# Patient Record
Sex: MALE | Race: WHITE | NOT HISPANIC OR LATINO | Employment: OTHER | URBAN - NONMETROPOLITAN AREA
[De-identification: names, ages, dates, MRNs, and addresses within clinical notes are randomized per-mention and may not be internally consistent; named-entity substitution may affect disease eponyms.]

---

## 2017-04-04 ENCOUNTER — HOSPITAL ENCOUNTER (EMERGENCY)
Facility: HOSPITAL | Age: 77
Discharge: HOME | End: 2017-04-04
Attending: EMERGENCY MEDICINE | Admitting: EMERGENCY MEDICINE

## 2017-04-04 ENCOUNTER — APPOINTMENT (OUTPATIENT)
Dept: MRI IMAGING | Facility: HOSPITAL | Age: 77
End: 2017-04-04

## 2017-04-04 VITALS
WEIGHT: 180 LBS | SYSTOLIC BLOOD PRESSURE: 108 MMHG | RESPIRATION RATE: 18 BRPM | OXYGEN SATURATION: 95 % | TEMPERATURE: 97.9 F | BODY MASS INDEX: 27.28 KG/M2 | DIASTOLIC BLOOD PRESSURE: 63 MMHG | HEART RATE: 63 BPM | HEIGHT: 68 IN

## 2017-04-04 DIAGNOSIS — M54.2 NECK PAIN: Primary | ICD-10-CM

## 2017-04-04 LAB
ALBUMIN SERPL-MCNC: 3.4 G/DL (ref 3.5–5)
ALBUMIN/GLOB SERPL: 1.1 G/DL (ref 1.1–2.5)
ALP SERPL-CCNC: 158 U/L (ref 24–120)
ALT SERPL W P-5'-P-CCNC: 18 U/L (ref 0–54)
ANION GAP SERPL CALCULATED.3IONS-SCNC: 15 MMOL/L (ref 4–13)
APTT PPP: 32.9 SECONDS (ref 24.1–34.8)
AST SERPL-CCNC: 21 U/L (ref 7–45)
BASOPHILS # BLD AUTO: 0.05 10*3/MM3 (ref 0–0.2)
BASOPHILS NFR BLD AUTO: 0.5 % (ref 0–2)
BILIRUB SERPL-MCNC: 0.2 MG/DL (ref 0.1–1)
BUN BLD-MCNC: 20 MG/DL (ref 5–21)
BUN/CREAT SERPL: 18.5 (ref 7–25)
CALCIUM SPEC-SCNC: 8.6 MG/DL (ref 8.4–10.4)
CHLORIDE SERPL-SCNC: 102 MMOL/L (ref 98–110)
CO2 SERPL-SCNC: 24 MMOL/L (ref 24–31)
CREAT BLD-MCNC: 1.08 MG/DL (ref 0.5–1.4)
DEPRECATED RDW RBC AUTO: 43.9 FL (ref 40–54)
EOSINOPHIL # BLD AUTO: 0.31 10*3/MM3 (ref 0–0.7)
EOSINOPHIL NFR BLD AUTO: 2.8 % (ref 0–4)
ERYTHROCYTE [DISTWIDTH] IN BLOOD BY AUTOMATED COUNT: 12.4 % (ref 12–15)
GFR SERPL CREATININE-BSD FRML MDRD: 67 ML/MIN/1.73
GLOBULIN UR ELPH-MCNC: 3.2 GM/DL
GLUCOSE BLD-MCNC: 125 MG/DL (ref 70–100)
HCT VFR BLD AUTO: 34.2 % (ref 40–52)
HGB BLD-MCNC: 11.6 G/DL (ref 14–18)
IMM GRANULOCYTES # BLD: 0.03 10*3/MM3 (ref 0–0.03)
IMM GRANULOCYTES NFR BLD: 0.3 % (ref 0–5)
INR PPP: 0.94 (ref 0.91–1.09)
LYMPHOCYTES # BLD AUTO: 1.89 10*3/MM3 (ref 0.72–4.86)
LYMPHOCYTES NFR BLD AUTO: 17.1 % (ref 15–45)
MCH RBC QN AUTO: 32.9 PG (ref 28–32)
MCHC RBC AUTO-ENTMCNC: 33.9 G/DL (ref 33–36)
MCV RBC AUTO: 96.9 FL (ref 82–95)
MONOCYTES # BLD AUTO: 1.28 10*3/MM3 (ref 0.19–1.3)
MONOCYTES NFR BLD AUTO: 11.6 % (ref 4–12)
NEUTROPHILS # BLD AUTO: 7.47 10*3/MM3 (ref 1.87–8.4)
NEUTROPHILS NFR BLD AUTO: 67.7 % (ref 39–78)
PLATELET # BLD AUTO: 367 10*3/MM3 (ref 130–400)
PMV BLD AUTO: 10.4 FL (ref 6–12)
POTASSIUM BLD-SCNC: 3.7 MMOL/L (ref 3.5–5.3)
PROT SERPL-MCNC: 6.6 G/DL (ref 6.3–8.7)
PROTHROMBIN TIME: 12.8 SECONDS (ref 11.9–14.6)
RBC # BLD AUTO: 3.53 10*6/MM3 (ref 4.8–5.9)
SODIUM BLD-SCNC: 141 MMOL/L (ref 135–145)
WBC NRBC COR # BLD: 11.03 10*3/MM3 (ref 4.8–10.8)

## 2017-04-04 PROCEDURE — 25010000002 DIAZEPAM PER 5 MG: Performed by: EMERGENCY MEDICINE

## 2017-04-04 PROCEDURE — 96376 TX/PRO/DX INJ SAME DRUG ADON: CPT

## 2017-04-04 PROCEDURE — 96374 THER/PROPH/DIAG INJ IV PUSH: CPT

## 2017-04-04 PROCEDURE — 85610 PROTHROMBIN TIME: CPT | Performed by: EMERGENCY MEDICINE

## 2017-04-04 PROCEDURE — 99283 EMERGENCY DEPT VISIT LOW MDM: CPT

## 2017-04-04 PROCEDURE — 85025 COMPLETE CBC W/AUTO DIFF WBC: CPT | Performed by: EMERGENCY MEDICINE

## 2017-04-04 PROCEDURE — 72141 MRI NECK SPINE W/O DYE: CPT

## 2017-04-04 PROCEDURE — 25010000002 KETOROLAC TROMETHAMINE PER 15 MG: Performed by: EMERGENCY MEDICINE

## 2017-04-04 PROCEDURE — 85730 THROMBOPLASTIN TIME PARTIAL: CPT | Performed by: EMERGENCY MEDICINE

## 2017-04-04 PROCEDURE — 25010000002 HYDROMORPHONE PER 4 MG: Performed by: EMERGENCY MEDICINE

## 2017-04-04 PROCEDURE — 80053 COMPREHEN METABOLIC PANEL: CPT | Performed by: EMERGENCY MEDICINE

## 2017-04-04 PROCEDURE — 25010000002 ONDANSETRON PER 1 MG: Performed by: EMERGENCY MEDICINE

## 2017-04-04 PROCEDURE — 96375 TX/PRO/DX INJ NEW DRUG ADDON: CPT

## 2017-04-04 RX ORDER — ONDANSETRON 2 MG/ML
4 INJECTION INTRAMUSCULAR; INTRAVENOUS ONCE
Status: COMPLETED | OUTPATIENT
Start: 2017-04-04 | End: 2017-04-04

## 2017-04-04 RX ORDER — DIAZEPAM 5 MG/ML
2.5 INJECTION, SOLUTION INTRAMUSCULAR; INTRAVENOUS ONCE
Status: COMPLETED | OUTPATIENT
Start: 2017-04-04 | End: 2017-04-04

## 2017-04-04 RX ORDER — OXYCODONE AND ACETAMINOPHEN 10; 325 MG/1; MG/1
1 TABLET ORAL EVERY 6 HOURS PRN
COMMUNITY
End: 2017-05-09

## 2017-04-04 RX ORDER — KETOROLAC TROMETHAMINE 30 MG/ML
30 INJECTION, SOLUTION INTRAMUSCULAR; INTRAVENOUS ONCE
Status: COMPLETED | OUTPATIENT
Start: 2017-04-04 | End: 2017-04-04

## 2017-04-04 RX ADMIN — HYDROMORPHONE HYDROCHLORIDE 1 MG: 1 INJECTION, SOLUTION INTRAMUSCULAR; INTRAVENOUS; SUBCUTANEOUS at 16:45

## 2017-04-04 RX ADMIN — DIAZEPAM 2.5 MG: 5 INJECTION, SOLUTION INTRAMUSCULAR; INTRAVENOUS at 19:04

## 2017-04-04 RX ADMIN — DIAZEPAM 2.5 MG: 5 INJECTION, SOLUTION INTRAMUSCULAR; INTRAVENOUS at 16:45

## 2017-04-04 RX ADMIN — KETOROLAC TROMETHAMINE 30 MG: 30 INJECTION, SOLUTION INTRAMUSCULAR at 19:06

## 2017-04-04 RX ADMIN — ONDANSETRON HYDROCHLORIDE 4 MG: 2 SOLUTION INTRAMUSCULAR; INTRAVENOUS at 16:45

## 2017-04-04 NOTE — ED PROVIDER NOTES
Subjective  neck pain  History of Present Illness  Her Moore is a 76-year-old white male who comes in today with chief complaint of neck pain.  The patient has been seen at [facility] as well as his own primary care physician.  Although he is being given medication he tells me the medication really has not helped the discomfort that he has been experiencing.  Apparently had a CT scan done.  The CT scan revealed some arthritic changes.  Mr. Moore tells me that he knows he has cervical spine stenosis and he has been able to endure that the past but just recently this pain and discomfort has caused him a lot of angst.  When his son noted that the pain seemed to be worsening he decided to bring Mr. Moore to our emergency room to be evaluated.  Upon my arrival at the bedside the patient is very hard of hearing however he appears to be very uncomfortable especially with any movement. He is not Complaining of any paresthesias in either arm or legs.  Review of Systems   Constitutional: Negative.    HENT: Negative.    Eyes: Negative.    Respiratory: Negative.    Cardiovascular: Negative.    Gastrointestinal: Negative.    Endocrine: Negative.    Genitourinary: Negative.    Musculoskeletal: Positive for myalgias and neck pain.   Skin: Negative.    Allergic/Immunologic: Negative.    Neurological: Negative.    Hematological: Negative.    Psychiatric/Behavioral: Negative.    All other systems reviewed and are negative.      Past Medical History:   Diagnosis Date   • Anemia    • Diverticulitis    • Enlarged prostate    • GERD (gastroesophageal reflux disease)    • Hypertension    • Kidney infection    • Lumbago    • Vitamin B12 deficiency    • Weak urinary stream        No Known Allergies    Past Surgical History:   Procedure Laterality Date   • ABDOMINAL SURGERY     • HERNIA REPAIR     • ROTATOR CUFF REPAIR     • TONSILLECTOMY         Family History   Problem Relation Age of Onset   • Adopted: Yes       Social History      Social History   • Marital status: Legally      Spouse name: N/A   • Number of children: N/A   • Years of education: N/A     Social History Main Topics   • Smoking status: Never Smoker   • Smokeless tobacco: None   • Alcohol use No   • Drug use: None   • Sexual activity: Not Asked     Other Topics Concern   • None     Social History Narrative           Objective   Physical Exam   Constitutional: He is oriented to person, place, and time. He appears well-developed and well-nourished.   HENT:   Head: Normocephalic and atraumatic.   Right Ear: External ear normal.   Left Ear: External ear normal.   Nose: Nose normal.   Mouth/Throat: Oropharynx is clear and moist.   Eyes: Conjunctivae and EOM are normal. Pupils are equal, round, and reactive to light. Right eye exhibits no discharge. Left eye exhibits no discharge.   Neck: No JVD present. No thyromegaly present.   Significant tenderness to palpation entire neck area.  Noted pain on range of motion.      Cardiovascular: Normal rate, regular rhythm, normal heart sounds and intact distal pulses.  Exam reveals no friction rub.    No murmur heard.  Pulmonary/Chest: Effort normal and breath sounds normal. No stridor. No respiratory distress.   Abdominal: Soft. Bowel sounds are normal. He exhibits no distension. There is no tenderness.   Musculoskeletal: Normal range of motion. He exhibits no edema or deformity.   Lymphadenopathy:     He has no cervical adenopathy.   Neurological: He is alert and oriented to person, place, and time. He has normal reflexes. No cranial nerve deficit.   Skin: Skin is warm and dry. No rash noted.   Psychiatric: Judgment normal.   Nursing note and vitals reviewed.      Procedures         ED Course  ED Course   Comment By Time   I have spoken with Dr. Galindo concerning Mr. Moore's eyes scan.  He will see him tomorrow in the office.  Upon recheck there is some overall improvement but not a maximal amount.  We will try some Toradol as  well as some additional Valium. Ericka Btoello MD 04/04 7267   I took over from Dr. Botello at shift change.  Told the patient and family that his blood tests look okay.  They understand that they are to call Dr. Galindo in AM to be worked in. Gerber Hough Jr., MD 04/04 1954                  MDM  Number of Diagnoses or Management Options  Neck pain: new and requires workup     Amount and/or Complexity of Data Reviewed  Clinical lab tests: ordered and reviewed  Tests in the radiology section of CPT®: ordered and reviewed  Discuss the patient with other providers: yes    Risk of Complications, Morbidity, and/or Mortality  Presenting problems: moderate  Diagnostic procedures: moderate  Management options: moderate    Patient Progress  Patient progress: stable      Final diagnoses:   Neck pain            Ericka Botello MD  04/05/17 1100

## 2017-04-05 ENCOUNTER — OFFICE VISIT (OUTPATIENT)
Dept: NEUROSURGERY | Facility: CLINIC | Age: 77
End: 2017-04-05

## 2017-04-05 ENCOUNTER — LAB (OUTPATIENT)
Dept: LAB | Facility: HOSPITAL | Age: 77
End: 2017-04-05
Attending: NEUROLOGICAL SURGERY

## 2017-04-05 ENCOUNTER — HOSPITAL ENCOUNTER (OUTPATIENT)
Dept: GENERAL RADIOLOGY | Facility: HOSPITAL | Age: 77
Discharge: HOME | End: 2017-04-05
Attending: NEUROLOGICAL SURGERY | Admitting: NEUROLOGICAL SURGERY

## 2017-04-05 VITALS
BODY MASS INDEX: 27.28 KG/M2 | SYSTOLIC BLOOD PRESSURE: 120 MMHG | WEIGHT: 180 LBS | DIASTOLIC BLOOD PRESSURE: 62 MMHG | HEIGHT: 68 IN

## 2017-04-05 DIAGNOSIS — Z86.19 HISTORY OF INFECTION: ICD-10-CM

## 2017-04-05 DIAGNOSIS — Z78.9 NONSMOKER: ICD-10-CM

## 2017-04-05 DIAGNOSIS — M47.812 CERVICAL SPONDYLOSIS WITHOUT MYELOPATHY: ICD-10-CM

## 2017-04-05 DIAGNOSIS — E66.3 OVERWEIGHT FOR HEIGHT: ICD-10-CM

## 2017-04-05 DIAGNOSIS — M47.812 CERVICAL SPONDYLOSIS WITHOUT MYELOPATHY: Primary | ICD-10-CM

## 2017-04-05 LAB
BASOPHILS # BLD AUTO: 0.06 10*3/MM3 (ref 0–0.2)
BASOPHILS NFR BLD AUTO: 0.6 % (ref 0–2)
CRP SERPL-MCNC: 6.11 MG/DL (ref 0–0.99)
DEPRECATED RDW RBC AUTO: 44.5 FL (ref 40–54)
EOSINOPHIL # BLD AUTO: 0.48 10*3/MM3 (ref 0–0.7)
EOSINOPHIL NFR BLD AUTO: 4.6 % (ref 0–4)
ERYTHROCYTE [DISTWIDTH] IN BLOOD BY AUTOMATED COUNT: 12.4 % (ref 12–15)
ERYTHROCYTE [SEDIMENTATION RATE] IN BLOOD: 18 MM/HR (ref 0–15)
HCT VFR BLD AUTO: 36 % (ref 40–52)
HGB BLD-MCNC: 12.2 G/DL (ref 14–18)
IMM GRANULOCYTES # BLD: 0.02 10*3/MM3 (ref 0–0.03)
IMM GRANULOCYTES NFR BLD: 0.2 % (ref 0–5)
LYMPHOCYTES # BLD AUTO: 1.98 10*3/MM3 (ref 0.72–4.86)
LYMPHOCYTES NFR BLD AUTO: 19.1 % (ref 15–45)
MCH RBC QN AUTO: 33.3 PG (ref 28–32)
MCHC RBC AUTO-ENTMCNC: 33.9 G/DL (ref 33–36)
MCV RBC AUTO: 98.4 FL (ref 82–95)
MONOCYTES # BLD AUTO: 1.09 10*3/MM3 (ref 0.19–1.3)
MONOCYTES NFR BLD AUTO: 10.5 % (ref 4–12)
NEUTROPHILS # BLD AUTO: 6.74 10*3/MM3 (ref 1.87–8.4)
NEUTROPHILS NFR BLD AUTO: 65 % (ref 39–78)
PLATELET # BLD AUTO: 406 10*3/MM3 (ref 130–400)
PMV BLD AUTO: 10.8 FL (ref 6–12)
RBC # BLD AUTO: 3.66 10*6/MM3 (ref 4.8–5.9)
WBC NRBC COR # BLD: 10.37 10*3/MM3 (ref 4.8–10.8)

## 2017-04-05 PROCEDURE — 36415 COLL VENOUS BLD VENIPUNCTURE: CPT

## 2017-04-05 PROCEDURE — 85651 RBC SED RATE NONAUTOMATED: CPT | Performed by: NEUROLOGICAL SURGERY

## 2017-04-05 PROCEDURE — 72040 X-RAY EXAM NECK SPINE 2-3 VW: CPT

## 2017-04-05 PROCEDURE — 86140 C-REACTIVE PROTEIN: CPT | Performed by: NEUROLOGICAL SURGERY

## 2017-04-05 PROCEDURE — 99203 OFFICE O/P NEW LOW 30 MIN: CPT | Performed by: NEUROLOGICAL SURGERY

## 2017-04-05 PROCEDURE — 85025 COMPLETE CBC W/AUTO DIFF WBC: CPT | Performed by: NEUROLOGICAL SURGERY

## 2017-04-05 RX ORDER — MELOXICAM 15 MG/1
TABLET ORAL
Qty: 60 TABLET | Refills: 0 | Status: SHIPPED | OUTPATIENT
Start: 2017-04-05 | End: 2017-04-06

## 2017-04-05 NOTE — ED PROVIDER NOTES
Subjective   History of Present Illness    Review of Systems    Past Medical History:   Diagnosis Date   • Anemia    • Diverticulitis    • Enlarged prostate    • GERD (gastroesophageal reflux disease)    • Hypertension    • Kidney infection    • Lumbago    • Vitamin B12 deficiency    • Weak urinary stream        No Known Allergies    Past Surgical History:   Procedure Laterality Date   • ABDOMINAL SURGERY     • HERNIA REPAIR     • ROTATOR CUFF REPAIR     • TONSILLECTOMY         Family History   Problem Relation Age of Onset   • Adopted: Yes       Social History     Social History   • Marital status: Legally      Spouse name: N/A   • Number of children: N/A   • Years of education: N/A     Social History Main Topics   • Smoking status: Never Smoker   • Smokeless tobacco: None   • Alcohol use No   • Drug use: None   • Sexual activity: Not Asked     Other Topics Concern   • None     Social History Narrative           Objective   Physical Exam    Procedures         ED Course  ED Course   Comment By Time   I have spoken with Dr. Galindo concerning Mr. Moore's eyes scan.  He will see him tomorrow in the office.  Upon recheck there is some overall improvement but not a maximal amount.  We will try some Toradol as well as some additional Valium. Ericka Botello MD 04/04 1816   I took over from Dr. Botello at shift change.  Told the patient and family that his blood tests look okay.  They understand that they are to call Dr. Galindo in AM to be worked in. Gerber Hough Jr., MD 04/04 1954                  [facility]    Final diagnoses:   Neck pain            Gerber Hough Jr., MD  04/04/17 1954

## 2017-04-05 NOTE — PROGRESS NOTES
Patient: Spencre Moore  : 1940    Primary Care Provider: Miguel Ángel Hinson DO    Referring Provider: No ref. provider found [facility] emergency room doctor Ayan        History    Chief Complaint: [] Neck pain    History of Present Illness: [] He is obtained from the patient and corrected frequently by his son-in-law present.  The patient has left-sided neck pain posteriorly for 1 week.  No fever no rash.  He states the left side of his neck was tender and swollen last night.  As general weakness in his arms difficulties with his gait with previous knee surgery and stiffness of the right knee memory problems for 2 years history of trigeminal neuralgia 3 of bilateral rotator cuff surgery total knee operation on the left hypertension and significant arthritis.  The chronic urinary infection.   MRI was done yesterday evening showing some very very nonspecific swelling in the soft tissues posterior to the facet at C2 on the left.  No radicular symptoms    Review of Systems   Constitutional: Negative.    HENT: Positive for hearing loss.         Edentulous   Eyes: Negative.         Prior cataract surgery bilateral   Respiratory: Negative.    Cardiovascular: Negative.    Gastrointestinal: Negative.         Gastric reflux   Endocrine: Negative.    Genitourinary: Negative.         Chronic urinary infection   Musculoskeletal: Positive for arthralgias, back pain and neck pain.   Skin: Negative.    Allergic/Immunologic: Negative.    Neurological: Negative.         Trigeminal neuralgia treated with stereotactic radiation therapy; Wale memory in the last 2 years   Hematological: Negative.    Psychiatric/Behavioral: Negative.         Depression   All other systems reviewed and are negative.      Past Medical History:   Diagnosis Date   • Anemia    • Arthritis    • Diverticulitis    • Enlarged prostate    • GERD (gastroesophageal reflux disease)    • Hypertension    • Kidney infection    • Lumbago    • Vitamin  B12 deficiency    • Weak urinary stream        Family History   Problem Relation Age of Onset   • Adopted: Yes       Social History   Substance Use Topics   • Smoking status: Never Smoker   • Smokeless tobacco: None   • Alcohol use No       Past Surgical History:   Procedure Laterality Date   • ABDOMINAL SURGERY     • CATARACT EXTRACTION Left    • HERNIA REPAIR     • REPLACEMENT TOTAL KNEE Left    • ROTATOR CUFF REPAIR     • TONSILLECTOMY         Biaxin [clarithromycin] and Parafon forte dsc [chlorzoxazone]      Physical Exam:   [] Awake alert and oriented fairly well.  He does need a lot of help and relating his medical history to me.  Right optic disc is flat but very pale the left could not be seen.  Oral pharynx is clear but edentulous.  Impaired range of motion of the neck with slight tenderness on the left side.  There are no skin lesions noted.  No mass.  No bruit.  Breath sounds are clear.  Harsh murmur throughout the chest.  Abdomen soft.  He has impaired motion of both shoulders cannot lift his arms above horizontal.  He has stiffness of the right knee cannot bend or flex it properly.  His cranial nerves intact except for bilateral hearing loss.  Fair equal strength of extremities.  Reflexes 1+ upper extremities unobtainable lower.  Sensory intact.  Positive Tinel's left hand with mild atrophy in the small muscles of left hand.  He is a left handed person.  Cerebeller intact.  No clonus and Ermias's negative    Medical Decision Making    Data Review:   [] MRI of cervical spine carried out shows degenerative spondylolisthesis C4 5 relative spinal stenosis C4 through 7.  Cervical spondylosis.  No destructive lesion.  He had description of slight soft tissue edema posterior to the facet C2 3 on the left.  This is mild.  There is no mass associated.  No lytic nor blastic lesion associated.  Etiology may be degenerative.    Diagnosis:   []  Acute cervical pain with underlying cervical spondylosis and  degenerative spondylolisthesis spinal stenosis  Treatment Options:   []    1. Cervical spondylosis without myelopathy

## 2017-04-06 ENCOUNTER — DOCUMENTATION (OUTPATIENT)
Dept: NEUROSURGERY | Facility: CLINIC | Age: 77
End: 2017-04-06

## 2017-04-06 DIAGNOSIS — M47.812 CERVICAL SPONDYLOSIS WITHOUT MYELOPATHY: Primary | ICD-10-CM

## 2017-04-06 RX ORDER — MELOXICAM 7.5 MG/1
7.5 TABLET ORAL 2 TIMES DAILY PRN
COMMUNITY
End: 2017-04-06 | Stop reason: SDUPTHER

## 2017-04-06 RX ORDER — MELOXICAM 7.5 MG/1
7.5 TABLET ORAL 2 TIMES DAILY PRN
Qty: 60 TABLET | Refills: 2 | Status: SHIPPED | OUTPATIENT
Start: 2017-04-06 | End: 2018-02-05

## 2017-04-06 NOTE — TELEPHONE ENCOUNTER
Regarding Mobic Rx 15 mg was written for bid, changed to Mobic 7.5 mg bid. Patient's insurance would not pay for 15 mg bid, 15 mg was maximum amount pt can take per day.

## 2017-04-06 NOTE — PROGRESS NOTES
Rec a message from [facility] stating 30 mg per day is all we can order for Mobic.  Patient's insurance would not authorize it for any more. Put Rx in for Dr. Galindo to sign off on for Mobic 7.5 mg 1 bid to be sent to pharmacy.

## 2017-04-17 ENCOUNTER — HOSPITAL ENCOUNTER (OUTPATIENT)
Dept: MRI IMAGING | Facility: HOSPITAL | Age: 77
Discharge: HOME | End: 2017-04-17
Attending: NEUROLOGICAL SURGERY | Admitting: NEUROLOGICAL SURGERY

## 2017-04-17 DIAGNOSIS — M47.812 CERVICAL SPONDYLOSIS WITHOUT MYELOPATHY: ICD-10-CM

## 2017-04-17 LAB — CREAT BLDA-MCNC: 1.1 MG/DL (ref 0.6–1.3)

## 2017-04-17 PROCEDURE — 82565 ASSAY OF CREATININE: CPT

## 2017-04-17 PROCEDURE — 72156 MRI NECK SPINE W/O & W/DYE: CPT

## 2017-04-17 PROCEDURE — 0 GADOBENATE DIMEGLUMINE 529 MG/ML SOLUTION: Performed by: NEUROLOGICAL SURGERY

## 2017-04-17 PROCEDURE — A9577 INJ MULTIHANCE: HCPCS | Performed by: NEUROLOGICAL SURGERY

## 2017-04-17 RX ADMIN — GADOBENATE DIMEGLUMINE 16 ML: 529 INJECTION, SOLUTION INTRAVENOUS at 15:30

## 2017-04-22 DIAGNOSIS — N40.0 ENLARGED PROSTATE: ICD-10-CM

## 2017-04-22 DIAGNOSIS — R39.12 WEAK URINARY STREAM: ICD-10-CM

## 2017-04-24 RX ORDER — TAMSULOSIN HYDROCHLORIDE 0.4 MG/1
CAPSULE ORAL
Qty: 30 CAPSULE | Refills: 0 | Status: SHIPPED | OUTPATIENT
Start: 2017-04-24 | End: 2017-05-11 | Stop reason: SDUPTHER

## 2017-04-27 DIAGNOSIS — R39.12 WEAK URINARY STREAM: ICD-10-CM

## 2017-04-27 DIAGNOSIS — N40.0 ENLARGED PROSTATE: ICD-10-CM

## 2017-04-27 RX ORDER — FINASTERIDE 5 MG/1
TABLET, FILM COATED ORAL
Qty: 30 TABLET | Refills: 0 | Status: SHIPPED | OUTPATIENT
Start: 2017-04-27 | End: 2017-05-11 | Stop reason: SDUPTHER

## 2017-05-02 ENCOUNTER — TELEPHONE (OUTPATIENT)
Dept: UROLOGY | Facility: CLINIC | Age: 77
End: 2017-05-02

## 2017-05-09 ENCOUNTER — OFFICE VISIT (OUTPATIENT)
Dept: NEUROLOGY | Facility: CLINIC | Age: 77
End: 2017-05-09

## 2017-05-09 VITALS
BODY MASS INDEX: 27.28 KG/M2 | SYSTOLIC BLOOD PRESSURE: 128 MMHG | HEIGHT: 68 IN | HEART RATE: 64 BPM | WEIGHT: 180 LBS | DIASTOLIC BLOOD PRESSURE: 58 MMHG

## 2017-05-09 DIAGNOSIS — G47.419 PRIMARY NARCOLEPSY WITHOUT CATAPLEXY: ICD-10-CM

## 2017-05-09 DIAGNOSIS — G50.0 TRIGEMINAL NEURALGIA OF RIGHT SIDE OF FACE: Primary | ICD-10-CM

## 2017-05-09 PROCEDURE — 99204 OFFICE O/P NEW MOD 45 MIN: CPT | Performed by: PSYCHIATRY & NEUROLOGY

## 2017-05-09 RX ORDER — METHYLPHENIDATE HYDROCHLORIDE 20 MG/1
20 TABLET ORAL 4 TIMES DAILY
Qty: 120 TABLET | Refills: 0 | Status: SHIPPED | OUTPATIENT
Start: 2017-05-09 | End: 2017-06-05 | Stop reason: SDUPTHER

## 2017-05-09 RX ORDER — TRIAMCINOLONE ACETONIDE 1 MG/G
CREAM TOPICAL
COMMUNITY
Start: 2017-05-08 | End: 2017-10-02

## 2017-05-09 RX ORDER — OXYCODONE AND ACETAMINOPHEN 7.5; 325 MG/1; MG/1
1 TABLET ORAL EVERY 6 HOURS PRN
COMMUNITY

## 2017-05-10 ENCOUNTER — OFFICE VISIT (OUTPATIENT)
Dept: NEUROSURGERY | Facility: CLINIC | Age: 77
End: 2017-05-10

## 2017-05-10 VITALS
WEIGHT: 180 LBS | BODY MASS INDEX: 27.28 KG/M2 | DIASTOLIC BLOOD PRESSURE: 70 MMHG | SYSTOLIC BLOOD PRESSURE: 140 MMHG | HEIGHT: 68 IN

## 2017-05-10 DIAGNOSIS — M47.812 CERVICAL SPONDYLOSIS WITHOUT MYELOPATHY: Primary | ICD-10-CM

## 2017-05-10 DIAGNOSIS — Z78.9 NONSMOKER: ICD-10-CM

## 2017-05-10 DIAGNOSIS — E66.3 OVERWEIGHT FOR HEIGHT: ICD-10-CM

## 2017-05-10 PROCEDURE — 99213 OFFICE O/P EST LOW 20 MIN: CPT | Performed by: NEUROLOGICAL SURGERY

## 2017-05-11 DIAGNOSIS — R39.12 WEAK URINARY STREAM: ICD-10-CM

## 2017-05-11 DIAGNOSIS — N40.0 ENLARGED PROSTATE: ICD-10-CM

## 2017-05-11 RX ORDER — TAMSULOSIN HYDROCHLORIDE 0.4 MG/1
CAPSULE ORAL
Qty: 30 CAPSULE | Refills: 0 | Status: SHIPPED | OUTPATIENT
Start: 2017-05-11 | End: 2017-07-26 | Stop reason: SDUPTHER

## 2017-05-11 RX ORDER — FINASTERIDE 5 MG/1
TABLET, FILM COATED ORAL
Qty: 30 TABLET | Refills: 0 | Status: SHIPPED | OUTPATIENT
Start: 2017-05-11 | End: 2017-07-26 | Stop reason: SDUPTHER

## 2017-06-05 NOTE — TELEPHONE ENCOUNTER
Mr. Moore's POA called today for a refill on Mr. Moore's Ritalin. I did tell him I would have it ready to  tomorrow after 1pm. He did voice understanding.     NEFTALI printed and reviewed.

## 2017-06-06 ENCOUNTER — OFFICE VISIT (OUTPATIENT)
Dept: UROLOGY | Facility: CLINIC | Age: 77
End: 2017-06-06

## 2017-06-06 VITALS
DIASTOLIC BLOOD PRESSURE: 64 MMHG | HEIGHT: 68 IN | BODY MASS INDEX: 27.37 KG/M2 | SYSTOLIC BLOOD PRESSURE: 142 MMHG | WEIGHT: 180.6 LBS | TEMPERATURE: 98.2 F

## 2017-06-06 DIAGNOSIS — N39.0 URINARY TRACT INFECTION, SITE UNSPECIFIED: Primary | ICD-10-CM

## 2017-06-06 DIAGNOSIS — N40.1 BENIGN NON-NODULAR PROSTATIC HYPERPLASIA WITH LOWER URINARY TRACT SYMPTOMS: ICD-10-CM

## 2017-06-06 LAB
BILIRUB BLD-MCNC: NEGATIVE MG/DL
CLARITY, POC: CLEAR
COLOR UR: YELLOW
GLUCOSE UR STRIP-MCNC: NEGATIVE MG/DL
KETONES UR QL: ABNORMAL
LEUKOCYTE EST, POC: ABNORMAL
NITRITE UR-MCNC: POSITIVE MG/ML
PH UR: 5.5 [PH] (ref 5–8)
PROT UR STRIP-MCNC: ABNORMAL MG/DL
RBC # UR STRIP: ABNORMAL /UL
SP GR UR: 1.03 (ref 1–1.03)
UROBILINOGEN UR QL: NORMAL

## 2017-06-06 PROCEDURE — 81003 URINALYSIS AUTO W/O SCOPE: CPT | Performed by: UROLOGY

## 2017-06-06 PROCEDURE — 99213 OFFICE O/P EST LOW 20 MIN: CPT | Performed by: UROLOGY

## 2017-06-06 NOTE — PROGRESS NOTES
Mr. Moore is 76 y.o. male    Chief Complaint   Patient presents with   • Urinary Tract Infection   • Benign Prostatic Hypertrophy       Urinary Tract Infection    This is a recurrent problem. The current episode started more than 1 month ago. The problem occurs intermittently. The problem has been resolved. The patient is experiencing no pain. There has been no fever. There is no history of pyelonephritis. Associated symptoms include frequency. Pertinent negatives include no chills, flank pain, hematuria or urgency. He has tried antibiotics for the symptoms. The treatment provided significant relief. His past medical history is significant for recurrent UTIs.       The following portions of the patient's history were reviewed and updated as appropriate: allergies, current medications, past family history, past medical history, past social history, past surgical history and problem list.    Review of Systems   Constitutional: Negative for chills and fever.   Gastrointestinal: Negative for abdominal pain, anal bleeding and blood in stool.   Genitourinary: Positive for frequency. Negative for difficulty urinating, flank pain, hematuria and urgency.         Current Outpatient Prescriptions:   •  aspirin 81 MG EC tablet, Take 81 mg by mouth Daily., Disp: , Rfl:   •  atorvastatin (LIPITOR) 10 MG tablet, , Disp: , Rfl:   •  carBAMazepine (TEGretol) 200 MG tablet, TAKE ONE TABLET BY MOUTH TWICE DAILY, Disp: , Rfl: 3  •  diclofenac (VOLTAREN) 1 % gel gel, , Disp: , Rfl:   •  finasteride (PROSCAR) 5 MG tablet, TAKE ONE TABLET BY MOUTH ONCE DAILY, Disp: 30 tablet, Rfl: 0  •  FLUoxetine (PROzac) 40 MG capsule, , Disp: , Rfl:   •  meloxicam (MOBIC) 7.5 MG tablet, Take 1 tablet by mouth 2 (Two) Times a Day As Needed for Moderate Pain (4-6). Changed from 15 mg to 7.5 mg, Disp: 60 tablet, Rfl: 2  •  methylphenidate (RITALIN) 20 MG tablet, Take 1 tablet by mouth 4 (Four) Times a Day., Disp: 120 tablet, Rfl: 0  •  omeprazole  "(priLOSEC) 40 MG capsule, , Disp: , Rfl:   •  oxyCODONE-acetaminophen (PERCOCET) 5-325 MG per tablet, Take 1 tablet by mouth Every 6 (Six) Hours As Needed. 1 tablet 4 times a day, Disp: , Rfl:   •  tamsulosin (FLOMAX) 0.4 MG capsule 24 hr capsule, TAKE ONE CAPSULE BY MOUTH ONCE DAILY, Disp: 30 capsule, Rfl: 0  •  terazosin (HYTRIN) 2 MG capsule, , Disp: , Rfl:   •  triamcinolone (KENALOG) 0.1 % cream, , Disp: , Rfl:   •  verapamil SR (CALAN-SR) 180 MG CR tablet, , Disp: , Rfl:     Past Medical History:   Diagnosis Date   • Anemia    • Arthritis    • Diverticulitis    • Enlarged prostate    • GERD (gastroesophageal reflux disease)    • Hypertension    • Kidney infection    • Lumbago    • Narcolepsy    • Vitamin B12 deficiency    • Weak urinary stream        Past Surgical History:   Procedure Laterality Date   • ABDOMINAL SURGERY     • CATARACT EXTRACTION Left    • HAND SURGERY Left    • HERNIA REPAIR     • REPLACEMENT TOTAL KNEE Left    • ROTATOR CUFF REPAIR     • TONSILLECTOMY         Social History     Social History   • Marital status: Legally      Spouse name: N/A   • Number of children: N/A   • Years of education: N/A     Social History Main Topics   • Smoking status: Never Smoker   • Smokeless tobacco: Never Used   • Alcohol use No   • Drug use: No   • Sexual activity: Defer     Other Topics Concern   • None     Social History Narrative       Family History   Problem Relation Age of Onset   • Adopted: Yes   • Family history unknown: Yes       Objective    /64  Temp 98.2 °F (36.8 °C)  Ht 68\" (172.7 cm)  Wt 180 lb 9.6 oz (81.9 kg)  BMI 27.46 kg/m2    Physical Exam    Hospital Outpatient Visit on 04/17/2017   Component Date Value Ref Range Status   • Creatinine 04/17/2017 1.10  0.60 - 1.30 mg/dL Final    Serial Number: [id number]    : [id number]       Results for orders placed or performed in visit on 06/06/17   POC Urinalysis Dipstick, Automated   Result Value Ref Range    Color Yellow Yellow, " Straw, Dark Yellow, Tarci    Clarity, UA Clear Clear    Glucose, UA Negative Negative, 1000 mg/dL (3+) mg/dL    Bilirubin Negative Negative    Ketones, UA Trace (A) Negative    Specific Gravity  1.030 1.005 - 1.030    Blood, UA Moderate (A) Negative    pH, Urine 5.5 5.0 - 8.0    Protein, POC 2+ (A) Negative mg/dL    Urobilinogen, UA Normal Normal    Leukocytes Small (1+) (A) Negative    Nitrite, UA Positive (A) Negative     Assessment and Plan    Spencer was seen today for urinary tract infection and benign prostatic hypertrophy.    Diagnoses and all orders for this visit:    Urinary tract infection, site unspecified  -     POC Urinalysis Dipstick, Automated    Benign non-nodular prostatic hyperplasia with lower urinary tract symptoms    Continue with current Flomax and finasteride.  He does have some mild lower urinary tract symptoms.  I do not think he is a good candidate for any  surgical procedure that is nonemergent.  He would be at high risk for sepsis given his multi-drug-resistant urinary tract infections.  He has not had any infections since last seen him.  Again his urine does look abnormal today but he is asymptomatic.  In agreement with infectious disease about not treating asymptomatic bacteriuria at this point.  He is discussing at this point a possible hip replacement.  He will likely need antibiotics to clear any active.  His urine prior to this procedure.  I encouraged him to see infectious disease prior and will have this discussion with his orthopedic surgeon if necessary.  I will see him back in a year unless he needs me otherwise.    EMR Dragon/Transcription disclaimer:  Much of this encounter note is an electronic transcription/translation of spoken language to printed text. The electronic translation of spoken language may permit erroneous, or at times, nonsensical words or phrases to be inadvertently transcribed; although I have reviewed the note for such errors, some may still exist.

## 2017-06-08 RX ORDER — METHYLPHENIDATE HYDROCHLORIDE 20 MG/1
20 TABLET ORAL 4 TIMES DAILY
Qty: 120 TABLET | Refills: 0 | Status: SHIPPED | OUTPATIENT
Start: 2017-06-08 | End: 2017-08-04 | Stop reason: SDUPTHER

## 2017-06-08 RX ORDER — METHYLPHENIDATE HYDROCHLORIDE 20 MG/1
20 TABLET ORAL 4 TIMES DAILY
Qty: 120 TABLET | Refills: 0 | Status: SHIPPED | OUTPATIENT
Start: 2017-06-08 | End: 2017-08-04

## 2017-06-14 ENCOUNTER — TELEPHONE (OUTPATIENT)
Dept: NEUROLOGY | Facility: CLINIC | Age: 77
End: 2017-06-14

## 2017-06-14 NOTE — TELEPHONE ENCOUNTER
Mr Larsen called to let me know that he has not received the Ritalin script that he had requested I mail to him. I did tell him that we would not be able to replace the script at this time. I told him that I will discuss the matter with Dr Shaffer to see what he says and I will call MR. Larsen back this afternoon around 3:00. He did voice understanding.

## 2017-06-14 NOTE — TELEPHONE ENCOUNTER
Mr. Larsen came by and was asking about the Ritalin script. I told him what was in your note today and would call after 3:00.

## 2017-06-15 ENCOUNTER — TELEPHONE (OUTPATIENT)
Dept: NEUROLOGY | Facility: CLINIC | Age: 77
End: 2017-06-15

## 2017-06-15 NOTE — TELEPHONE ENCOUNTER
Mr. Larsen called regarding Ritalin script that he has not received in the mail. I spoke with Romeo and she states that Dr. Shaffer could not write another script. Mr. Larsen was very upset with the office. I told him I would talk with my manager, Karen and call him back. I talked with Karen and she says it is true that Dr. Shaffer could not re-write the script. She suggested that he contact the PCP or urgent care to see if they can give an emergency supply until scripts are received. She will talk with the office and no controlled scripts will be mailed from now on. I called Mr. Larsen back and explained the options and apologized for the trouble. He was calm and voiced understanding.

## 2017-06-24 DIAGNOSIS — M47.812 CERVICAL SPONDYLOSIS WITHOUT MYELOPATHY: ICD-10-CM

## 2017-06-26 RX ORDER — MELOXICAM 7.5 MG/1
TABLET ORAL
Qty: 60 TABLET | Refills: 0 | OUTPATIENT
Start: 2017-06-26

## 2017-07-25 ENCOUNTER — TELEPHONE (OUTPATIENT)
Dept: UROLOGY | Facility: CLINIC | Age: 77
End: 2017-07-25

## 2017-07-25 DIAGNOSIS — R39.12 WEAK URINARY STREAM: ICD-10-CM

## 2017-07-25 DIAGNOSIS — N40.0 ENLARGED PROSTATE: ICD-10-CM

## 2017-07-25 NOTE — TELEPHONE ENCOUNTER
Pt POA called for refill on Finasteride 5mg and Tamsulosin 0.4mg to be called to [facility] in [place]. He would like refills for a year.

## 2017-07-26 RX ORDER — FINASTERIDE 5 MG/1
5 TABLET, FILM COATED ORAL DAILY
Qty: 90 TABLET | Refills: 3 | Status: SHIPPED | OUTPATIENT
Start: 2017-07-26 | End: 2018-06-06 | Stop reason: SDUPTHER

## 2017-07-26 RX ORDER — FINASTERIDE 5 MG/1
TABLET, FILM COATED ORAL
Qty: 90 TABLET | Refills: 3 | Status: CANCELLED | OUTPATIENT
Start: 2017-07-26

## 2017-07-26 RX ORDER — TAMSULOSIN HYDROCHLORIDE 0.4 MG/1
CAPSULE ORAL
Qty: 90 CAPSULE | Refills: 3 | Status: SHIPPED | OUTPATIENT
Start: 2017-07-26 | End: 2018-06-06 | Stop reason: SDUPTHER

## 2017-08-04 ENCOUNTER — OFFICE VISIT (OUTPATIENT)
Dept: NEUROLOGY | Facility: CLINIC | Age: 77
End: 2017-08-04

## 2017-08-04 VITALS
DIASTOLIC BLOOD PRESSURE: 80 MMHG | HEIGHT: 68 IN | HEART RATE: 70 BPM | BODY MASS INDEX: 28.34 KG/M2 | WEIGHT: 187 LBS | SYSTOLIC BLOOD PRESSURE: 140 MMHG

## 2017-08-04 DIAGNOSIS — G50.0 TRIGEMINAL NEURALGIA OF RIGHT SIDE OF FACE: Primary | ICD-10-CM

## 2017-08-04 DIAGNOSIS — G47.419 PRIMARY NARCOLEPSY WITHOUT CATAPLEXY: ICD-10-CM

## 2017-08-04 PROCEDURE — 99213 OFFICE O/P EST LOW 20 MIN: CPT | Performed by: PHYSICIAN ASSISTANT

## 2017-08-04 RX ORDER — METHYLPHENIDATE HYDROCHLORIDE 20 MG/1
20 TABLET ORAL 4 TIMES DAILY
Qty: 120 TABLET | Refills: 0 | Status: SHIPPED | OUTPATIENT
Start: 2017-08-04 | End: 2017-09-11 | Stop reason: SDUPTHER

## 2017-08-04 RX ORDER — METHYLPHENIDATE HYDROCHLORIDE 20 MG/1
20 TABLET ORAL 4 TIMES DAILY
Qty: 120 TABLET | Refills: 0 | Status: CANCELLED | OUTPATIENT
Start: 2017-08-04

## 2017-08-04 RX ORDER — CARBAMAZEPINE 200 MG/1
200 TABLET ORAL 2 TIMES DAILY
COMMUNITY
End: 2017-11-03 | Stop reason: SDUPTHER

## 2017-08-09 NOTE — PROGRESS NOTES
Subjective   Spencer Moore is a 77 y.o. male is here today for follow-up.    Facial Pain   This is a chronic problem. The current episode started more than 1 year ago. The problem occurs intermittently. The problem has been waxing and waning. Associated symptoms include headaches. The symptoms are aggravated by exertion, stress and eating. He has tried rest (Gamma knife, medications) for the symptoms. The treatment provided significant relief.   Neurologic Problem   The patient's primary symptoms include an altered mental status. Primary symptoms comment: Narcolepsy and cataplexy. This is a chronic problem. The current episode started more than 1 year ago. The neurological problem developed suddenly. The problem is unchanged. There was no focality noted. Associated symptoms include headaches. Past treatments include medication. The treatment provided significant relief.       The following portions of the patient's history were reviewed and updated as appropriate: allergies, current medications, past family history, past medical history, past social history, past surgical history and problem list.    Review of Systems   Constitutional: Negative.    HENT: Positive for hearing loss.    Eyes: Negative.    Respiratory: Negative.    Cardiovascular: Negative.    Gastrointestinal: Negative.    Endocrine: Negative.    Genitourinary: Positive for difficulty urinating and frequency.   Musculoskeletal: Positive for gait problem.   Allergic/Immunologic: Negative.    Neurological: Positive for headaches. Negative for facial asymmetry and speech difficulty.   Hematological: Negative.    Psychiatric/Behavioral: Positive for sleep disturbance.       Objective   Physical Exam   Constitutional: He is oriented to person, place, and time. Vital signs are normal. He appears well-developed and well-nourished. No distress.   HENT:   Head: Normocephalic and atraumatic.   Right Ear: External ear normal. Decreased hearing is noted.   Left  Ear: External ear normal. Decreased hearing is noted.   Nose: Nose normal.   Mouth/Throat: Uvula is midline, oropharynx is clear and moist and mucous membranes are normal.   Eyes: Conjunctivae, EOM and lids are normal. Pupils are equal, round, and reactive to light. No scleral icterus.   Neck: Normal range of motion and phonation normal. No spinous process tenderness and no muscular tenderness present. Carotid bruit is not present. Normal range of motion present. No thyroid mass and no thyromegaly present.   Cardiovascular: Normal rate, regular rhythm, S1 normal, S2 normal and normal heart sounds.    No murmur heard.  Pulmonary/Chest: Effort normal and breath sounds normal. No stridor. No respiratory distress. He has no wheezes. He has no rales.   Musculoskeletal: He exhibits no edema.        Lumbar back: He exhibits decreased range of motion and pain.   Lymphadenopathy:     He has no cervical adenopathy.   Neurological: He is alert and oriented to person, place, and time. He has normal strength. He displays no atrophy and no tremor. No cranial nerve deficit or sensory deficit. He exhibits normal muscle tone. Gait abnormal. Coordination normal. GCS eye subscore is 4. GCS verbal subscore is 5. GCS motor subscore is 6.   Reflex Scores:       Tricep reflexes are 2+ on the right side and 2+ on the left side.       Bicep reflexes are 2+ on the right side and 2+ on the left side.       Brachioradialis reflexes are 2+ on the right side and 2+ on the left side.       Patellar reflexes are 2+ on the right side and 2+ on the left side.       Achilles reflexes are 2+ on the right side and 2+ on the left side.  Mild psychomotor impairment of his gait   Skin: Skin is warm and dry. No ecchymosis, no lesion and no rash noted. No cyanosis. Nails show no clubbing.   Psychiatric: He has a normal mood and affect. His behavior is normal. Thought content normal. His speech is delayed. He is not actively hallucinating. Cognition and  memory are normal. He expresses impulsivity. He is attentive.   Nursing note and vitals reviewed.        Assessment/Plan   Spencer was seen today for sleeping problem and neurologic problem.    Diagnoses and all orders for this visit:    Trigeminal neuralgia of right side of face    Primary narcolepsy without cataplexy    Neurologically stable.  No changes were made in his medication regimen today.    10 minutes of 15 minute outpatient visit was spent in counseling and coordination of care today.  Return in 3 months.        EMR Dragon transcription disclaimer:  Much of this encounter note is an electronic transcription/translation of spoken language to printed text.  The electronic translation of spoken language may permit erroneous, or at times, nonsensical words or phrases to be inadvertently transcribed.  The author has reviewed the note for such errors, however some may still exist.

## 2017-09-11 NOTE — TELEPHONE ENCOUNTER
Mr. Larsen called to request a refill for Mr Moore's Ritalin. I did tell him it will be ready for him Wednesday after 2pm. He did voice understanding.

## 2017-09-14 RX ORDER — METHYLPHENIDATE HYDROCHLORIDE 20 MG/1
20 TABLET ORAL 4 TIMES DAILY
Qty: 120 TABLET | Refills: 0 | Status: SHIPPED | OUTPATIENT
Start: 2017-09-14 | End: 2017-10-05 | Stop reason: SDUPTHER

## 2017-10-02 ENCOUNTER — OFFICE VISIT (OUTPATIENT)
Dept: CARDIOLOGY | Facility: CLINIC | Age: 77
End: 2017-10-02

## 2017-10-02 VITALS
HEIGHT: 68 IN | DIASTOLIC BLOOD PRESSURE: 68 MMHG | HEART RATE: 71 BPM | WEIGHT: 188 LBS | SYSTOLIC BLOOD PRESSURE: 122 MMHG | BODY MASS INDEX: 28.49 KG/M2

## 2017-10-02 DIAGNOSIS — E78.2 MIXED HYPERLIPIDEMIA: ICD-10-CM

## 2017-10-02 DIAGNOSIS — I10 ESSENTIAL HYPERTENSION: Primary | ICD-10-CM

## 2017-10-02 PROCEDURE — 99203 OFFICE O/P NEW LOW 30 MIN: CPT | Performed by: INTERNAL MEDICINE

## 2017-10-02 PROCEDURE — 93000 ELECTROCARDIOGRAM COMPLETE: CPT | Performed by: INTERNAL MEDICINE

## 2017-10-02 RX ORDER — CLONIDINE HYDROCHLORIDE 0.1 MG/1
0.1 TABLET ORAL AS NEEDED
COMMUNITY

## 2017-10-02 RX ORDER — IRBESARTAN AND HYDROCHLOROTHIAZIDE 300; 12.5 MG/1; MG/1
1 TABLET, FILM COATED ORAL DAILY
COMMUNITY
End: 2020-01-23

## 2017-10-02 NOTE — PROGRESS NOTES
Subjective:     Encounter Date:10/02/2017      Patient ID: Spencer Moore is a 77 y.o. male with hypertension referred here for further evaluation and to establish care.    Referring Provider: Miguel Ángel Hinson DO    Reason for Referral: Hypertension    Chief Complaint: Hypertension    Hypertension   This is a chronic problem. The problem has been gradually improving since onset. The problem is resistant. Associated symptoms include palpitations and shortness of breath. Pertinent negatives include no anxiety, blurred vision, chest pain, headaches, malaise/fatigue, neck pain, orthopnea or PND. There are no associated agents to hypertension. Risk factors for coronary artery disease include male gender and dyslipidemia. Past treatments include calcium channel blockers, angiotensin blockers and diuretics. The current treatment provides moderate improvement. There are no compliance problems.  There is no history of angina or CAD/MI.   Hyperlipidemia   This is a chronic problem. The problem is controlled (per patient report). He has no history of diabetes. Associated symptoms include shortness of breath. Pertinent negatives include no chest pain, focal sensory loss, focal weakness, leg pain or myalgias. Current antihyperlipidemic treatment includes statins. The current treatment provides significant improvement of lipids. There are no compliance problems.  Risk factors for coronary artery disease include male sex, hypertension and dyslipidemia.      The patient presents today for further evaluation of abnormal blood pressure readings.  The patient says that he has had hypertension for a number of years.  He himself does not give an enormous amount of history; however his brother-in-law who is present with him gives the majority of the history.  The patient himself says that he is very concerned about nocturnal blood pressure readings that are high.  It seems that over the past few months Dr. Hinson has been  adjusting medications and that the trend is towards overall better blood pressure control however, the patient is still having a large number of readings in the 150s and 160s range.  The patient takes clonidine as needed but says that he takes it often times at night when his blood pressure is 160 mmHg for fear that it might go up overnight therefore he goes ahead and takes clonidine.  He says that he does this every so often although was difficult for me to determine how often this is actually occurring.  The patient denies any significant chest pain.  He says that he does have some mild shortness of breath and dyspnea on exertion but the symptoms are not limiting his activities.  He also says that he has occasional palpitations but he says that at this time, he has checked his pulse and his pulse is regular and in the 70s.  He denies any lightheadedness, dizziness, syncope.  He denies any headache or visual changes with elevated blood pressure readings and denies any focal weakness or speech difficulties.  He denies any personal history of cardiovascular disease although does have hyperlipidemia which he says well-controlled with his lipids being followed closely by Dr. Hinson.    The following portions of the patient's history were reviewed and updated as appropriate: allergies, current medications, past family history, past medical history, past social history, past surgical history and problem list.     Past Medical History:   Diagnosis Date   • Anemia    • Arthritis    • Diverticulitis    • Enlarged prostate    • GERD (gastroesophageal reflux disease)    • Hypertension    • Kidney infection    • Lumbago    • Narcolepsy    • Vitamin B12 deficiency    • Weak urinary stream      Past Surgical History:   Procedure Laterality Date   • ABDOMINAL SURGERY     • CATARACT EXTRACTION Left    • HAND SURGERY Left    • HERNIA REPAIR     • REPLACEMENT TOTAL KNEE Left    • ROTATOR CUFF REPAIR     • TONSILLECTOMY          Current Outpatient Prescriptions:   •  aspirin 81 MG EC tablet, Take 81 mg by mouth Daily., Disp: , Rfl:   •  atorvastatin (LIPITOR) 10 MG tablet, Take 10 mg by mouth Daily., Disp: , Rfl:   •  carBAMazepine (TEGretol) 200 MG tablet, Take 200 mg by mouth 2 (Two) Times a Day. Most of the time he is only taking 1 per day, Disp: , Rfl:   •  CloNIDine (CATAPRES) 0.1 MG tablet, Take 0.1 mg by mouth As Needed for High Blood Pressure (1 tablet if systolic is over 170)., Disp: , Rfl:   •  diclofenac (VOLTAREN) 1 % gel gel, Apply 4 g topically 2 (Two) Times a Day., Disp: , Rfl:   •  finasteride (PROSCAR) 5 MG tablet, Take 1 tablet by mouth Daily., Disp: 90 tablet, Rfl: 3  •  FLUoxetine (PROzac) 40 MG capsule, Take 40 mg by mouth Daily., Disp: , Rfl:   •  irbesartan-hydrochlorothiazide (AVALIDE) 300-12.5 MG tablet, Take 1 tablet by mouth Daily., Disp: , Rfl:   •  meloxicam (MOBIC) 7.5 MG tablet, Take 1 tablet by mouth 2 (Two) Times a Day As Needed for Moderate Pain (4-6). Changed from 15 mg to 7.5 mg, Disp: 60 tablet, Rfl: 2  •  methylphenidate (RITALIN) 20 MG tablet, Take 1 tablet by mouth 4 (Four) Times a Day., Disp: 120 tablet, Rfl: 0  •  omeprazole (priLOSEC) 40 MG capsule, Take 40 mg by mouth Daily., Disp: , Rfl:   •  oxyCODONE-acetaminophen (PERCOCET) 5-325 MG per tablet, Take 1 tablet by mouth Every 6 (Six) Hours As Needed. 1 tablet 4 times a day, Disp: , Rfl:   •  tamsulosin (FLOMAX) 0.4 MG capsule 24 hr capsule, Take one capsule by mouth once daily, Disp: 90 capsule, Rfl: 3  •  terazosin (HYTRIN) 2 MG capsule, Take 2 mg by mouth Every Night., Disp: , Rfl:   •  metoprolol tartrate (LOPRESSOR) 25 MG tablet, Take 1 tablet by mouth 2 (Two) Times a Day., Disp: 60 tablet, Rfl: 11     Allergies   Allergen Reactions   • Biaxin [Clarithromycin]    • Parafon Forte Dsc [Chlorzoxazone]      Social History   Substance Use Topics   • Smoking status: Never Smoker   • Smokeless tobacco: Never Used   • Alcohol use No     Family  History   Problem Relation Age of Onset   • Adopted: Yes   • Family history unknown: Yes     Review of Systems   Constitution: Negative for chills, fever, malaise/fatigue, night sweats and weight loss.   HENT: Negative for congestion and hearing loss.    Eyes: Negative for blurred vision and pain.   Cardiovascular: Positive for dyspnea on exertion and palpitations. Negative for chest pain, claudication, irregular heartbeat, leg swelling, orthopnea, paroxysmal nocturnal dyspnea and syncope.   Respiratory: Positive for shortness of breath. Negative for cough, hemoptysis and wheezing.    Endocrine: Negative for cold intolerance, heat intolerance, polydipsia and polyuria.   Hematologic/Lymphatic: Negative for adenopathy and bleeding problem. Does not bruise/bleed easily.   Skin: Negative for color change, poor wound healing and rash.   Musculoskeletal: Negative for arthritis, back pain, joint pain, joint swelling, myalgias and neck pain.   Gastrointestinal: Negative for abdominal pain, change in bowel habit, constipation, diarrhea, heartburn, hematochezia, melena, nausea and vomiting.   Genitourinary: Negative for bladder incontinence, dysuria, frequency, hematuria and nocturia.   Neurological: Negative for dizziness, focal weakness, headaches, light-headedness, loss of balance, numbness and seizures.   Psychiatric/Behavioral: Negative for altered mental status, memory loss and substance abuse.   Allergic/Immunologic: Negative for hives and persistent infections.       ECG 12 Lead  Date/Time: 10/2/2017 1:34 PM  Performed by: RORO ARANDA  Authorized by: RORO ARANDA   Comparison: compared with previous ECG from 4/9/2015  Rhythm: sinus rhythm  Rate: normal  Conduction: conduction normal  ST Segments: ST segments normal  T Waves: T waves normal  QRS axis: normal  Other findings: LVH  Clinical impression: abnormal ECG             Objective:     Physical Exam   Constitutional: He is oriented to person,  place, and time. Vital signs are normal. He appears well-developed and well-nourished. He is cooperative.  Non-toxic appearance. No distress.   HENT:   Head: Normocephalic and atraumatic.   Right Ear: External ear normal.   Left Ear: External ear normal.   Nose: Nose normal.   Mouth/Throat: Uvula is midline, oropharynx is clear and moist and mucous membranes are normal. Mucous membranes are not pale, not dry and not cyanotic. No oropharyngeal exudate.   Eyes: EOM and lids are normal. Pupils are equal, round, and reactive to light.   Neck: Normal range of motion. Neck supple. No hepatojugular reflux and no JVD present. Carotid bruit is not present. No tracheal deviation and no edema present. No thyroid mass and no thyromegaly present.   Cardiovascular: Normal rate, regular rhythm, S1 normal, S2 normal and intact distal pulses.   No extrasystoles are present. PMI is not displaced.  Exam reveals no gallop and no friction rub.    No murmur heard.  Pulses:       Radial pulses are 2+ on the right side, and 2+ on the left side.        Femoral pulses are 2+ on the right side, and 2+ on the left side.       Dorsalis pedis pulses are 1+ on the right side, and 1+ on the left side.        Posterior tibial pulses are 1+ on the right side, and 1+ on the left side.   Pulmonary/Chest: Effort normal and breath sounds normal. No accessory muscle usage. No respiratory distress. He has no wheezes. He has no rales. He exhibits no tenderness.   Abdominal: Soft. Normal appearance and bowel sounds are normal. He exhibits no distension, no abdominal bruit and no pulsatile midline mass. There is no hepatosplenomegaly. There is no tenderness.   Musculoskeletal: Normal range of motion. He exhibits edema (trace ankles, bilaterally). He exhibits no tenderness or deformity.   Lymphadenopathy:     He has no cervical adenopathy.   Neurological: He is oriented to person, place, and time. He has normal strength. No cranial nerve deficit.   Skin: Skin  "is warm, dry and intact. No rash noted. He is not diaphoretic. No cyanosis or erythema. Nails show no clubbing.   Psychiatric: He has a normal mood and affect. His speech is normal and behavior is normal. Thought content normal.   Vitals reviewed.    /68 (BP Location: Left arm, Patient Position: Sitting)  Pulse 71  Ht 68\" (172.7 cm)  Wt 188 lb (85.3 kg)  BMI 28.59 kg/m2        Assessment:          Diagnosis Plan   1. Essential hypertension  ECG 12 Lead    metoprolol tartrate (LOPRESSOR) 25 MG tablet   2. Mixed hyperlipidemia            Plan:       1.  Essential hypertension: The patient's blood pressure seems to be overall improved after recent changes were made by Dr. Hinson.  At this time, to try and help him even further, I am going to discontinue his verapamil and place him on 25 twice daily of metoprolol.  The patient will continue to monitor his blood pressure at home and call us should blood pressure continued to increase.  Otherwise, we will see him back in a month with her nurse practitioner clinic to reevaluate his blood pressure.  It may be that he needs additional agents.  I have cautioned him about the use of clonidine, which he seems to take almost inferior of his pressure going up.  He describes taking this at night when his blood pressure is 160 mmHg for fear that it might be 180 mmHg later.  Therefore, I have cautioned him about taking this only at times when his blood pressures actually measured a greater than 180 mmHg.  The patient should call or return sooner than planned if he should have difficulty controlling blood pressure after changes have been made.    2.  Mixed hyperlipidemia: The patient's lipids are followed by his primary care physician.  He remains on Lipitor which she is tolerating well.  Reportedly, his lipids are well controlled.    Follow-up: One month and her nurse practitioner clinic and unlikely 6 months with me thereafterwards.    EMR Dragon/Transcription disclaimer: " Much of this encounter note is an electronic transcription/translation of spoken language to printed text. The electronic translation of spoken language may permit erroneous, or at times, nonsensical words or phrases to be inadvertently transcribed; although I have reviewed the note for such errors, some may still exist.

## 2017-10-05 RX ORDER — METHYLPHENIDATE HYDROCHLORIDE 20 MG/1
20 TABLET ORAL 4 TIMES DAILY
Qty: 120 TABLET | Refills: 0 | Status: SHIPPED | OUTPATIENT
Start: 2017-10-05 | End: 2017-11-10 | Stop reason: SDUPTHER

## 2017-10-05 NOTE — TELEPHONE ENCOUNTER
Mr Larsen called to see when he could  Mr Moore's Ritalin script. I did let him know that I will have it ready for him on Monday. He did voice understanding.

## 2017-10-20 ENCOUNTER — TELEPHONE (OUTPATIENT)
Dept: NEUROLOGY | Facility: CLINIC | Age: 77
End: 2017-10-20

## 2017-10-20 NOTE — TELEPHONE ENCOUNTER
I did call and speak with Mr Larsen to let him know that I have received the authorization for Mr Moore's Ritalin. He did voice understanding and I have faxed it to [facility].

## 2017-11-03 ENCOUNTER — OFFICE VISIT (OUTPATIENT)
Dept: NEUROLOGY | Facility: CLINIC | Age: 77
End: 2017-11-03

## 2017-11-03 VITALS
HEART RATE: 56 BPM | DIASTOLIC BLOOD PRESSURE: 60 MMHG | WEIGHT: 189 LBS | HEIGHT: 68 IN | BODY MASS INDEX: 28.64 KG/M2 | OXYGEN SATURATION: 98 % | SYSTOLIC BLOOD PRESSURE: 120 MMHG

## 2017-11-03 DIAGNOSIS — G47.419 PRIMARY NARCOLEPSY WITHOUT CATAPLEXY: ICD-10-CM

## 2017-11-03 DIAGNOSIS — G50.0 TRIGEMINAL NEURALGIA OF RIGHT SIDE OF FACE: Primary | ICD-10-CM

## 2017-11-03 PROCEDURE — 99213 OFFICE O/P EST LOW 20 MIN: CPT | Performed by: PHYSICIAN ASSISTANT

## 2017-11-03 RX ORDER — CARBAMAZEPINE 200 MG/1
200 TABLET ORAL 2 TIMES DAILY
Qty: 60 TABLET | Refills: 6 | Status: SHIPPED | OUTPATIENT
Start: 2017-11-03 | End: 2018-10-08 | Stop reason: SDUPTHER

## 2017-11-03 NOTE — PROGRESS NOTES
Subjective   Spencer Moore is a 77 y.o. male is here today for follow-up.    Facial Pain   This is a chronic problem. The current episode started more than 1 year ago. The problem occurs intermittently. The problem has been waxing and waning. Associated symptoms include headaches. The symptoms are aggravated by exertion, stress and eating. He has tried rest (Gamma knife, medications) for the symptoms. The treatment provided significant relief.   Neurologic Problem   The patient's primary symptoms include an altered mental status. Primary symptoms comment: Narcolepsy and cataplexy. This is a chronic problem. The current episode started more than 1 year ago. The neurological problem developed suddenly. The problem is unchanged. There was no focality noted. Associated symptoms include headaches. Past treatments include medication. The treatment provided significant relief.       The following portions of the patient's history were reviewed and updated as appropriate: allergies, current medications, past family history, past medical history, past social history, past surgical history and problem list.    Review of Systems   Constitutional: Negative.    HENT: Positive for hearing loss.    Eyes: Negative.    Respiratory: Negative.    Cardiovascular: Negative.    Gastrointestinal: Negative.    Endocrine: Negative.    Genitourinary: Positive for difficulty urinating and frequency.   Musculoskeletal: Positive for gait problem.   Allergic/Immunologic: Negative.    Neurological: Positive for headaches. Negative for facial asymmetry and speech difficulty.   Hematological: Negative.    Psychiatric/Behavioral: Positive for sleep disturbance.       Objective   Physical Exam   Constitutional: He is oriented to person, place, and time. Vital signs are normal. He appears well-developed and well-nourished. No distress.   HENT:   Head: Normocephalic and atraumatic.   Right Ear: External ear normal. Decreased hearing is noted.   Left  Ear: External ear normal. Decreased hearing is noted.   Nose: Nose normal.   Mouth/Throat: Uvula is midline, oropharynx is clear and moist and mucous membranes are normal.   Eyes: Conjunctivae, EOM and lids are normal. Pupils are equal, round, and reactive to light. No scleral icterus.   Neck: Normal range of motion and phonation normal. No spinous process tenderness and no muscular tenderness present. Carotid bruit is not present. Normal range of motion present. No thyroid mass and no thyromegaly present.   Cardiovascular: Normal rate, regular rhythm, S1 normal, S2 normal and normal heart sounds.    No murmur heard.  Pulmonary/Chest: Effort normal and breath sounds normal. No stridor. No respiratory distress. He has no wheezes. He has no rales.   Musculoskeletal: He exhibits no edema.        Lumbar back: He exhibits decreased range of motion and pain.   Lymphadenopathy:     He has no cervical adenopathy.   Neurological: He is alert and oriented to person, place, and time. He has normal strength. He displays no atrophy and no tremor. No cranial nerve deficit or sensory deficit. He exhibits normal muscle tone. Gait abnormal. Coordination normal. GCS eye subscore is 4. GCS verbal subscore is 5. GCS motor subscore is 6.   Reflex Scores:       Tricep reflexes are 2+ on the right side and 2+ on the left side.       Bicep reflexes are 2+ on the right side and 2+ on the left side.       Brachioradialis reflexes are 2+ on the right side and 2+ on the left side.       Patellar reflexes are 2+ on the right side and 2+ on the left side.       Achilles reflexes are 2+ on the right side and 2+ on the left side.  Mild psychomotor impairment of his gait   Skin: Skin is warm and dry. No ecchymosis, no lesion and no rash noted. No cyanosis. Nails show no clubbing.   Psychiatric: He has a normal mood and affect. His behavior is normal. Thought content normal. His speech is delayed. He is not actively hallucinating. Cognition and  memory are normal. He expresses impulsivity. He is attentive.   Nursing note and vitals reviewed.        Assessment/Plan   Spencer was seen today for facial pain.    Diagnoses and all orders for this visit:    Trigeminal neuralgia of right side of face  -     carBAMazepine (TEGretol) 200 MG tablet; Take 1 tablet by mouth 2 (Two) Times a Day. Most of the time he is only taking 1 per day    Primary narcolepsy without cataplexy    Patient is neurologically stable.  His facial neuralgia is well-controlled.  He has been on Ritalin for greater than four decades continues to respond well to this for his narcolepsy symptoms.    10 minutes of a 15 minute outpatient visit was spent in counseling and coordination of care today.        EMR Dragon transcription disclaimer:  Much of this encounter note is an electronic transcription/translation of spoken language to printed text.  The electronic translation of spoken language may permit erroneous, or at times, nonsensical words or phrases to be inadvertently transcribed.  The author has reviewed the note for such errors, however some may still exist.

## 2017-11-06 ENCOUNTER — OFFICE VISIT (OUTPATIENT)
Dept: CARDIOLOGY | Facility: CLINIC | Age: 77
End: 2017-11-06

## 2017-11-06 VITALS
WEIGHT: 190 LBS | OXYGEN SATURATION: 99 % | DIASTOLIC BLOOD PRESSURE: 80 MMHG | BODY MASS INDEX: 28.79 KG/M2 | SYSTOLIC BLOOD PRESSURE: 126 MMHG | HEART RATE: 55 BPM | HEIGHT: 68 IN

## 2017-11-06 DIAGNOSIS — I10 ESSENTIAL HYPERTENSION: Primary | ICD-10-CM

## 2017-11-06 PROCEDURE — 99213 OFFICE O/P EST LOW 20 MIN: CPT | Performed by: INTERNAL MEDICINE

## 2017-11-06 NOTE — PROGRESS NOTES
Subjective:     Encounter Date:11/06/2017      Patient ID: Spencer Moore is a 77 y.o. male a history of hypertension and hyperlipidemia who returns today for follow-up of blood pressure.    Chief Complaint:Follow-up of hypertension    Hypertension   This is a chronic problem. The problem has been gradually improving since onset. The problem is controlled. Pertinent negatives include no anxiety, chest pain, headaches, malaise/fatigue, neck pain, orthopnea, palpitations, peripheral edema, PND or shortness of breath. There are no associated agents to hypertension. Risk factors for coronary artery disease include male gender and dyslipidemia. Past treatments include angiotensin blockers, diuretics and beta blockers. The current treatment provides significant improvement. There are no compliance problems.  There is no history of angina or heart failure.     The patient presents today for follow-up of hypertension.  He says that his blood pressure has been better controlled since his last office visit.  The majority of the readings that the patient has been getting have been less than 150 mmHg with quite a few readings in the 120s and 130s range.  The patient says that he has felt good about the readings that he has been getting.  He has tolerated all of his medications well.  He has noted that his pulse is been low in the 50s at times and sometimes even in the upper 40s.  The patient denies lightheadedness, dizziness, syncope.  The patient's breathing is at baseline.  He denies any chest pain.  Overall, he says that he has been doing well since his last office visit here.      Current Outpatient Prescriptions:   •  aspirin 81 MG EC tablet, Take 81 mg by mouth Daily., Disp: , Rfl:   •  atorvastatin (LIPITOR) 10 MG tablet, Take 10 mg by mouth Daily., Disp: , Rfl:   •  carBAMazepine (TEGretol) 200 MG tablet, Take 1 tablet by mouth 2 (Two) Times a Day. Most of the time he is only taking 1 per day, Disp: 60 tablet,  Rfl: 6  •  CloNIDine (CATAPRES) 0.1 MG tablet, Take 0.1 mg by mouth As Needed for High Blood Pressure (1 tablet if systolic is over 170)., Disp: , Rfl:   •  diclofenac (VOLTAREN) 1 % gel gel, Apply 4 g topically 2 (Two) Times a Day., Disp: , Rfl:   •  finasteride (PROSCAR) 5 MG tablet, Take 1 tablet by mouth Daily., Disp: 90 tablet, Rfl: 3  •  FLUoxetine (PROzac) 40 MG capsule, Take 40 mg by mouth Daily., Disp: , Rfl:   •  irbesartan-hydrochlorothiazide (AVALIDE) 300-12.5 MG tablet, Take 1 tablet by mouth Daily., Disp: , Rfl:   •  meloxicam (MOBIC) 7.5 MG tablet, Take 1 tablet by mouth 2 (Two) Times a Day As Needed for Moderate Pain (4-6). Changed from 15 mg to 7.5 mg, Disp: 60 tablet, Rfl: 2  •  methylphenidate (RITALIN) 20 MG tablet, Take 1 tablet by mouth 4 (Four) Times a Day., Disp: 120 tablet, Rfl: 0  •  metoprolol tartrate (LOPRESSOR) 25 MG tablet, Take 1 tablet by mouth 2 (Two) Times a Day., Disp: 60 tablet, Rfl: 11  •  omeprazole (priLOSEC) 40 MG capsule, Take 40 mg by mouth Daily., Disp: , Rfl:   •  oxyCODONE-acetaminophen (PERCOCET) 5-325 MG per tablet, Take 1 tablet by mouth Every 6 (Six) Hours As Needed. 1 tablet 4 times a day, Disp: , Rfl:   •  tamsulosin (FLOMAX) 0.4 MG capsule 24 hr capsule, Take one capsule by mouth once daily, Disp: 90 capsule, Rfl: 3  •  terazosin (HYTRIN) 2 MG capsule, Take 2 mg by mouth Every Night., Disp: , Rfl:     Allergies   Allergen Reactions   • Biaxin [Clarithromycin]    • Parafon Forte Dsc [Chlorzoxazone]      Social History   Substance Use Topics   • Smoking status: Never Smoker   • Smokeless tobacco: Never Used   • Alcohol use No     Review of Systems   Constitution: Negative for chills, fever, weakness, malaise/fatigue, night sweats and weight loss.   HENT: Negative for congestion and sore throat.    Cardiovascular: Negative for chest pain, claudication, dyspnea on exertion, irregular heartbeat, leg swelling, orthopnea, palpitations, paroxysmal nocturnal dyspnea and  "syncope.   Respiratory: Negative for cough, hemoptysis, shortness of breath and wheezing.    Endocrine: Negative for cold intolerance, heat intolerance, polydipsia and polyuria.   Hematologic/Lymphatic: Negative for bleeding problem. Does not bruise/bleed easily.   Musculoskeletal: Negative for neck pain.   Gastrointestinal: Negative for abdominal pain, hematemesis, hematochezia, melena, nausea and vomiting.   Genitourinary: Negative for bladder incontinence, dysuria and hematuria.   Neurological: Negative for dizziness, headaches, loss of balance, numbness, paresthesias and seizures.       Procedures: None       Objective:     Physical Exam   Constitutional: He is oriented to person, place, and time. He appears well-developed and well-nourished. No distress.   HENT:   Head: Normocephalic and atraumatic.   Mouth/Throat: Oropharynx is clear and moist.   Eyes: EOM are normal. Pupils are equal, round, and reactive to light.   Neck: Normal range of motion. Neck supple. No JVD present. No thyromegaly present.   Cardiovascular: Normal rate, regular rhythm, S1 normal, S2 normal, normal heart sounds and intact distal pulses.  Exam reveals no gallop and no friction rub.    No murmur heard.  Pulmonary/Chest: Effort normal and breath sounds normal.   Abdominal: Soft. Bowel sounds are normal. He exhibits no distension. There is no tenderness.   Musculoskeletal: Normal range of motion. He exhibits no edema.   Neurological: He is alert and oriented to person, place, and time. No cranial nerve deficit.   Skin: Skin is warm and dry. No rash noted. No cyanosis or erythema. Nails show no clubbing.   Psychiatric: He has a normal mood and affect.   Vitals reviewed.    /80 (BP Location: Left arm, Patient Position: Sitting)  Pulse 55  Ht 68\" (172.7 cm)  Wt 190 lb (86.2 kg)  SpO2 99%  BMI 28.89 kg/m2         Assessment:          Diagnosis Plan   1. Essential hypertension            Plan:       1.  Essential hypertension: The " patient's blood pressure is under better control at this time.  He still has some readings exceed 150 mmHg however, there has been marked improvement since his last visit.  He will continue to follow closely with Dr. Hinson.  We will see him back in 3 months.  However, no changes are recommended at this time as it appears that the trend is towards better blood pressure control in this patient.  Have asked him to call or return if blood pressure should start to rise once again.    Follow-up: 3 months unless needed sooner.

## 2017-11-09 NOTE — PROGRESS NOTES
I have reviewed the notes, assessments, and/or procedures performed by RAUL Lerner, I concur with her/his documentation of Spencer Moore.  Spencer Shaffer MD

## 2017-11-10 RX ORDER — METHYLPHENIDATE HYDROCHLORIDE 20 MG/1
20 TABLET ORAL 4 TIMES DAILY
Qty: 120 TABLET | Refills: 0 | Status: SHIPPED | OUTPATIENT
Start: 2017-11-10 | End: 2017-12-11 | Stop reason: SDUPTHER

## 2017-12-11 RX ORDER — METHYLPHENIDATE HYDROCHLORIDE 20 MG/1
20 TABLET ORAL 4 TIMES DAILY
Qty: 120 TABLET | Refills: 0 | Status: SHIPPED | OUTPATIENT
Start: 2017-12-11 | End: 2018-01-09 | Stop reason: SDUPTHER

## 2018-01-03 ENCOUNTER — TELEPHONE (OUTPATIENT)
Dept: NEUROLOGY | Facility: CLINIC | Age: 78
End: 2018-01-03

## 2018-01-10 RX ORDER — METHYLPHENIDATE HYDROCHLORIDE 20 MG/1
20 TABLET ORAL 4 TIMES DAILY
Qty: 120 TABLET | Refills: 0 | Status: SHIPPED | OUTPATIENT
Start: 2018-01-10 | End: 2018-02-05 | Stop reason: SDUPTHER

## 2018-02-05 ENCOUNTER — OFFICE VISIT (OUTPATIENT)
Dept: NEUROLOGY | Facility: CLINIC | Age: 78
End: 2018-02-05

## 2018-02-05 VITALS
HEART RATE: 64 BPM | BODY MASS INDEX: 28.64 KG/M2 | SYSTOLIC BLOOD PRESSURE: 128 MMHG | WEIGHT: 189 LBS | DIASTOLIC BLOOD PRESSURE: 78 MMHG | HEIGHT: 68 IN

## 2018-02-05 DIAGNOSIS — I67.9 CEREBROVASCULAR SMALL VESSEL DISEASE: ICD-10-CM

## 2018-02-05 DIAGNOSIS — G47.419 PRIMARY NARCOLEPSY WITHOUT CATAPLEXY: ICD-10-CM

## 2018-02-05 DIAGNOSIS — G50.0 TRIGEMINAL NEURALGIA OF RIGHT SIDE OF FACE: Primary | ICD-10-CM

## 2018-02-05 PROCEDURE — 99214 OFFICE O/P EST MOD 30 MIN: CPT | Performed by: PHYSICIAN ASSISTANT

## 2018-02-05 NOTE — PROGRESS NOTES
Subjective   Spencer Moore is a 77 y.o. male is here today for follow-up.    Facial Pain   This is a chronic problem. The current episode started more than 1 year ago. The problem occurs intermittently. The problem has been waxing and waning. Associated symptoms include headaches. The symptoms are aggravated by exertion, stress and eating. He has tried rest (Gamma knife, medications) for the symptoms. The treatment provided significant relief.   Neurologic Problem   The patient's primary symptoms include an altered mental status. Primary symptoms comment: Narcolepsy and cataplexy. This is a chronic problem. The current episode started more than 1 year ago. The neurological problem developed suddenly. The problem is unchanged. There was no focality noted. Associated symptoms include headaches. Past treatments include medication. The treatment provided significant relief.       The following portions of the patient's history were reviewed and updated as appropriate: allergies, current medications, past family history, past medical history, past social history, past surgical history and problem list.    Review of Systems   Constitutional: Negative.    HENT: Positive for hearing loss.    Eyes: Negative.    Respiratory: Negative.    Cardiovascular: Negative.    Gastrointestinal: Negative.    Endocrine: Negative.    Genitourinary: Positive for difficulty urinating and frequency.   Musculoskeletal: Positive for gait problem.   Allergic/Immunologic: Negative.    Neurological: Positive for headaches. Negative for facial asymmetry and speech difficulty.   Hematological: Negative.    Psychiatric/Behavioral: Positive for sleep disturbance.       Objective   Physical Exam   Constitutional: He is oriented to person, place, and time. Vital signs are normal. He appears well-developed and well-nourished. No distress.   HENT:   Head: Normocephalic and atraumatic.   Right Ear: External ear normal. Decreased hearing is noted.   Left  Ear: External ear normal. Decreased hearing is noted.   Nose: Nose normal.   Mouth/Throat: Uvula is midline, oropharynx is clear and moist and mucous membranes are normal.   Eyes: Conjunctivae, EOM and lids are normal. Pupils are equal, round, and reactive to light. No scleral icterus.   Neck: Normal range of motion and phonation normal. No spinous process tenderness and no muscular tenderness present. Carotid bruit is not present. Normal range of motion present. No thyroid mass and no thyromegaly present.   Cardiovascular: Normal rate, regular rhythm, S1 normal, S2 normal and normal heart sounds.    No murmur heard.  Pulmonary/Chest: Effort normal and breath sounds normal. No stridor. No respiratory distress. He has no wheezes. He has no rales.   Musculoskeletal: He exhibits no edema.        Lumbar back: He exhibits decreased range of motion and pain.   Lymphadenopathy:     He has no cervical adenopathy.   Neurological: He is alert and oriented to person, place, and time. He has normal strength. He displays no atrophy and no tremor. No cranial nerve deficit or sensory deficit. He exhibits normal muscle tone. Gait abnormal. Coordination normal. GCS eye subscore is 4. GCS verbal subscore is 5. GCS motor subscore is 6.   Reflex Scores:       Tricep reflexes are 2+ on the right side and 2+ on the left side.       Bicep reflexes are 2+ on the right side and 2+ on the left side.       Brachioradialis reflexes are 2+ on the right side and 2+ on the left side.       Patellar reflexes are 2+ on the right side and 2+ on the left side.       Achilles reflexes are 2+ on the right side and 2+ on the left side.  Mild psychomotor impairment of his gait   Skin: Skin is warm and dry. No ecchymosis, no lesion and no rash noted. No cyanosis. Nails show no clubbing.   Psychiatric: He has a normal mood and affect. His behavior is normal. Thought content normal. His speech is delayed. He is not actively hallucinating. Cognition and  memory are normal. He expresses impulsivity. He is attentive.   Nursing note and vitals reviewed.        Assessment/Plan   Spencer was seen today for trigeminal neuralgia of right side of face and sleeping problem.    Diagnoses and all orders for this visit:    Trigeminal neuralgia of right side of face    Primary narcolepsy without cataplexy    Cerebrovascular small vessel disease      Discussed modulating his Tegretol dose between 200 and 400 mg daily depending on symptoms.  Generally he takes 200 mg per day, but when consistently taking 400 mg per day has more ataxia.  This is reviewed with the patient and his family.    No other changes were recommended in his medication regimen today.    20 minutes of a 25 minute outpatient visit was spent in counseling and coordination of care.    He will continue on scheduled methylphenidate for his history of narcolepsy.      Patient's BMI is above normal parameters. Follow-up plan includes:  no follow-up required.      Fall Risk Assessment  Fallen in past 6 months: 5--> Yes  Mental Status: 0--> no mental status change  Mobility: 2--> Requires assistance- transfer, walker, etc.  Medications: 1--> Narcotics  Total Fall Risk Score: 10      EMR Dragon transcription disclaimer:  Much of this encounter note is an electronic transcription/translation of spoken language to printed text.  The electronic translation of spoken language may permit erroneous, or at times, nonsensical words or phrases to be inadvertently transcribed.  The author has reviewed the note for such errors, however some may still exist.

## 2018-02-06 ENCOUNTER — OFFICE VISIT (OUTPATIENT)
Dept: CARDIOLOGY | Facility: CLINIC | Age: 78
End: 2018-02-06

## 2018-02-06 VITALS
SYSTOLIC BLOOD PRESSURE: 114 MMHG | DIASTOLIC BLOOD PRESSURE: 60 MMHG | WEIGHT: 189 LBS | HEIGHT: 68 IN | OXYGEN SATURATION: 98 % | HEART RATE: 56 BPM | BODY MASS INDEX: 28.64 KG/M2

## 2018-02-06 DIAGNOSIS — E78.2 MIXED HYPERLIPIDEMIA: ICD-10-CM

## 2018-02-06 DIAGNOSIS — R13.10 PAINFUL SWALLOWING: ICD-10-CM

## 2018-02-06 DIAGNOSIS — I10 ESSENTIAL HYPERTENSION: Primary | ICD-10-CM

## 2018-02-06 PROCEDURE — 99213 OFFICE O/P EST LOW 20 MIN: CPT | Performed by: INTERNAL MEDICINE

## 2018-02-06 NOTE — PROGRESS NOTES
Subjective:     Encounter Date:02/06/2018      Patient ID: Spencer Moore is a 77 y.o. male with hypertension and hyperlipidemia who presents today for follow-up.    Chief Complaint: Routine follow-up.    Hypertension   This is a chronic problem. The problem is unchanged. The problem is controlled. Pertinent negatives include no chest pain, headaches, orthopnea, palpitations, peripheral edema, PND or shortness of breath. There are no associated agents to hypertension. Risk factors for coronary artery disease include male gender and dyslipidemia. Past treatments include angiotensin blockers, beta blockers, diuretics and central alpha agonists. The current treatment provides significant improvement. There are no compliance problems.  There is no history of CAD/MI or heart failure.   Hyperlipidemia   This is a chronic problem. The problem is controlled (per patient report - followed by Dr. Hinson). He has no history of diabetes. Factors aggravating his hyperlipidemia include beta blockers. Pertinent negatives include no chest pain, focal sensory loss, focal weakness, leg pain, myalgias or shortness of breath. Current antihyperlipidemic treatment includes statins. The current treatment provides significant improvement of lipids. There are no compliance problems.      The patient presents today for follow-up of his blood pressure.  The patient says that his blood pressure has been under very good control lately.  He is no longer having elevated blood pressure readings.  He says that otherwise he has been clinically stable with the exception of some trouble swallowing lately.  The patient tells me that a number of years ago he had similar symptoms and had to have his esophagus stretched.  The patient describes both trouble swallowing and painful swallowing and also feeling like sometimes that food and pills might get stuck in his esophagus.  He denies any nausea, vomiting, abdominal pain.  The patient denies any  heart burn or acid reflux.    The patient has been tolerating all of his medications well with no significant side effects.  The patient does remain on Lipitor for his cholesterol and this is followed by his primary care physician.  The patient denies any significant shortness of breath, dyspnea on exertion, orthopnea, PND, edema, lightheadedness, dizziness, syncope.      Current Outpatient Prescriptions:   •  aspirin 81 MG EC tablet, Take 81 mg by mouth Daily., Disp: , Rfl:   •  atorvastatin (LIPITOR) 10 MG tablet, Take 10 mg by mouth Daily., Disp: , Rfl:   •  carBAMazepine (TEGretol) 200 MG tablet, Take 1 tablet by mouth 2 (Two) Times a Day. Most of the time he is only taking 1 per day, Disp: 60 tablet, Rfl: 6  •  CloNIDine (CATAPRES) 0.1 MG tablet, Take 0.1 mg by mouth As Needed for High Blood Pressure (1 tablet if systolic is over 170)., Disp: , Rfl:   •  diclofenac (VOLTAREN) 1 % gel gel, Apply 4 g topically 2 (Two) Times a Day., Disp: , Rfl:   •  finasteride (PROSCAR) 5 MG tablet, Take 1 tablet by mouth Daily., Disp: 90 tablet, Rfl: 3  •  FLUoxetine (PROzac) 40 MG capsule, Take 40 mg by mouth Daily., Disp: , Rfl:   •  irbesartan-hydrochlorothiazide (AVALIDE) 300-12.5 MG tablet, Take 1 tablet by mouth Daily., Disp: , Rfl:   •  methylphenidate (RITALIN) 20 MG tablet, Take 1 tablet by mouth 4 (Four) Times a Day., Disp: 120 tablet, Rfl: 0  •  metoprolol tartrate (LOPRESSOR) 25 MG tablet, Take 1 tablet by mouth 2 (Two) Times a Day., Disp: 60 tablet, Rfl: 11  •  omeprazole (priLOSEC) 40 MG capsule, Take 40 mg by mouth Daily., Disp: , Rfl:   •  oxyCODONE-acetaminophen (PERCOCET) 5-325 MG per tablet, Take 1 tablet by mouth Every 6 (Six) Hours As Needed. Takes 7.5mg four times daily, Disp: , Rfl:   •  tamsulosin (FLOMAX) 0.4 MG capsule 24 hr capsule, Take one capsule by mouth once daily, Disp: 90 capsule, Rfl: 3  •  terazosin (HYTRIN) 2 MG capsule, Take 2 mg by mouth Every Night., Disp: , Rfl:     Allergies   Allergen  Reactions   • Biaxin [Clarithromycin]    • Parafon Forte Dsc [Chlorzoxazone]      Social History   Substance Use Topics   • Smoking status: Never Smoker   • Smokeless tobacco: Never Used   • Alcohol use No     Review of Systems   Constitution: Negative for chills, fever, weakness, night sweats and weight loss.   HENT: Positive for odynophagia and sore throat.    Cardiovascular: Negative for chest pain, claudication, dyspnea on exertion, irregular heartbeat, leg swelling, orthopnea, palpitations, paroxysmal nocturnal dyspnea and syncope.   Respiratory: Negative for cough, hemoptysis, shortness of breath and wheezing.    Hematologic/Lymphatic: Negative for bleeding problem. Does not bruise/bleed easily.   Musculoskeletal: Negative for myalgias.   Gastrointestinal: Negative for abdominal pain, hematemesis, hematochezia, melena, nausea and vomiting.   Genitourinary: Negative for bladder incontinence, dysuria and hematuria.   Neurological: Negative for dizziness, focal weakness, headaches, loss of balance, numbness, paresthesias and seizures.       Procedures: None today       Objective:     Physical Exam   Constitutional: He is oriented to person, place, and time. He appears well-developed and well-nourished. No distress.   HENT:   Head: Normocephalic and atraumatic.   Mouth/Throat: Oropharynx is clear and moist.   Eyes: EOM are normal. Pupils are equal, round, and reactive to light.   Neck: Normal range of motion. Neck supple. No JVD present. No thyromegaly present.   Cardiovascular: Normal rate, regular rhythm, S1 normal, S2 normal, normal heart sounds and intact distal pulses.  Exam reveals no gallop and no friction rub.    No murmur heard.  Pulmonary/Chest: Effort normal and breath sounds normal.   Abdominal: Soft. Bowel sounds are normal. He exhibits no distension. There is no tenderness.   Musculoskeletal: Normal range of motion. He exhibits no edema.   Neurological: He is alert and oriented to person, place, and  "time. No cranial nerve deficit.   Skin: Skin is warm and dry. No rash noted. No cyanosis or erythema. Nails show no clubbing.   Psychiatric: He has a normal mood and affect.   Vitals reviewed.    /60 (BP Location: Left arm, Patient Position: Sitting)  Pulse 56  Ht 172.7 cm (68\")  Wt 85.7 kg (189 lb)  SpO2 98%  BMI 28.74 kg/m2        Assessment:          Diagnosis Plan   1. Essential hypertension     2. Mixed hyperlipidemia     3. Painful swallowing            Plan:       1.  Essential hypertension: This patient's blood pressure is now very.  Control has been very good on home readings and readings and other physician's offices.  Therefore, I think this patient is clinically stable on his current medications.  In terms of follow-up, he will follow-up with his primary care physician and can return here as needed at this time.    2.  Mixed hyperlipidemia: The patient remains on Lipitor and is tolerating this well.  He continues to follow with Dr. Hinson regarding his lipids.    3.  Painful swallowing: The patient says that a number of years ago he had to have his esophagus stretched.  He says that he has had some difficulty swallowing and some painful swallowing as well as some pills getting stuck in his esophagus.  He was scheduled to see gastroenterology tomorrow however is not going to do this due to the potential for ice on the roads.  He will reschedule his appointment with him in the near future.    Follow-up: As needed at this time.    EMR Dragon/Transcription disclaimer: Much of this encounter note is an electronic transcription/translation of spoken language to printed text. The electronic translation of spoken language may permit erroneous, or at times, nonsensical words or phrases to be inadvertently transcribed; although I have reviewed the note for such errors, some may still exist.            "

## 2018-02-07 RX ORDER — METHYLPHENIDATE HYDROCHLORIDE 20 MG/1
20 TABLET ORAL 4 TIMES DAILY
Qty: 120 TABLET | Refills: 0 | Status: SHIPPED | OUTPATIENT
Start: 2018-02-07 | End: 2018-03-14 | Stop reason: SDUPTHER

## 2018-03-13 ENCOUNTER — DOCUMENTATION (OUTPATIENT)
Dept: NEUROLOGY | Facility: CLINIC | Age: 78
End: 2018-03-13

## 2018-03-13 NOTE — PROGRESS NOTES
Received a call from patient's care giver in regards to his Ritalin prescription.  He is calling to say he will need a refill soon.  I did let him know I would let Romeo know and she will get that prescription signed.

## 2018-03-14 NOTE — TELEPHONE ENCOUNTER
----- Message from Samantha Murillo LPN sent at 3/13/2018  9:36 AM CDT -----  Regarding: Spencer Moore  Patient's care giver calls letting us know he will need a prescription for Ritalin for the patient. He states he always calls ahead so he doesn't run out.  Please call him when he can pick the prescription up. Phone number is [phone].

## 2018-03-15 RX ORDER — METHYLPHENIDATE HYDROCHLORIDE 20 MG/1
20 TABLET ORAL 4 TIMES DAILY
Qty: 120 TABLET | Refills: 0 | Status: SHIPPED | OUTPATIENT
Start: 2018-03-15 | End: 2018-04-12 | Stop reason: SDUPTHER

## 2018-04-13 RX ORDER — METHYLPHENIDATE HYDROCHLORIDE 20 MG/1
20 TABLET ORAL 4 TIMES DAILY
Qty: 120 TABLET | Refills: 0 | Status: SHIPPED | OUTPATIENT
Start: 2018-04-13 | End: 2018-08-02 | Stop reason: SDUPTHER

## 2018-04-30 ENCOUNTER — OFFICE VISIT (OUTPATIENT)
Dept: OTOLARYNGOLOGY | Facility: CLINIC | Age: 78
End: 2018-04-30

## 2018-04-30 ENCOUNTER — PROCEDURE VISIT (OUTPATIENT)
Dept: OTOLARYNGOLOGY | Facility: CLINIC | Age: 78
End: 2018-04-30

## 2018-04-30 VITALS
BODY MASS INDEX: 29.7 KG/M2 | SYSTOLIC BLOOD PRESSURE: 130 MMHG | WEIGHT: 196 LBS | DIASTOLIC BLOOD PRESSURE: 80 MMHG | HEIGHT: 68 IN | TEMPERATURE: 97 F

## 2018-04-30 DIAGNOSIS — H90.3 SENSORINEURAL HEARING LOSS (SNHL) OF BOTH EARS: ICD-10-CM

## 2018-04-30 DIAGNOSIS — H93.13 TINNITUS OF BOTH EARS: ICD-10-CM

## 2018-04-30 DIAGNOSIS — G50.0 TRIGEMINAL NEURALGIA OF RIGHT SIDE OF FACE: ICD-10-CM

## 2018-04-30 DIAGNOSIS — H90.3 SENSORINEURAL HEARING LOSS (SNHL), BILATERAL: Primary | ICD-10-CM

## 2018-04-30 DIAGNOSIS — H69.83 DYSFUNCTION OF BOTH EUSTACHIAN TUBES: Primary | ICD-10-CM

## 2018-04-30 PROCEDURE — 92550 TYMPANOMETRY & REFLEX THRESH: CPT | Performed by: AUDIOLOGIST

## 2018-04-30 PROCEDURE — 99214 OFFICE O/P EST MOD 30 MIN: CPT | Performed by: PHYSICIAN ASSISTANT

## 2018-04-30 RX ORDER — FLUTICASONE PROPIONATE 50 MCG
2 SPRAY, SUSPENSION (ML) NASAL DAILY
Qty: 16 G | Refills: 11 | Status: SHIPPED | OUTPATIENT
Start: 2018-04-30 | End: 2020-07-23 | Stop reason: ALTCHOICE

## 2018-04-30 RX ORDER — AZELASTINE 1 MG/ML
2 SPRAY, METERED NASAL 2 TIMES DAILY
Qty: 30 ML | Refills: 11 | Status: SHIPPED | OUTPATIENT
Start: 2018-04-30 | End: 2020-07-23 | Stop reason: ALTCHOICE

## 2018-04-30 NOTE — PROGRESS NOTES
CASE HISTORY DETAILS   Mr. Moore presented to the clinic this date with complaints of decreased hearing bilaterally. He has had long standing hearing loss and wears binaural hearing aids which do not give him much benefit. He further reported bilateral tinnitus. He denied aural fullness and history of ear infection.        SUMMARY   RIGHT  · Otoscopy revealed clear EAC/Unremarkable TM.  · Severe sloping sensorineural hearing loss.  · Immitance measures are consistent with negative middle ear pressure.    LEFT  · Otoscopy revealed clear EAC/Unremarkable TM.  · Severe sloping sensorineural hearing loss.  · Immitance measures suggest possible middle ear fluid (wide tympanometric width).    Options for amplification were discussed. Based on hearing thresholds he may be a candidate for a cochlear implant evaluation. However, this is pending medical clearance to determine if he has health risks that would prevent surgery.    RECOMMENDATIONS   Results of today's evaluation were discussed with Mr Moore and the following recommendations were made:  1. ENT evaluation today as scheduled.  2. Continue wearing binaural amplification.    AUDIOGRAM AND IMMITANCE       Ajit Carlton, CCC-A  Audiologist

## 2018-04-30 NOTE — PROGRESS NOTES
YOB: 1940  Location: [facility]  Location Address: [address] [place] [address],  [address], [address] [place] [address]  Location Phone: [phone]    Chief Complaint   Patient presents with   • Ear Problem       History of Present Illness  Spencer Moore is a 77 y.o. male.  Spencer Moore is here for evaluation of ENT complaints. The patient has had problems with hearing loss, change in hearing and tinnitus  The symptoms are localized to both ears. The patient has had moderate to severe symptoms. The symptoms have been present for the last several years The symptoms are aggravated by  no identifiable factors. The symptoms are improved by no identifiable factors.     Procedure visit     2018  [facility]   GARRETT Monique   Audiology   Sensorineural hearing loss (SNHL), bilateral   Dx   Hearing Loss   Reason for Visit    Progress Notes           CASE HISTORY DETAILS   Mr. Moore presented to the clinic this date with complaints of decreased hearing bilaterally. He has had long standing hearing loss and wears binaural hearing aids which do not give him much benefit. He further reported bilateral tinnitus. He denied aural fullness and history of ear infection.         SUMMARY   RIGHT  ? Otoscopy revealed clear EAC/Unremarkable TM.  ? Severe sloping sensorineural hearing loss.  ? Immitance measures are consistent with negative middle ear pressure.     LEFT  ? Otoscopy revealed clear EAC/Unremarkable TM.  ? Severe sloping sensorineural hearing loss.  ? Immitance measures suggest possible middle ear fluid (wide tympanometric width).     Options for amplification were discussed. Based on hearing thresholds he may be a candidate for a cochlear implant evaluation. However, this is pending medical clearance to determine if he has health risks that would prevent surgery.     RECOMMENDATIONS   Results of today's evaluation were discussed with Mr Moore and the following  recommendations were made:  1. ENT evaluation today as scheduled.  2. Continue wearing binaural amplification.     AUDIOGRAM AND IMMITANCE        Ajit Carlton, [facility]  Audiologist      Instructions         Return for ENT evaluation today as scheduled..     Past Medical History:   Diagnosis Date   • Anemia    • Arthritis    • Diverticulitis    • Enlarged prostate    • GERD (gastroesophageal reflux disease)    • Hypertension    • Kidney infection    • Lumbago    • Narcolepsy    • Vitamin B12 deficiency    • Weak urinary stream        Past Surgical History:   Procedure Laterality Date   • ABDOMINAL SURGERY     • CATARACT EXTRACTION Left    • HAND SURGERY Left    • HERNIA REPAIR     • REPLACEMENT TOTAL KNEE Left    • ROTATOR CUFF REPAIR     • TONSILLECTOMY         Outpatient Prescriptions Marked as Taking for the 4/30/18 encounter (Office Visit) with RAUL Rosas   Medication Sig Dispense Refill   • aspirin 81 MG EC tablet Take 81 mg by mouth Daily.     • atorvastatin (LIPITOR) 10 MG tablet Take 10 mg by mouth Daily.     • carBAMazepine (TEGretol) 200 MG tablet Take 1 tablet by mouth 2 (Two) Times a Day. Most of the time he is only taking 1 per day 60 tablet 6   • CloNIDine (CATAPRES) 0.1 MG tablet Take 0.1 mg by mouth As Needed for High Blood Pressure (1 tablet if systolic is over 170).     • diclofenac (VOLTAREN) 1 % gel gel Apply 4 g topically 2 (Two) Times a Day.     • finasteride (PROSCAR) 5 MG tablet Take 1 tablet by mouth Daily. 90 tablet 3   • FLUoxetine (PROzac) 40 MG capsule Take 40 mg by mouth Daily.     • irbesartan-hydrochlorothiazide (AVALIDE) 300-12.5 MG tablet Take 1 tablet by mouth Daily.     • methylphenidate (RITALIN) 20 MG tablet Take 1 tablet by mouth 4 (Four) Times a Day. 120 tablet 0   • metoprolol tartrate (LOPRESSOR) 25 MG tablet Take 1 tablet by mouth 2 (Two) Times a Day. 60 tablet 11   • omeprazole (priLOSEC) 40 MG capsule Take 40 mg by mouth Daily.     •  oxyCODONE-acetaminophen (PERCOCET) 5-325 MG per tablet Take 1 tablet by mouth Every 6 (Six) Hours As Needed. Takes 7.5mg four times daily     • tamsulosin (FLOMAX) 0.4 MG capsule 24 hr capsule Take one capsule by mouth once daily 90 capsule 3   • terazosin (HYTRIN) 2 MG capsule Take 2 mg by mouth Every Night.         Biaxin [clarithromycin] and Parafon forte dsc [chlorzoxazone]    Family History   Problem Relation Age of Onset   • Adopted: Yes   • Family history unknown: Yes       Social History     Social History   • Marital status: Legally      Spouse name: N/A   • Number of children: N/A   • Years of education: N/A     Occupational History   • Not on file.     Social History Main Topics   • Smoking status: Never Smoker   • Smokeless tobacco: Never Used   • Alcohol use No   • Drug use: No   • Sexual activity: Defer     Other Topics Concern   • Not on file     Social History Narrative   • No narrative on file       Review of Systems   Constitutional: Negative for activity change, appetite change, chills, diaphoresis, fatigue, fever and unexpected weight change.   HENT: Positive for hearing loss and tinnitus. Negative for congestion, ear discharge, ear pain, facial swelling, mouth sores, nosebleeds, postnasal drip, rhinorrhea, sinus pressure, sneezing, sore throat, trouble swallowing and voice change.    Eyes: Negative for pain, discharge, redness, itching and visual disturbance.   Respiratory: Negative for apnea, cough, choking, chest tightness, shortness of breath, wheezing and stridor.    Gastrointestinal: Negative for nausea and vomiting.   Endocrine: Negative for cold intolerance and heat intolerance.   Musculoskeletal: Negative for arthralgias, back pain, gait problem, neck pain and neck stiffness.   Skin: Negative for rash.   Allergic/Immunologic: Negative for environmental allergies and food allergies.   Neurological: Negative for dizziness, tremors, seizures, syncope, facial asymmetry, speech  difficulty, weakness, light-headedness, numbness and headaches.   Hematological: Negative for adenopathy. Does not bruise/bleed easily.   Psychiatric/Behavioral: Negative for behavioral problems, sleep disturbance and suicidal ideas. The patient is not nervous/anxious and is not hyperactive.        Vitals:    04/30/18 1504   BP: 130/80   Temp: 97 °F (36.1 °C)       Body mass index is 29.8 kg/m².    Objective     Physical Exam  CONSTITUTIONAL: well nourished, alert, oriented, in no acute distress     COMMUNICATION AND VOICE: able to communicate normally, normal voice quality    HEAD: normocephalic, no lesions, atraumatic, no tenderness, no masses     FACE: appearance normal, no lesions, no tenderness, no deformities, facial motion symmetric    SALIVARY GLANDS: parotid glands with no tenderness, no swelling, no masses, submandibular glands with normal size, nontender    EYES: ocular motility normal, eyelids normal, orbits normal, no proptosis, conjunctiva normal , pupils equal, round     EARS:  Hearing: response to conversational voice normal bilaterally   External Ears: auricles without lesions  Otoscopic: tympanic membrane with pressure on the right and mild effusion on the left    NOSE:  External Nose: structure normal, no tenderness on palpation, no nasal discharge, no lesions, no evidence of trauma, nostrils patent   Intranasal Exam: nasal mucosa normal, vestibule within normal limits, inferior turbinate normal, nasal septum midline   Nasopharynx:     ORAL:  Lips: upper and lower lips without lesion   Teeth: dentition within normal limits for age   Gums: gingivae healthy   Oral Mucosa: oral mucosa normal, no mucosal lesions   Floor of Mouth: Warthin’s duct patent, mucosa normal  Tongue: lingual mucosa normal without lesions, normal tongue mobility   Palate: soft and hard palates with normal mucosa and structure  Oropharynx: oropharyngeal mucosa normal    NECK: neck appearance normal, no mass,  noted without  erythema or tenderness    THYROID: no overt thyromegaly, no tenderness, nodules or mass present on palpation, position midline     LYMPH NODES: no lymphadenopathy    CHEST/RESPIRATORY: respiratory effort normal, normal breath sounds     CARDIOVASCULAR: rate and rhythm normal, extremities without cyanosis or edema      NEUROLOGIC/PSYCHIATRIC: oriented to time, place and person, mood normal, affect appropriate, CN II-XII intact grossly    Assessment/Plan   Problems Addressed this Visit        Nervous and Auditory    Trigeminal neuralgia of right side of face      Other Visit Diagnoses     Dysfunction of both eustachian tubes    -  Primary    Tinnitus of both ears        Sensorineural hearing loss (SNHL) of both ears        Relevant Orders    Ambulatory Referral to ENT (Otolaryngology) (Completed)        * Surgery not found *  Orders Placed This Encounter   Procedures   • Ambulatory Referral to ENT (Otolaryngology)     Referral Priority:   Routine     Referral Type:   Consultation     Referral Reason:   Specialty Services Required     Requested Specialty:   Otolaryngology     Number of Visits Requested:   1     Return in about 6 weeks (around 6/11/2018) for Recheck ears.       Patient Instructions   Will start nasal sprays and recheck ears in 6 weeks. If symptoms have not improved will consider PE tubes.    ###### BMI  #####   MyPlate from [employer]  The general, healthful diet is based on the 2010 Dietary Guidelines for Americans. The amount of food you need to eat from each food group depends on your age, sex, and level of physical activity and can be individualized by a dietitian. Go to ChooseMyPlate.gov for more information.  What do I need to know about the MyPlate plan?  · Enjoy your food, but eat less.  · Avoid oversized portions.  ¨ ½ of your plate should include fruits and vegetables.  ¨ ¼ of your plate should be grains.  ¨ ¼ of your plate should be protein.  Grains   · Make at least half of your grains whole  grains.  · For a 2,000 calorie daily food plan, eat 6 oz every day.  · 1 oz is about 1 slice bread, 1 cup cereal, or ½ cup cooked rice, cereal, or pasta.  Vegetables   · Make half your plate fruits and vegetables.  · For a 2,000 calorie daily food plan, eat 2½ cups every day.  · 1 cup is about 1 cup raw or cooked vegetables or vegetable juice or 2 cups raw leafy greens.  Fruits   · Make half your plate fruits and vegetables.  · For a 2,000 calorie daily food plan, eat 2 cups every day.  · 1 cup is about 1 cup fruit or 100% fruit juice or ½ cup dried fruit.  Protein   · For a 2,000 calorie daily food plan, eat 5½ oz every day.  · 1 oz is about 1 oz meat, poultry, or fish, ¼ cup cooked beans, 1 egg, 1 Tbsp peanut butter, or ½ oz nuts or seeds.  Dairy   · Switch to fat-free or low-fat (1%) milk.  · For a 2,000 calorie daily food plan, eat 3 cups every day.  · 1 cup is about 1 cup milk or yogurt or soy milk (soy beverage), 1½ oz natural cheese, or 2 oz processed cheese.  Fats, Oils, and Empty Calories   · Only small amounts of oils are recommended.  · Empty calories are calories from solid fats or added sugars.  · Compare sodium in foods like soup, bread, and frozen meals. Choose the foods with lower numbers.  · Drink water instead of sugary drinks.  What foods can I eat?  Grains   Whole grains such as whole wheat, quinoa, millet, and bulgur. Bread, rolls, and pasta made from whole grains. Brown or wild rice. Hot or cold cereals made from whole grains and without added sugar.  Vegetables   All fresh vegetables, especially fresh red, dark green, or orange vegetables. Peas and beans. Low-sodium frozen or canned vegetables prepared without added salt. Low-sodium vegetable juices.  Fruits   All fresh, frozen, and dried fruits. Canned fruit packed in water or fruit juice without added sugar. Fruit juices without added sugar.  Meats and Other Protein Sources   Boiled, baked, or grilled lean meat trimmed of fat. Skinless  poultry. Fresh seafood and shellfish. Canned seafood packed in water. Unsalted nuts and unsalted nut butters. Tofu. Dried beans and pea. Eggs.  Dairy   Low-fat or fat-free milk, yogurt, and cheeses.  Sweets and Desserts   Frozen desserts made from low-fat milk.  Fats and Oils   Olive, peanut, and canola oils and margarine. Salad dressing and mayonnaise made from these oils.  Other   Soups and casseroles made from allowed ingredients and without added fat or salt.  The items listed above may not be a complete list of recommended foods or beverages. Contact your dietitian for more options.   What foods are not recommended?  Grains   Sweetened, low-fiber cereals. Packaged baked goods. Snack crackers and chips. Cheese crackers, butter crackers, and biscuits. Frozen waffles, sweet breads, doughnuts, pastries, packaged baking mixes, pancakes, cakes, and cookies.  Vegetables   Regular canned or frozen vegetables or vegetables prepared with salt. Canned tomatoes. Canned tomato sauce. Fried vegetables. Vegetables in cream sauce or cheese sauce.  Fruits   Fruits packed in syrup or made with added sugar.  Meats and Other Protein Sources   Marbled or fatty meats such as ribs. Poultry with skin. Fried meats, poultry, eggs, or fish. Sausages, hot dogs, and deli meats such as pastrami, bologna, or salami.  Dairy   Whole milk, cream, cheeses made from whole milk, sour cream. Ice cream or yogurt made from whole milk or with added sugar.  Beverages   For adults, no more than one alcoholic drink per day. Regular soft drinks or other sugary beverages. Juice drinks.  Sweets and Desserts   Sugary or fatty desserts, candy, and other sweets.  Fats and Oils   Solid shortening or partially hydrogenated oils. Solid margarine. Margarine that contains trans fats. Butter.  The items listed above may not be a complete list of foods and beverages to avoid. Contact your dietitian for more information.   This information is not intended to replace  advice given to you by your health care provider. Make sure you discuss any questions you have with your health care provider.  Document Released: 01/06/2009 Document Revised: 05/25/2017 Document Reviewed: 11/26/2014  [employer] Interactive Patient Education © 2017 [employer]     Calorie Counting for Weight Loss  Calories are units of energy. Your body needs a certain amount of calories from food to keep you going throughout the day. When you eat more calories than your body needs, your body stores the extra calories as fat. When you eat fewer calories than your body needs, your body burns fat to get the energy it needs.  Calorie counting means keeping track of how many calories you eat and drink each day. Calorie counting can be helpful if you need to lose weight. If you make sure to eat fewer calories than your body needs, you should lose weight. Ask your health care provider what a healthy weight is for you.  For calorie counting to work, you will need to eat the right number of calories in a day in order to lose a healthy amount of weight per week. A dietitian can help you determine how many calories you need in a day and will give you suggestions on how to reach your calorie goal.  · A healthy amount of weight to lose per week is usually 1-2 lb (0.5-0.9 kg). This usually means that your daily calorie intake should be reduced by 500-750 calories.  · Eating 1,200 - 1,500 calories per day can help most women lose weight.  · Eating 1,500 - 1,800 calories per day can help most men lose weight.  What is my plan?  My goal is to have __________ calories per day.  If I have this many calories per day, I should lose around __________ pounds per week.  What do I need to know about calorie counting?  In order to meet your daily calorie goal, you will need to:  · Find out how many calories are in each food you would like to eat. Try to do this before you eat.  · Decide how much of the food you plan to eat.  · Write down  what you ate and how many calories it had. Doing this is called keeping a food log.  To successfully lose weight, it is important to balance calorie counting with a healthy lifestyle that includes regular activity. Aim for 150 minutes of moderate exercise (such as walking) or 75 minutes of vigorous exercise (such as running) each week.  Where do I find calorie information?     The number of calories in a food can be found on a Nutrition Facts label. If a food does not have a Nutrition Facts label, try to look up the calories online or ask your dietitian for help.  Remember that calories are listed per serving. If you choose to have more than one serving of a food, you will have to multiply the calories per serving by the amount of servings you plan to eat. For example, the label on a package of bread might say that a serving size is 1 slice and that there are 90 calories in a serving. If you eat 1 slice, you will have eaten 90 calories. If you eat 2 slices, you will have eaten 180 calories.  How do I keep a food log?  Immediately after each meal, record the following information in your food log:  · What you ate. Don't forget to include toppings, sauces, and other extras on the food.  · How much you ate. This can be measured in cups, ounces, or number of items.  · How many calories each food and drink had.  · The total number of calories in the meal.  Keep your food log near you, such as in a small notebook in your pocket, or use a mobile anatoliy or website. Some programs will calculate calories for you and show you how many calories you have left for the day to meet your goal.  What are some calorie counting tips?  · Use your calories on foods and drinks that will fill you up and not leave you hungry:  ¨ Some examples of foods that fill you up are nuts and nut butters, vegetables, lean proteins, and high-fiber foods like whole grains. High-fiber foods are foods with more than 5 g fiber per serving.  ¨ Drinks such as  "sodas, specialty coffee drinks, alcohol, and juices have a lot of calories, yet do not fill you up.  · Eat nutritious foods and avoid empty calories. Empty calories are calories you get from foods or beverages that do not have many vitamins or protein, such as candy, sweets, and soda. It is better to have a nutritious high-calorie food (such as an avocado) than a food with few nutrients (such as a bag of chips).  · Know how many calories are in the foods you eat most often. This will help you calculate calorie counts faster.  · Pay attention to calories in drinks. Low-calorie drinks include water and unsweetened drinks.  · Pay attention to nutrition labels for \"low fat\" or \"fat free\" foods. These foods sometimes have the same amount of calories or more calories than the full fat versions. They also often have added sugar, starch, or salt, to make up for flavor that was removed with the fat.  · Find a way of tracking calories that works for you. Get creative. Try different apps or programs if writing down calories does not work for you.  What are some portion control tips?  · Know how many calories are in a serving. This will help you know how many servings of a certain food you can have.  · Use a measuring cup to measure serving sizes. You could also try weighing out portions on a kitchen scale. With time, you will be able to estimate serving sizes for some foods.  · Take some time to put servings of different foods on your favorite plates, bowls, and cups so you know what a serving looks like.  · Try not to eat straight from a bag or box. Doing this can lead to overeating. Put the amount you would like to eat in a cup or on a plate to make sure you are eating the right portion.  · Use smaller plates, glasses, and bowls to prevent overeating.  · Try not to multitask (for example, watch TV or use your computer) while eating. If it is time to eat, sit down at a table and enjoy your food. This will help you to know " when you are full. It will also help you to be aware of what you are eating and how much you are eating.  What are tips for following this plan?  Reading food labels   · Check the calorie count compared to the serving size. The serving size may be smaller than what you are used to eating.  · Check the source of the calories. Make sure the food you are eating is high in vitamins and protein and low in saturated and trans fats.  Shopping   · Read nutrition labels while you shop. This will help you make healthy decisions before you decide to purchase your food.  · Make a grocery list and stick to it.  Cooking   · Try to cook your favorite foods in a healthier way. For example, try baking instead of frying.  · Use low-fat dairy products.  Meal planning   · Use more fruits and vegetables. Half of your plate should be fruits and vegetables.  · Include lean proteins like poultry and fish.  How do I count calories when eating out?  · Ask for smaller portion sizes.  · Consider sharing an entree and sides instead of getting your own entree.  · If you get your own entree, eat only half. Ask for a box at the beginning of your meal and put the rest of your entree in it so you are not tempted to eat it.  · If calories are listed on the menu, choose the lower calorie options.  · Choose dishes that include vegetables, fruits, whole grains, low-fat dairy products, and lean protein.  · Choose items that are boiled, broiled, grilled, or steamed. Stay away from items that are buttered, battered, fried, or served with cream sauce. Items labeled “crispy” are usually fried, unless stated otherwise.  · Choose water, low-fat milk, unsweetened iced tea, or other drinks without added sugar. If you want an alcoholic beverage, choose a lower calorie option such as a glass of wine or light beer.  · Ask for dressings, sauces, and syrups on the side. These are usually high in calories, so you should limit the amount you eat.  · If you want a  salad, choose a garden salad and ask for grilled meats. Avoid extra toppings like rodney, cheese, or fried items. Ask for the dressing on the side, or ask for olive oil and vinegar or lemon to use as dressing.  · Estimate how many servings of a food you are given. For example, a serving of cooked rice is ½ cup or about the size of half a baseball. Knowing serving sizes will help you be aware of how much food you are eating at restaurants. The list below tells you how big or small some common portion sizes are based on everyday objects:  ¨ 1 oz--4 stacked dice.  ¨ 3 oz--1 deck of cards.  ¨ 1 tsp--1 die.  ¨ 1 Tbsp--½ a ping-pong ball.  ¨ 2 Tbsp--1 ping-pong ball.  ¨ ½ cup--½ baseball.  ¨ 1 cup--1 baseball.  Summary  · Calorie counting means keeping track of how many calories you eat and drink each day. If you eat fewer calories than your body needs, you should lose weight.  · A healthy amount of weight to lose per week is usually 1-2 lb (0.5-0.9 kg). This usually means reducing your daily calorie intake by 500-750 calories.  · The number of calories in a food can be found on a Nutrition Facts label. If a food does not have a Nutrition Facts label, try to look up the calories online or ask your dietitian for help.  · Use your calories on foods and drinks that will fill you up, and not on foods and drinks that will leave you hungry.  · Use smaller plates, glasses, and bowls to prevent overeating.  This information is not intended to replace advice given to you by your health care provider. Make sure you discuss any questions you have with your health care provider.  Document Released: 12/18/2006 Document Revised: 11/17/2017 Document Reviewed: 11/17/2017  [employer] Interactive Patient Education © 2017 [employer]     Exercising to Lose Weight  Exercising can help you to lose weight. In order to lose weight through exercise, you need to do vigorous-intensity exercise. You can tell that you are exercising with vigorous  intensity if you are breathing very hard and fast and cannot hold a conversation while exercising.  Moderate-intensity exercise helps to maintain your current weight. You can tell that you are exercising at a moderate level if you have a higher heart rate and faster breathing, but you are still able to hold a conversation.  How often should I exercise?  Choose an activity that you enjoy and set realistic goals. Your health care provider can help you to make an activity plan that works for you. Exercise regularly as directed by your health care provider. This may include:  · Doing resistance training twice each week, such as:  ¨ Push-ups.  ¨ Sit-ups.  ¨ Lifting weights.  ¨ Using resistance bands.  · Doing a given intensity of exercise for a given amount of time. Choose from these options:  ¨ 150 minutes of moderate-intensity exercise every week.  ¨ 75 minutes of vigorous-intensity exercise every week.  ¨ A mix of moderate-intensity and vigorous-intensity exercise every week.  Children, pregnant women, people who are out of shape, people who are overweight, and older adults may need to consult a health care provider for individual recommendations. If you have any sort of medical condition, be sure to consult your health care provider before starting a new exercise program.  What are some activities that can help me to lose weight?  · Walking at a rate of at least 4.5 miles an hour.  · Jogging or running at a rate of 5 miles per hour.  · Biking at a rate of at least 10 miles per hour.  · Lap swimming.  · Roller-skating or in-line skating.  · Cross-country skiing.  · Vigorous competitive sports, such as football, basketball, and soccer.  · Jumping rope.  · Aerobic dancing.  How can I be more active in my day-to-day activities?  · Use the stairs instead of the elevator.  · Take a walk during your lunch break.  · If you drive, park your car farther away from work or school.  · If you take public transportation, get off  one stop early and walk the rest of the way.  · Make all of your phone calls while standing up and walking around.  · Get up, stretch, and walk around every 30 minutes throughout the day.  What guidelines should I follow while exercising?  · Do not exercise so much that you hurt yourself, feel dizzy, or get very short of breath.  · Consult your health care provider prior to starting a new exercise program.  · Wear comfortable clothes and shoes with good support.  · Drink plenty of water while you exercise to prevent dehydration or heat stroke. Body water is lost during exercise and must be replaced.  · Work out until you breathe faster and your heart beats faster.  This information is not intended to replace advice given to you by your health care provider. Make sure you discuss any questions you have with your health care provider.  Document Released: 01/20/2012 Document Revised: 05/25/2017 Document Reviewed: 05/21/2015  [employer] Patient Education © 2017 [employer]

## 2018-04-30 NOTE — PATIENT INSTRUCTIONS
Will start nasal sprays and recheck ears in 6 weeks. If symptoms have not improved will consider PE tubes.    ###### BMI  #####   MyPlate from [employer]  The general, healthful diet is based on the 2010 Dietary Guidelines for Americans. The amount of food you need to eat from each food group depends on your age, sex, and level of physical activity and can be individualized by a dietitian. Go to ChooseMyPlate.gov for more information.  What do I need to know about the MyPlate plan?  · Enjoy your food, but eat less.  · Avoid oversized portions.  ¨ ½ of your plate should include fruits and vegetables.  ¨ ¼ of your plate should be grains.  ¨ ¼ of your plate should be protein.  Grains   · Make at least half of your grains whole grains.  · For a 2,000 calorie daily food plan, eat 6 oz every day.  · 1 oz is about 1 slice bread, 1 cup cereal, or ½ cup cooked rice, cereal, or pasta.  Vegetables   · Make half your plate fruits and vegetables.  · For a 2,000 calorie daily food plan, eat 2½ cups every day.  · 1 cup is about 1 cup raw or cooked vegetables or vegetable juice or 2 cups raw leafy greens.  Fruits   · Make half your plate fruits and vegetables.  · For a 2,000 calorie daily food plan, eat 2 cups every day.  · 1 cup is about 1 cup fruit or 100% fruit juice or ½ cup dried fruit.  Protein   · For a 2,000 calorie daily food plan, eat 5½ oz every day.  · 1 oz is about 1 oz meat, poultry, or fish, ¼ cup cooked beans, 1 egg, 1 Tbsp peanut butter, or ½ oz nuts or seeds.  Dairy   · Switch to fat-free or low-fat (1%) milk.  · For a 2,000 calorie daily food plan, eat 3 cups every day.  · 1 cup is about 1 cup milk or yogurt or soy milk (soy beverage), 1½ oz natural cheese, or 2 oz processed cheese.  Fats, Oils, and Empty Calories   · Only small amounts of oils are recommended.  · Empty calories are calories from solid fats or added sugars.  · Compare sodium in foods like soup, bread, and frozen meals. Choose the foods with lower  numbers.  · Drink water instead of sugary drinks.  What foods can I eat?  Grains   Whole grains such as whole wheat, quinoa, millet, and bulgur. Bread, rolls, and pasta made from whole grains. Brown or wild rice. Hot or cold cereals made from whole grains and without added sugar.  Vegetables   All fresh vegetables, especially fresh red, dark green, or orange vegetables. Peas and beans. Low-sodium frozen or canned vegetables prepared without added salt. Low-sodium vegetable juices.  Fruits   All fresh, frozen, and dried fruits. Canned fruit packed in water or fruit juice without added sugar. Fruit juices without added sugar.  Meats and Other Protein Sources   Boiled, baked, or grilled lean meat trimmed of fat. Skinless poultry. Fresh seafood and shellfish. Canned seafood packed in water. Unsalted nuts and unsalted nut butters. Tofu. Dried beans and pea. Eggs.  Dairy   Low-fat or fat-free milk, yogurt, and cheeses.  Sweets and Desserts   Frozen desserts made from low-fat milk.  Fats and Oils   Olive, peanut, and canola oils and margarine. Salad dressing and mayonnaise made from these oils.  Other   Soups and casseroles made from allowed ingredients and without added fat or salt.  The items listed above may not be a complete list of recommended foods or beverages. Contact your dietitian for more options.   What foods are not recommended?  Grains   Sweetened, low-fiber cereals. Packaged baked goods. Snack crackers and chips. Cheese crackers, butter crackers, and biscuits. Frozen waffles, sweet breads, doughnuts, pastries, packaged baking mixes, pancakes, cakes, and cookies.  Vegetables   Regular canned or frozen vegetables or vegetables prepared with salt. Canned tomatoes. Canned tomato sauce. Fried vegetables. Vegetables in cream sauce or cheese sauce.  Fruits   Fruits packed in syrup or made with added sugar.  Meats and Other Protein Sources   Marbled or fatty meats such as ribs. Poultry with skin. Fried meats,  poultry, eggs, or fish. Sausages, hot dogs, and deli meats such as pastrami, bologna, or salami.  Dairy   Whole milk, cream, cheeses made from whole milk, sour cream. Ice cream or yogurt made from whole milk or with added sugar.  Beverages   For adults, no more than one alcoholic drink per day. Regular soft drinks or other sugary beverages. Juice drinks.  Sweets and Desserts   Sugary or fatty desserts, candy, and other sweets.  Fats and Oils   Solid shortening or partially hydrogenated oils. Solid margarine. Margarine that contains trans fats. Butter.  The items listed above may not be a complete list of foods and beverages to avoid. Contact your dietitian for more information.   This information is not intended to replace advice given to you by your health care provider. Make sure you discuss any questions you have with your health care provider.  Document Released: 01/06/2009 Document Revised: 05/25/2017 Document Reviewed: 11/26/2014  [employer] Interactive Patient Education © 2017 [employer]     Calorie Counting for Weight Loss  Calories are units of energy. Your body needs a certain amount of calories from food to keep you going throughout the day. When you eat more calories than your body needs, your body stores the extra calories as fat. When you eat fewer calories than your body needs, your body burns fat to get the energy it needs.  Calorie counting means keeping track of how many calories you eat and drink each day. Calorie counting can be helpful if you need to lose weight. If you make sure to eat fewer calories than your body needs, you should lose weight. Ask your health care provider what a healthy weight is for you.  For calorie counting to work, you will need to eat the right number of calories in a day in order to lose a healthy amount of weight per week. A dietitian can help you determine how many calories you need in a day and will give you suggestions on how to reach your calorie goal.  · A  healthy amount of weight to lose per week is usually 1-2 lb (0.5-0.9 kg). This usually means that your daily calorie intake should be reduced by 500-750 calories.  · Eating 1,200 - 1,500 calories per day can help most women lose weight.  · Eating 1,500 - 1,800 calories per day can help most men lose weight.  What is my plan?  My goal is to have __________ calories per day.  If I have this many calories per day, I should lose around __________ pounds per week.  What do I need to know about calorie counting?  In order to meet your daily calorie goal, you will need to:  · Find out how many calories are in each food you would like to eat. Try to do this before you eat.  · Decide how much of the food you plan to eat.  · Write down what you ate and how many calories it had. Doing this is called keeping a food log.  To successfully lose weight, it is important to balance calorie counting with a healthy lifestyle that includes regular activity. Aim for 150 minutes of moderate exercise (such as walking) or 75 minutes of vigorous exercise (such as running) each week.  Where do I find calorie information?     The number of calories in a food can be found on a Nutrition Facts label. If a food does not have a Nutrition Facts label, try to look up the calories online or ask your dietitian for help.  Remember that calories are listed per serving. If you choose to have more than one serving of a food, you will have to multiply the calories per serving by the amount of servings you plan to eat. For example, the label on a package of bread might say that a serving size is 1 slice and that there are 90 calories in a serving. If you eat 1 slice, you will have eaten 90 calories. If you eat 2 slices, you will have eaten 180 calories.  How do I keep a food log?  Immediately after each meal, record the following information in your food log:  · What you ate. Don't forget to include toppings, sauces, and other extras on the food.  · How  "much you ate. This can be measured in cups, ounces, or number of items.  · How many calories each food and drink had.  · The total number of calories in the meal.  Keep your food log near you, such as in a small notebook in your pocket, or use a mobile anatoliy or website. Some programs will calculate calories for you and show you how many calories you have left for the day to meet your goal.  What are some calorie counting tips?  · Use your calories on foods and drinks that will fill you up and not leave you hungry:  ¨ Some examples of foods that fill you up are nuts and nut butters, vegetables, lean proteins, and high-fiber foods like whole grains. High-fiber foods are foods with more than 5 g fiber per serving.  ¨ Drinks such as sodas, specialty coffee drinks, alcohol, and juices have a lot of calories, yet do not fill you up.  · Eat nutritious foods and avoid empty calories. Empty calories are calories you get from foods or beverages that do not have many vitamins or protein, such as candy, sweets, and soda. It is better to have a nutritious high-calorie food (such as an avocado) than a food with few nutrients (such as a bag of chips).  · Know how many calories are in the foods you eat most often. This will help you calculate calorie counts faster.  · Pay attention to calories in drinks. Low-calorie drinks include water and unsweetened drinks.  · Pay attention to nutrition labels for \"low fat\" or \"fat free\" foods. These foods sometimes have the same amount of calories or more calories than the full fat versions. They also often have added sugar, starch, or salt, to make up for flavor that was removed with the fat.  · Find a way of tracking calories that works for you. Get creative. Try different apps or programs if writing down calories does not work for you.  What are some portion control tips?  · Know how many calories are in a serving. This will help you know how many servings of a certain food you can " have.  · Use a measuring cup to measure serving sizes. You could also try weighing out portions on a kitchen scale. With time, you will be able to estimate serving sizes for some foods.  · Take some time to put servings of different foods on your favorite plates, bowls, and cups so you know what a serving looks like.  · Try not to eat straight from a bag or box. Doing this can lead to overeating. Put the amount you would like to eat in a cup or on a plate to make sure you are eating the right portion.  · Use smaller plates, glasses, and bowls to prevent overeating.  · Try not to multitask (for example, watch TV or use your computer) while eating. If it is time to eat, sit down at a table and enjoy your food. This will help you to know when you are full. It will also help you to be aware of what you are eating and how much you are eating.  What are tips for following this plan?  Reading food labels   · Check the calorie count compared to the serving size. The serving size may be smaller than what you are used to eating.  · Check the source of the calories. Make sure the food you are eating is high in vitamins and protein and low in saturated and trans fats.  Shopping   · Read nutrition labels while you shop. This will help you make healthy decisions before you decide to purchase your food.  · Make a grocery list and stick to it.  Cooking   · Try to cook your favorite foods in a healthier way. For example, try baking instead of frying.  · Use low-fat dairy products.  Meal planning   · Use more fruits and vegetables. Half of your plate should be fruits and vegetables.  · Include lean proteins like poultry and fish.  How do I count calories when eating out?  · Ask for smaller portion sizes.  · Consider sharing an entree and sides instead of getting your own entree.  · If you get your own entree, eat only half. Ask for a box at the beginning of your meal and put the rest of your entree in it so you are not tempted to eat  it.  · If calories are listed on the menu, choose the lower calorie options.  · Choose dishes that include vegetables, fruits, whole grains, low-fat dairy products, and lean protein.  · Choose items that are boiled, broiled, grilled, or steamed. Stay away from items that are buttered, battered, fried, or served with cream sauce. Items labeled “crispy” are usually fried, unless stated otherwise.  · Choose water, low-fat milk, unsweetened iced tea, or other drinks without added sugar. If you want an alcoholic beverage, choose a lower calorie option such as a glass of wine or light beer.  · Ask for dressings, sauces, and syrups on the side. These are usually high in calories, so you should limit the amount you eat.  · If you want a salad, choose a garden salad and ask for grilled meats. Avoid extra toppings like rodney, cheese, or fried items. Ask for the dressing on the side, or ask for olive oil and vinegar or lemon to use as dressing.  · Estimate how many servings of a food you are given. For example, a serving of cooked rice is ½ cup or about the size of half a baseball. Knowing serving sizes will help you be aware of how much food you are eating at restaurants. The list below tells you how big or small some common portion sizes are based on everyday objects:  ¨ 1 oz--4 stacked dice.  ¨ 3 oz--1 deck of cards.  ¨ 1 tsp--1 die.  ¨ 1 Tbsp--½ a ping-pong ball.  ¨ 2 Tbsp--1 ping-pong ball.  ¨ ½ cup--½ baseball.  ¨ 1 cup--1 baseball.  Summary  · Calorie counting means keeping track of how many calories you eat and drink each day. If you eat fewer calories than your body needs, you should lose weight.  · A healthy amount of weight to lose per week is usually 1-2 lb (0.5-0.9 kg). This usually means reducing your daily calorie intake by 500-750 calories.  · The number of calories in a food can be found on a Nutrition Facts label. If a food does not have a Nutrition Facts label, try to look up the calories online or ask your  dietitian for help.  · Use your calories on foods and drinks that will fill you up, and not on foods and drinks that will leave you hungry.  · Use smaller plates, glasses, and bowls to prevent overeating.  This information is not intended to replace advice given to you by your health care provider. Make sure you discuss any questions you have with your health care provider.  Document Released: 12/18/2006 Document Revised: 11/17/2017 Document Reviewed: 11/17/2017  [employer] Patient Education © 2017 [employer]     Exercising to Lose Weight  Exercising can help you to lose weight. In order to lose weight through exercise, you need to do vigorous-intensity exercise. You can tell that you are exercising with vigorous intensity if you are breathing very hard and fast and cannot hold a conversation while exercising.  Moderate-intensity exercise helps to maintain your current weight. You can tell that you are exercising at a moderate level if you have a higher heart rate and faster breathing, but you are still able to hold a conversation.  How often should I exercise?  Choose an activity that you enjoy and set realistic goals. Your health care provider can help you to make an activity plan that works for you. Exercise regularly as directed by your health care provider. This may include:  · Doing resistance training twice each week, such as:  ¨ Push-ups.  ¨ Sit-ups.  ¨ Lifting weights.  ¨ Using resistance bands.  · Doing a given intensity of exercise for a given amount of time. Choose from these options:  ¨ 150 minutes of moderate-intensity exercise every week.  ¨ 75 minutes of vigorous-intensity exercise every week.  ¨ A mix of moderate-intensity and vigorous-intensity exercise every week.  Children, pregnant women, people who are out of shape, people who are overweight, and older adults may need to consult a health care provider for individual recommendations. If you have any sort of medical condition, be  sure to consult your health care provider before starting a new exercise program.  What are some activities that can help me to lose weight?  · Walking at a rate of at least 4.5 miles an hour.  · Jogging or running at a rate of 5 miles per hour.  · Biking at a rate of at least 10 miles per hour.  · Lap swimming.  · Roller-skating or in-line skating.  · Cross-country skiing.  · Vigorous competitive sports, such as football, basketball, and soccer.  · Jumping rope.  · Aerobic dancing.  How can I be more active in my day-to-day activities?  · Use the stairs instead of the elevator.  · Take a walk during your lunch break.  · If you drive, park your car farther away from work or school.  · If you take public transportation, get off one stop early and walk the rest of the way.  · Make all of your phone calls while standing up and walking around.  · Get up, stretch, and walk around every 30 minutes throughout the day.  What guidelines should I follow while exercising?  · Do not exercise so much that you hurt yourself, feel dizzy, or get very short of breath.  · Consult your health care provider prior to starting a new exercise program.  · Wear comfortable clothes and shoes with good support.  · Drink plenty of water while you exercise to prevent dehydration or heat stroke. Body water is lost during exercise and must be replaced.  · Work out until you breathe faster and your heart beats faster.  This information is not intended to replace advice given to you by your health care provider. Make sure you discuss any questions you have with your health care provider.  Document Released: 01/20/2012 Document Revised: 05/25/2017 Document Reviewed: 05/21/2015  [employer] Patient Education © 2017 [employer]

## 2018-05-03 ENCOUNTER — TELEPHONE (OUTPATIENT)
Dept: OTOLARYNGOLOGY | Facility: CLINIC | Age: 78
End: 2018-05-03

## 2018-05-03 NOTE — TELEPHONE ENCOUNTER
Suzie called re: patient hearing aid repair. They wish to send one of his aids for repair. They will drop off aid on Tuesday.

## 2018-05-04 ENCOUNTER — TELEPHONE (OUTPATIENT)
Dept: OTOLARYNGOLOGY | Facility: CLINIC | Age: 78
End: 2018-05-04

## 2018-05-04 NOTE — TELEPHONE ENCOUNTER
Bharti returned my call and left a vm regarding cochlear implant referral appt.  She said she has mailed the pt an intake packet that must be filled out and returned before an appt can be made.  All recs recv'd and will wait for pack to be returned.  They will also have the audio reviewed by the audiologist.  Once all this information is completed, they will contact pt with an appt.  She stated it could take 2 weeks or more depending on how long it takes for packet to come back to them.  Thanks

## 2018-05-08 ENCOUNTER — OFFICE VISIT (OUTPATIENT)
Dept: NEUROLOGY | Facility: CLINIC | Age: 78
End: 2018-05-08

## 2018-05-08 VITALS
HEART RATE: 68 BPM | SYSTOLIC BLOOD PRESSURE: 122 MMHG | HEIGHT: 68 IN | DIASTOLIC BLOOD PRESSURE: 70 MMHG | BODY MASS INDEX: 29.25 KG/M2 | WEIGHT: 193 LBS

## 2018-05-08 DIAGNOSIS — G50.0 TRIGEMINAL NEURALGIA OF RIGHT SIDE OF FACE: Primary | ICD-10-CM

## 2018-05-08 DIAGNOSIS — I67.9 CEREBROVASCULAR SMALL VESSEL DISEASE: ICD-10-CM

## 2018-05-08 DIAGNOSIS — M47.812 CERVICAL SPONDYLOSIS WITHOUT MYELOPATHY: ICD-10-CM

## 2018-05-08 DIAGNOSIS — G47.419 PRIMARY NARCOLEPSY WITHOUT CATAPLEXY: ICD-10-CM

## 2018-05-08 PROCEDURE — 99214 OFFICE O/P EST MOD 30 MIN: CPT | Performed by: PHYSICIAN ASSISTANT

## 2018-05-10 ENCOUNTER — DOCUMENTATION (OUTPATIENT)
Dept: OTOLARYNGOLOGY | Facility: CLINIC | Age: 78
End: 2018-05-10

## 2018-05-10 NOTE — PROGRESS NOTES
Mr. Moore's hearing right hearing aid (SN: [id number]) was dropped off by his brother in law, Orestes Larsen, to be sent for repair. Will call pt when aid is returned from .    [phone]

## 2018-05-15 NOTE — PROGRESS NOTES
Subjective   Spencer Moore is a 77 y.o. male is here today for follow-up.    Facial Pain   This is a chronic problem. The current episode started more than 1 year ago. The problem occurs intermittently. The problem has been waxing and waning. Associated symptoms include fatigue, headaches, neck pain, numbness and weakness. The symptoms are aggravated by exertion, stress and eating. He has tried rest (Gamma knife, medications) for the symptoms. The treatment provided significant relief.   Neurologic Problem   The patient's primary symptoms include an altered mental status and weakness. Primary symptoms comment: Narcolepsy and cataplexy. This is a chronic problem. The current episode started more than 1 year ago. The neurological problem developed suddenly. The problem is unchanged. There was no focality noted. Associated symptoms include fatigue, headaches and neck pain. Past treatments include medication. The treatment provided significant relief.       The following portions of the patient's history were reviewed and updated as appropriate: allergies, current medications, past family history, past medical history, past social history, past surgical history and problem list.    Review of Systems   Constitutional: Positive for fatigue.   HENT: Positive for hearing loss. Negative for drooling and trouble swallowing.    Eyes: Negative.    Respiratory: Negative.    Cardiovascular: Negative.    Gastrointestinal: Negative.    Endocrine: Negative.    Genitourinary: Positive for difficulty urinating and frequency.   Musculoskeletal: Positive for gait problem and neck pain.   Skin: Negative.    Allergic/Immunologic: Negative.    Neurological: Positive for weakness, numbness and headaches. Negative for facial asymmetry and speech difficulty.   Hematological: Negative.    Psychiatric/Behavioral: Positive for sleep disturbance.       Objective   Physical Exam   Constitutional: He is oriented to person, place, and time. Vital  signs are normal. He appears well-developed and well-nourished. No distress.   HENT:   Head: Normocephalic and atraumatic.   Right Ear: External ear normal. Decreased hearing is noted.   Left Ear: External ear normal. Decreased hearing is noted.   Nose: Nose normal.   Mouth/Throat: Uvula is midline, oropharynx is clear and moist and mucous membranes are normal.   Eyes: Conjunctivae, EOM and lids are normal. Pupils are equal, round, and reactive to light. No scleral icterus.   Neck: Normal range of motion and phonation normal. No spinous process tenderness and no muscular tenderness present. Carotid bruit is not present. Normal range of motion present. No thyroid mass and no thyromegaly present.   Cardiovascular: Normal rate, regular rhythm, S1 normal, S2 normal and normal heart sounds.    No murmur heard.  Pulmonary/Chest: Effort normal and breath sounds normal. No stridor. No respiratory distress. He has no wheezes. He has no rales.   Musculoskeletal: He exhibits no edema.        Lumbar back: He exhibits decreased range of motion and pain.   Lymphadenopathy:     He has no cervical adenopathy.   Neurological: He is alert and oriented to person, place, and time. He has normal strength. He displays no atrophy and no tremor. No cranial nerve deficit or sensory deficit. He exhibits normal muscle tone. Gait abnormal. Coordination normal. GCS eye subscore is 4. GCS verbal subscore is 5. GCS motor subscore is 6.   Reflex Scores:       Tricep reflexes are 2+ on the right side and 2+ on the left side.       Bicep reflexes are 2+ on the right side and 2+ on the left side.       Brachioradialis reflexes are 2+ on the right side and 2+ on the left side.       Patellar reflexes are 2+ on the right side and 2+ on the left side.       Achilles reflexes are 2+ on the right side and 2+ on the left side.  Mild psychomotor impairment of his gait   Skin: Skin is warm and dry. No ecchymosis, no lesion and no rash noted. No cyanosis.  Nails show no clubbing.   Psychiatric: He has a normal mood and affect. His behavior is normal. Thought content normal. His speech is delayed. He is not actively hallucinating. Cognition and memory are normal. He expresses impulsivity. He is attentive.   Nursing note and vitals reviewed.        Assessment/Plan   Spencer was seen today for trigeminal neuralgia.    Diagnoses and all orders for this visit:    Trigeminal neuralgia of right side of face    Cerebrovascular small vessel disease    Cervical spondylosis without myelopathy    Primary narcolepsy without cataplexy    The patient's trigeminal neuralgia has improved.  No changes are made with regard to treatment of this today.    The patient has been spontaneously decreasing his Ritalin dose to 3 times per day.  We will continue Reglan at 3 times per day, he did not require refills today.    20 minutes of a 25 minute outpatient visit was spent in counseling and coordination of care with the patient and his family today.    Dictated utilizing Dragon dictation.

## 2018-05-23 ENCOUNTER — PROCEDURE VISIT (OUTPATIENT)
Dept: OTOLARYNGOLOGY | Facility: CLINIC | Age: 78
End: 2018-05-23

## 2018-05-23 DIAGNOSIS — H90.3 SENSORINEURAL HEARING LOSS (SNHL), BILATERAL: Primary | ICD-10-CM

## 2018-05-23 PROCEDURE — HEARINGNOCHG: Performed by: AUDIOLOGIST

## 2018-05-23 NOTE — PROGRESS NOTES
Mr. Moore presented to the clinic this date to  hearing aid from repair.   Repair charge $300.  Left aid will be sent for repair.

## 2018-06-06 ENCOUNTER — OFFICE VISIT (OUTPATIENT)
Dept: UROLOGY | Facility: CLINIC | Age: 78
End: 2018-06-06

## 2018-06-06 VITALS
HEART RATE: 52 BPM | BODY MASS INDEX: 32.19 KG/M2 | SYSTOLIC BLOOD PRESSURE: 120 MMHG | WEIGHT: 193.2 LBS | DIASTOLIC BLOOD PRESSURE: 82 MMHG | HEIGHT: 65 IN | TEMPERATURE: 98.2 F

## 2018-06-06 DIAGNOSIS — R39.12 WEAK URINARY STREAM: ICD-10-CM

## 2018-06-06 DIAGNOSIS — Z87.440 HISTORY OF UTI: ICD-10-CM

## 2018-06-06 DIAGNOSIS — N40.0 ENLARGED PROSTATE: ICD-10-CM

## 2018-06-06 DIAGNOSIS — N40.1 BENIGN PROSTATIC HYPERPLASIA WITH WEAK URINARY STREAM: Primary | ICD-10-CM

## 2018-06-06 DIAGNOSIS — R82.71 ASYMPTOMATIC BACTERIURIA: ICD-10-CM

## 2018-06-06 DIAGNOSIS — R39.12 BENIGN PROSTATIC HYPERPLASIA WITH WEAK URINARY STREAM: Primary | ICD-10-CM

## 2018-06-06 LAB
BILIRUB BLD-MCNC: NEGATIVE MG/DL
CLARITY, POC: CLEAR
COLOR UR: YELLOW
GLUCOSE UR STRIP-MCNC: NEGATIVE MG/DL
KETONES UR QL: NEGATIVE
LEUKOCYTE EST, POC: ABNORMAL
NITRITE UR-MCNC: POSITIVE MG/ML
PH UR: 5.5 [PH] (ref 5–8)
PROT UR STRIP-MCNC: NEGATIVE MG/DL
RBC # UR STRIP: ABNORMAL /UL
SP GR UR: 1.01 (ref 1–1.03)
UROBILINOGEN UR QL: NORMAL

## 2018-06-06 PROCEDURE — 81001 URINALYSIS AUTO W/SCOPE: CPT | Performed by: UROLOGY

## 2018-06-06 PROCEDURE — 51798 US URINE CAPACITY MEASURE: CPT | Performed by: UROLOGY

## 2018-06-06 PROCEDURE — 99214 OFFICE O/P EST MOD 30 MIN: CPT | Performed by: UROLOGY

## 2018-06-06 RX ORDER — FINASTERIDE 5 MG/1
5 TABLET, FILM COATED ORAL DAILY
Qty: 90 TABLET | Refills: 3 | Status: SHIPPED | OUTPATIENT
Start: 2018-06-06 | End: 2022-11-17 | Stop reason: SDUPTHER

## 2018-06-06 RX ORDER — TAMSULOSIN HYDROCHLORIDE 0.4 MG/1
CAPSULE ORAL
Qty: 90 CAPSULE | Refills: 3 | Status: SHIPPED | OUTPATIENT
Start: 2018-06-06 | End: 2020-10-13 | Stop reason: SDUPTHER

## 2018-06-06 NOTE — PATIENT INSTRUCTIONS

## 2018-06-06 NOTE — PROGRESS NOTES
Mr. Moore is 77 y.o. male    Chief Complaint   Patient presents with   • Benign Prostatic Hypertrophy       Benign Prostatic Hypertrophy   This is a chronic problem. The current episode started more than 1 year ago. The problem is unchanged. Irritative symptoms include urgency. Irritative symptoms do not include frequency. Nothing aggravates the symptoms. Past treatments include finasteride and tamsulosin. The treatment provided moderate relief. He has been using treatment for 6 to 12 months.       The following portions of the patient's history were reviewed and updated as appropriate: allergies, current medications, past family history, past medical history, past social history, past surgical history and problem list.    Review of Systems   Respiratory: Negative.    Cardiovascular: Negative.    Genitourinary: Positive for urgency. Negative for difficulty urinating, flank pain and frequency.         Current Outpatient Prescriptions:   •  aspirin 81 MG EC tablet, Take 81 mg by mouth Daily., Disp: , Rfl:   •  atorvastatin (LIPITOR) 10 MG tablet, Take 10 mg by mouth Daily., Disp: , Rfl:   •  azelastine (ASTELIN) 0.1 % nasal spray, 2 sprays into each nostril 2 (Two) Times a Day. Use in each nostril as directed, Disp: 30 mL, Rfl: 11  •  carBAMazepine (TEGretol) 200 MG tablet, Take 1 tablet by mouth 2 (Two) Times a Day. Most of the time he is only taking 1 per day, Disp: 60 tablet, Rfl: 6  •  CloNIDine (CATAPRES) 0.1 MG tablet, Take 0.1 mg by mouth As Needed for High Blood Pressure (1 tablet if systolic is over 170)., Disp: , Rfl:   •  Cyanocobalamin (B-12 COMPLIANCE INJECTION) 1000 MCG/ML kit, Inject  as directed Every 30 (Thirty) Days., Disp: , Rfl:   •  finasteride (PROSCAR) 5 MG tablet, Take 1 tablet by mouth Daily., Disp: 90 tablet, Rfl: 3  •  FLUoxetine (PROzac) 40 MG capsule, Take 40 mg by mouth Daily., Disp: , Rfl:   •  fluticasone (FLONASE) 50 MCG/ACT nasal spray, 2 sprays into each nostril Daily., Disp: 16 g,  "Rfl: 11  •  irbesartan-hydrochlorothiazide (AVALIDE) 300-12.5 MG tablet, Take 1 tablet by mouth Daily., Disp: , Rfl:   •  methylphenidate (RITALIN) 20 MG tablet, Take 1 tablet by mouth 4 (Four) Times a Day. (Patient taking differently: Take 20 mg by mouth 3 (Three) Times a Day.), Disp: 120 tablet, Rfl: 0  •  metoprolol tartrate (LOPRESSOR) 25 MG tablet, Take 1 tablet by mouth 2 (Two) Times a Day. (Patient taking differently: Take 25 mg by mouth Daily.), Disp: 60 tablet, Rfl: 11  •  omeprazole (priLOSEC) 40 MG capsule, Take 40 mg by mouth Daily., Disp: , Rfl:   •  oxyCODONE-acetaminophen (PERCOCET) 7.5-325 MG per tablet, Take 1 tablet by mouth Every 6 (Six) Hours As Needed. Takes 7.5mg four times daily, Disp: , Rfl:   •  tamsulosin (FLOMAX) 0.4 MG capsule 24 hr capsule, Take one capsule by mouth once daily, Disp: 90 capsule, Rfl: 3  •  terazosin (HYTRIN) 2 MG capsule, Take 2 mg by mouth Every Night., Disp: , Rfl:     Past Medical History:   Diagnosis Date   • Anemia    • Arthritis    • Diverticulitis    • Enlarged prostate    • GERD (gastroesophageal reflux disease)    • Hypertension    • Kidney infection    • Lumbago    • Narcolepsy    • Vitamin B12 deficiency    • Weak urinary stream        Past Surgical History:   Procedure Laterality Date   • ABDOMINAL SURGERY     • CATARACT EXTRACTION Left    • HAND SURGERY Left    • HERNIA REPAIR     • REPLACEMENT TOTAL KNEE Left    • ROTATOR CUFF REPAIR     • TONSILLECTOMY         Social History     Social History   • Marital status: Legally      Social History Main Topics   • Smoking status: Never Smoker   • Smokeless tobacco: Never Used   • Alcohol use No   • Drug use: No   • Sexual activity: Defer     Other Topics Concern   • Not on file       Family History   Problem Relation Age of Onset   • Adopted: Yes   • Family history unknown: Yes       Objective    /82   Pulse 52   Temp 98.2 °F (36.8 °C)   Ht 165.1 cm (65\")   Wt 87.6 kg (193 lb 3.2 oz)   BMI " 32.15 kg/m²     Physical Exam    Office Visit on 06/06/2017   Component Date Value Ref Range Status   • Color 06/06/2017 Yellow  Yellow, Straw, Dark Yellow, Traci Final   • Clarity, UA 06/06/2017 Clear  Clear Final   • Glucose, UA 06/06/2017 Negative  Negative, 1000 mg/dL (3+) mg/dL Final   • Bilirubin 06/06/2017 Negative  Negative Final   • Ketones, UA 06/06/2017 Trace* Negative Final   • Specific Gravity  06/06/2017 1.030  1.005 - 1.030 Final   • Blood, UA 06/06/2017 Moderate* Negative Final   • pH, Urine 06/06/2017 5.5  5.0 - 8.0 Final   • Protein, POC 06/06/2017 2+* Negative mg/dL Final   • Urobilinogen, UA 06/06/2017 Normal  Normal Final   • Leukocytes 06/06/2017 Small (1+)* Negative Final   • Nitrite, UA 06/06/2017 Positive* Negative Final       Results for orders placed or performed in visit on 06/06/18   POC Urinalysis Dipstick, Multipro   Result Value Ref Range    Color Yellow Yellow, Straw, Dark Yellow, Traci    Clarity, UA Clear Clear    Glucose, UA Negative Negative, 1000 mg/dL (3+) mg/dL    Bilirubin Negative Negative    Ketones, UA Negative Negative    Specific Gravity  1.015 1.005 - 1.030    Blood, UA Small (A) Negative    pH, Urine 5.5 5.0 - 8.0    Protein, POC Negative Negative mg/dL    Urobilinogen, UA Normal Normal    Leukocytes Moderate (2+) (A) Negative    Nitrite, UA Positive (A) Negative     Bladder Scan interpretation  Estimation of residual urine via abdominal ultrasound  Residual Urine: 74  ml  Indication: BPH  Position: Supine  Examination: Incremental scanning of the suprapubic area using 3 MHz transducer using copious amounts of acoustic gel.   Findings: An anechoic area was demonstrated which represented the bladder, with measurement of residual urine as noted. I inspected this myself. In that the residual urine was stable or insignificant, no treatment will be necessary at this time.       Assessment and Plan    Spencer was seen today for benign prostatic hypertrophy.    Diagnoses and  all orders for this visit:    Benign prostatic hyperplasia with weak urinary stream  -     POC Urinalysis Dipstick, Multipro    Enlarged prostate  -     finasteride (PROSCAR) 5 MG tablet; Take 1 tablet by mouth Daily.  -     tamsulosin (FLOMAX) 0.4 MG capsule 24 hr capsule; Take one capsule by mouth once daily    Weak urinary stream  -     finasteride (PROSCAR) 5 MG tablet; Take 1 tablet by mouth Daily.  -     tamsulosin (FLOMAX) 0.4 MG capsule 24 hr capsule; Take one capsule by mouth once daily    History of UTI    Asymptomatic bacteriuria    No infections since his last visit.  Urinalysis abnormal today, but I believe this is likely due to his continued bacteria.  I discussed with him that there is no need to treat asymptomatic bacteria, and currently he denies any significant frequency urgency abdominal pain or dysuria.  Again, we discussed the has any orthopedic procedures that he will likely need treatment of this prior to their operation.  Likely best served with infectious disease, as they have treated him in the past.  He is overall happy with how he is urinating and I refilled his Proscar and Flomax today.

## 2018-06-18 ENCOUNTER — OFFICE VISIT (OUTPATIENT)
Dept: OTOLARYNGOLOGY | Facility: CLINIC | Age: 78
End: 2018-06-18

## 2018-06-18 ENCOUNTER — PROCEDURE VISIT (OUTPATIENT)
Dept: OTOLARYNGOLOGY | Facility: CLINIC | Age: 78
End: 2018-06-18

## 2018-06-18 VITALS
DIASTOLIC BLOOD PRESSURE: 78 MMHG | WEIGHT: 193 LBS | TEMPERATURE: 97.8 F | HEIGHT: 68 IN | SYSTOLIC BLOOD PRESSURE: 122 MMHG | BODY MASS INDEX: 29.25 KG/M2

## 2018-06-18 DIAGNOSIS — H90.3 SENSORINEURAL HEARING LOSS (SNHL) OF BOTH EARS: ICD-10-CM

## 2018-06-18 DIAGNOSIS — G50.0 TRIGEMINAL NEURALGIA OF RIGHT SIDE OF FACE: Primary | ICD-10-CM

## 2018-06-18 DIAGNOSIS — H90.3 SENSORINEURAL HEARING LOSS (SNHL) OF BOTH EARS: Primary | ICD-10-CM

## 2018-06-18 PROCEDURE — 99213 OFFICE O/P EST LOW 20 MIN: CPT | Performed by: PHYSICIAN ASSISTANT

## 2018-06-18 PROCEDURE — HEARINGNOCHG: Performed by: AUDIOLOGIST

## 2018-06-18 NOTE — PATIENT INSTRUCTIONS
Stop nasal sprays at this time, follow-up in one year for ear check and audiogram.    ###### BMI  #####   MyPlate from [employer]  The general, healthful diet is based on the 2010 Dietary Guidelines for Americans. The amount of food you need to eat from each food group depends on your age, sex, and level of physical activity and can be individualized by a dietitian. Go to ChooseMyPlate.gov for more information.  What do I need to know about the MyPlate plan?  · Enjoy your food, but eat less.  · Avoid oversized portions.  ? ½ of your plate should include fruits and vegetables.  ? ¼ of your plate should be grains.  ? ¼ of your plate should be protein.  Grains  · Make at least half of your grains whole grains.  · For a 2,000 calorie daily food plan, eat 6 oz every day.  · 1 oz is about 1 slice bread, 1 cup cereal, or ½ cup cooked rice, cereal, or pasta.  Vegetables  · Make half your plate fruits and vegetables.  · For a 2,000 calorie daily food plan, eat 2½ cups every day.  · 1 cup is about 1 cup raw or cooked vegetables or vegetable juice or 2 cups raw leafy greens.  Fruits  · Make half your plate fruits and vegetables.  · For a 2,000 calorie daily food plan, eat 2 cups every day.  · 1 cup is about 1 cup fruit or 100% fruit juice or ½ cup dried fruit.  Protein  · For a 2,000 calorie daily food plan, eat 5½ oz every day.  · 1 oz is about 1 oz meat, poultry, or fish, ¼ cup cooked beans, 1 egg, 1 Tbsp peanut butter, or ½ oz nuts or seeds.  Dairy  · Switch to fat-free or low-fat (1%) milk.  · For a 2,000 calorie daily food plan, eat 3 cups every day.  · 1 cup is about 1 cup milk or yogurt or soy milk (soy beverage), 1½ oz natural cheese, or 2 oz processed cheese.  Fats, Oils, and Empty Calories  · Only small amounts of oils are recommended.  · Empty calories are calories from solid fats or added sugars.  · Compare sodium in foods like soup, bread, and frozen meals. Choose the foods with lower numbers.  · Drink water instead of  sugary drinks.  What foods can I eat?  Grains  Whole grains such as whole wheat, quinoa, millet, and bulgur. Bread, rolls, and pasta made from whole grains. Brown or wild rice. Hot or cold cereals made from whole grains and without added sugar.  Vegetables  All fresh vegetables, especially fresh red, dark green, or orange vegetables. Peas and beans. Low-sodium frozen or canned vegetables prepared without added salt. Low-sodium vegetable juices.  Fruits  All fresh, frozen, and dried fruits. Canned fruit packed in water or fruit juice without added sugar. Fruit juices without added sugar.  Meats and Other Protein Sources  Boiled, baked, or grilled lean meat trimmed of fat. Skinless poultry. Fresh seafood and shellfish. Canned seafood packed in water. Unsalted nuts and unsalted nut butters. Tofu. Dried beans and pea. Eggs.  Dairy  Low-fat or fat-free milk, yogurt, and cheeses.  Sweets and Desserts  Frozen desserts made from low-fat milk.  Fats and Oils  Olive, peanut, and canola oils and margarine. Salad dressing and mayonnaise made from these oils.  Other  Soups and casseroles made from allowed ingredients and without added fat or salt.  The items listed above may not be a complete list of recommended foods or beverages. Contact your dietitian for more options.  What foods are not recommended?  Grains  Sweetened, low-fiber cereals. Packaged baked goods. Snack crackers and chips. Cheese crackers, butter crackers, and biscuits. Frozen waffles, sweet breads, doughnuts, pastries, packaged baking mixes, pancakes, cakes, and cookies.  Vegetables  Regular canned or frozen vegetables or vegetables prepared with salt. Canned tomatoes. Canned tomato sauce. Fried vegetables. Vegetables in cream sauce or cheese sauce.  Fruits  Fruits packed in syrup or made with added sugar.  Meats and Other Protein Sources  Marbled or fatty meats such as ribs. Poultry with skin. Fried meats, poultry, eggs, or fish. Sausages, hot dogs, and deli  meats such as pastrami, bologna, or salami.  Dairy  Whole milk, cream, cheeses made from whole milk, sour cream. Ice cream or yogurt made from whole milk or with added sugar.  Beverages  For adults, no more than one alcoholic drink per day. Regular soft drinks or other sugary beverages. Juice drinks.  Sweets and Desserts  Sugary or fatty desserts, candy, and other sweets.  Fats and Oils  Solid shortening or partially hydrogenated oils. Solid margarine. Margarine that contains trans fats. Butter.  The items listed above may not be a complete list of foods and beverages to avoid. Contact your dietitian for more information.  This information is not intended to replace advice given to you by your health care provider. Make sure you discuss any questions you have with your health care provider.  Document Released: 01/06/2009 Document Revised: 05/25/2017 Document Reviewed: 11/26/2014  [employer] Interactive Patient Education © 2018 [employer]     Calorie Counting for Weight Loss  Calories are units of energy. Your body needs a certain amount of calories from food to keep you going throughout the day. When you eat more calories than your body needs, your body stores the extra calories as fat. When you eat fewer calories than your body needs, your body burns fat to get the energy it needs.  Calorie counting means keeping track of how many calories you eat and drink each day. Calorie counting can be helpful if you need to lose weight. If you make sure to eat fewer calories than your body needs, you should lose weight. Ask your health care provider what a healthy weight is for you.  For calorie counting to work, you will need to eat the right number of calories in a day in order to lose a healthy amount of weight per week. A dietitian can help you determine how many calories you need in a day and will give you suggestions on how to reach your calorie goal.  · A healthy amount of weight to lose per week is usually 1-2 lb  (0.5-0.9 kg). This usually means that your daily calorie intake should be reduced by 500-750 calories.  · Eating 1,200 - 1,500 calories per day can help most women lose weight.  · Eating 1,500 - 1,800 calories per day can help most men lose weight.    What is my plan?  My goal is to have __________ calories per day.  If I have this many calories per day, I should lose around __________ pounds per week.  What do I need to know about calorie counting?  In order to meet your daily calorie goal, you will need to:  · Find out how many calories are in each food you would like to eat. Try to do this before you eat.  · Decide how much of the food you plan to eat.  · Write down what you ate and how many calories it had. Doing this is called keeping a food log.    To successfully lose weight, it is important to balance calorie counting with a healthy lifestyle that includes regular activity. Aim for 150 minutes of moderate exercise (such as walking) or 75 minutes of vigorous exercise (such as running) each week.  Where do I find calorie information?    The number of calories in a food can be found on a Nutrition Facts label. If a food does not have a Nutrition Facts label, try to look up the calories online or ask your dietitian for help.  Remember that calories are listed per serving. If you choose to have more than one serving of a food, you will have to multiply the calories per serving by the amount of servings you plan to eat. For example, the label on a package of bread might say that a serving size is 1 slice and that there are 90 calories in a serving. If you eat 1 slice, you will have eaten 90 calories. If you eat 2 slices, you will have eaten 180 calories.  How do I keep a food log?  Immediately after each meal, record the following information in your food log:  · What you ate. Don't forget to include toppings, sauces, and other extras on the food.  · How much you ate. This can be measured in cups, ounces, or  "number of items.  · How many calories each food and drink had.  · The total number of calories in the meal.    Keep your food log near you, such as in a small notebook in your pocket, or use a mobile anatoliy or website. Some programs will calculate calories for you and show you how many calories you have left for the day to meet your goal.  What are some calorie counting tips?  · Use your calories on foods and drinks that will fill you up and not leave you hungry:  ? Some examples of foods that fill you up are nuts and nut butters, vegetables, lean proteins, and high-fiber foods like whole grains. High-fiber foods are foods with more than 5 g fiber per serving.  ? Drinks such as sodas, specialty coffee drinks, alcohol, and juices have a lot of calories, yet do not fill you up.  · Eat nutritious foods and avoid empty calories. Empty calories are calories you get from foods or beverages that do not have many vitamins or protein, such as candy, sweets, and soda. It is better to have a nutritious high-calorie food (such as an avocado) than a food with few nutrients (such as a bag of chips).  · Know how many calories are in the foods you eat most often. This will help you calculate calorie counts faster.  · Pay attention to calories in drinks. Low-calorie drinks include water and unsweetened drinks.  · Pay attention to nutrition labels for \"low fat\" or \"fat free\" foods. These foods sometimes have the same amount of calories or more calories than the full fat versions. They also often have added sugar, starch, or salt, to make up for flavor that was removed with the fat.  · Find a way of tracking calories that works for you. Get creative. Try different apps or programs if writing down calories does not work for you.  What are some portion control tips?  · Know how many calories are in a serving. This will help you know how many servings of a certain food you can have.  · Use a measuring cup to measure serving sizes. You could " also try weighing out portions on a kitchen scale. With time, you will be able to estimate serving sizes for some foods.  · Take some time to put servings of different foods on your favorite plates, bowls, and cups so you know what a serving looks like.  · Try not to eat straight from a bag or box. Doing this can lead to overeating. Put the amount you would like to eat in a cup or on a plate to make sure you are eating the right portion.  · Use smaller plates, glasses, and bowls to prevent overeating.  · Try not to multitask (for example, watch TV or use your computer) while eating. If it is time to eat, sit down at a table and enjoy your food. This will help you to know when you are full. It will also help you to be aware of what you are eating and how much you are eating.  What are tips for following this plan?  Reading food labels  · Check the calorie count compared to the serving size. The serving size may be smaller than what you are used to eating.  · Check the source of the calories. Make sure the food you are eating is high in vitamins and protein and low in saturated and trans fats.  Shopping  · Read nutrition labels while you shop. This will help you make healthy decisions before you decide to purchase your food.  · Make a grocery list and stick to it.  Cooking  · Try to cook your favorite foods in a healthier way. For example, try baking instead of frying.  · Use low-fat dairy products.  Meal planning  · Use more fruits and vegetables. Half of your plate should be fruits and vegetables.  · Include lean proteins like poultry and fish.  How do I count calories when eating out?  · Ask for smaller portion sizes.  · Consider sharing an entree and sides instead of getting your own entree.  · If you get your own entree, eat only half. Ask for a box at the beginning of your meal and put the rest of your entree in it so you are not tempted to eat it.  · If calories are listed on the menu, choose the lower calorie  options.  · Choose dishes that include vegetables, fruits, whole grains, low-fat dairy products, and lean protein.  · Choose items that are boiled, broiled, grilled, or steamed. Stay away from items that are buttered, battered, fried, or served with cream sauce. Items labeled “crispy” are usually fried, unless stated otherwise.  · Choose water, low-fat milk, unsweetened iced tea, or other drinks without added sugar. If you want an alcoholic beverage, choose a lower calorie option such as a glass of wine or light beer.  · Ask for dressings, sauces, and syrups on the side. These are usually high in calories, so you should limit the amount you eat.  · If you want a salad, choose a garden salad and ask for grilled meats. Avoid extra toppings like rodney, cheese, or fried items. Ask for the dressing on the side, or ask for olive oil and vinegar or lemon to use as dressing.  · Estimate how many servings of a food you are given. For example, a serving of cooked rice is ½ cup or about the size of half a baseball. Knowing serving sizes will help you be aware of how much food you are eating at restaurants. The list below tells you how big or small some common portion sizes are based on everyday objects:  ? 1 oz--4 stacked dice.  ? 3 oz--1 deck of cards.  ? 1 tsp--1 die.  ? 1 Tbsp--½ a ping-pong ball.  ? 2 Tbsp--1 ping-pong ball.  ? ½ cup--½ baseball.  ? 1 cup--1 baseball.  Summary  · Calorie counting means keeping track of how many calories you eat and drink each day. If you eat fewer calories than your body needs, you should lose weight.  · A healthy amount of weight to lose per week is usually 1-2 lb (0.5-0.9 kg). This usually means reducing your daily calorie intake by 500-750 calories.  · The number of calories in a food can be found on a Nutrition Facts label. If a food does not have a Nutrition Facts label, try to look up the calories online or ask your dietitian for help.  · Use your calories on foods and drinks that  will fill you up, and not on foods and drinks that will leave you hungry.  · Use smaller plates, glasses, and bowls to prevent overeating.  This information is not intended to replace advice given to you by your health care provider. Make sure you discuss any questions you have with your health care provider.  Document Released: 12/18/2006 Document Revised: 11/17/2017 Document Reviewed: 11/17/2017  [employer] Interactive Patient Education © 2018 [employer]     Exercising to Lose Weight  Exercising can help you to lose weight. In order to lose weight through exercise, you need to do vigorous-intensity exercise. You can tell that you are exercising with vigorous intensity if you are breathing very hard and fast and cannot hold a conversation while exercising.  Moderate-intensity exercise helps to maintain your current weight. You can tell that you are exercising at a moderate level if you have a higher heart rate and faster breathing, but you are still able to hold a conversation.  How often should I exercise?  Choose an activity that you enjoy and set realistic goals. Your health care provider can help you to make an activity plan that works for you. Exercise regularly as directed by your health care provider. This may include:  · Doing resistance training twice each week, such as:  ? Push-ups.  ? Sit-ups.  ? Lifting weights.  ? Using resistance bands.  · Doing a given intensity of exercise for a given amount of time. Choose from these options:  ? 150 minutes of moderate-intensity exercise every week.  ? 75 minutes of vigorous-intensity exercise every week.  ? A mix of moderate-intensity and vigorous-intensity exercise every week.    Children, pregnant women, people who are out of shape, people who are overweight, and older adults may need to consult a health care provider for individual recommendations. If you have any sort of medical condition, be sure to consult your health care provider before starting a new  exercise program.  What are some activities that can help me to lose weight?  · Walking at a rate of at least 4.5 miles an hour.  · Jogging or running at a rate of 5 miles per hour.  · Biking at a rate of at least 10 miles per hour.  · Lap swimming.  · Roller-skating or in-line skating.  · Cross-country skiing.  · Vigorous competitive sports, such as football, basketball, and soccer.  · Jumping rope.  · Aerobic dancing.  How can I be more active in my day-to-day activities?  · Use the stairs instead of the elevator.  · Take a walk during your lunch break.  · If you drive, park your car farther away from work or school.  · If you take public transportation, get off one stop early and walk the rest of the way.  · Make all of your phone calls while standing up and walking around.  · Get up, stretch, and walk around every 30 minutes throughout the day.  What guidelines should I follow while exercising?  · Do not exercise so much that you hurt yourself, feel dizzy, or get very short of breath.  · Consult your health care provider prior to starting a new exercise program.  · Wear comfortable clothes and shoes with good support.  · Drink plenty of water while you exercise to prevent dehydration or heat stroke. Body water is lost during exercise and must be replaced.  · Work out until you breathe faster and your heart beats faster.  This information is not intended to replace advice given to you by your health care provider. Make sure you discuss any questions you have with your health care provider.  Document Released: 01/20/2012 Document Revised: 05/25/2017 Document Reviewed: 05/21/2015  [employer] Interactive Patient Education © 2018 [employer]

## 2018-06-18 NOTE — PROGRESS NOTES
YOB: 1940  Location: [facility]  Location Address: [address] [place] [address],  [address], [address] [place] [address]  Location Phone: [phone]    Chief Complaint   Patient presents with   • Follow-up     ears       History of Present Illness  Spencer Moore is a 77 y.o. male.  Spencer Moore is here for follow-up evaluation of ENT complaints. The patient has had problems with hearing loss, change in hearing and tinnitus  The symptoms are localized to both ears. The patient has had improved symptoms. The symptoms have been present for the last several years The symptoms are aggravated by  no identifiable factors. The symptoms are improved by hearing aid repair and nasal sprays.     Procedure visit     2018  [facility]   GARRETT Monique   Audiology   Sensorineural hearing loss (SNHL), bilateral   Dx   Hearing Loss   Reason for Visit    Progress Notes           CASE HISTORY DETAILS   Mr. Moore presented to the clinic this date with complaints of decreased hearing bilaterally. He has had long standing hearing loss and wears binaural hearing aids which do not give him much benefit. He further reported bilateral tinnitus. He denied aural fullness and history of ear infection.         SUMMARY   RIGHT  ? Otoscopy revealed clear EAC/Unremarkable TM.  ? Severe sloping sensorineural hearing loss.  ? Immitance measures are consistent with negative middle ear pressure.     LEFT  ? Otoscopy revealed clear EAC/Unremarkable TM.  ? Severe sloping sensorineural hearing loss.  ? Immitance measures suggest possible middle ear fluid (wide tympanometric width).     Options for amplification were discussed. Based on hearing thresholds he may be a candidate for a cochlear implant evaluation. However, this is pending medical clearance to determine if he has health risks that would prevent surgery.     RECOMMENDATIONS   Results of today's evaluation were discussed with Mr Moore and the  following recommendations were made:  1. ENT evaluation today as scheduled.  2. Continue wearing binaural amplification.     AUDIOGRAM AND IMMITANCE        Ajit Carlton, [facility]  Audiologist      Instructions         Return for ENT evaluation today as scheduled..     Past Medical History:   Diagnosis Date   • Anemia    • Arthritis    • Diverticulitis    • Enlarged prostate    • ETD (eustachian tube dysfunction)    • GERD (gastroesophageal reflux disease)    • Hypertension    • Kidney infection    • Lumbago    • Narcolepsy    • SNHL (sensorineural hearing loss)    • Tinnitus    • Vitamin B12 deficiency    • Weak urinary stream        Past Surgical History:   Procedure Laterality Date   • ABDOMINAL SURGERY     • CATARACT EXTRACTION Left    • HAND SURGERY Left    • HERNIA REPAIR     • REPLACEMENT TOTAL KNEE Left    • ROTATOR CUFF REPAIR     • TONSILLECTOMY         Outpatient Prescriptions Marked as Taking for the 6/18/18 encounter (Office Visit) with RAUL Rosas   Medication Sig Dispense Refill   • aspirin 81 MG EC tablet Take 81 mg by mouth Daily.     • atorvastatin (LIPITOR) 10 MG tablet Take 10 mg by mouth Daily.     • azelastine (ASTELIN) 0.1 % nasal spray 2 sprays into each nostril 2 (Two) Times a Day. Use in each nostril as directed 30 mL 11   • carBAMazepine (TEGretol) 200 MG tablet Take 1 tablet by mouth 2 (Two) Times a Day. Most of the time he is only taking 1 per day 60 tablet 6   • CloNIDine (CATAPRES) 0.1 MG tablet Take 0.1 mg by mouth As Needed for High Blood Pressure (1 tablet if systolic is over 170).     • Cyanocobalamin (B-12 COMPLIANCE INJECTION) 1000 MCG/ML kit Inject  as directed Every 30 (Thirty) Days.     • finasteride (PROSCAR) 5 MG tablet Take 1 tablet by mouth Daily. 90 tablet 3   • FLUoxetine (PROzac) 40 MG capsule Take 40 mg by mouth Daily.     • fluticasone (FLONASE) 50 MCG/ACT nasal spray 2 sprays into each nostril Daily. 16 g 11   • irbesartan-hydrochlorothiazide  (AVALIDE) 300-12.5 MG tablet Take 1 tablet by mouth Daily.     • methylphenidate (RITALIN) 20 MG tablet Take 1 tablet by mouth 4 (Four) Times a Day. (Patient taking differently: Take 20 mg by mouth 3 (Three) Times a Day.) 120 tablet 0   • metoprolol tartrate (LOPRESSOR) 25 MG tablet Take 1 tablet by mouth 2 (Two) Times a Day. (Patient taking differently: Take 25 mg by mouth Daily.) 60 tablet 11   • omeprazole (priLOSEC) 40 MG capsule Take 40 mg by mouth Daily.     • oxyCODONE-acetaminophen (PERCOCET) 7.5-325 MG per tablet Take 1 tablet by mouth Every 6 (Six) Hours As Needed. Takes 7.5mg four times daily     • tamsulosin (FLOMAX) 0.4 MG capsule 24 hr capsule Take one capsule by mouth once daily 90 capsule 3   • terazosin (HYTRIN) 2 MG capsule Take 2 mg by mouth Every Night.         Biaxin [clarithromycin] and Parafon forte dsc [chlorzoxazone]    Family History   Problem Relation Age of Onset   • Adopted: Yes   • Family history unknown: Yes       Social History     Social History   • Marital status: Legally      Spouse name: N/A   • Number of children: N/A   • Years of education: N/A     Occupational History   • Not on file.     Social History Main Topics   • Smoking status: Never Smoker   • Smokeless tobacco: Never Used   • Alcohol use No   • Drug use: No   • Sexual activity: Defer     Other Topics Concern   • Not on file     Social History Narrative   • No narrative on file       Review of Systems   Constitutional: Negative for activity change, appetite change, chills, diaphoresis, fatigue, fever and unexpected weight change.   HENT: Positive for hearing loss and tinnitus. Negative for congestion, ear discharge, ear pain, facial swelling, mouth sores, nosebleeds, postnasal drip, rhinorrhea, sinus pressure, sneezing, sore throat, trouble swallowing and voice change.    Eyes: Negative for pain, discharge, redness, itching and visual disturbance.   Respiratory: Negative for apnea, cough, choking, chest  tightness, shortness of breath, wheezing and stridor.    Gastrointestinal: Negative for nausea and vomiting.   Endocrine: Negative for cold intolerance and heat intolerance.   Musculoskeletal: Negative for arthralgias, back pain, gait problem, neck pain and neck stiffness.   Skin: Negative for rash.   Allergic/Immunologic: Negative for environmental allergies and food allergies.   Neurological: Negative for dizziness, tremors, seizures, syncope, facial asymmetry, speech difficulty, weakness, light-headedness, numbness and headaches.   Hematological: Negative for adenopathy. Does not bruise/bleed easily.   Psychiatric/Behavioral: Negative for behavioral problems, sleep disturbance and suicidal ideas. The patient is not nervous/anxious and is not hyperactive.        Vitals:    06/18/18 1312   BP: 122/78   Temp: 97.8 °F (36.6 °C)       Body mass index is 29.35 kg/m².    Objective     Physical Exam  CONSTITUTIONAL: well nourished, alert, oriented, in no acute distress     COMMUNICATION AND VOICE: able to communicate normally, normal voice quality    HEAD: normocephalic, no lesions, atraumatic, no tenderness, no masses     FACE: appearance normal, no lesions, no tenderness, no deformities, facial motion symmetric    EYES: ocular motility normal, eyelids normal, orbits normal, no proptosis, conjunctiva normal , pupils equal, round     EARS:  Hearing: response to conversational voice normal bilaterally   External Ears: auricles without lesions  Otoscopic: tympanic membrane normal without pressure or effusion, no lesions, no perforation    NOSE:  External Nose: structure normal, no tenderness on palpation, no nasal discharge, no lesions, no evidence of trauma, nostrils patent   Intranasal Exam: nasal mucosa normal, vestibule within normal limits, inferior turbinate normal, nasal septum midline   Nasopharynx:     ORAL:  Lips: upper and lower lips without lesion   Teeth: dentition within normal limits for age   Gums: gingivae  healthy   Oral Mucosa: oral mucosa normal, no mucosal lesions   Floor of Mouth: Warthin’s duct patent, mucosa normal  Tongue: lingual mucosa normal without lesions, normal tongue mobility   Palate: soft and hard palates with normal mucosa and structure  Oropharynx: oropharyngeal mucosa normal    NECK: neck appearance normal    CHEST/RESPIRATORY: respiratory effort normal, normal breath sounds     CARDIOVASCULAR: rate and rhythm normal, extremities without cyanosis or edema      NEUROLOGIC/PSYCHIATRIC: oriented to time, place and person, mood normal, affect appropriate, CN II-XII intact grossly    Assessment/Plan   Problems Addressed this Visit        Nervous and Auditory    Trigeminal neuralgia of right side of face - Primary    Relevant Orders    Comprehensive Hearing Test    Sensorineural hearing loss (SNHL) of both ears    Relevant Orders    Comprehensive Hearing Test        * Surgery not found *  Orders Placed This Encounter   Procedures   • Comprehensive Hearing Test     Standing Status:   Future     Standing Expiration Date:   7/3/2019     Order Specific Question:   Laterality     Answer:   Bilateral     Return in about 1 year (around 6/18/2019) for Recheck ears with audiogram.       Patient Instructions   Stop nasal sprays at this time, follow-up in one year for ear check and audiogram.    ###### BMI  #####   MyPlate from [employer]  The general, healthful diet is based on the 2010 Dietary Guidelines for Americans. The amount of food you need to eat from each food group depends on your age, sex, and level of physical activity and can be individualized by a dietitian. Go to ChooseMyPlate.gov for more information.  What do I need to know about the MyPlate plan?  · Enjoy your food, but eat less.  · Avoid oversized portions.  ? ½ of your plate should include fruits and vegetables.  ? ¼ of your plate should be grains.  ? ¼ of your plate should be protein.  Grains  · Make at least half of your grains whole grains.  · For  a 2,000 calorie daily food plan, eat 6 oz every day.  · 1 oz is about 1 slice bread, 1 cup cereal, or ½ cup cooked rice, cereal, or pasta.  Vegetables  · Make half your plate fruits and vegetables.  · For a 2,000 calorie daily food plan, eat 2½ cups every day.  · 1 cup is about 1 cup raw or cooked vegetables or vegetable juice or 2 cups raw leafy greens.  Fruits  · Make half your plate fruits and vegetables.  · For a 2,000 calorie daily food plan, eat 2 cups every day.  · 1 cup is about 1 cup fruit or 100% fruit juice or ½ cup dried fruit.  Protein  · For a 2,000 calorie daily food plan, eat 5½ oz every day.  · 1 oz is about 1 oz meat, poultry, or fish, ¼ cup cooked beans, 1 egg, 1 Tbsp peanut butter, or ½ oz nuts or seeds.  Dairy  · Switch to fat-free or low-fat (1%) milk.  · For a 2,000 calorie daily food plan, eat 3 cups every day.  · 1 cup is about 1 cup milk or yogurt or soy milk (soy beverage), 1½ oz natural cheese, or 2 oz processed cheese.  Fats, Oils, and Empty Calories  · Only small amounts of oils are recommended.  · Empty calories are calories from solid fats or added sugars.  · Compare sodium in foods like soup, bread, and frozen meals. Choose the foods with lower numbers.  · Drink water instead of sugary drinks.  What foods can I eat?  Grains  Whole grains such as whole wheat, quinoa, millet, and bulgur. Bread, rolls, and pasta made from whole grains. Brown or wild rice. Hot or cold cereals made from whole grains and without added sugar.  Vegetables  All fresh vegetables, especially fresh red, dark green, or orange vegetables. Peas and beans. Low-sodium frozen or canned vegetables prepared without added salt. Low-sodium vegetable juices.  Fruits  All fresh, frozen, and dried fruits. Canned fruit packed in water or fruit juice without added sugar. Fruit juices without added sugar.  Meats and Other Protein Sources  Boiled, baked, or grilled lean meat trimmed of fat. Skinless poultry. Fresh seafood and  shellfish. Canned seafood packed in water. Unsalted nuts and unsalted nut butters. Tofu. Dried beans and pea. Eggs.  Dairy  Low-fat or fat-free milk, yogurt, and cheeses.  Sweets and Desserts  Frozen desserts made from low-fat milk.  Fats and Oils  Olive, peanut, and canola oils and margarine. Salad dressing and mayonnaise made from these oils.  Other  Soups and casseroles made from allowed ingredients and without added fat or salt.  The items listed above may not be a complete list of recommended foods or beverages. Contact your dietitian for more options.  What foods are not recommended?  Grains  Sweetened, low-fiber cereals. Packaged baked goods. Snack crackers and chips. Cheese crackers, butter crackers, and biscuits. Frozen waffles, sweet breads, doughnuts, pastries, packaged baking mixes, pancakes, cakes, and cookies.  Vegetables  Regular canned or frozen vegetables or vegetables prepared with salt. Canned tomatoes. Canned tomato sauce. Fried vegetables. Vegetables in cream sauce or cheese sauce.  Fruits  Fruits packed in syrup or made with added sugar.  Meats and Other Protein Sources  Marbled or fatty meats such as ribs. Poultry with skin. Fried meats, poultry, eggs, or fish. Sausages, hot dogs, and deli meats such as pastrami, bologna, or salami.  Dairy  Whole milk, cream, cheeses made from whole milk, sour cream. Ice cream or yogurt made from whole milk or with added sugar.  Beverages  For adults, no more than one alcoholic drink per day. Regular soft drinks or other sugary beverages. Juice drinks.  Sweets and Desserts  Sugary or fatty desserts, candy, and other sweets.  Fats and Oils  Solid shortening or partially hydrogenated oils. Solid margarine. Margarine that contains trans fats. Butter.  The items listed above may not be a complete list of foods and beverages to avoid. Contact your dietitian for more information.  This information is not intended to replace advice given to you by your health care  provider. Make sure you discuss any questions you have with your health care provider.  Document Released: 01/06/2009 Document Revised: 05/25/2017 Document Reviewed: 11/26/2014  [employer] Interactive Patient Education © 2018 [employer]     Calorie Counting for Weight Loss  Calories are units of energy. Your body needs a certain amount of calories from food to keep you going throughout the day. When you eat more calories than your body needs, your body stores the extra calories as fat. When you eat fewer calories than your body needs, your body burns fat to get the energy it needs.  Calorie counting means keeping track of how many calories you eat and drink each day. Calorie counting can be helpful if you need to lose weight. If you make sure to eat fewer calories than your body needs, you should lose weight. Ask your health care provider what a healthy weight is for you.  For calorie counting to work, you will need to eat the right number of calories in a day in order to lose a healthy amount of weight per week. A dietitian can help you determine how many calories you need in a day and will give you suggestions on how to reach your calorie goal.  · A healthy amount of weight to lose per week is usually 1-2 lb (0.5-0.9 kg). This usually means that your daily calorie intake should be reduced by 500-750 calories.  · Eating 1,200 - 1,500 calories per day can help most women lose weight.  · Eating 1,500 - 1,800 calories per day can help most men lose weight.    What is my plan?  My goal is to have __________ calories per day.  If I have this many calories per day, I should lose around __________ pounds per week.  What do I need to know about calorie counting?  In order to meet your daily calorie goal, you will need to:  · Find out how many calories are in each food you would like to eat. Try to do this before you eat.  · Decide how much of the food you plan to eat.  · Write down what you ate and how many calories it  had. Doing this is called keeping a food log.    To successfully lose weight, it is important to balance calorie counting with a healthy lifestyle that includes regular activity. Aim for 150 minutes of moderate exercise (such as walking) or 75 minutes of vigorous exercise (such as running) each week.  Where do I find calorie information?    The number of calories in a food can be found on a Nutrition Facts label. If a food does not have a Nutrition Facts label, try to look up the calories online or ask your dietitian for help.  Remember that calories are listed per serving. If you choose to have more than one serving of a food, you will have to multiply the calories per serving by the amount of servings you plan to eat. For example, the label on a package of bread might say that a serving size is 1 slice and that there are 90 calories in a serving. If you eat 1 slice, you will have eaten 90 calories. If you eat 2 slices, you will have eaten 180 calories.  How do I keep a food log?  Immediately after each meal, record the following information in your food log:  · What you ate. Don't forget to include toppings, sauces, and other extras on the food.  · How much you ate. This can be measured in cups, ounces, or number of items.  · How many calories each food and drink had.  · The total number of calories in the meal.    Keep your food log near you, such as in a small notebook in your pocket, or use a mobile anatoliy or website. Some programs will calculate calories for you and show you how many calories you have left for the day to meet your goal.  What are some calorie counting tips?  · Use your calories on foods and drinks that will fill you up and not leave you hungry:  ? Some examples of foods that fill you up are nuts and nut butters, vegetables, lean proteins, and high-fiber foods like whole grains. High-fiber foods are foods with more than 5 g fiber per serving.  ? Drinks such as sodas, specialty coffee drinks,  "alcohol, and juices have a lot of calories, yet do not fill you up.  · Eat nutritious foods and avoid empty calories. Empty calories are calories you get from foods or beverages that do not have many vitamins or protein, such as candy, sweets, and soda. It is better to have a nutritious high-calorie food (such as an avocado) than a food with few nutrients (such as a bag of chips).  · Know how many calories are in the foods you eat most often. This will help you calculate calorie counts faster.  · Pay attention to calories in drinks. Low-calorie drinks include water and unsweetened drinks.  · Pay attention to nutrition labels for \"low fat\" or \"fat free\" foods. These foods sometimes have the same amount of calories or more calories than the full fat versions. They also often have added sugar, starch, or salt, to make up for flavor that was removed with the fat.  · Find a way of tracking calories that works for you. Get creative. Try different apps or programs if writing down calories does not work for you.  What are some portion control tips?  · Know how many calories are in a serving. This will help you know how many servings of a certain food you can have.  · Use a measuring cup to measure serving sizes. You could also try weighing out portions on a kitchen scale. With time, you will be able to estimate serving sizes for some foods.  · Take some time to put servings of different foods on your favorite plates, bowls, and cups so you know what a serving looks like.  · Try not to eat straight from a bag or box. Doing this can lead to overeating. Put the amount you would like to eat in a cup or on a plate to make sure you are eating the right portion.  · Use smaller plates, glasses, and bowls to prevent overeating.  · Try not to multitask (for example, watch TV or use your computer) while eating. If it is time to eat, sit down at a table and enjoy your food. This will help you to know when you are full. It will also " help you to be aware of what you are eating and how much you are eating.  What are tips for following this plan?  Reading food labels  · Check the calorie count compared to the serving size. The serving size may be smaller than what you are used to eating.  · Check the source of the calories. Make sure the food you are eating is high in vitamins and protein and low in saturated and trans fats.  Shopping  · Read nutrition labels while you shop. This will help you make healthy decisions before you decide to purchase your food.  · Make a grocery list and stick to it.  Cooking  · Try to cook your favorite foods in a healthier way. For example, try baking instead of frying.  · Use low-fat dairy products.  Meal planning  · Use more fruits and vegetables. Half of your plate should be fruits and vegetables.  · Include lean proteins like poultry and fish.  How do I count calories when eating out?  · Ask for smaller portion sizes.  · Consider sharing an entree and sides instead of getting your own entree.  · If you get your own entree, eat only half. Ask for a box at the beginning of your meal and put the rest of your entree in it so you are not tempted to eat it.  · If calories are listed on the menu, choose the lower calorie options.  · Choose dishes that include vegetables, fruits, whole grains, low-fat dairy products, and lean protein.  · Choose items that are boiled, broiled, grilled, or steamed. Stay away from items that are buttered, battered, fried, or served with cream sauce. Items labeled “crispy” are usually fried, unless stated otherwise.  · Choose water, low-fat milk, unsweetened iced tea, or other drinks without added sugar. If you want an alcoholic beverage, choose a lower calorie option such as a glass of wine or light beer.  · Ask for dressings, sauces, and syrups on the side. These are usually high in calories, so you should limit the amount you eat.  · If you want a salad, choose a garden salad and ask for  grilled meats. Avoid extra toppings like rodney, cheese, or fried items. Ask for the dressing on the side, or ask for olive oil and vinegar or lemon to use as dressing.  · Estimate how many servings of a food you are given. For example, a serving of cooked rice is ½ cup or about the size of half a baseball. Knowing serving sizes will help you be aware of how much food you are eating at restaurants. The list below tells you how big or small some common portion sizes are based on everyday objects:  ? 1 oz--4 stacked dice.  ? 3 oz--1 deck of cards.  ? 1 tsp--1 die.  ? 1 Tbsp--½ a ping-pong ball.  ? 2 Tbsp--1 ping-pong ball.  ? ½ cup--½ baseball.  ? 1 cup--1 baseball.  Summary  · Calorie counting means keeping track of how many calories you eat and drink each day. If you eat fewer calories than your body needs, you should lose weight.  · A healthy amount of weight to lose per week is usually 1-2 lb (0.5-0.9 kg). This usually means reducing your daily calorie intake by 500-750 calories.  · The number of calories in a food can be found on a Nutrition Facts label. If a food does not have a Nutrition Facts label, try to look up the calories online or ask your dietitian for help.  · Use your calories on foods and drinks that will fill you up, and not on foods and drinks that will leave you hungry.  · Use smaller plates, glasses, and bowls to prevent overeating.  This information is not intended to replace advice given to you by your health care provider. Make sure you discuss any questions you have with your health care provider.  Document Released: 12/18/2006 Document Revised: 11/17/2017 Document Reviewed: 11/17/2017  [employer] Patient Education © 2018 [employer]     Exercising to Lose Weight  Exercising can help you to lose weight. In order to lose weight through exercise, you need to do vigorous-intensity exercise. You can tell that you are exercising with vigorous intensity if you are breathing very hard and  fast and cannot hold a conversation while exercising.  Moderate-intensity exercise helps to maintain your current weight. You can tell that you are exercising at a moderate level if you have a higher heart rate and faster breathing, but you are still able to hold a conversation.  How often should I exercise?  Choose an activity that you enjoy and set realistic goals. Your health care provider can help you to make an activity plan that works for you. Exercise regularly as directed by your health care provider. This may include:  · Doing resistance training twice each week, such as:  ? Push-ups.  ? Sit-ups.  ? Lifting weights.  ? Using resistance bands.  · Doing a given intensity of exercise for a given amount of time. Choose from these options:  ? 150 minutes of moderate-intensity exercise every week.  ? 75 minutes of vigorous-intensity exercise every week.  ? A mix of moderate-intensity and vigorous-intensity exercise every week.    Children, pregnant women, people who are out of shape, people who are overweight, and older adults may need to consult a health care provider for individual recommendations. If you have any sort of medical condition, be sure to consult your health care provider before starting a new exercise program.  What are some activities that can help me to lose weight?  · Walking at a rate of at least 4.5 miles an hour.  · Jogging or running at a rate of 5 miles per hour.  · Biking at a rate of at least 10 miles per hour.  · Lap swimming.  · Roller-skating or in-line skating.  · Cross-country skiing.  · Vigorous competitive sports, such as football, basketball, and soccer.  · Jumping rope.  · Aerobic dancing.  How can I be more active in my day-to-day activities?  · Use the stairs instead of the elevator.  · Take a walk during your lunch break.  · If you drive, park your car farther away from work or school.  · If you take public transportation, get off one stop early and walk the rest of the  way.  · Make all of your phone calls while standing up and walking around.  · Get up, stretch, and walk around every 30 minutes throughout the day.  What guidelines should I follow while exercising?  · Do not exercise so much that you hurt yourself, feel dizzy, or get very short of breath.  · Consult your health care provider prior to starting a new exercise program.  · Wear comfortable clothes and shoes with good support.  · Drink plenty of water while you exercise to prevent dehydration or heat stroke. Body water is lost during exercise and must be replaced.  · Work out until you breathe faster and your heart beats faster.  This information is not intended to replace advice given to you by your health care provider. Make sure you discuss any questions you have with your health care provider.  Document Released: 01/20/2012 Document Revised: 05/25/2017 Document Reviewed: 05/21/2015  [email or web address] Interactive Patient Education © 2018 [employer]

## 2018-08-02 RX ORDER — METHYLPHENIDATE HYDROCHLORIDE 20 MG/1
20 TABLET ORAL 4 TIMES DAILY
Qty: 120 TABLET | Refills: 0 | Status: SHIPPED | OUTPATIENT
Start: 2018-08-02 | End: 2018-08-28 | Stop reason: SDUPTHER

## 2018-08-28 ENCOUNTER — OFFICE VISIT (OUTPATIENT)
Dept: NEUROLOGY | Facility: CLINIC | Age: 78
End: 2018-08-28

## 2018-08-28 VITALS
SYSTOLIC BLOOD PRESSURE: 110 MMHG | WEIGHT: 198 LBS | HEART RATE: 68 BPM | DIASTOLIC BLOOD PRESSURE: 70 MMHG | BODY MASS INDEX: 30.01 KG/M2 | HEIGHT: 68 IN

## 2018-08-28 DIAGNOSIS — G50.0 TRIGEMINAL NEURALGIA OF RIGHT SIDE OF FACE: ICD-10-CM

## 2018-08-28 DIAGNOSIS — G47.419 PRIMARY NARCOLEPSY WITHOUT CATAPLEXY: ICD-10-CM

## 2018-08-28 DIAGNOSIS — I67.9 CEREBROVASCULAR SMALL VESSEL DISEASE: Primary | ICD-10-CM

## 2018-08-28 PROCEDURE — 99213 OFFICE O/P EST LOW 20 MIN: CPT | Performed by: PHYSICIAN ASSISTANT

## 2018-08-30 RX ORDER — METHYLPHENIDATE HYDROCHLORIDE 20 MG/1
20 TABLET ORAL 4 TIMES DAILY
Qty: 120 TABLET | Refills: 0 | Status: SHIPPED | OUTPATIENT
Start: 2018-08-30 | End: 2018-09-27 | Stop reason: SDUPTHER

## 2018-09-03 NOTE — PROGRESS NOTES
Subjective   Spencer Moore is a 78 y.o. male is here today for follow-up.    Facial pain symptoms are quite improved.  He is otherwise stable.      Facial Pain   This is a chronic problem. The current episode started more than 1 year ago. The problem occurs intermittently. The problem has been waxing and waning. Associated symptoms include fatigue, headaches, neck pain, numbness and weakness. The symptoms are aggravated by exertion, stress and eating. He has tried rest (Gamma knife, medications) for the symptoms. The treatment provided significant relief.   Neurologic Problem   The patient's primary symptoms include an altered mental status and weakness. Primary symptoms comment: Narcolepsy and cataplexy. This is a chronic problem. The current episode started more than 1 year ago. The neurological problem developed suddenly. The problem is unchanged. There was no focality noted. Associated symptoms include fatigue, headaches and neck pain. Past treatments include medication. The treatment provided significant relief.       The following portions of the patient's history were reviewed and updated as appropriate: allergies, current medications, past family history, past medical history, past social history, past surgical history and problem list.    Review of Systems   Constitutional: Positive for fatigue.   HENT: Positive for hearing loss. Negative for trouble swallowing.    Eyes: Negative.    Respiratory: Negative.    Cardiovascular: Negative.    Gastrointestinal: Negative.    Endocrine: Negative.    Genitourinary: Positive for difficulty urinating and frequency.   Musculoskeletal: Positive for gait problem and neck pain.   Skin: Negative.    Allergic/Immunologic: Negative.    Neurological: Positive for weakness, numbness and headaches. Negative for facial asymmetry and speech difficulty.   Hematological: Negative.    Psychiatric/Behavioral: Positive for sleep disturbance.       Objective   Physical Exam    Constitutional: He is oriented to person, place, and time. Vital signs are normal. He appears well-developed and well-nourished. No distress.   HENT:   Head: Normocephalic and atraumatic.   Right Ear: External ear normal. Decreased hearing is noted.   Left Ear: External ear normal. Decreased hearing is noted.   Nose: Nose normal.   Mouth/Throat: Uvula is midline, oropharynx is clear and moist and mucous membranes are normal.   Eyes: Pupils are equal, round, and reactive to light. Conjunctivae, EOM and lids are normal. No scleral icterus.   Neck: Normal range of motion and phonation normal. No spinous process tenderness and no muscular tenderness present. Carotid bruit is not present. Normal range of motion present. No thyroid mass and no thyromegaly present.   Cardiovascular: Normal rate, regular rhythm, S1 normal, S2 normal and normal heart sounds.    No murmur heard.  Pulmonary/Chest: Effort normal and breath sounds normal. No stridor. No respiratory distress. He has no wheezes. He has no rales.   Musculoskeletal: He exhibits no edema.        Lumbar back: He exhibits decreased range of motion and pain.   Lymphadenopathy:     He has no cervical adenopathy.   Neurological: He is alert and oriented to person, place, and time. He has normal strength. He displays no atrophy and no tremor. No cranial nerve deficit or sensory deficit. He exhibits normal muscle tone. Gait abnormal. Coordination normal. GCS eye subscore is 4. GCS verbal subscore is 5. GCS motor subscore is 6.   Reflex Scores:       Tricep reflexes are 2+ on the right side and 2+ on the left side.       Bicep reflexes are 2+ on the right side and 2+ on the left side.       Brachioradialis reflexes are 2+ on the right side and 2+ on the left side.       Patellar reflexes are 2+ on the right side and 2+ on the left side.       Achilles reflexes are 2+ on the right side and 2+ on the left side.  Mild psychomotor impairment of his gait   Skin: Skin is warm  and dry. No ecchymosis, no lesion and no rash noted. No cyanosis. Nails show no clubbing.   Psychiatric: He has a normal mood and affect. His behavior is normal. Thought content normal. His speech is delayed. He is not actively hallucinating. Cognition and memory are normal. He expresses impulsivity. He is attentive.   Nursing note and vitals reviewed.        Assessment/Plan   Spencer was seen today for trigeminal neuralgia.    Diagnoses and all orders for this visit:    Cerebrovascular small vessel disease    Trigeminal neuralgia of right side of face    Primary narcolepsy without cataplexy    No changes are recommended in his medication regimen today.    10 minutes of a 15 minute outpatient visit was spent in counseling and coordination of care with the patient and his family today.    Dictated utilizing Dragon dictation.

## 2018-10-01 RX ORDER — METHYLPHENIDATE HYDROCHLORIDE 20 MG/1
20 TABLET ORAL 4 TIMES DAILY
Qty: 120 TABLET | Refills: 0 | Status: SHIPPED | OUTPATIENT
Start: 2018-10-01 | End: 2018-10-29 | Stop reason: SDUPTHER

## 2018-10-08 DIAGNOSIS — G50.0 TRIGEMINAL NEURALGIA OF RIGHT SIDE OF FACE: ICD-10-CM

## 2018-10-08 RX ORDER — CARBAMAZEPINE 200 MG/1
TABLET ORAL
Qty: 60 TABLET | Refills: 5 | Status: SHIPPED | OUTPATIENT
Start: 2018-10-08 | End: 2019-07-08 | Stop reason: SDUPTHER

## 2018-10-29 RX ORDER — METHYLPHENIDATE HYDROCHLORIDE 20 MG/1
20 TABLET ORAL 4 TIMES DAILY
Qty: 120 TABLET | Refills: 0 | Status: SHIPPED | OUTPATIENT
Start: 2018-10-29 | End: 2019-01-03 | Stop reason: SDUPTHER

## 2018-12-10 ENCOUNTER — OFFICE VISIT (OUTPATIENT)
Dept: OTOLARYNGOLOGY | Facility: CLINIC | Age: 78
End: 2018-12-10

## 2018-12-10 VITALS
SYSTOLIC BLOOD PRESSURE: 120 MMHG | DIASTOLIC BLOOD PRESSURE: 78 MMHG | HEIGHT: 68 IN | BODY MASS INDEX: 30.16 KG/M2 | TEMPERATURE: 97.6 F | WEIGHT: 199 LBS

## 2018-12-10 DIAGNOSIS — H61.23 BILATERAL IMPACTED CERUMEN: Primary | ICD-10-CM

## 2018-12-10 DIAGNOSIS — H90.3 SENSORINEURAL HEARING LOSS (SNHL) OF BOTH EARS: ICD-10-CM

## 2018-12-10 PROCEDURE — 69210 REMOVE IMPACTED EAR WAX UNI: CPT | Performed by: PHYSICIAN ASSISTANT

## 2018-12-10 NOTE — PROGRESS NOTES
Procedure Note    Pre-operative Diagnosis: Cerumen impaction/bilateral    Post-operative Diagnosis: same    Anesthesia: none    Procedure:  Binocular ear microscopy with cerumen removal    Procedure Details:    The patient was placed supine on the procedure table. Using a speculum, the ear was examined with a microscope. Cerumen removed with curette, alligator forceps, and suction.    Condition:  Stable.  Patient tolerated procedure well.    Complications:  None

## 2019-01-03 RX ORDER — METHYLPHENIDATE HYDROCHLORIDE 20 MG/1
20 TABLET ORAL 4 TIMES DAILY
Qty: 120 TABLET | Refills: 0 | Status: SHIPPED | OUTPATIENT
Start: 2019-01-03 | End: 2019-02-04 | Stop reason: SDUPTHER

## 2019-01-07 ENCOUNTER — OFFICE VISIT (OUTPATIENT)
Dept: NEUROLOGY | Facility: CLINIC | Age: 79
End: 2019-01-07

## 2019-01-07 VITALS
BODY MASS INDEX: 30.01 KG/M2 | WEIGHT: 198 LBS | DIASTOLIC BLOOD PRESSURE: 70 MMHG | SYSTOLIC BLOOD PRESSURE: 120 MMHG | HEIGHT: 68 IN | HEART RATE: 66 BPM

## 2019-01-07 DIAGNOSIS — G50.0 TRIGEMINAL NEURALGIA OF RIGHT SIDE OF FACE: ICD-10-CM

## 2019-01-07 DIAGNOSIS — G47.419 PRIMARY NARCOLEPSY WITHOUT CATAPLEXY: ICD-10-CM

## 2019-01-07 DIAGNOSIS — I67.9 CEREBROVASCULAR SMALL VESSEL DISEASE: Primary | ICD-10-CM

## 2019-01-07 DIAGNOSIS — M48.062 SPINAL STENOSIS OF LUMBAR REGION WITH NEUROGENIC CLAUDICATION: ICD-10-CM

## 2019-01-07 DIAGNOSIS — M47.812 CERVICAL SPONDYLOSIS WITHOUT MYELOPATHY: ICD-10-CM

## 2019-01-07 PROCEDURE — 99214 OFFICE O/P EST MOD 30 MIN: CPT | Performed by: PHYSICIAN ASSISTANT

## 2019-01-11 ENCOUNTER — PROCEDURE VISIT (OUTPATIENT)
Dept: OTOLARYNGOLOGY | Facility: CLINIC | Age: 79
End: 2019-01-11

## 2019-01-11 DIAGNOSIS — H90.3 SENSORINEURAL HEARING LOSS (SNHL) OF BOTH EARS: Primary | ICD-10-CM

## 2019-01-11 PROCEDURE — HEARINGNOCHG: Performed by: OTOLARYNGOLOGY

## 2019-01-11 NOTE — PROGRESS NOTES
Patient came in to  his repaired HA. I put the new tubing on and he left with 2 extra tubes. He was given the contact information for Dr. Gale and was told what size tubing he needed, if he wished to order it through [employer].    No charge per HA agreement.

## 2019-01-16 ENCOUNTER — HOSPITAL ENCOUNTER (OUTPATIENT)
Dept: MRI IMAGING | Facility: HOSPITAL | Age: 79
Discharge: HOME | End: 2019-01-16
Admitting: PHYSICIAN ASSISTANT

## 2019-01-16 ENCOUNTER — HOSPITAL ENCOUNTER (OUTPATIENT)
Dept: GENERAL RADIOLOGY | Facility: HOSPITAL | Age: 79
Discharge: HOME | End: 2019-01-16

## 2019-01-16 DIAGNOSIS — M48.062 SPINAL STENOSIS OF LUMBAR REGION WITH NEUROGENIC CLAUDICATION: ICD-10-CM

## 2019-01-16 PROCEDURE — 72148 MRI LUMBAR SPINE W/O DYE: CPT

## 2019-01-16 PROCEDURE — 72114 X-RAY EXAM L-S SPINE BENDING: CPT

## 2019-01-24 NOTE — PROGRESS NOTES
Subjective   Spencer Moore is a 78 y.o. male is here today for follow-up.    Facial pain symptoms are quite improved.  He complains of increased LBP, axial mechanical symptoms and neurogenic claudication. These symptoms are his primary concern today.  He feels his symptoms are severely limiting and severely alter his daily activities.  His symptoms are worsening.      Facial Pain   This is a chronic problem. The current episode started more than 1 year ago. The problem occurs intermittently. The problem has been resolved. Associated symptoms include fatigue, headaches, neck pain, numbness and weakness. The symptoms are aggravated by exertion, stress and eating. He has tried rest (Gamma knife, medications) for the symptoms. The treatment provided significant relief.   Back Pain   This is a chronic problem. The current episode started more than 1 year ago. The problem occurs daily. The problem has been gradually worsening since onset. The pain is present in the lumbar spine and sacro-iliac. The quality of the pain is described as aching, cramping and shooting. The pain radiates to the left foot and right foot. The pain is severe. The pain is worse during the day. The symptoms are aggravated by position, standing and bending (walking). Stiffness is present in the morning. Associated symptoms include headaches, numbness and weakness. He has tried analgesics, bed rest, home exercises, muscle relaxant and NSAIDs for the symptoms. The treatment provided mild relief.       The following portions of the patient's history were reviewed and updated as appropriate: allergies, current medications, past family history, past medical history, past social history, past surgical history and problem list.    Review of Systems   Constitutional: Positive for fatigue.   HENT: Positive for hearing loss. Negative for trouble swallowing.    Eyes: Negative.    Respiratory: Negative.    Cardiovascular: Negative.    Gastrointestinal:  Negative.    Endocrine: Negative.    Genitourinary: Positive for difficulty urinating and frequency.   Musculoskeletal: Positive for back pain, gait problem and neck pain.   Skin: Negative.    Allergic/Immunologic: Negative.    Neurological: Positive for weakness, numbness and headaches. Negative for facial asymmetry and speech difficulty.   Hematological: Negative.    Psychiatric/Behavioral: Positive for sleep disturbance.       Objective   Physical Exam   Constitutional: He is oriented to person, place, and time. Vital signs are normal. He appears well-developed and well-nourished. No distress.   HENT:   Head: Normocephalic and atraumatic.   Right Ear: External ear normal. Decreased hearing is noted.   Left Ear: External ear normal. Decreased hearing is noted.   Nose: Nose normal.   Mouth/Throat: Uvula is midline, oropharynx is clear and moist and mucous membranes are normal.   Eyes: Conjunctivae, EOM and lids are normal. Pupils are equal, round, and reactive to light. No scleral icterus.   Neck: Normal range of motion and phonation normal. No spinous process tenderness and no muscular tenderness present. Carotid bruit is not present. Normal range of motion present. No thyroid mass and no thyromegaly present.   Cardiovascular: Normal rate, regular rhythm, S1 normal, S2 normal and normal heart sounds.   No murmur heard.  Pulmonary/Chest: Effort normal and breath sounds normal. No stridor. No respiratory distress. He has no wheezes. He has no rales.   Musculoskeletal: He exhibits no edema.        Lumbar back: He exhibits decreased range of motion and pain.   Lymphadenopathy:     He has no cervical adenopathy.   Neurological: He is alert and oriented to person, place, and time. He displays no atrophy and no tremor. No cranial nerve deficit or sensory deficit. He exhibits normal muscle tone. Gait abnormal. Coordination normal. GCS eye subscore is 4. GCS verbal subscore is 5. GCS motor subscore is 6.   Reflex Scores:        Tricep reflexes are 2+ on the right side and 2+ on the left side.       Bicep reflexes are 2+ on the right side and 2+ on the left side.       Brachioradialis reflexes are 2+ on the right side and 2+ on the left side.       Patellar reflexes are 2+ on the right side and 2+ on the left side.       Achilles reflexes are 2+ on the right side and 2+ on the left side.  Mild psychomotor impairment of his gait, senile tetraparesis.   Skin: Skin is warm and dry. No ecchymosis, no lesion and no rash noted. No cyanosis. Nails show no clubbing.   Psychiatric: He has a normal mood and affect. His behavior is normal. Thought content normal. His speech is delayed. Cognition and memory are normal. He expresses impulsivity.   Nursing note and vitals reviewed.        Assessment/Plan   Spencer was seen today for trigeminal neuralgia.    Diagnoses and all orders for this visit:    Cerebrovascular small vessel disease    Trigeminal neuralgia of right side of face    Cervical spondylosis without myelopathy    Primary narcolepsy without cataplexy    Spinal stenosis of lumbar region with neurogenic claudication  -     MRI Lumbar Spine Without Contrast; Future  -     XR Spine Lumbar Complete With Flex & Ext; Future    I think he might do well with a LSO for stability and comfort.  Discussed expectations and implications of further lumbar spine testing in detail, he wishes to proceed as his symptoms are so limiting for him.    He is otherwise stable, no other changes in his treatment recommendations today.    20 minutes of a 25 minute outpatient visit was spent in counseling and coordination of care with the patient and his family today.

## 2019-01-30 DIAGNOSIS — Z51.81 ENCOUNTER FOR MEDICATION MONITORING: Primary | ICD-10-CM

## 2019-02-04 DIAGNOSIS — M47.812 CERVICAL SPONDYLOSIS WITHOUT MYELOPATHY: Primary | ICD-10-CM

## 2019-02-05 RX ORDER — METHYLPHENIDATE HYDROCHLORIDE 20 MG/1
20 TABLET ORAL 4 TIMES DAILY
Qty: 120 TABLET | Refills: 0 | Status: SHIPPED | OUTPATIENT
Start: 2019-02-05 | End: 2019-03-05 | Stop reason: SDUPTHER

## 2019-02-12 ENCOUNTER — LAB (OUTPATIENT)
Dept: LAB | Facility: HOSPITAL | Age: 79
End: 2019-02-12

## 2019-02-12 DIAGNOSIS — Z51.81 ENCOUNTER FOR MEDICATION MONITORING: ICD-10-CM

## 2019-02-12 LAB
AMPHET+METHAMPHET UR QL: NEGATIVE
BARBITURATES UR QL SCN: NEGATIVE
BENZODIAZ UR QL SCN: NEGATIVE
CANNABINOIDS SERPL QL: NEGATIVE
COCAINE UR QL: NEGATIVE
METHADONE UR QL SCN: NEGATIVE
OPIATES UR QL: POSITIVE
PCP UR QL SCN: NEGATIVE

## 2019-02-12 PROCEDURE — 80307 DRUG TEST PRSMV CHEM ANLYZR: CPT | Performed by: PHYSICIAN ASSISTANT

## 2019-03-05 RX ORDER — METHYLPHENIDATE HYDROCHLORIDE 20 MG/1
20 TABLET ORAL 4 TIMES DAILY
Qty: 120 TABLET | Refills: 0 | Status: SHIPPED | OUTPATIENT
Start: 2019-03-05 | End: 2019-04-04 | Stop reason: SDUPTHER

## 2019-04-04 ENCOUNTER — DOCUMENTATION (OUTPATIENT)
Dept: NEUROLOGY | Facility: CLINIC | Age: 79
End: 2019-04-04

## 2019-04-04 RX ORDER — METHYLPHENIDATE HYDROCHLORIDE 20 MG/1
20 TABLET ORAL 4 TIMES DAILY
Qty: 120 TABLET | Refills: 0 | Status: SHIPPED | OUTPATIENT
Start: 2019-04-05 | End: 2019-05-08 | Stop reason: SDUPTHER

## 2019-04-04 NOTE — PROGRESS NOTES
Recent drug screen done and noncontributory.  Did show opiates positive and patient takes Percocet according to the notes

## 2019-04-04 NOTE — PROGRESS NOTES
I checked with Bay Kurtz regarding this patient's Ritalin dosage and it is correct so I am approving  the month prescription.

## 2019-05-08 RX ORDER — METHYLPHENIDATE HYDROCHLORIDE 20 MG/1
20 TABLET ORAL 4 TIMES DAILY
Qty: 120 TABLET | Refills: 0 | Status: SHIPPED | OUTPATIENT
Start: 2019-05-08 | End: 2019-08-06 | Stop reason: SDUPTHER

## 2019-06-10 ENCOUNTER — TELEPHONE (OUTPATIENT)
Dept: NEUROLOGY | Facility: CLINIC | Age: 79
End: 2019-06-10

## 2019-06-10 NOTE — TELEPHONE ENCOUNTER
Orestes said Spencer is taking 3 Ritalin per day instead of 4.  He would like the script changed so he doesn't have extra medication.

## 2019-06-16 DIAGNOSIS — H90.3 SENSORINEURAL HEARING LOSS (SNHL) OF BOTH EARS: Primary | ICD-10-CM

## 2019-06-19 ENCOUNTER — OFFICE VISIT (OUTPATIENT)
Dept: OTOLARYNGOLOGY | Facility: CLINIC | Age: 79
End: 2019-06-19

## 2019-06-19 ENCOUNTER — PROCEDURE VISIT (OUTPATIENT)
Dept: OTOLARYNGOLOGY | Facility: CLINIC | Age: 79
End: 2019-06-19

## 2019-06-19 VITALS
SYSTOLIC BLOOD PRESSURE: 120 MMHG | DIASTOLIC BLOOD PRESSURE: 80 MMHG | HEIGHT: 68 IN | WEIGHT: 189 LBS | TEMPERATURE: 97.7 F | BODY MASS INDEX: 28.64 KG/M2

## 2019-06-19 DIAGNOSIS — H90.3 SENSORINEURAL HEARING LOSS (SNHL) OF BOTH EARS: Primary | ICD-10-CM

## 2019-06-19 PROCEDURE — 92555 SPEECH THRESHOLD AUDIOMETRY: CPT | Performed by: AUDIOLOGIST-HEARING AID FITTER

## 2019-06-19 PROCEDURE — 92551 PURE TONE HEARING TEST AIR: CPT | Performed by: AUDIOLOGIST-HEARING AID FITTER

## 2019-06-19 PROCEDURE — 99213 OFFICE O/P EST LOW 20 MIN: CPT | Performed by: PHYSICIAN ASSISTANT

## 2019-06-19 PROCEDURE — 92567 TYMPANOMETRY: CPT | Performed by: AUDIOLOGIST-HEARING AID FITTER

## 2019-06-19 NOTE — PROGRESS NOTES
RAUL Rosas     Chief Complaint   Patient presents with   • Follow-up     ears       HPI   Spencer Moore is a  78 y.o. male who complains of hearing loss. The symptoms are localized to both ears. The patient has had moderate to severe symptoms. The symptoms have been relatively constant for the last several years. The symptoms are aggravated by  no identifiable factors. The symptoms are improved by hearing aids.          Review of Systems   Constitutional: Negative for activity change, appetite change, chills, diaphoresis, fatigue, fever and unexpected weight change.   HENT: Positive for hearing loss. Negative for congestion, ear discharge, ear pain, facial swelling, mouth sores, nosebleeds, postnasal drip, rhinorrhea, sinus pressure, sneezing, sore throat, tinnitus, trouble swallowing and voice change.    Eyes: Negative for pain, discharge, redness, itching and visual disturbance.   Respiratory: Negative for apnea, cough, choking, chest tightness, shortness of breath, wheezing and stridor.    Gastrointestinal: Negative for nausea and vomiting.   Endocrine: Negative for cold intolerance and heat intolerance.   Musculoskeletal: Negative for arthralgias, back pain, gait problem, neck pain and neck stiffness.   Skin: Negative for rash.   Allergic/Immunologic: Negative for environmental allergies and food allergies.   Neurological: Negative for dizziness, tremors, seizures, syncope, facial asymmetry, speech difficulty, weakness, light-headedness, numbness and headaches.   Hematological: Negative for adenopathy. Does not bruise/bleed easily.   Psychiatric/Behavioral: Negative for behavioral problems, sleep disturbance and suicidal ideas. The patient is not nervous/anxious and is not hyperactive.    :    Past History:  Past Medical History:   Diagnosis Date   • Anemia    • Arthritis    • Bilateral impacted cerumen 12/10/2018   • Diverticulitis    • Enlarged prostate    • ETD (eustachian tube dysfunction)     • GERD (gastroesophageal reflux disease)    • Hypertension    • Kidney infection    • Lumbago    • Narcolepsy    • Sensorineural hearing loss (SNHL) of both ears 6/18/2018   • SNHL (sensorineural hearing loss)    • Tinnitus    • Vitamin B12 deficiency    • Weak urinary stream      Past Surgical History:   Procedure Laterality Date   • ABDOMINAL SURGERY     • CATARACT EXTRACTION Left    • HAND SURGERY Left    • HERNIA REPAIR     • REPLACEMENT TOTAL KNEE Left    • ROTATOR CUFF REPAIR     • TONSILLECTOMY       Family History   Adopted: Yes   Family history unknown: Yes     Social History     Tobacco Use   • Smoking status: Never Smoker   • Smokeless tobacco: Never Used   Substance Use Topics   • Alcohol use: No   • Drug use: No     Outpatient Medications Marked as Taking for the 6/19/19 encounter (Office Visit) with Mark Barnett PA   Medication Sig Dispense Refill   • aspirin 81 MG EC tablet Take 81 mg by mouth Daily.     • atorvastatin (LIPITOR) 10 MG tablet Take 10 mg by mouth Daily.     • azelastine (ASTELIN) 0.1 % nasal spray 2 sprays into each nostril 2 (Two) Times a Day. Use in each nostril as directed 30 mL 11   • carBAMazepine (TEGretol) 200 MG tablet TAKE ONE TABLET BY MOUTH TWICE DAILY 60 tablet 5   • CloNIDine (CATAPRES) 0.1 MG tablet Take 0.1 mg by mouth As Needed for High Blood Pressure (1 tablet if systolic is over 170).     • Cyanocobalamin (B-12 COMPLIANCE INJECTION) 1000 MCG/ML kit Inject  as directed Every 30 (Thirty) Days.     • finasteride (PROSCAR) 5 MG tablet Take 1 tablet by mouth Daily. 90 tablet 3   • FLUoxetine (PROzac) 40 MG capsule Take 40 mg by mouth Daily.     • fluticasone (FLONASE) 50 MCG/ACT nasal spray 2 sprays into each nostril Daily. 16 g 11   • irbesartan-hydrochlorothiazide (AVALIDE) 300-12.5 MG tablet Take 1 tablet by mouth Daily.     • methylphenidate (RITALIN) 20 MG tablet Take 1 tablet by mouth 4 (Four) Times a Day. 120 tablet 0   • metoprolol tartrate (LOPRESSOR)  25 MG tablet Take 1 tablet by mouth 2 (Two) Times a Day. (Patient taking differently: Take 25 mg by mouth Daily.) 60 tablet 11   • omeprazole (priLOSEC) 40 MG capsule Take 40 mg by mouth Daily.     • oxyCODONE-acetaminophen (PERCOCET) 7.5-325 MG per tablet Take 1 tablet by mouth Every 6 (Six) Hours As Needed. Takes 7.5mg four times daily     • tamsulosin (FLOMAX) 0.4 MG capsule 24 hr capsule Take one capsule by mouth once daily 90 capsule 3   • terazosin (HYTRIN) 2 MG capsule Take 2 mg by mouth Every Night.       Allergies:  Biaxin [clarithromycin] and Parafon forte dsc [chlorzoxazone]    Vital Signs:   Vitals:    06/19/19 1402   BP: 120/80   Temp: 97.7 °F (36.5 °C)       Physical Exam   CONSTITUTIONAL: well nourished, alert, oriented, in no acute distress     COMMUNICATION AND VOICE: able to communicate normally, normal voice quality    HEAD: normocephalic, no lesions, atraumatic, no tenderness, no masses     FACE: appearance normal, no lesions, no tenderness, no deformities, facial motion symmetric    EYES: ocular motility normal, eyelids normal, orbits normal, no proptosis, conjunctiva normal , pupils equal, round     EARS:  Hearing: response to conversational voice normal bilaterally   External Ears: auricles without lesions  Otoscopic: tympanic membrane appearance normal, no lesions, no perforation, normal mobility, no fluid    NOSE:  External Nose: structure normal, no tenderness on palpation, no nasal discharge, no lesions, no evidence of trauma, nostrils patent   Intranasal Exam: nasal mucosa normal, vestibule within normal limits, inferior turbinate normal, nasal septum midline     ORAL:  Lips: upper and lower lips without lesion   Teeth: dentition within normal limits for age   Gums: gingivae healthy   Oral Mucosa: oral mucosa normal, no mucosal lesions   Floor of Mouth: Warthin’s duct patent, mucosa normal  Tongue: lingual mucosa normal without lesions, normal tongue mobility   Palate: soft and hard  palates with normal mucosa and structure  Oropharynx: oropharyngeal mucosa normal    NECK: neck appearance normal    CHEST/RESPIRATORY: respiratory effort normal, normal breath sounds     CARDIOVASCULAR: rate and rhythm normal, extremities without cyanosis or edema      NEUROLOGIC/PSYCHIATRIC: oriented to time, place and person, mood normal, affect appropriate, CN II-XII intact grossly    RESULTS REVIEW:    I have reviewed the patients old records in the chart.  Audiologic testing reviewed.    Marlena Palmer was seen today for follow-up.    Diagnoses and all orders for this visit:    Sensorineural hearing loss (SNHL) of both ears  -     Comprehensive Hearing Test  -     Tympanometry      * Surgery not found *  Orders Placed This Encounter   Procedures   • Comprehensive Hearing Test     Order Specific Question:   Laterality     Answer:   Bilateral   • Tympanometry     Order Specific Question:   Laterality     Answer:   Bilateral     Plan    Advised to consider new hearing aids, follow-up in one year with audiogram, if symptoms worsen call for sooner appointment.    Return in about 1 year (around 6/19/2020) for Recheck ears with audiogram.    RAUL Rosas  06/21/19  9:05 PM

## 2019-06-22 NOTE — PATIENT INSTRUCTIONS
Advised to consider new hearing aids, follow-up in one year with audiogram, if symptoms worsen call for sooner appointment.

## 2019-07-08 ENCOUNTER — OFFICE VISIT (OUTPATIENT)
Dept: NEUROLOGY | Facility: CLINIC | Age: 79
End: 2019-07-08

## 2019-07-08 VITALS
HEART RATE: 58 BPM | HEIGHT: 68 IN | DIASTOLIC BLOOD PRESSURE: 74 MMHG | SYSTOLIC BLOOD PRESSURE: 122 MMHG | BODY MASS INDEX: 28.19 KG/M2 | WEIGHT: 186 LBS

## 2019-07-08 DIAGNOSIS — G50.0 TRIGEMINAL NEURALGIA OF RIGHT SIDE OF FACE: ICD-10-CM

## 2019-07-08 DIAGNOSIS — G47.419 PRIMARY NARCOLEPSY WITHOUT CATAPLEXY: ICD-10-CM

## 2019-07-08 DIAGNOSIS — Z51.81 MEDICATION MONITORING ENCOUNTER: ICD-10-CM

## 2019-07-08 DIAGNOSIS — I67.9 CEREBROVASCULAR SMALL VESSEL DISEASE: Primary | ICD-10-CM

## 2019-07-08 DIAGNOSIS — G47.9 SLEEP DISORDER: ICD-10-CM

## 2019-07-08 PROCEDURE — 99214 OFFICE O/P EST MOD 30 MIN: CPT | Performed by: PHYSICIAN ASSISTANT

## 2019-07-08 RX ORDER — CARBAMAZEPINE 200 MG/1
200 TABLET ORAL 2 TIMES DAILY
Qty: 60 TABLET | Refills: 6 | Status: SHIPPED | OUTPATIENT
Start: 2019-07-08 | End: 2019-11-07 | Stop reason: SDUPTHER

## 2019-07-08 RX ORDER — MELOXICAM 15 MG/1
15 TABLET ORAL DAILY
Refills: 1 | COMMUNITY
Start: 2019-06-21 | End: 2019-07-26

## 2019-07-08 NOTE — PROGRESS NOTES
Subjective   Spencer Moore is a 78 y.o. male is here today for follow-up.    Facial pain symptoms remains improved.  Continues to have issues with chronic back pain.  The family is progressively concerned regarding memory.  Family also raised the issue of whether at 78 years of age, the patient's level of narcolepsy continues to require methylphenidate.  He has not had formal evaluation of his narcolepsy in quite a number of years.  Patient and family are agreeable to reevaluation.  The goal would be to potentially reduce or eliminate the use of methylphenidate.      Facial Pain   This is a chronic problem. The current episode started more than 1 year ago. The problem occurs intermittently. The problem has been resolved. Associated symptoms include fatigue, headaches, neck pain, numbness and weakness. The symptoms are aggravated by exertion, stress and eating. He has tried rest (Gamma knife, medications) for the symptoms. The treatment provided significant relief.   Back Pain   This is a chronic problem. The current episode started more than 1 year ago. The problem occurs daily. The problem has been gradually worsening since onset. The pain is present in the lumbar spine and sacro-iliac. The quality of the pain is described as aching, cramping and shooting. The pain radiates to the left foot and right foot. The pain is severe. The pain is worse during the day. The symptoms are aggravated by position, standing and bending (walking). Stiffness is present in the morning. Associated symptoms include headaches, numbness and weakness. He has tried analgesics, bed rest, home exercises, muscle relaxant and NSAIDs for the symptoms. The treatment provided mild relief.       The following portions of the patient's history were reviewed and updated as appropriate: allergies, current medications, past family history, past medical history, past social history, past surgical history and problem list.    Review of Systems    Constitutional: Positive for fatigue.   HENT: Positive for hearing loss. Negative for trouble swallowing.    Eyes: Negative.    Respiratory: Negative.    Cardiovascular: Negative.    Gastrointestinal: Negative.    Endocrine: Negative.    Genitourinary: Positive for difficulty urinating and frequency.   Musculoskeletal: Positive for back pain, gait problem and neck pain.   Skin: Negative.    Allergic/Immunologic: Negative.    Neurological: Positive for weakness, numbness and headaches. Negative for facial asymmetry and speech difficulty.   Hematological: Negative.    Psychiatric/Behavioral: Positive for sleep disturbance.       Objective   Physical Exam   Constitutional: He is oriented to person, place, and time. Vital signs are normal. He appears well-developed and well-nourished. No distress.   HENT:   Head: Normocephalic and atraumatic.   Right Ear: External ear normal. Decreased hearing is noted.   Left Ear: External ear normal. Decreased hearing is noted.   Nose: Nose normal.   Mouth/Throat: Uvula is midline, oropharynx is clear and moist and mucous membranes are normal.   Eyes: Conjunctivae, EOM and lids are normal. Pupils are equal, round, and reactive to light. No scleral icterus.   Neck: Trachea normal, normal range of motion and phonation normal. No JVD present. Carotid bruit is not present.   Cardiovascular: Normal rate, regular rhythm and normal heart sounds.   No murmur heard.  Pulmonary/Chest: Effort normal and breath sounds normal.   Musculoskeletal: He exhibits no edema.        Lumbar back: He exhibits decreased range of motion and pain.   Lymphadenopathy:     He has no cervical adenopathy.   Neurological: He is alert and oriented to person, place, and time. He displays no atrophy and no tremor. No cranial nerve deficit or sensory deficit. He exhibits normal muscle tone. Gait abnormal. Coordination normal. GCS eye subscore is 4. GCS verbal subscore is 5. GCS motor subscore is 6.   Reflex Scores:        Tricep reflexes are 2+ on the right side and 2+ on the left side.       Bicep reflexes are 2+ on the right side and 2+ on the left side.       Brachioradialis reflexes are 2+ on the right side and 2+ on the left side.       Patellar reflexes are 2+ on the right side and 2+ on the left side.       Achilles reflexes are 2+ on the right side and 2+ on the left side.  Mild psychomotor impairment of his gait, senile tetraparesis.   Skin: Skin is warm, dry and intact.   Psychiatric: He has a normal mood and affect. His behavior is normal. Thought content normal. His speech is delayed. Cognition and memory are normal. He expresses impulsivity.   Nursing note and vitals reviewed.        Assessment/Plan   Spencer was seen today for trigeminal neuralgia.    Diagnoses and all orders for this visit:    Cerebrovascular small vessel disease  -     Ambulatory Referral to Speech Therapy; Future    Trigeminal neuralgia of right side of face  -     carBAMazepine (TEGretol) 200 MG tablet; Take 1 tablet by mouth 2 (Two) Times a Day.    Primary narcolepsy without cataplexy  -     Polysomnography 4 or more parameters; Future  -     Urine Drug Screen - Urine, Clean Catch; Future  -     Multiple sleep latency test without CPAP; Future    Sleep disorder  -     Ambulatory Referral to Speech Therapy; Future    Medication monitoring encounter  -     Urine Drug Screen - Urine, Clean Catch; Future    Was sent for memory testing with speech therapy.    Continue carbamazepine unchanged.  He has occasional labs with his family doctor in [place].    Reevaluate narcolepsy with a polysomnogram, drug screen and MSLT.    Concerns and questions from the patient and family are reviewed.  Today's findings, recommendations, medication, details of testing and expectations regarding speech therapy evaluation and MSLT are reviewed in detail.  20 minutes of a 25-minute outpatient visit was spent in counseling and coordination of care today.      Dictated  utilizing Dragon dictation.

## 2019-08-07 RX ORDER — METHYLPHENIDATE HYDROCHLORIDE 20 MG/1
20 TABLET ORAL 3 TIMES DAILY
Qty: 90 TABLET | Refills: 0 | Status: SHIPPED | OUTPATIENT
Start: 2019-08-07 | End: 2019-09-04 | Stop reason: SDUPTHER

## 2019-08-08 ENCOUNTER — HOSPITAL ENCOUNTER (OUTPATIENT)
Dept: SPEECH THERAPY | Facility: HOSPITAL | Age: 79
Setting detail: THERAPIES SERIES
Discharge: HOME | End: 2019-08-08

## 2019-08-08 ENCOUNTER — LAB (OUTPATIENT)
Dept: LAB | Facility: HOSPITAL | Age: 79
End: 2019-08-08

## 2019-08-08 DIAGNOSIS — G47.419 PRIMARY NARCOLEPSY WITHOUT CATAPLEXY: ICD-10-CM

## 2019-08-08 DIAGNOSIS — R41.89 OTHER SYMPTOMS AND SIGNS INVOLVING COGNITIVE FUNCTIONS AND AWARENESS: Primary | ICD-10-CM

## 2019-08-08 DIAGNOSIS — Z51.81 MEDICATION MONITORING ENCOUNTER: ICD-10-CM

## 2019-08-08 PROCEDURE — 80307 DRUG TEST PRSMV CHEM ANLYZR: CPT | Performed by: PHYSICIAN ASSISTANT

## 2019-08-08 PROCEDURE — 96125 COGNITIVE TEST BY HC PRO: CPT | Performed by: SPEECH-LANGUAGE PATHOLOGIST

## 2019-08-08 NOTE — THERAPY DISCHARGE NOTE
"Outpatient Speech Language Pathology   Adult Speech Language Cognitive Eval/Discharge   [place]     Patient Name: Spencer Moore  : 1940  MRN: [id number]  Today's Date: 2019         Visit Date: 2019    Patient Active Problem List   Diagnosis   • Cervical spondylosis without myelopathy   • History of infection   • Nonsmoker   • Overweight for height   • Urinary tract infection   • Benign non-nodular prostatic hyperplasia with lower urinary tract symptoms   • Trigeminal neuralgia of right side of face   • Primary narcolepsy without cataplexy   • Hypertension   • Mixed hyperlipidemia   • Cerebrovascular small vessel disease   • Painful swallowing   • History of UTI   • Asymptomatic bacteriuria   • Sensorineural hearing loss (SNHL) of both ears   • Bilateral impacted cerumen        Past Medical History:   Diagnosis Date   • Anemia    • Arthritis    • Bilateral impacted cerumen 12/10/2018   • Diverticulitis    • Enlarged prostate    • ETD (eustachian tube dysfunction)    • GERD (gastroesophageal reflux disease)    • Hypertension    • Kidney infection    • Lumbago    • Narcolepsy    • Sensorineural hearing loss (SNHL) of both ears 2018   • SNHL (sensorineural hearing loss)    • Tinnitus    • Vitamin B12 deficiency    • Weak urinary stream         Past Surgical History:   Procedure Laterality Date   • ABDOMINAL SURGERY     • CATARACT EXTRACTION Left    • HAND SURGERY Left    • HERNIA REPAIR     • REPLACEMENT TOTAL KNEE Left    • ROTATOR CUFF REPAIR     • TONSILLECTOMY           Visit Dx:    ICD-10-CM ICD-9-CM   1. Other symptoms and signs involving cognitive functions and awareness R41.89 799.59     Patient was seen today for a memory evaluation. Patient was alert and cooperative. He was accompanied by his brother in law who is his POA. Patient complains of memory difficulty worsening over the past 8 years (\"Since I turned 71\"). History is significant for narcolepsy and sensory neural hearing " loss. He appeared to put forth good effort on testing tasks. Difficulty was noted in the area of immediate and delayed memory and visuospatial skills.  See below for RBANS scores.     RBANS: The Repeatable Battery for the Assessment of Neuropsychological Status (RBANS) assesses patient function in the areas of Immediate and Delayed memory, visuospatial/constructional skills, language and attention. It is used to detect and track neurocognitive deficits.   Index score Percentile Qualitative Description   Immediate Memory 76 5 Borderline   Visuospatial 75 5 Borderline   Language 96 39 Average   Attention 85 16 Low Average   Delayed Memory 64 1 Extremely Low   Total Scale 74 4 Borderline   Comments:  Cannot rule out negative effect of severe hearing loss.         SLP SLC Evaluation - 08/08/19 4294        Communication Assessment/Intervention    Document Type  evaluation   -KG    Subjective Information  no complaints   -KG    Patient Observations  alert;cooperative;agree to therapy   -KG    Patient/Family Observations  Patient accompanied by his brother in law, who is his POA   -KG    Patient Effort  good   -KG    Symptoms Noted During/After Treatment  none   -KG       General Information    Patient Profile Reviewed  yes   -KG    Pertinent History Of Current Problem  Worsening memory over the past 8 years or so. Patient has a hx of narcolepsy. He complains of forgetting things from day to day.    -KG    Precautions/Limitations, Vision  WFL with corrective lenses   -KG    Precautions/Limitations, Hearing  hearing impairment, bilaterally;hearing aid, bilaterally;other (see comments) Severe loss    Severe loss  -KG    Patient Level of Education  High school, reports having reading problems in school.    -KG    Prior Level of Function-Communication  WFL   -KG    Plans/Goals Discussed with  patient;family   -KG    Barriers to Rehab  hearing deficit   -KG    Patient's Goals for Discharge  return to home   -KG    Family Goals  for Discharge  patient able to provide self-care independently   -KG    Standardized Assessment Used  RBANS   -KG       Pain Assessment    Additional Documentation  Pain Scale: Numbers Pre/Post-Treatment (Group)   -KG       Pain Scale: Numbers Pre/Post-Treatment    Pain Scale: Numbers, Pretreatment  0/10 - no pain   -KG    Pain Scale: Numbers, Post-Treatment  0/10 - no pain   -KG       Motor Speech Assessment/Intervention    Motor Speech Function  WFL   -KG       Cognitive Assessment Intervention- SLP    Orientation Status (Cognition)  WFL   -KG    Memory (Cognitive)  short-term;mild impairment;delayed;moderate impairment   -KG    Attention (Cognitive)  WFL   -KG    Right Hemisphere Function  visuo-spatial;mild impairment   -KG       Standardized Tests    Cognitive/Memory Tests  RBANS: Repeatable Battery for the Assessment of Neuropsychological Status   -KG       RBANS- Repeatable Battery for the Assessment of Neuropsychological Status    RBANS Comments  See report for scores   -KG       SLP Clinical Impressions    SLP Diagnosis  Mild/Moderate memory loss   -KG    Rehab Potential/Prognosis  fair   -KG    SLC Criteria for Skilled Therapy Interventions Met  baseline status   -KG    Functional Impact  functional impact in ADLs   -KG       Recommendations    Therapy Frequency (SLP SLC)  evaluation only   -KG    Anticipated Dischage Disposition  home with assist   -KG       SLP Discharge Summary    Discharge Destination  home w/ assist   -KG      User Key  (r) = Recorded By, (t) = Taken By, (c) = Cosigned By    Initials Name Provider Type    KG Rebekah Cho CCC-SLP Speech and Language Pathologist                        OP SLP Education     Row Name 08/08/19 144       Education    Barriers to Learning  Hearing deficit  -KG    Action Taken to Address Barriers  Pt wore aids. SLP spoke in increased volume and with patient able to watch SLP's face/lips when speaking. Patient's brother in law/POA present.   -KG     Education Provided  Described results of evaluation;Patient expressed understanding of evaluation;Family/caregivers expressed understanding of evaluation;Patient requires further education on strategies, risks  -KG    Assessed  Learning needs;Learning motivation;Learning preferences;Learning readiness  -KG    Learning Motivation  Strong  -KG    Learning Method  Explanation  -KG    Teaching Response  Verbalized understanding  -KG      User Key  (r) = Recorded By, (t) = Taken By, (c) = Cosigned By    Initials Name Effective Dates    KG Rebekah Cho CCC-SLP 02/05/19 -               OP SLP Assessment/Plan - 08/08/19 1440        SLP Assessment    Functional Problems  Speech Language- Adult/Cognition   -KG    Impact on Function: Adult Speech Language/Cognition  Trouble learning or remembering new information   -KG    Clinical Impression Comments  Difficulty with immediate and delayed memory, visuospatial skills. Language and attention WFL.    -KG    SLP Diagnosis  Memory loss.    -KG       SLP Plan    Plan Comments  Follow up with MD   -KG      User Key  (r) = Recorded By, (t) = Taken By, (c) = Cosigned By    Initials Name Provider Type    KG Rebekah Cho CCC-SLP Speech and Language Pathologist                 Time Calculation:   SLP Start Time: 1333  SLP Stop Time: 1443  SLP Time Calculation (min): 70 min    Therapy Charges for Today     Code Description Service Date Service Provider Modifiers Qty    51551253454 HC ST STD COG PERF TEST PER HOUR 8/8/2019 Rebekah Cho CCC-SLP GN 1               OP SLP Discharge Summary  Date of Discharge: 08/08/19  Reason for Discharge: no further expectation of functional progress(Baseline status)  Progress Toward Achieving Short/long Term Goals: discharge on same date as initial evaluation  Discharge Destination: home w/ assist  Discharge Instructions: Follow up with MD.     Thank you for this referral and for allowing me to participate in the care of this patient.      Rebekah Cho, CCC-SLP  8/8/2019

## 2019-09-04 RX ORDER — METHYLPHENIDATE HYDROCHLORIDE 20 MG/1
20 TABLET ORAL 3 TIMES DAILY
Qty: 90 TABLET | Refills: 0 | Status: SHIPPED | OUTPATIENT
Start: 2019-09-04 | End: 2019-10-07 | Stop reason: SDUPTHER

## 2019-09-19 ENCOUNTER — HOSPITAL ENCOUNTER (OUTPATIENT)
Dept: SLEEP MEDICINE | Facility: HOSPITAL | Age: 79
End: 2019-09-19

## 2019-09-20 ENCOUNTER — APPOINTMENT (OUTPATIENT)
Dept: SLEEP MEDICINE | Facility: HOSPITAL | Age: 79
End: 2019-09-20

## 2019-10-01 DIAGNOSIS — G50.0 TRIGEMINAL NEURALGIA OF RIGHT SIDE OF FACE: ICD-10-CM

## 2019-10-01 RX ORDER — CARBAMAZEPINE 200 MG/1
TABLET ORAL
Qty: 60 TABLET | Refills: 5 | Status: SHIPPED | OUTPATIENT
Start: 2019-10-01 | End: 2020-08-31

## 2019-10-07 RX ORDER — METHYLPHENIDATE HYDROCHLORIDE 20 MG/1
20 TABLET ORAL 3 TIMES DAILY
Qty: 90 TABLET | Refills: 0 | Status: SHIPPED | OUTPATIENT
Start: 2019-10-08 | End: 2019-11-05 | Stop reason: SDUPTHER

## 2019-11-05 DIAGNOSIS — Z51.81 MEDICATION MONITORING ENCOUNTER: Primary | ICD-10-CM

## 2019-11-05 RX ORDER — METHYLPHENIDATE HYDROCHLORIDE 20 MG/1
20 TABLET ORAL 3 TIMES DAILY
Qty: 90 TABLET | Refills: 0 | Status: SHIPPED | OUTPATIENT
Start: 2019-11-08 | End: 2019-12-04 | Stop reason: SDUPTHER

## 2019-11-07 ENCOUNTER — OFFICE VISIT (OUTPATIENT)
Dept: NEUROLOGY | Facility: CLINIC | Age: 79
End: 2019-11-07

## 2019-11-07 ENCOUNTER — LAB (OUTPATIENT)
Dept: LAB | Facility: HOSPITAL | Age: 79
End: 2019-11-07

## 2019-11-07 VITALS
HEIGHT: 68 IN | HEART RATE: 64 BPM | BODY MASS INDEX: 29.4 KG/M2 | SYSTOLIC BLOOD PRESSURE: 128 MMHG | DIASTOLIC BLOOD PRESSURE: 88 MMHG | WEIGHT: 194 LBS

## 2019-11-07 DIAGNOSIS — Z51.81 MEDICATION MONITORING ENCOUNTER: ICD-10-CM

## 2019-11-07 DIAGNOSIS — G47.419 PRIMARY NARCOLEPSY WITHOUT CATAPLEXY: Primary | ICD-10-CM

## 2019-11-07 DIAGNOSIS — I67.9 CEREBROVASCULAR SMALL VESSEL DISEASE: ICD-10-CM

## 2019-11-07 DIAGNOSIS — G50.0 TRIGEMINAL NEURALGIA OF RIGHT SIDE OF FACE: ICD-10-CM

## 2019-11-07 PROCEDURE — 99214 OFFICE O/P EST MOD 30 MIN: CPT | Performed by: PHYSICIAN ASSISTANT

## 2019-11-07 PROCEDURE — G0480 DRUG TEST DEF 1-7 CLASSES: HCPCS | Performed by: PHYSICIAN ASSISTANT

## 2019-11-15 LAB
ME-PHENIDATE SERPL-MCNC: >2000 NG/ML
PPAA SERPL-MCNC: NORMAL NG/ML

## 2019-11-18 NOTE — PROGRESS NOTES
Subjective   Spencer Moore is a 79 y.o. male is here today for follow-up.    Facial pain symptoms remains improved.  Continues to have issues with chronic back pain.  The family is progressively concerned regarding memory.  The patient has not completed MSLT testing, the patient and family do wish to continue with this.  This testing is recommended due to the great number of years since formal diagnosis with the need to re-quantify the characteristics of his narcolepsy.  A goal, so stated by the patient and family, would be to potential for reduction in the use of Ritalin as he nears 81 years of age.      Facial Pain   This is a chronic problem. The current episode started more than 1 year ago. The problem occurs intermittently. The problem has been resolved. Associated symptoms include fatigue, headaches, neck pain, numbness and weakness. The symptoms are aggravated by exertion, stress and eating. He has tried rest (Gamma knife, medications) for the symptoms. The treatment provided significant relief.   Back Pain   This is a chronic problem. The current episode started more than 1 year ago. The problem occurs daily. The problem has been gradually worsening since onset. The pain is present in the lumbar spine and sacro-iliac. The quality of the pain is described as aching, cramping and shooting. The pain radiates to the left foot and right foot. The pain is severe. The pain is worse during the day. The symptoms are aggravated by position, standing and bending (walking). Stiffness is present in the morning. Associated symptoms include headaches, numbness and weakness. He has tried analgesics, bed rest, home exercises, muscle relaxant and NSAIDs for the symptoms. The treatment provided mild relief.       The following portions of the patient's history were reviewed and updated as appropriate: allergies, current medications, past family history, past medical history, past social history, past surgical history and  problem list.    Review of Systems   Constitutional: Positive for fatigue.   HENT: Positive for hearing loss. Negative for trouble swallowing.    Eyes: Negative.    Respiratory: Negative.    Cardiovascular: Negative.    Gastrointestinal: Negative.    Endocrine: Negative.    Genitourinary: Positive for difficulty urinating and frequency.   Musculoskeletal: Positive for back pain, gait problem and neck pain.   Skin: Negative.    Allergic/Immunologic: Negative.    Neurological: Positive for weakness, numbness and headaches. Negative for facial asymmetry and speech difficulty.   Hematological: Negative.    Psychiatric/Behavioral: Positive for sleep disturbance.       Objective   Physical Exam   Constitutional: He is oriented to person, place, and time. Vital signs are normal. He appears well-developed and well-nourished. No distress.   HENT:   Head: Normocephalic and atraumatic.   Right Ear: External ear normal. Decreased hearing is noted.   Left Ear: External ear normal. Decreased hearing is noted.   Nose: Nose normal.   Mouth/Throat: Uvula is midline, oropharynx is clear and moist and mucous membranes are normal.   Eyes: Conjunctivae, EOM and lids are normal. Pupils are equal, round, and reactive to light. No scleral icterus.   Neck: Trachea normal, normal range of motion and phonation normal. No JVD present. Carotid bruit is not present.   Cardiovascular: Normal rate, regular rhythm and normal heart sounds.   Pulmonary/Chest: Effort normal and breath sounds normal.   Musculoskeletal: He exhibits no edema.        Lumbar back: He exhibits decreased range of motion and pain.   Lymphadenopathy:     He has no cervical adenopathy.   Neurological: He is alert and oriented to person, place, and time. He displays no atrophy and no tremor. No cranial nerve deficit or sensory deficit. He exhibits normal muscle tone. Gait abnormal. Coordination normal. GCS eye subscore is 4. GCS verbal subscore is 5. GCS motor subscore is 6.    Reflex Scores:       Tricep reflexes are 2+ on the right side and 2+ on the left side.       Bicep reflexes are 2+ on the right side and 2+ on the left side.       Brachioradialis reflexes are 2+ on the right side and 2+ on the left side.       Patellar reflexes are 2+ on the right side and 2+ on the left side.       Achilles reflexes are 2+ on the right side and 2+ on the left side.  Mild psychomotor impairment of his gait, senile tetraparesis.   Skin: Skin is warm, dry and intact.   Psychiatric: He has a normal mood and affect. His behavior is normal. Thought content normal. His speech is delayed. Cognition and memory are normal. He expresses impulsivity.   Nursing note and vitals reviewed.        Assessment/Plan   Spencer was seen today for trigeminal neuralgia.    Diagnoses and all orders for this visit:    Primary narcolepsy without cataplexy    Trigeminal neuralgia of right side of face    Cerebrovascular small vessel disease    The patient will proceed with MSLT testing.  The nature of this testing and patient concerns and questions regarding the testing are reviewed.    The patient will continue with Tegretol unchanged as this has been quite successful in reducing trigeminal neuralgia complaints.  It has been well-tolerated as well.    The patient will continue with aspirin and Lipitor at their present doses.  The nature of small vessel cerebrovascular disease is reviewed with the patient and family.    20 minutes of a 25-minute outpatient visit was spent in counseling and coordination of care today.    Dictated utilizing Dragon dictation.

## 2019-12-04 DIAGNOSIS — G47.419 PRIMARY NARCOLEPSY WITHOUT CATAPLEXY: Primary | ICD-10-CM

## 2019-12-04 RX ORDER — METHYLPHENIDATE HYDROCHLORIDE 20 MG/1
20 TABLET ORAL 3 TIMES DAILY
Qty: 90 TABLET | Refills: 0 | Status: SHIPPED | OUTPATIENT
Start: 2019-12-11 | End: 2019-12-30 | Stop reason: SDUPTHER

## 2019-12-04 NOTE — TELEPHONE ENCOUNTER
NEFTALI printed and reviewed.  Herb will be in town on Friday and would like to pick the script up.

## 2019-12-05 ENCOUNTER — TELEPHONE (OUTPATIENT)
Dept: NEUROLOGY | Facility: CLINIC | Age: 79
End: 2019-12-05

## 2019-12-05 DIAGNOSIS — G47.419 PRIMARY NARCOLEPSY WITHOUT CATAPLEXY: Primary | ICD-10-CM

## 2019-12-05 NOTE — TELEPHONE ENCOUNTER
----- Message from Bhupinder Bal MD sent at 12/5/2019  3:35 PM CST -----  Okay to do a EKG at [place] and just change order pendto me and let me know  ----- Message -----  From: Shani Bravo LPN  Sent: 12/5/2019   3:12 PM  To: Bhupinder Bal MD    [facility] in [place] wants to know if Spencer can have the ECG there.  If it's OK for him to have it done there, the order needs to be changed to ECG clinic.

## 2019-12-09 ENCOUNTER — CLINICAL SUPPORT (OUTPATIENT)
Dept: INTERNAL MEDICINE | Facility: CLINIC | Age: 79
End: 2019-12-09

## 2019-12-09 DIAGNOSIS — G47.419 PRIMARY NARCOLEPSY WITHOUT CATAPLEXY: ICD-10-CM

## 2019-12-09 PROCEDURE — 93005 ELECTROCARDIOGRAM TRACING: CPT | Performed by: INTERNAL MEDICINE

## 2019-12-09 NOTE — PROGRESS NOTES
Pt came into [facility] office today for EKG,  I will fax the results, to Dr Bal.

## 2019-12-30 DIAGNOSIS — G47.419 PRIMARY NARCOLEPSY WITHOUT CATAPLEXY: Primary | ICD-10-CM

## 2019-12-30 RX ORDER — METHYLPHENIDATE HYDROCHLORIDE 20 MG/1
20 TABLET ORAL 3 TIMES DAILY
Qty: 90 TABLET | Refills: 0 | Status: SHIPPED | OUTPATIENT
Start: 2020-01-06 | End: 2020-02-03 | Stop reason: SDUPTHER

## 2020-01-23 ENCOUNTER — OFFICE VISIT (OUTPATIENT)
Dept: ONCOLOGY | Facility: CLINIC | Age: 80
End: 2020-01-23

## 2020-01-23 VITALS
BODY MASS INDEX: 29.28 KG/M2 | HEART RATE: 60 BPM | SYSTOLIC BLOOD PRESSURE: 128 MMHG | DIASTOLIC BLOOD PRESSURE: 62 MMHG | RESPIRATION RATE: 16 BRPM | HEIGHT: 68 IN | WEIGHT: 193.2 LBS | TEMPERATURE: 96.4 F | OXYGEN SATURATION: 95 %

## 2020-01-23 DIAGNOSIS — D53.9 MACROCYTIC ANEMIA: Primary | ICD-10-CM

## 2020-01-23 DIAGNOSIS — D72.829 LEUKOCYTOSIS, UNSPECIFIED TYPE: ICD-10-CM

## 2020-01-23 PROCEDURE — 99214 OFFICE O/P EST MOD 30 MIN: CPT | Performed by: INTERNAL MEDICINE

## 2020-01-23 RX ORDER — FERROUS SULFATE 325(65) MG
325 TABLET ORAL
Qty: 30 TABLET | Refills: 5 | Status: SHIPPED | OUTPATIENT
Start: 2020-01-23 | End: 2020-06-09 | Stop reason: SDUPTHER

## 2020-01-23 RX ORDER — NYSTATIN 100000 U/G
CREAM TOPICAL 2 TIMES DAILY
Qty: 1 G | Refills: 0 | Status: SHIPPED | OUTPATIENT
Start: 2020-01-23 | End: 2020-02-02

## 2020-01-23 RX ORDER — IRBESARTAN 300 MG/1
300 TABLET ORAL DAILY
COMMUNITY
Start: 2020-01-20

## 2020-01-23 RX ORDER — HYDROCORTISONE 1 G/100G
CREAM TOPICAL
COMMUNITY
Start: 2020-01-09 | End: 2020-07-23 | Stop reason: ALTCHOICE

## 2020-01-23 RX ORDER — HYDROCHLOROTHIAZIDE 12.5 MG/1
12.5 TABLET ORAL DAILY
COMMUNITY
Start: 2020-01-20 | End: 2022-08-18 | Stop reason: ALTCHOICE
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
## 2020-02-03 DIAGNOSIS — G47.419 PRIMARY NARCOLEPSY WITHOUT CATAPLEXY: ICD-10-CM

## 2020-02-03 RX ORDER — METHYLPHENIDATE HYDROCHLORIDE 20 MG/1
20 TABLET ORAL 3 TIMES DAILY
Qty: 90 TABLET | Refills: 0 | Status: SHIPPED | OUTPATIENT
Start: 2020-02-03 | End: 2020-02-28 | Stop reason: SDUPTHER

## 2020-02-03 NOTE — TELEPHONE ENCOUNTER
NEFTALI printed and reviewed.  Herb will be in town this week and would like to pick his script up.

## 2020-02-14 ENCOUNTER — RESULTS ENCOUNTER (OUTPATIENT)
Dept: ONCOLOGY | Facility: CLINIC | Age: 80
End: 2020-02-14

## 2020-02-14 DIAGNOSIS — D72.829 LEUKOCYTOSIS, UNSPECIFIED TYPE: ICD-10-CM

## 2020-02-14 DIAGNOSIS — D53.9 MACROCYTIC ANEMIA: ICD-10-CM

## 2020-02-28 DIAGNOSIS — G47.419 PRIMARY NARCOLEPSY WITHOUT CATAPLEXY: ICD-10-CM

## 2020-03-04 RX ORDER — METHYLPHENIDATE HYDROCHLORIDE 20 MG/1
20 TABLET ORAL 3 TIMES DAILY
Qty: 90 TABLET | Refills: 0 | Status: SHIPPED | OUTPATIENT
Start: 2020-03-04 | End: 2020-04-08 | Stop reason: SDUPTHER

## 2020-03-05 ENCOUNTER — OFFICE VISIT (OUTPATIENT)
Dept: NEUROLOGY | Facility: CLINIC | Age: 80
End: 2020-03-05

## 2020-03-05 VITALS
BODY MASS INDEX: 29.7 KG/M2 | HEIGHT: 68 IN | WEIGHT: 196 LBS | SYSTOLIC BLOOD PRESSURE: 126 MMHG | DIASTOLIC BLOOD PRESSURE: 70 MMHG | HEART RATE: 64 BPM

## 2020-03-05 DIAGNOSIS — G50.0 TRIGEMINAL NEURALGIA OF RIGHT SIDE OF FACE: ICD-10-CM

## 2020-03-05 DIAGNOSIS — G47.419 PRIMARY NARCOLEPSY WITHOUT CATAPLEXY: Primary | ICD-10-CM

## 2020-03-05 DIAGNOSIS — I67.9 CEREBROVASCULAR SMALL VESSEL DISEASE: ICD-10-CM

## 2020-03-05 PROCEDURE — 99213 OFFICE O/P EST LOW 20 MIN: CPT | Performed by: PHYSICIAN ASSISTANT

## 2020-03-05 NOTE — PROGRESS NOTES
Subjective   Spencer Moore is a 79 y.o. male is here today for follow-up.    Facial pain symptoms remain improved.  Continues to have issues with chronic back pain.  The family is progressively concerned regarding memory.  The patient has not completed MSLT testing, the patient and family do wish to continue with this.  This testing is recommended due to the great number of years since formal diagnosis with the need to re-quantify the characteristics of his narcolepsy.  A goal, so stated by the patient and family, would be to potential for reduction in the use of Ritalin as he nears 81 years of age.    Trigeminal Neuralgia   Associated symptoms include fatigue, headaches, neck pain, numbness and weakness.   Facial Pain   This is a chronic problem. The current episode started more than 1 year ago. The problem occurs intermittently. The problem has been resolved. Associated symptoms include fatigue, headaches, neck pain, numbness and weakness. The symptoms are aggravated by exertion, stress and eating. He has tried rest (Gamma knife, medications) for the symptoms. The treatment provided significant relief.   Back Pain   This is a chronic problem. The current episode started more than 1 year ago. The problem occurs daily. The problem has been gradually worsening since onset. The pain is present in the lumbar spine and sacro-iliac. The quality of the pain is described as aching, cramping and shooting. The pain radiates to the left foot and right foot. The pain is severe. The pain is worse during the day. The symptoms are aggravated by position, standing and bending (walking). Stiffness is present in the morning. Associated symptoms include headaches, numbness and weakness. He has tried analgesics, bed rest, home exercises, muscle relaxant and NSAIDs for the symptoms. The treatment provided mild relief.       The following portions of the patient's history were reviewed and updated as appropriate: allergies, current  medications, past family history, past medical history, past social history, past surgical history and problem list.    Review of Systems   Constitutional: Positive for fatigue.   HENT: Positive for hearing loss. Negative for trouble swallowing.    Eyes: Negative.    Respiratory: Negative.    Cardiovascular: Negative.    Gastrointestinal: Negative.    Endocrine: Negative.    Genitourinary: Positive for difficulty urinating and frequency.   Musculoskeletal: Positive for back pain, gait problem and neck pain.   Skin: Negative.    Allergic/Immunologic: Negative.    Neurological: Positive for weakness, numbness and headaches. Negative for facial asymmetry and speech difficulty.   Hematological: Negative.    Psychiatric/Behavioral: Positive for sleep disturbance.       Objective   Physical Exam   Constitutional: He is oriented to person, place, and time. Vital signs are normal.   HENT:   Head: Normocephalic.   Right Ear: External ear normal. Decreased hearing is noted.   Left Ear: External ear normal. Decreased hearing is noted.   Nose: Nose normal.   Mouth/Throat: Uvula is midline, oropharynx is clear and moist and mucous membranes are normal.   Eyes: Pupils are equal, round, and reactive to light. Conjunctivae, EOM and lids are normal. No scleral icterus.   Neck: Trachea normal, normal range of motion and phonation normal. No JVD present. Carotid bruit is not present.   Cardiovascular: Normal rate, regular rhythm and normal heart sounds.   Pulmonary/Chest: Effort normal and breath sounds normal.   Musculoskeletal: He exhibits no edema.        Lumbar back: He exhibits decreased range of motion and pain.   Lymphadenopathy:     He has no cervical adenopathy.   Neurological: He is oriented to person, place, and time. He displays no atrophy and no tremor. No cranial nerve deficit or sensory deficit. He exhibits normal muscle tone. Gait abnormal. Coordination normal. GCS eye subscore is 4. GCS verbal subscore is 5. GCS motor  subscore is 6.   Reflex Scores:       Tricep reflexes are 2+ on the right side and 2+ on the left side.       Bicep reflexes are 2+ on the right side and 2+ on the left side.       Brachioradialis reflexes are 2+ on the right side and 2+ on the left side.       Patellar reflexes are 2+ on the right side and 2+ on the left side.       Achilles reflexes are 2+ on the right side and 2+ on the left side.  Mild psychomotor impairment of his gait, senile tetraparesis.   Skin: Skin is warm, dry and intact.   Psychiatric: He has a normal mood and affect. His behavior is normal. Thought content normal. His speech is delayed. Cognition and memory are normal. He expresses impulsivity.   Nursing note and vitals reviewed.        Assessment/Plan   Spencer was seen today for primary narcolepsy without cataplexy and trigeminal neuralgia.    Diagnoses and all orders for this visit:    Primary narcolepsy without cataplexy    Cerebrovascular small vessel disease    Trigeminal neuralgia of right side of face    The patient is stable on long-term Ritalin for complaints of primary narcolepsy without cataplexy.  No changes are recommended in his present regimen for narcolepsy.    The patient is stable with regard to his cerebrovascular disease.  The patient will continue on aspirin and low-dose Lipitor.    Trigeminal neuralgia is well controlled on Tegretol which is been well-tolerated.  Patient will continue with Tegretol.    Medication recommendations, clinical status, expectations as well as the patient's questions and concerns were reviewed with the patient and family.    10 minutes of a 15-minute outpatient visit was spent in counseling and coordination of care today.    Dictated utilizing Dragon dictation.

## 2020-03-13 ENCOUNTER — RESULTS ENCOUNTER (OUTPATIENT)
Dept: ONCOLOGY | Facility: CLINIC | Age: 80
End: 2020-03-13

## 2020-03-13 DIAGNOSIS — D53.9 MACROCYTIC ANEMIA: ICD-10-CM

## 2020-03-13 DIAGNOSIS — D72.829 LEUKOCYTOSIS, UNSPECIFIED TYPE: ICD-10-CM

## 2020-04-02 RX ORDER — CYANOCOBALAMIN 1000 UG/ML
1000 INJECTION, SOLUTION INTRAMUSCULAR; SUBCUTANEOUS
Qty: 10 ML | Refills: 0 | Status: SHIPPED | OUTPATIENT
Start: 2020-04-02

## 2020-04-06 ENCOUNTER — TELEPHONE (OUTPATIENT)
Dept: NEUROLOGY | Facility: CLINIC | Age: 80
End: 2020-04-06

## 2020-04-06 NOTE — TELEPHONE ENCOUNTER
SPOKE TO [facility]VO RE: REFILL FOR RITALIN; HE WOULD LIKE TO KNOW IF IT CAN BE SENT TO THE PHARMACY INSTEAD OF NEEDING TO  THE PRESCRIPTION.      PLEASE CALL [phone] TO LET HIM KNOW IF THIS IS POSSIBLE.    I had the opportunity to review the med list and/or validate the medication prescribed by the provider: DR. SOLIS  Medication: RITALIN  Dose: 20 MG  Sig:  How does the patient take the medication? BY MOUTH  How often does the patient take the medication? 3 TIMES DAILY  Pharmacy Type (local, mail order, specialty): LOCAL  Pharmacy Name: [facility]  Pharmacy Phone number or location (if available): [phone] AT [address] [place] [address]  Supply Amount (e.g, 30 or 90 days): 30 DAY

## 2020-04-08 DIAGNOSIS — G47.419 PRIMARY NARCOLEPSY WITHOUT CATAPLEXY: ICD-10-CM

## 2020-04-08 RX ORDER — METHYLPHENIDATE HYDROCHLORIDE 20 MG/1
20 TABLET ORAL 3 TIMES DAILY
Qty: 90 TABLET | Refills: 0 | Status: SHIPPED | OUTPATIENT
Start: 2020-04-10 | End: 2020-05-07 | Stop reason: SDUPTHER

## 2020-04-10 ENCOUNTER — RESULTS ENCOUNTER (OUTPATIENT)
Dept: ONCOLOGY | Facility: CLINIC | Age: 80
End: 2020-04-10

## 2020-04-10 ENCOUNTER — CLINICAL SUPPORT (OUTPATIENT)
Dept: INTERNAL MEDICINE | Facility: CLINIC | Age: 80
End: 2020-04-10

## 2020-04-10 DIAGNOSIS — D53.9 MACROCYTIC ANEMIA: ICD-10-CM

## 2020-04-10 DIAGNOSIS — D72.829 LEUKOCYTOSIS, UNSPECIFIED TYPE: ICD-10-CM

## 2020-04-10 DIAGNOSIS — N18.30 CHRONIC KIDNEY DISEASE, STAGE III (MODERATE) (HCC): Primary | ICD-10-CM

## 2020-04-10 PROCEDURE — 36415 COLL VENOUS BLD VENIPUNCTURE: CPT | Performed by: FAMILY MEDICINE

## 2020-04-11 LAB
ALBUMIN SERPL-MCNC: 4.1 G/DL (ref 3.7–4.7)
ALBUMIN/GLOB SERPL: 1.6 {RATIO} (ref 1.2–2.2)
ALP SERPL-CCNC: 187 IU/L (ref 39–117)
ALT SERPL-CCNC: 17 IU/L (ref 0–44)
AST SERPL-CCNC: 20 IU/L (ref 0–40)
BASOPHILS # BLD AUTO: 0.1 X10E3/UL (ref 0–0.2)
BASOPHILS NFR BLD AUTO: 1 %
BILIRUB SERPL-MCNC: 0.2 MG/DL (ref 0–1.2)
BUN SERPL-MCNC: 33 MG/DL (ref 8–27)
BUN/CREAT SERPL: 21 (ref 10–24)
CALCIUM SERPL-MCNC: 8.7 MG/DL (ref 8.6–10.2)
CHLORIDE SERPL-SCNC: 103 MMOL/L (ref 96–106)
CO2 SERPL-SCNC: 21 MMOL/L (ref 20–29)
CREAT SERPL-MCNC: 1.59 MG/DL (ref 0.76–1.27)
EOSINOPHIL # BLD AUTO: 0.4 X10E3/UL (ref 0–0.4)
EOSINOPHIL NFR BLD AUTO: 4 %
ERYTHROCYTE [DISTWIDTH] IN BLOOD BY AUTOMATED COUNT: 11.6 % (ref 11.6–15.4)
GLOBULIN SER CALC-MCNC: 2.6 G/DL (ref 1.5–4.5)
GLUCOSE SERPL-MCNC: 118 MG/DL (ref 65–99)
HCT VFR BLD AUTO: 34.5 % (ref 37.5–51)
HGB BLD-MCNC: 11.9 G/DL (ref 13–17.7)
IMM GRANULOCYTES # BLD AUTO: 0 X10E3/UL (ref 0–0.1)
IMM GRANULOCYTES NFR BLD AUTO: 0 %
LYMPHOCYTES # BLD AUTO: 2.8 X10E3/UL (ref 0.7–3.1)
LYMPHOCYTES NFR BLD AUTO: 29 %
MCH RBC QN AUTO: 33.5 PG (ref 26.6–33)
MCHC RBC AUTO-ENTMCNC: 34.5 G/DL (ref 31.5–35.7)
MCV RBC AUTO: 97 FL (ref 79–97)
MONOCYTES # BLD AUTO: 1 X10E3/UL (ref 0.1–0.9)
MONOCYTES NFR BLD AUTO: 10 %
NEUTROPHILS # BLD AUTO: 5.5 X10E3/UL (ref 1.4–7)
NEUTROPHILS NFR BLD AUTO: 56 %
PLATELET # BLD AUTO: 291 X10E3/UL (ref 150–450)
POTASSIUM SERPL-SCNC: 5.1 MMOL/L (ref 3.5–5.2)
PROT SERPL-MCNC: 6.7 G/DL (ref 6–8.5)
RBC # BLD AUTO: 3.55 X10E6/UL (ref 4.14–5.8)
SODIUM SERPL-SCNC: 142 MMOL/L (ref 134–144)
WBC # BLD AUTO: 9.8 X10E3/UL (ref 3.4–10.8)

## 2020-05-01 ENCOUNTER — OFFICE VISIT (OUTPATIENT)
Dept: INTERNAL MEDICINE | Facility: CLINIC | Age: 80
End: 2020-05-01

## 2020-05-01 ENCOUNTER — TELEPHONE (OUTPATIENT)
Dept: INTERNAL MEDICINE | Facility: CLINIC | Age: 80
End: 2020-05-01

## 2020-05-01 VITALS
OXYGEN SATURATION: 98 % | TEMPERATURE: 94.4 F | HEART RATE: 54 BPM | HEIGHT: 68 IN | SYSTOLIC BLOOD PRESSURE: 142 MMHG | BODY MASS INDEX: 29.4 KG/M2 | DIASTOLIC BLOOD PRESSURE: 72 MMHG | WEIGHT: 194 LBS

## 2020-05-01 DIAGNOSIS — R52 PAIN: ICD-10-CM

## 2020-05-01 DIAGNOSIS — R22.32 MASS OF LEFT UPPER EXTREMITY: ICD-10-CM

## 2020-05-01 DIAGNOSIS — M79.602 LEFT ARM PAIN: Primary | ICD-10-CM

## 2020-05-01 DIAGNOSIS — S46.312A TRICEPS TENDON RUPTURE, LEFT, INITIAL ENCOUNTER: ICD-10-CM

## 2020-05-01 DIAGNOSIS — R20.2 LEFT HAND PARESTHESIA: ICD-10-CM

## 2020-05-01 PROCEDURE — 96372 THER/PROPH/DIAG INJ SC/IM: CPT | Performed by: FAMILY MEDICINE

## 2020-05-01 PROCEDURE — 99214 OFFICE O/P EST MOD 30 MIN: CPT | Performed by: FAMILY MEDICINE

## 2020-05-01 PROCEDURE — 93005 ELECTROCARDIOGRAM TRACING: CPT | Performed by: FAMILY MEDICINE

## 2020-05-01 RX ORDER — DIAZEPAM 2 MG/1
2 TABLET ORAL EVERY 8 HOURS PRN
Qty: 21 TABLET | Refills: 0 | Status: SHIPPED | OUTPATIENT
Start: 2020-05-01 | End: 2020-07-23 | Stop reason: ALTCHOICE

## 2020-05-01 RX ORDER — OXYCODONE AND ACETAMINOPHEN 7.5; 3 MG/1; MG/1
1 TABLET ORAL EVERY 4 HOURS PRN
Qty: 24 TABLET | Refills: 0 | Status: SHIPPED | OUTPATIENT
Start: 2020-05-01 | End: 2020-05-01 | Stop reason: ALTCHOICE

## 2020-05-01 RX ORDER — METHYLPREDNISOLONE SODIUM SUCCINATE 125 MG/2ML
125 INJECTION, POWDER, LYOPHILIZED, FOR SOLUTION INTRAMUSCULAR; INTRAVENOUS ONCE
Status: COMPLETED | OUTPATIENT
Start: 2020-05-01 | End: 2020-05-01

## 2020-05-01 RX ORDER — KETOROLAC TROMETHAMINE 30 MG/ML
15 INJECTION, SOLUTION INTRAMUSCULAR; INTRAVENOUS ONCE
Status: DISCONTINUED | OUTPATIENT
Start: 2020-05-01 | End: 2020-05-01

## 2020-05-01 RX ORDER — PREDNISONE 20 MG/1
20 TABLET ORAL 2 TIMES DAILY
Qty: 10 TABLET | Refills: 0 | Status: SHIPPED | OUTPATIENT
Start: 2020-05-01 | End: 2020-05-07 | Stop reason: SDUPTHER

## 2020-05-01 RX ORDER — KETOROLAC TROMETHAMINE 30 MG/ML
15 INJECTION, SOLUTION INTRAMUSCULAR; INTRAVENOUS ONCE
Status: COMPLETED | OUTPATIENT
Start: 2020-05-01 | End: 2020-05-01

## 2020-05-01 RX ORDER — OXYCODONE AND ACETAMINOPHEN 7.5; 325 MG/1; MG/1
1 TABLET ORAL EVERY 4 HOURS PRN
Qty: 24 TABLET | Refills: 0 | Status: SHIPPED | OUTPATIENT
Start: 2020-05-01 | End: 2020-07-23 | Stop reason: ALTCHOICE

## 2020-05-01 RX ADMIN — KETOROLAC TROMETHAMINE 15 MG: 30 INJECTION, SOLUTION INTRAMUSCULAR; INTRAVENOUS at 11:07

## 2020-05-01 RX ADMIN — METHYLPREDNISOLONE SODIUM SUCCINATE 125 MG: 125 INJECTION, POWDER, LYOPHILIZED, FOR SOLUTION INTRAMUSCULAR; INTRAVENOUS at 11:40

## 2020-05-01 NOTE — PROGRESS NOTES
Subjective     Chief Complaint   Patient presents with   • Wrist Pain     pain goes up into shoulder       History of Present Illness    Patient experienced acute onset of pain this morning.  Initially he felt it was focused in his left hand.  He also experienced paresthesia of his entire left hand with this.  He noted also started radiating up his left arm into the shoulder region.  He denies any chest pain, shortness of breath, diaphoresis and nausea.  He recalls no trauma.  He has had no unusual activity.  He did not sleep on that side last night.  He chronically sleeps on his right side.  He did take a Percocet this morning that was a scheduled medication it did help a little.  However, the pain has gotten worse again since is taking the medication.  The pain is rated at a 10 out of 10.  The patient is accompanied by his brother-in-law who is his DURABLE POWER OF  and oversees his general care.    Patient's PMR from outside medical facility reviewed and noted.    Review of Systems     Otherwise complete ROS reviewed and negative except as mentioned in the HPI.    Past Medical History:   Past Medical History:   Diagnosis Date   • Anemia    • Arthritis    • Bilateral impacted cerumen 12/10/2018   • Diverticulitis    • Enlarged prostate    • ETD (eustachian tube dysfunction)    • GERD (gastroesophageal reflux disease)    • Hypertension    • Kidney infection    • Lumbago    • Narcolepsy    • Sensorineural hearing loss (SNHL) of both ears 6/18/2018   • SNHL (sensorineural hearing loss)    • Tinnitus    • Vitamin B12 deficiency    • Weak urinary stream      Past Surgical History:  Past Surgical History:   Procedure Laterality Date   • ABDOMINAL SURGERY     • CATARACT EXTRACTION Left    • HAND SURGERY Left    • HERNIA REPAIR     • REPLACEMENT TOTAL KNEE Left    • ROTATOR CUFF REPAIR     • TONSILLECTOMY       Social History:  reports that he has never smoked. He has never used smokeless tobacco. He  reports that he does not drink alcohol or use drugs.    Family History: He was adopted. Family history is unknown by patient.       Allergies:  Allergies   Allergen Reactions   • Biaxin [Clarithromycin] GI Intolerance     unknown   • Parafon Forte Dsc [Chlorzoxazone] Provider Review Needed     Swallowing issues     Medications:  Prior to Admission medications    Medication Sig Start Date End Date Taking? Authorizing Provider   aspirin 81 MG EC tablet Take 81 mg by mouth Daily.    Caroline Stephenson MD   atorvastatin (LIPITOR) 10 MG tablet Take 10 mg by mouth Daily. 12/12/16   Caroline Stephenson MD   azelastine (ASTELIN) 0.1 % nasal spray 2 sprays into each nostril 2 (Two) Times a Day. Use in each nostril as directed 4/30/18   Mark Barnett PA   carBAMazepine (TEGretol) 200 MG tablet TAKE ONE TABLET BY MOUTH TWICE DAILY 10/1/19   Cyril Kurtz PA   CloNIDine (CATAPRES) 0.1 MG tablet Take 0.1 mg by mouth As Needed for High Blood Pressure (1 tablet if systolic is over 170).    Caroline Stephenson MD   Cyanocobalamin (B-12 COMPLIANCE INJECTION) 1000 MCG/ML kit Inject  as directed Every 30 (Thirty) Days.    Caroline Stephenson MD   cyanocobalamin 1000 MCG/ML injection Inject 1 mL into the appropriate muscle as directed by prescriber Every 28 (Twenty-Eight) Days. 4/2/20   Rosi Baker DO   diazePAM (VALIUM) 2 MG tablet Take 1 tablet by mouth Every 8 (Eight) Hours As Needed for Anxiety or Muscle Spasms. 5/1/20   Rosi Baker DO   ferrous sulfate 325 (65 FE) MG tablet Take 1 tablet by mouth Daily With Breakfast. 1/23/20   Danish Mabry MD   finasteride (PROSCAR) 5 MG tablet Take 1 tablet by mouth Daily. 6/6/18   Giuseppe Gonzalez MD   FLUoxetine (PROzac) 40 MG capsule Take 40 mg by mouth Daily. 11/3/16   Caroline Stephenson MD   fluticasone (FLONASE) 50 MCG/ACT nasal spray 2 sprays into each nostril Daily. 4/30/18   Mark Barnett PA   HM HYDROCORTISONE-ALOE  "MAX ST 1 % cream APPLY TO AREA TWICE DAILY AS NEEDED AS DIRECTED 1/9/20   Caroline Stephenson MD   hydroCHLOROthiazide (HYDRODIURIL) 12.5 MG tablet  1/20/20   Caroline Stephenson MD   irbesartan (AVAPRO) 300 MG tablet  1/20/20   Caroline Stephenson MD   methylphenidate (Ritalin) 20 MG tablet Take 1 tablet by mouth 3 (Three) Times a Day. 4/10/20   Bhupinder Bal MD   metoprolol tartrate (LOPRESSOR) 25 MG tablet Take 1 tablet by mouth 2 (Two) Times a Day.  Patient taking differently: Take 25 mg by mouth Daily. 10/2/17   Oh Brandon MD   omeprazole (priLOSEC) 40 MG capsule Take 40 mg by mouth Daily. 12/4/16   Caroline Stephenson MD   oxyCODONE-acetaminophen (LYNOX) 7.5-300 MG per tablet Take 1 tablet by mouth Every 4 (Four) Hours As Needed for Moderate Pain . 5/1/20   Rosi Baker DO   oxyCODONE-acetaminophen (PERCOCET) 7.5-325 MG per tablet Take 1 tablet by mouth Every 6 (Six) Hours As Needed. Takes 7.5mg four times daily    Caroline Stephenson MD   predniSONE (DELTASONE) 20 MG tablet Take 1 tablet by mouth 2 (Two) Times a Day. 5/1/20   Rosi Baker DO   tamsulosin (FLOMAX) 0.4 MG capsule 24 hr capsule Take one capsule by mouth once daily 6/6/18   Giuseppe Gonzalez MD   terazosin (HYTRIN) 2 MG capsule Take 2 mg by mouth Every Night. 11/8/16   Caroline Stephenson MD       Objective     Vital Signs: /72 (BP Location: Right arm, Patient Position: Sitting, Cuff Size: Adult)   Pulse 54   Temp 94.4 °F (34.7 °C) (Temporal)   Ht 172.7 cm (68\")   Wt 88 kg (194 lb)   SpO2 98%   BMI 29.50 kg/m²   Physical Exam   Constitutional: He is oriented to person, place, and time. He appears well-developed and well-nourished. He appears distressed.   The patient is rocking persistently and clutching at his hand intermittently shaking his hand trying to find positions of comfort.   CHRISTIAN:   Head: Normocephalic and atraumatic.   Right Ear: External ear normal.   Left Ear: External " ear normal.   Markedly hard of hearing even with hearing aids.   Eyes: Pupils are equal, round, and reactive to light. Conjunctivae are normal.   Neck: No JVD present.   Neck range of motion for patient is within his normal range at this time it is somewhat reduced secondary to arthritis.  He is able to flex and extend his neck   Cardiovascular: Normal rate, regular rhythm, normal heart sounds and intact distal pulses.   Radial pulses are palpable bilaterally   Pulmonary/Chest: Effort normal and breath sounds normal.   Abdominal: Soft. Bowel sounds are normal.   Musculoskeletal:   Patient has marked arthritis in his lower extremities.  He walks with a cane.  He is unable to abduct his arms to the level of his shoulders.  This is a chronic problem according to his brother-in-law.    The left arm is mobile.  The patient is shaking and holding onto it.  He is able to make a fist.  He can flex and extend at the elbow.  Visual evaluation reveals a mass on the posterior aspect of the left upper arm just beneath the humeral head.  Concern would be for a triceps muscle tendon rupture with retraction of the tricep muscle.  When queried the patient notes this is new.  He also notes that this is where significant amount of pain is.    Gait is slow.  Patient uses cane.  He holds it in his left hand.  He is having difficulty holding it.   Neurological: He is alert and oriented to person, place, and time. No cranial nerve deficit.   Skin: Skin is warm and dry. Capillary refill takes less than 2 seconds. He is not diaphoretic.   Psychiatric: He has a normal mood and affect. His behavior is normal.   Nursing note and vitals reviewed.        Results Reviewed:  Glucose   Date Value Ref Range Status   04/04/2017 125 (H) 70 - 100 mg/dL Final     BUN   Date Value Ref Range Status   04/10/2020 33 (H) 8 - 27 mg/dL Final   04/04/2017 20 5 - 21 mg/dL Final     Creatinine   Date Value Ref Range Status   04/10/2020 1.59 (H) 0.76 - 1.27 mg/dL  Final   04/17/2017 1.10 0.60 - 1.30 mg/dL Final     Comment:     Serial Number: [id number]    : [id number]     Sodium   Date Value Ref Range Status   04/10/2020 142 134 - 144 mmol/L Final   04/04/2017 141 135 - 145 mmol/L Final     Potassium   Date Value Ref Range Status   04/10/2020 5.1 3.5 - 5.2 mmol/L Final   04/04/2017 3.7 3.5 - 5.3 mmol/L Final     Chloride   Date Value Ref Range Status   04/10/2020 103 96 - 106 mmol/L Final   04/04/2017 102 98 - 110 mmol/L Final     CO2   Date Value Ref Range Status   04/04/2017 24.0 24.0 - 31.0 mmol/L Final     Total CO2   Date Value Ref Range Status   04/10/2020 21 20 - 29 mmol/L Final     Calcium   Date Value Ref Range Status   04/10/2020 8.7 8.6 - 10.2 mg/dL Final   04/04/2017 8.6 8.4 - 10.4 mg/dL Final     ALT (SGPT)   Date Value Ref Range Status   04/10/2020 17 0 - 44 IU/L Final   04/04/2017 18 0 - 54 U/L Final     AST (SGOT)   Date Value Ref Range Status   04/10/2020 20 0 - 40 IU/L Final   04/04/2017 21 7 - 45 U/L Final     WBC   Date Value Ref Range Status   04/10/2020 9.8 3.4 - 10.8 x10E3/uL Final     Hematocrit   Date Value Ref Range Status   04/10/2020 34.5 (L) 37.5 - 51.0 % Final   04/05/2017 36.0 (L) 40.0 - 52.0 % Final     Platelets   Date Value Ref Range Status   04/10/2020 291 150 - 450 x10E3/uL Final   04/05/2017 406 (H) 130 - 400 10*3/mm3 Final         ECG 12 Lead  Date/Time: 5/1/2020 11:53 AM  Performed by: Rosi Baker DO  Authorized by: Rosi Baker DO   Previous ECG: no previous ECG available  Rhythm: sinus bradycardia  Rate: bradycardic  Conduction: conduction normal  ST Segments: ST segments normal  T Waves: T waves normal  QRS axis: normal  Other: no other findings    Clinical impression: normal ECG  Comments: Patient takes metoprolol routinely            Assessment / Plan     Assessment/Plan:  1. Pain    - ketorolac (TORADOL) injection 15 mg  - methylPREDNISolone sodium succinate (SOLU-Medrol) injection 125 mg    2. Left arm pain    -  ECG 12 Lead  - US Venous Doppler Upper Extremity Left (duplex);  Done here in office.  Radiology reads as no DVT.  - oxyCODONE-acetaminophen (LYNOX) 7.5-300 MG per tablet; Take 1 tablet by mouth Every 4 (Four) Hours As Needed for Moderate Pain .  Dispense: 24 tablet; Refill: 0  - diazePAM (VALIUM) 2 MG tablet; Take 1 tablet by mouth Every 8 (Eight) Hours As Needed for Anxiety or Muscle Spasms.  Dispense: 21 tablet; Refill: 0  - methylPREDNISolone sodium succinate (SOLU-Medrol) injection 125 mg    3. Triceps tendon rupture, left, initial encounter  Presumed.      4. Left hand paresthesia      Findings are discussed with the patient and his brother-in-law.  An attempt was made to get hold of the patient's primary care doctor in [place].  The patient's doctor's office is closed and the answering service will not place a call to Dr. Hinson.  The patient is used Dr. Masterson for orthopedics in the past.  A call was placed to his office, information given to the .  Awaiting a call back from him.  A CT of the left upper extremity was ordered, it is scheduled for Monday at 8 AM.  The patient was given Toradol in the office for pain.  This was of minimal help.  He was also given an injection of Solu-Medrol.  The patient's routine Percocet 7.5 mg was changed from every 6 hours to every 4 hours for pain control.  Prednisone 20 mg twice daily for 5 days was prescribed.  Valium 2 mg every 8 hours as needed muscle spasm was also prescribed.  Discussion was had with the patient's brother-in-law that the patient needs to have closer supervision.  If the pain worsens or remains uncontrolled then I would recommend a visit to the [facility] emergency room.  Should the patient's hand become cold or white this should also be a precipitating factor for a visit to the emergency room.    Return if symptoms worsen or fail to improve. unless patient needs to be seen sooner or acute issues arise.        I have discussed  the patient results/orders and and plan/recommendation with them at today's visit.      Rosi Baker,    05/01/2020

## 2020-05-01 NOTE — TELEPHONE ENCOUNTER
JONATHAN FROM [facility] CALLED AND STATED THAT THE MEDICATION SENT IN FOR THIS PATIENT IS NOT AVAILABLE AT [facility] NOR AT SURROUNDING PHARMACIES.

## 2020-05-06 ENCOUNTER — TELEPHONE (OUTPATIENT)
Dept: NEUROLOGY | Facility: CLINIC | Age: 80
End: 2020-05-06

## 2020-05-06 NOTE — TELEPHONE ENCOUNTER
Provider: DR. SOLIS (IS THE ONE THAT PRESCRIBE THE RITALIN- ACCIDENTALLY PUT IN RAUL YAP)    Caller: HERVITO NAQVI  Relationship to Patient: PT'S POA  Phone Number: [phone]      Reason for Call: POA CALLING ABOUT PT'S MEDICATION methylphenidate (Ritalin) IF IT COULD BE CALLED INTO [facility] AGAIN THIS MONTH DUE TO THE COVID 19 OR WILL HE HAVE TO COME TO THE PRACTICE AND PICK IT UP? PLEASW CALL HIM BACK AND LET HIM KNOW

## 2020-05-07 ENCOUNTER — CLINICAL SUPPORT (OUTPATIENT)
Dept: INTERNAL MEDICINE | Facility: CLINIC | Age: 80
End: 2020-05-07

## 2020-05-07 DIAGNOSIS — M79.602 LEFT ARM PAIN: Primary | ICD-10-CM

## 2020-05-07 DIAGNOSIS — G47.419 PRIMARY NARCOLEPSY WITHOUT CATAPLEXY: ICD-10-CM

## 2020-05-07 PROCEDURE — 96372 THER/PROPH/DIAG INJ SC/IM: CPT | Performed by: FAMILY MEDICINE

## 2020-05-07 RX ORDER — METHYLPREDNISOLONE SODIUM SUCCINATE 125 MG/2ML
125 INJECTION, POWDER, LYOPHILIZED, FOR SOLUTION INTRAMUSCULAR; INTRAVENOUS EVERY 6 HOURS
Status: DISCONTINUED | OUTPATIENT
Start: 2020-05-07 | End: 2020-09-24

## 2020-05-07 RX ORDER — METHYLPHENIDATE HYDROCHLORIDE 20 MG/1
20 TABLET ORAL 3 TIMES DAILY
Qty: 90 TABLET | Refills: 0 | Status: SHIPPED | OUTPATIENT
Start: 2020-05-09 | End: 2020-06-03 | Stop reason: SDUPTHER

## 2020-05-07 RX ORDER — PREDNISONE 20 MG/1
20 TABLET ORAL 2 TIMES DAILY
Qty: 10 TABLET | Refills: 0 | Status: SHIPPED | OUTPATIENT
Start: 2020-05-07 | End: 2020-06-16

## 2020-05-07 RX ADMIN — METHYLPREDNISOLONE SODIUM SUCCINATE 125 MG: 125 INJECTION, POWDER, LYOPHILIZED, FOR SOLUTION INTRAMUSCULAR; INTRAVENOUS at 10:26

## 2020-05-07 NOTE — PROGRESS NOTES
Pt of Dr Baker came in today per Dr Baker for steroid injection.   This was given with no problems.

## 2020-05-08 ENCOUNTER — RESULTS ENCOUNTER (OUTPATIENT)
Dept: ONCOLOGY | Facility: CLINIC | Age: 80
End: 2020-05-08

## 2020-05-08 ENCOUNTER — HOSPITAL ENCOUNTER (OUTPATIENT)
Dept: GENERAL RADIOLOGY | Facility: HOSPITAL | Age: 80
Discharge: HOME | End: 2020-05-08
Admitting: NURSE PRACTITIONER

## 2020-05-08 ENCOUNTER — OFFICE VISIT (OUTPATIENT)
Dept: NEUROSURGERY | Facility: CLINIC | Age: 80
End: 2020-05-08

## 2020-05-08 VITALS
DIASTOLIC BLOOD PRESSURE: 76 MMHG | HEIGHT: 68 IN | WEIGHT: 196 LBS | SYSTOLIC BLOOD PRESSURE: 132 MMHG | BODY MASS INDEX: 29.7 KG/M2

## 2020-05-08 DIAGNOSIS — D72.829 LEUKOCYTOSIS, UNSPECIFIED TYPE: ICD-10-CM

## 2020-05-08 DIAGNOSIS — M54.12 CERVICAL RADICULOPATHY: Primary | ICD-10-CM

## 2020-05-08 DIAGNOSIS — D53.9 MACROCYTIC ANEMIA: ICD-10-CM

## 2020-05-08 DIAGNOSIS — M50.30 DEGENERATION OF CERVICAL INTERVERTEBRAL DISC: ICD-10-CM

## 2020-05-08 DIAGNOSIS — M47.812 CERVICAL SPONDYLOSIS WITHOUT MYELOPATHY: ICD-10-CM

## 2020-05-08 PROBLEM — M48.02 SPINAL STENOSIS IN CERVICAL REGION: Status: ACTIVE | Noted: 2020-05-08

## 2020-05-08 PROCEDURE — 72052 X-RAY EXAM NECK SPINE 6/>VWS: CPT

## 2020-05-08 PROCEDURE — 99214 OFFICE O/P EST MOD 30 MIN: CPT | Performed by: NURSE PRACTITIONER

## 2020-05-08 RX ORDER — PREDNISONE 10 MG/1
TABLET ORAL
Qty: 32 TABLET | Refills: 0 | Status: SHIPPED | OUTPATIENT
Start: 2020-05-08 | End: 2020-06-16
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
## 2020-05-08 NOTE — PATIENT INSTRUCTIONS
"PATIENT TO CONTINUE TO FOLLOW UP WITH HIS PRIMARY CARE PROVIDER FOR YEARLY PHYSICAL EXAMS TO ENSURE COMPLETE HEALTH MAINTENANCE        BMI for Adults    Body mass index (BMI) is a number that is calculated from a person's weight and height. BMI may help to estimate how much of a person's weight is composed of fat. BMI can help identify those who may be at higher risk for certain medical problems.  How is BMI used with adults?  BMI is used as a screening tool to identify possible weight problems. It is used to check whether a person is obese, overweight, healthy weight, or underweight.  How is BMI calculated?  BMI measures your weight and compares it to your height. This can be done either in English ([place] or metric measurements. Note that charts are available to help you find your BMI quickly and easily without having to do these calculations yourself.  To calculate your BMI in English ([place] measurements, your health care provider will:  1. Measure your weight in pounds (lb).  2. Multiply the number of pounds by 703.  ? For example, for a person who weighs 180 lb, multiply that number by 703, which equals 126,540.  3. Measure your height in inches (in). Then multiply that number by itself to get a measurement called \"inches squared.\"  ? For example, for a person who is 70 in tall, the \"inches squared\" measurement is 70 in x 70 in, which equals 4900 inches squared.  4. Divide the total from Step 2 (number of lb x 703) by the total from Step 3 (inches squared): 126,540 ÷ 4900 = 25.8. This is your BMI.  To calculate your BMI in metric measurements, your health care provider will:  1. Measure your weight in kilograms (kg).  2. Measure your height in meters (m). Then multiply that number by itself to get a measurement called \"meters squared.\"  ? For example, for a person who is 1.75 m tall, the \"meters squared\" measurement is 1.75 m x 1.75 m, which is equal to 3.1 meters squared.  3. Divide the number of kilograms " (your weight) by the meters squared number. In this example: 70 ÷ 3.1 = 22.6. This is your BMI.  How is BMI interpreted?  To interpret your results, your health care provider will use BMI charts to identify whether you are underweight, normal weight, overweight, or obese. The following guidelines will be used:  · Underweight: BMI less than 18.5.  · Normal weight: BMI between 18.5 and 24.9.  · Overweight: BMI between 25 and 29.9.  · Obese: BMI of 30 and above.  Please note:  · Weight includes both fat and muscle, so someone with a muscular build, such as an athlete, may have a BMI that is higher than 24.9. In cases like these, BMI is not an accurate measure of body fat.  · To determine if excess body fat is the cause of a BMI of 25 or higher, further assessments may need to be done by a health care provider.  · BMI is usually interpreted in the same way for men and women.  Why is BMI a useful tool?  BMI is useful in two ways:  · Identifying a weight problem that may be related to a medical condition, or that may increase the risk for medical problems.  · Promoting lifestyle and diet changes in order to reach a healthy weight.  Summary  · Body mass index (BMI) is a number that is calculated from a person's weight and height.  · BMI may help to estimate how much of a person's weight is composed of fat. BMI can help identify those who may be at higher risk for certain medical problems.  · BMI can be measured using English measurements or metric measurements.  · To interpret your results, your health care provider will use BMI charts to identify whether you are underweight, normal weight, overweight, or obese.  This information is not intended to replace advice given to you by your health care provider. Make sure you discuss any questions you have with your health care provider.  Document Released: 08/29/2005 Document Revised: 10/31/2018 Document Reviewed: 10/31/2018    Advance Care Planning and Advance Directives          You make decisions on a daily basis - decisions about where you want to live, your career, your home, your life. Perhaps one of the most important decisions you face is your choice for future medical care. Take time to talk with your family and your healthcare team and start planning today.  Advance Care Planning is a process that can help you:  · Understand possible future healthcare decisions in light of your own experiences  · Reflect on those decision in light of your goals and values  · Discuss your decisions with those closest to you and the healthcare professionals that care for you  · Make a plan by creating a document that reflects your wishes    Surrogate Decision Maker  In the event of a medical emergency, which has left you unable to communicate or to make your own decisions, you would need someone to make decisions for you.  It is important to discuss your preferences for medical treatment with this person while you are in good health.     Qualities of a surrogate decision maker:  • Willing to take on this role and responsibility  • Knows what you want for future medical care  • Willing to follow your wishes even if they don't agree with them  • Able to make difficult medical decisions under stressful circumstances    Advance Directives  These are legal documents you can create that will guide your healthcare team and decision maker(s) when needed. These documents can be stored in the electronic medical record.    · Living Will - a legal document to guide your care if you have a terminal condition or a serious illness and are unable to communicate. States vary by statute in document names/types, but most forms may include one or more of the following:        -  Directions regarding life-prolonging treatments        -  Directions regarding artificially provided nutrition/hydration        -  Choosing a healthcare decision maker        -  Direction regarding organ/tissue donation    · Durable Power of   for Healthcare - this document names an -in-fact to make medical decisions for you, but it may also allow this person to make personal and financial decisions for you. Please seek the advice of an  if you need this type of document.    **Advance Directives are not required and no one may discriminate against you if you do not sign one.    Medical Orders  Many states allow specific forms/orders signed by your physician to record your wishes for medical treatment in your current state of health. This form, signed in personal communication with your physician, addresses resuscitation and other medical interventions that you may or may not want.      For more information or to schedule a time with a [facility] Advance Care Planning Facilitator contact: [email or web address] or call [phone] and someone will contact you directly.  [employer]vier Interactive Patient Education © 2020 [employer]

## 2020-05-12 ENCOUNTER — CLINICAL SUPPORT (OUTPATIENT)
Dept: INTERNAL MEDICINE | Facility: CLINIC | Age: 80
End: 2020-05-12

## 2020-05-12 DIAGNOSIS — D72.829 LEUKOCYTOSIS, UNSPECIFIED TYPE: ICD-10-CM

## 2020-05-12 DIAGNOSIS — D53.9 MACROCYTIC ANEMIA: Primary | ICD-10-CM

## 2020-05-12 PROCEDURE — 36415 COLL VENOUS BLD VENIPUNCTURE: CPT | Performed by: FAMILY MEDICINE

## 2020-05-12 NOTE — PROGRESS NOTES
Venipuncture Blood Specimen Collection  Venipuncture performed in Left AC by Silvio Gonzalez RN with good hemostasis. Patient tolerated the procedure well without complications.   05/12/20   Silvio Gonzalez RN     Patient presented to office for CBC blood draw. He states that he normally goes to [facility], but due to COVID-19, prefers to not go to the hospital at this time for blood work.

## 2020-05-13 ENCOUNTER — TELEPHONE (OUTPATIENT)
Dept: ONCOLOGY | Facility: CLINIC | Age: 80
End: 2020-05-13

## 2020-05-13 DIAGNOSIS — D72.829 LEUKOCYTOSIS, UNSPECIFIED TYPE: Primary | ICD-10-CM

## 2020-05-13 LAB
BASOPHILS # BLD AUTO: 0 X10E3/UL (ref 0–0.2)
BASOPHILS NFR BLD AUTO: 0 %
EOSINOPHIL # BLD AUTO: 0 X10E3/UL (ref 0–0.4)
EOSINOPHIL NFR BLD AUTO: 0 %
ERYTHROCYTE [DISTWIDTH] IN BLOOD BY AUTOMATED COUNT: 11.7 % (ref 11.6–15.4)
HCT VFR BLD AUTO: 36.7 % (ref 37.5–51)
HGB BLD-MCNC: 12.7 G/DL (ref 13–17.7)
IMM GRANULOCYTES # BLD AUTO: 0.2 X10E3/UL (ref 0–0.1)
IMM GRANULOCYTES NFR BLD AUTO: 1 %
LYMPHOCYTES # BLD AUTO: 2 X10E3/UL (ref 0.7–3.1)
LYMPHOCYTES NFR BLD AUTO: 10 %
MCH RBC QN AUTO: 34 PG (ref 26.6–33)
MCHC RBC AUTO-ENTMCNC: 34.6 G/DL (ref 31.5–35.7)
MCV RBC AUTO: 98 FL (ref 79–97)
MONOCYTES # BLD AUTO: 1.4 X10E3/UL (ref 0.1–0.9)
MONOCYTES NFR BLD AUTO: 6 %
NEUTROPHILS # BLD AUTO: 17.7 X10E3/UL (ref 1.4–7)
NEUTROPHILS NFR BLD AUTO: 83 %
PLATELET # BLD AUTO: 357 X10E3/UL (ref 150–450)
RBC # BLD AUTO: 3.74 X10E6/UL (ref 4.14–5.8)
WBC # BLD AUTO: 21.3 X10E3/UL (ref 3.4–10.8)

## 2020-05-13 NOTE — TELEPHONE ENCOUNTER
Called and spoke with patients NAJMA Carlisle, he reports patient is currently taking steroids and tapering due to severe pain he is suffering from his back.     Patient is taking steroids.

## 2020-05-13 NOTE — TELEPHONE ENCOUNTER
----- Message from Danish Mabry MD sent at 5/13/2020  7:43 AM CDT -----  Labs, 05/13/2020- wbc 21.3 w ANC 17.7 (elevated).  pls find out if he's taking any steroids. If not, pls ask if he has a fever, then get a chest xray and urinalysis + culture. Repeat cbc w diff in 1 week. tx u

## 2020-05-19 ENCOUNTER — CLINICAL SUPPORT (OUTPATIENT)
Dept: INTERNAL MEDICINE | Facility: CLINIC | Age: 80
End: 2020-05-19

## 2020-05-19 DIAGNOSIS — D53.9 MACROCYTIC ANEMIA: Primary | ICD-10-CM

## 2020-05-19 PROCEDURE — 36415 COLL VENOUS BLD VENIPUNCTURE: CPT | Performed by: FAMILY MEDICINE

## 2020-05-19 NOTE — PROGRESS NOTES
Venipuncture Blood Specimen Collection  Venipuncture performed in [facility] by Bethany Talamantes CMA with good hemostasis. Patient tolerated the procedure well without complications.   05/19/20   Bethany Talamantes CMA

## 2020-05-20 ENCOUNTER — HOSPITAL ENCOUNTER (OUTPATIENT)
Dept: MRI IMAGING | Facility: HOSPITAL | Age: 80
Discharge: HOME | End: 2020-05-20
Admitting: NURSE PRACTITIONER

## 2020-05-20 ENCOUNTER — TELEPHONE (OUTPATIENT)
Dept: ONCOLOGY | Facility: CLINIC | Age: 80
End: 2020-05-20

## 2020-05-20 DIAGNOSIS — M47.812 CERVICAL SPONDYLOSIS WITHOUT MYELOPATHY: ICD-10-CM

## 2020-05-20 DIAGNOSIS — M50.30 DEGENERATION OF CERVICAL INTERVERTEBRAL DISC: ICD-10-CM

## 2020-05-20 DIAGNOSIS — M54.12 CERVICAL RADICULOPATHY: ICD-10-CM

## 2020-05-20 LAB
BASOPHILS # BLD AUTO: 0 X10E3/UL (ref 0–0.2)
BASOPHILS NFR BLD AUTO: 0 %
EOSINOPHIL # BLD AUTO: 0 X10E3/UL (ref 0–0.4)
EOSINOPHIL NFR BLD AUTO: 0 %
ERYTHROCYTE [DISTWIDTH] IN BLOOD BY AUTOMATED COUNT: 12.4 % (ref 11.6–15.4)
HCT VFR BLD AUTO: 37 % (ref 37.5–51)
HGB BLD-MCNC: 12.4 G/DL (ref 13–17.7)
IMM GRANULOCYTES # BLD AUTO: 0.2 X10E3/UL (ref 0–0.1)
IMM GRANULOCYTES NFR BLD AUTO: 1 %
LYMPHOCYTES # BLD AUTO: 2.1 X10E3/UL (ref 0.7–3.1)
LYMPHOCYTES NFR BLD AUTO: 11 %
MCH RBC QN AUTO: 34.3 PG (ref 26.6–33)
MCHC RBC AUTO-ENTMCNC: 33.5 G/DL (ref 31.5–35.7)
MCV RBC AUTO: 102 FL (ref 79–97)
MONOCYTES # BLD AUTO: 1.3 X10E3/UL (ref 0.1–0.9)
MONOCYTES NFR BLD AUTO: 7 %
NEUTROPHILS # BLD AUTO: 15.7 X10E3/UL (ref 1.4–7)
NEUTROPHILS NFR BLD AUTO: 81 %
PLATELET # BLD AUTO: 311 X10E3/UL (ref 150–450)
RBC # BLD AUTO: 3.62 X10E6/UL (ref 4.14–5.8)
WBC # BLD AUTO: 19.3 X10E3/UL (ref 3.4–10.8)

## 2020-05-20 PROCEDURE — 72141 MRI NECK SPINE W/O DYE: CPT

## 2020-05-20 NOTE — TELEPHONE ENCOUNTER
Spoke with patient NAJMA Orestestabby Larsen, he reports patient is still tapering his steroid medication. Presently taking 10 mg daily.

## 2020-05-20 NOTE — TELEPHONE ENCOUNTER
----- Message from Danish Mabry MD sent at 5/20/2020  7:41 AM CDT -----  Labs 5/19/2020–WBC 19.3 (was 21.3).  Please ask if he is still taking steroids?

## 2020-05-21 ENCOUNTER — TELEPHONE (OUTPATIENT)
Dept: NEUROSURGERY | Facility: CLINIC | Age: 80
End: 2020-05-21

## 2020-05-21 DIAGNOSIS — M47.812 CERVICAL SPONDYLOSIS WITHOUT MYELOPATHY: ICD-10-CM

## 2020-05-21 DIAGNOSIS — M54.12 CERVICAL RADICULOPATHY: Primary | ICD-10-CM

## 2020-05-21 NOTE — TELEPHONE ENCOUNTER
Called and spoke to patient's power of  Orestes Larsen in regards to the results of the MRI.  It was discussed that the patient does need to go through a dedicated course of physical therapy prior to looking at any surgical intervention at this time.  Acknowledged understanding of this and would like to proceed with therapy at [facility].  We will get the order sent over there to them and have him follow-up with Dr. Kang after the therapy is completed

## 2020-05-21 NOTE — TELEPHONE ENCOUNTER
Left voicemail asking patient's POA, Orestes, to call me back to make sure he is aware the appt had been changed.    trixie shields CMA

## 2020-05-21 NOTE — TELEPHONE ENCOUNTER
Patient has an appt in your clinic on 5/27/20.  Do you want me to move that appt out 6 weeks and make it with Dr Kang?  If so, please let patient know your plan when you call him about the test and PT.    trixie

## 2020-06-03 DIAGNOSIS — G47.419 PRIMARY NARCOLEPSY WITHOUT CATAPLEXY: ICD-10-CM

## 2020-06-03 NOTE — TELEPHONE ENCOUNTER
Please order refill for 20,g Ritalin and contact patient care giver Herb when order has been completed at [phone].       Thank you

## 2020-06-04 RX ORDER — METHYLPHENIDATE HYDROCHLORIDE 20 MG/1
20 TABLET ORAL 3 TIMES DAILY
Qty: 90 TABLET | Refills: 0 | Status: SHIPPED | OUTPATIENT
Start: 2020-06-04 | End: 2020-07-06 | Stop reason: SDUPTHER

## 2020-06-05 ENCOUNTER — RESULTS ENCOUNTER (OUTPATIENT)
Dept: ONCOLOGY | Facility: CLINIC | Age: 80
End: 2020-06-05

## 2020-06-05 DIAGNOSIS — D53.9 MACROCYTIC ANEMIA: ICD-10-CM

## 2020-06-05 DIAGNOSIS — D72.829 LEUKOCYTOSIS, UNSPECIFIED TYPE: ICD-10-CM

## 2020-06-09 ENCOUNTER — CLINICAL SUPPORT (OUTPATIENT)
Dept: INTERNAL MEDICINE | Facility: CLINIC | Age: 80
End: 2020-06-09

## 2020-06-09 DIAGNOSIS — D53.9 MACROCYTIC ANEMIA: Primary | ICD-10-CM

## 2020-06-09 DIAGNOSIS — D72.829 LEUKOCYTOSIS, UNSPECIFIED TYPE: ICD-10-CM

## 2020-06-09 PROCEDURE — 36415 COLL VENOUS BLD VENIPUNCTURE: CPT | Performed by: NURSE PRACTITIONER

## 2020-06-09 RX ORDER — FERROUS SULFATE 325(65) MG
325 TABLET ORAL
Qty: 30 TABLET | Refills: 5 | Status: SHIPPED | OUTPATIENT
Start: 2020-06-09 | End: 2020-09-28

## 2020-06-09 NOTE — PROGRESS NOTES
Venipuncture Blood Specimen Collection  Venipuncture performed in Right AC by Silvio Gonzalez RN with good hemostasis. Patient tolerated the procedure well without complications.   06/09/20   Silvio Gonzalez RN

## 2020-06-11 LAB
BASOPHILS # BLD AUTO: 0 X10E3/UL (ref 0–0.2)
BASOPHILS NFR BLD AUTO: 0 %
EOSINOPHIL # BLD AUTO: 0.1 X10E3/UL (ref 0–0.4)
EOSINOPHIL NFR BLD AUTO: 1 %
ERYTHROCYTE [DISTWIDTH] IN BLOOD BY AUTOMATED COUNT: 12.5 % (ref 11.6–15.4)
HCT VFR BLD AUTO: 35.3 % (ref 37.5–51)
HGB BLD-MCNC: 11.8 G/DL (ref 13–17.7)
IMM GRANULOCYTES # BLD AUTO: 0 X10E3/UL (ref 0–0.1)
IMM GRANULOCYTES NFR BLD AUTO: 0 %
LYMPHOCYTES # BLD AUTO: 2 X10E3/UL (ref 0.7–3.1)
LYMPHOCYTES NFR BLD AUTO: 20 %
MCH RBC QN AUTO: 34.5 PG (ref 26.6–33)
MCHC RBC AUTO-ENTMCNC: 33.4 G/DL (ref 31.5–35.7)
MCV RBC AUTO: 103 FL (ref 79–97)
MONOCYTES # BLD AUTO: 0.7 X10E3/UL (ref 0.1–0.9)
MONOCYTES NFR BLD AUTO: 7 %
NEUTROPHILS # BLD AUTO: 7.3 X10E3/UL (ref 1.4–7)
NEUTROPHILS NFR BLD AUTO: 72 %
PLATELET # BLD AUTO: 326 X10E3/UL (ref 150–450)
RBC # BLD AUTO: 3.42 X10E6/UL (ref 4.14–5.8)
WBC # BLD AUTO: 10.1 X10E3/UL (ref 3.4–10.8)

## 2020-06-16 RX ORDER — PREDNISONE 2.5 MG
7.5 TABLET ORAL DAILY
Qty: 90 TABLET | Refills: 1 | Status: SHIPPED | OUTPATIENT
Start: 2020-06-16 | End: 2020-08-17 | Stop reason: SDUPTHER

## 2020-07-02 ENCOUNTER — RESULTS ENCOUNTER (OUTPATIENT)
Dept: ONCOLOGY | Facility: CLINIC | Age: 80
End: 2020-07-02

## 2020-07-02 DIAGNOSIS — D72.829 LEUKOCYTOSIS, UNSPECIFIED TYPE: ICD-10-CM

## 2020-07-02 DIAGNOSIS — D53.9 MACROCYTIC ANEMIA: ICD-10-CM

## 2020-07-03 ENCOUNTER — RESULTS ENCOUNTER (OUTPATIENT)
Dept: ONCOLOGY | Facility: CLINIC | Age: 80
End: 2020-07-03

## 2020-07-03 DIAGNOSIS — D72.829 LEUKOCYTOSIS, UNSPECIFIED TYPE: ICD-10-CM

## 2020-07-03 DIAGNOSIS — D53.9 MACROCYTIC ANEMIA: ICD-10-CM

## 2020-07-06 DIAGNOSIS — G47.419 PRIMARY NARCOLEPSY WITHOUT CATAPLEXY: ICD-10-CM

## 2020-07-06 RX ORDER — METHYLPHENIDATE HYDROCHLORIDE 20 MG/1
20 TABLET ORAL 3 TIMES DAILY
Qty: 90 TABLET | Refills: 0 | Status: SHIPPED | OUTPATIENT
Start: 2020-07-06 | End: 2020-08-06 | Stop reason: SDUPTHER

## 2020-07-07 ENCOUNTER — CLINICAL SUPPORT (OUTPATIENT)
Dept: INTERNAL MEDICINE | Facility: CLINIC | Age: 80
End: 2020-07-07

## 2020-07-07 DIAGNOSIS — D72.829 LEUKOCYTOSIS, UNSPECIFIED TYPE: Primary | ICD-10-CM

## 2020-07-07 DIAGNOSIS — D53.9 MACROCYTIC ANEMIA: ICD-10-CM

## 2020-07-07 PROCEDURE — 36415 COLL VENOUS BLD VENIPUNCTURE: CPT | Performed by: FAMILY MEDICINE

## 2020-07-07 NOTE — PROGRESS NOTES
Patient came to our office for CBC. He states that he does not feel comfortable going to the hospital due to COVID 19. He states that if needed per Dr. Mabry, he will go to [facility] for Procrit injection.

## 2020-07-07 NOTE — PROGRESS NOTES
Venipuncture Blood Specimen Collection  Venipuncture performed in right ac by Emilee Lee MA with good hemostasis. Patient tolerated the procedure well without complications.   07/07/20   Emilee Lee MA

## 2020-07-09 LAB
BASOPHILS # BLD AUTO: 0.1 X10E3/UL (ref 0–0.2)
BASOPHILS NFR BLD AUTO: 1 %
EOSINOPHIL # BLD AUTO: 0.1 X10E3/UL (ref 0–0.4)
EOSINOPHIL NFR BLD AUTO: 1 %
ERYTHROCYTE [DISTWIDTH] IN BLOOD BY AUTOMATED COUNT: 12.7 % (ref 11.6–15.4)
HCT VFR BLD AUTO: 35.3 % (ref 37.5–51)
HGB BLD-MCNC: 11.5 G/DL (ref 13–17.7)
IMM GRANULOCYTES # BLD AUTO: 0 X10E3/UL (ref 0–0.1)
IMM GRANULOCYTES NFR BLD AUTO: 0 %
LYMPHOCYTES # BLD AUTO: 2.4 X10E3/UL (ref 0.7–3.1)
LYMPHOCYTES NFR BLD AUTO: 20 %
MCH RBC QN AUTO: 34.8 PG (ref 26.6–33)
MCHC RBC AUTO-ENTMCNC: 32.6 G/DL (ref 31.5–35.7)
MCV RBC AUTO: 107 FL (ref 79–97)
MONOCYTES # BLD AUTO: 1 X10E3/UL (ref 0.1–0.9)
MONOCYTES NFR BLD AUTO: 8 %
NEUTROPHILS # BLD AUTO: 8.4 X10E3/UL (ref 1.4–7)
NEUTROPHILS NFR BLD AUTO: 70 %
PLATELET # BLD AUTO: 265 X10E3/UL (ref 150–450)
RBC # BLD AUTO: 3.3 X10E6/UL (ref 4.14–5.8)
WBC # BLD AUTO: 11.9 X10E3/UL (ref 3.4–10.8)

## 2020-07-15 ENCOUNTER — CLINICAL SUPPORT (OUTPATIENT)
Dept: INTERNAL MEDICINE | Facility: CLINIC | Age: 80
End: 2020-07-15

## 2020-07-15 DIAGNOSIS — D72.829 LEUKOCYTOSIS, UNSPECIFIED TYPE: Primary | ICD-10-CM

## 2020-07-15 DIAGNOSIS — D53.9 MACROCYTIC ANEMIA: ICD-10-CM

## 2020-07-15 PROCEDURE — 36415 COLL VENOUS BLD VENIPUNCTURE: CPT | Performed by: FAMILY MEDICINE

## 2020-07-15 NOTE — PROGRESS NOTES
Venipuncture Blood Specimen Collection  Venipuncture performed in the right ac by Estelita Hartley MA with good hemostasis. Patient tolerated the procedure well without complications.   07/15/20   Estelita Hartley MA

## 2020-07-16 LAB
BASOPHILS # BLD AUTO: 0.1 X10E3/UL (ref 0–0.2)
BASOPHILS NFR BLD AUTO: 0 %
EOSINOPHIL # BLD AUTO: 0 X10E3/UL (ref 0–0.4)
EOSINOPHIL NFR BLD AUTO: 0 %
ERYTHROCYTE [DISTWIDTH] IN BLOOD BY AUTOMATED COUNT: 12.4 % (ref 11.6–15.4)
HCT VFR BLD AUTO: 33.2 % (ref 37.5–51)
HGB BLD-MCNC: 11.3 G/DL (ref 13–17.7)
IMM GRANULOCYTES # BLD AUTO: 0 X10E3/UL (ref 0–0.1)
IMM GRANULOCYTES NFR BLD AUTO: 0 %
LYMPHOCYTES # BLD AUTO: 1.9 X10E3/UL (ref 0.7–3.1)
LYMPHOCYTES NFR BLD AUTO: 16 %
MCH RBC QN AUTO: 34.7 PG (ref 26.6–33)
MCHC RBC AUTO-ENTMCNC: 34 G/DL (ref 31.5–35.7)
MCV RBC AUTO: 102 FL (ref 79–97)
MONOCYTES # BLD AUTO: 0.6 X10E3/UL (ref 0.1–0.9)
MONOCYTES NFR BLD AUTO: 5 %
NEUTROPHILS # BLD AUTO: 9 X10E3/UL (ref 1.4–7)
NEUTROPHILS NFR BLD AUTO: 79 %
PLATELET # BLD AUTO: 316 X10E3/UL (ref 150–450)
RBC # BLD AUTO: 3.26 X10E6/UL (ref 4.14–5.8)
WBC # BLD AUTO: 11.6 X10E3/UL (ref 3.4–10.8)

## 2020-07-17 LAB
ALBUMIN SERPL-MCNC: 4 G/DL (ref 3.7–4.7)
ALBUMIN/GLOB SERPL: 1.6 {RATIO} (ref 1.2–2.2)
ALP SERPL-CCNC: 146 IU/L (ref 39–117)
ALT SERPL-CCNC: 28 IU/L (ref 0–44)
AST SERPL-CCNC: 26 IU/L (ref 0–40)
BILIRUB SERPL-MCNC: <0.2 MG/DL (ref 0–1.2)
BUN SERPL-MCNC: 21 MG/DL (ref 8–27)
BUN/CREAT SERPL: 16 (ref 10–24)
CALCIUM SERPL-MCNC: 8.8 MG/DL (ref 8.6–10.2)
CHLORIDE SERPL-SCNC: 100 MMOL/L (ref 96–106)
CO2 SERPL-SCNC: 24 MMOL/L (ref 20–29)
CREAT SERPL-MCNC: 1.3 MG/DL (ref 0.76–1.27)
FERRITIN SERPL-MCNC: 193 NG/ML (ref 30–400)
GLOBULIN SER CALC-MCNC: 2.5 G/DL (ref 1.5–4.5)
GLUCOSE SERPL-MCNC: 125 MG/DL (ref 65–99)
IRON SATN MFR SERPL: 39 % (ref 15–55)
IRON SERPL-MCNC: 98 UG/DL (ref 38–169)
POTASSIUM SERPL-SCNC: 4.9 MMOL/L (ref 3.5–5.2)
PROT SERPL-MCNC: 6.5 G/DL (ref 6–8.5)
SODIUM SERPL-SCNC: 140 MMOL/L (ref 134–144)
TIBC SERPL-MCNC: 254 UG/DL (ref 250–450)
UIBC SERPL-MCNC: 156 UG/DL (ref 111–343)
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
## 2020-07-23 ENCOUNTER — OFFICE VISIT (OUTPATIENT)
Dept: ONCOLOGY | Facility: CLINIC | Age: 80
End: 2020-07-23

## 2020-07-23 VITALS
HEART RATE: 63 BPM | BODY MASS INDEX: 29.93 KG/M2 | RESPIRATION RATE: 16 BRPM | HEIGHT: 68 IN | OXYGEN SATURATION: 97 % | SYSTOLIC BLOOD PRESSURE: 118 MMHG | DIASTOLIC BLOOD PRESSURE: 78 MMHG | WEIGHT: 197.5 LBS | TEMPERATURE: 98 F

## 2020-07-23 DIAGNOSIS — D53.9 MACROCYTIC ANEMIA: Primary | ICD-10-CM

## 2020-07-23 PROCEDURE — 99214 OFFICE O/P EST MOD 30 MIN: CPT | Performed by: INTERNAL MEDICINE

## 2020-07-30 ENCOUNTER — APPOINTMENT (OUTPATIENT)
Dept: ULTRASOUND IMAGING | Facility: HOSPITAL | Age: 80
End: 2020-07-30

## 2020-08-06 ENCOUNTER — OFFICE VISIT (OUTPATIENT)
Dept: NEUROSURGERY | Facility: CLINIC | Age: 80
End: 2020-08-06

## 2020-08-06 VITALS
SYSTOLIC BLOOD PRESSURE: 122 MMHG | DIASTOLIC BLOOD PRESSURE: 70 MMHG | WEIGHT: 197.5 LBS | BODY MASS INDEX: 29.93 KG/M2 | HEIGHT: 68 IN

## 2020-08-06 DIAGNOSIS — Z78.9 NONSMOKER: ICD-10-CM

## 2020-08-06 DIAGNOSIS — G47.419 PRIMARY NARCOLEPSY WITHOUT CATAPLEXY: ICD-10-CM

## 2020-08-06 DIAGNOSIS — M79.602 LEFT ARM PAIN: ICD-10-CM

## 2020-08-06 DIAGNOSIS — M47.812 CERVICAL SPONDYLOSIS WITHOUT MYELOPATHY: ICD-10-CM

## 2020-08-06 DIAGNOSIS — R20.2 LEFT HAND PARESTHESIA: ICD-10-CM

## 2020-08-06 DIAGNOSIS — M48.02 SPINAL STENOSIS IN CERVICAL REGION: Primary | ICD-10-CM

## 2020-08-06 DIAGNOSIS — M54.12 CERVICAL RADICULOPATHY: ICD-10-CM

## 2020-08-06 PROCEDURE — 99213 OFFICE O/P EST LOW 20 MIN: CPT | Performed by: NEUROLOGICAL SURGERY

## 2020-08-06 RX ORDER — METHYLPHENIDATE HYDROCHLORIDE 20 MG/1
20 TABLET ORAL 3 TIMES DAILY
Qty: 90 TABLET | Refills: 0 | Status: SHIPPED | OUTPATIENT
Start: 2020-08-06 | End: 2020-08-26 | Stop reason: SDUPTHER

## 2020-08-06 NOTE — PROGRESS NOTES
SUBJECTIVE:  Patient ID: Spencer Moore is a 80 y.o. male is here today for follow-up.    Chief Complaint:left arm weakness  Chief Complaint   Patient presents with   • NECK & ARM PAIN     patient is here for follow up after completing physical therapy; patient and his POA state the therapy did not improve his symptoms much at all.  The patient states he is having trouble holding things with his left hand.  Patient's imaging @ [facility].       [facility]  80-year-old gentleman that we saw in May 2020 for left paraspinal neck pain and left upper extremity radicular pain numbness and tingling.  He did a dedicated course of physical therapy and has been put on chronic low-dose steroids by his primary care doctor.  At this point he complained of no neck pain and no arm pain he is complaining of weakness in the left hand and numbness in the left hand.  Him and his family say that if he comes off the steroids over the pain comes back.  He is on 7.5 mg of prednisone.  He has had carpal tunnel release surgery in the left hand over a year ago.  We sent him for imaging he is here to discuss the results.    The following portions of the patient's history were reviewed and updated as appropriate: allergies, current medications, past family history, past medical history, past social history, past surgical history and problem list.    OBJECTIVE:    Review of Systems   Neurological: Positive for weakness and numbness.   All other systems reviewed and are negative.         Physical Exam   Constitutional: He is oriented to person, place, and time.   Neurological: He is oriented to person, place, and time. He has a normal Finger-Nose-Finger Test.   Psychiatric: His speech is normal.       Neurologic Exam     Mental Status   Oriented to person, place, and time.   Attention: normal.   Speech: speech is normal   Level of consciousness: alert  Knowledge: good.     Cranial Nerves   Cranial nerves II through XII intact.     Motor Exam   Muscle bulk:  Thenar eminence wasting and interosseous wasting in the left hand.  Right arm pronator drift: absent  Left arm pronator drift: absent5 out of 5 on flexion-extension movements with the exception of the left  which is 3- out of 5    Decreased range of motion and mobility of the shoulders bilaterally due to orthopedic issues     Sensory Exam   Light touch normal.   Pinprick normal.     Gait, Coordination, and Reflexes     Gait  Gait: (Walks with a limp due to orthopedic issues)    Coordination   Finger to nose coordination: normal    Tremor   Resting tremor: absent  Intention tremor: absent  Action tremor: absent    Reflexes   Reflexes 2+ except as noted.       Independent Review of Radiographic Studies:   MRI of the cervical spine and plain x-rays show severe degenerative disc disease with kyphotic deformity.  There is multilevel foraminal stenosis worse on the right than the left.    ASSESSMENT/PLAN:  The patient right now has significant improvement in his neck pain and left upper extremity radicular pain.  He remains on low-dose steroids and takes Percocet chronically for a variety of pain issues.  He has wasting in the left hand and definite weakness in the left hand.  He has a history of prior carpal tunnel surgery in that hand.  Clinically right now he presents like a severe carpal tunnel syndrome.  I would recommend repeating the EMG nerve conduction study and then putting that together with his MRI of his neck to see where we think the problem with the left hand is coming from.  We will see him in follow-up after that test is completed.      1. Spinal stenosis in cervical region    2. Cervical spondylosis without myelopathy    3. Left arm pain    4. Left hand paresthesia    5. Cervical radiculopathy    6. Nonsmoker    7. BMI 30.0-30.9,adult            Return in about 4 weeks (around 9/3/2020) for test results w/JULIO CESAR GREEN ALREADY IN THE SYSTEM.      Gerber Kang MD

## 2020-08-06 NOTE — PATIENT INSTRUCTIONS
"PATIENT TO CONTINUE TO   BMI for Adults    Body mass index (BMI) is a number that is calculated from a person's weight and height. BMI may help to estimate how much of a person's weight is composed of fat. BMI can help identify those who may be at higher risk for certain medical problems.  How is BMI used with adults?  BMI is used as a screening tool to identify possible weight problems. It is used to check whether a person is obese, overweight, healthy weight, or underweight.  How is BMI calculated?  BMI measures your weight and compares it to your height. This can be done either in English (U[place] or metric measurements. Note that charts are available to help you find your BMI quickly and easily without having to do these calculations yourself.  To calculate your BMI in English ([place] measurements, your health care provider will:  1. Measure your weight in pounds (lb).  2. Multiply the number of pounds by 703.  ? For example, for a person who weighs 180 lb, multiply that number by 703, which equals 126,540.  3. Measure your height in inches (in). Then multiply that number by itself to get a measurement called \"inches squared.\"  ? For example, for a person who is 70 in tall, the \"inches squared\" measurement is 70 in x 70 in, which equals 4900 inches squared.  4. Divide the total from Step 2 (number of lb x 703) by the total from Step 3 (inches squared): 126,540 ÷ 4900 = 25.8. This is your BMI.  To calculate your BMI in metric measurements, your health care provider will:  1. Measure your weight in kilograms (kg).  2. Measure your height in meters (m). Then multiply that number by itself to get a measurement called \"meters squared.\"  ? For example, for a person who is 1.75 m tall, the \"meters squared\" measurement is 1.75 m x 1.75 m, which is equal to 3.1 meters squared.  3. Divide the number of kilograms (your weight) by the meters squared number. In this example: 70 ÷ 3.1 = 22.6. This is your BMI.  How is BMI " interpreted?  To interpret your results, your health care provider will use BMI charts to identify whether you are underweight, normal weight, overweight, or obese. The following guidelines will be used:  · Underweight: BMI less than 18.5.  · Normal weight: BMI between 18.5 and 24.9.  · Overweight: BMI between 25 and 29.9.  · Obese: BMI of 30 and above.  Please note:  · Weight includes both fat and muscle, so someone with a muscular build, such as an athlete, may have a BMI that is higher than 24.9. In cases like these, BMI is not an accurate measure of body fat.  · To determine if excess body fat is the cause of a BMI of 25 or higher, further assessments may need to be done by a health care provider.  · BMI is usually interpreted in the same way for men and women.  Why is BMI a useful tool?  BMI is useful in two ways:  · Identifying a weight problem that may be related to a medical condition, or that may increase the risk for medical problems.  · Promoting lifestyle and diet changes in order to reach a healthy weight.  Summary  · Body mass index (BMI) is a number that is calculated from a person's weight and height.  · BMI may help to estimate how much of a person's weight is composed of fat. BMI can help identify those who may be at higher risk for certain medical problems.  · BMI can be measured using English measurements or metric measurements.  · To interpret your results, your health care provider will use BMI charts to identify whether you are underweight, normal weight, overweight, or obese.  This information is not intended to replace advice given to you by your health care provider. Make sure you discuss any questions you have with your health care provider.  Document Released: 08/29/2005 Document Revised: 11/30/2018 Document Reviewed: 10/31/2018  [employer] Patient Education © 2020 [employer]  FOLLOW UP WITH HIS PRIMARY CARE PROVIDER FOR YEARLY PHYSICAL EXAMS TO ENSURE COMPLETE HEALTH  MAINTENANCE

## 2020-08-10 ENCOUNTER — CLINICAL SUPPORT (OUTPATIENT)
Dept: INTERNAL MEDICINE | Facility: CLINIC | Age: 80
End: 2020-08-10

## 2020-08-10 DIAGNOSIS — N18.30 CHRONIC KIDNEY DISEASE, STAGE III (MODERATE) (HCC): Primary | ICD-10-CM

## 2020-08-10 DIAGNOSIS — Z79.899 HIGH RISK MEDICATION USE: ICD-10-CM

## 2020-08-10 DIAGNOSIS — D53.9 MACROCYTIC ANEMIA: ICD-10-CM

## 2020-08-10 PROCEDURE — 36415 COLL VENOUS BLD VENIPUNCTURE: CPT | Performed by: FAMILY MEDICINE

## 2020-08-10 NOTE — PROGRESS NOTES
Venipuncture Blood Specimen Collection  Venipuncture performed in Left AC by Silvio Gonzalez RN with good hemostasis. Patient tolerated the procedure well without complications.   08/10/20   Silvio Gonzalez RN

## 2020-08-11 LAB
ALBUMIN SERPL-MCNC: 3.8 G/DL (ref 3.7–4.7)
ALBUMIN/GLOB SERPL: 1.6 {RATIO} (ref 1.2–2.2)
ALP SERPL-CCNC: 146 IU/L (ref 39–117)
ALT SERPL-CCNC: 29 IU/L (ref 0–44)
AST SERPL-CCNC: 22 IU/L (ref 0–40)
BASOPHILS # BLD AUTO: 0.1 X10E3/UL (ref 0–0.2)
BASOPHILS NFR BLD AUTO: 1 %
BILIRUB SERPL-MCNC: 0.3 MG/DL (ref 0–1.2)
BUN SERPL-MCNC: 27 MG/DL (ref 8–27)
BUN/CREAT SERPL: 23 (ref 10–24)
CALCIUM SERPL-MCNC: 8.6 MG/DL (ref 8.6–10.2)
CHLORIDE SERPL-SCNC: 103 MMOL/L (ref 96–106)
CO2 SERPL-SCNC: 21 MMOL/L (ref 20–29)
CREAT SERPL-MCNC: 1.16 MG/DL (ref 0.76–1.27)
EOSINOPHIL # BLD AUTO: 0.1 X10E3/UL (ref 0–0.4)
EOSINOPHIL NFR BLD AUTO: 0 %
ERYTHROCYTE [DISTWIDTH] IN BLOOD BY AUTOMATED COUNT: 12 % (ref 11.6–15.4)
GLOBULIN SER CALC-MCNC: 2.4 G/DL (ref 1.5–4.5)
GLUCOSE SERPL-MCNC: 129 MG/DL (ref 65–99)
HBA1C MFR BLD: 5.5 % (ref 4.8–5.6)
HCT VFR BLD AUTO: 35.8 % (ref 37.5–51)
HGB BLD-MCNC: 12.2 G/DL (ref 13–17.7)
IMM GRANULOCYTES # BLD AUTO: 0 X10E3/UL (ref 0–0.1)
IMM GRANULOCYTES NFR BLD AUTO: 0 %
LYMPHOCYTES # BLD AUTO: 1.5 X10E3/UL (ref 0.7–3.1)
LYMPHOCYTES NFR BLD AUTO: 12 %
MCH RBC QN AUTO: 35.1 PG (ref 26.6–33)
MCHC RBC AUTO-ENTMCNC: 34.1 G/DL (ref 31.5–35.7)
MCV RBC AUTO: 103 FL (ref 79–97)
MONOCYTES # BLD AUTO: 0.5 X10E3/UL (ref 0.1–0.9)
MONOCYTES NFR BLD AUTO: 4 %
NEUTROPHILS # BLD AUTO: 10.7 X10E3/UL (ref 1.4–7)
NEUTROPHILS NFR BLD AUTO: 83 %
PLATELET # BLD AUTO: 267 X10E3/UL (ref 150–450)
POTASSIUM SERPL-SCNC: 4.7 MMOL/L (ref 3.5–5.2)
PROT SERPL-MCNC: 6.2 G/DL (ref 6–8.5)
RBC # BLD AUTO: 3.48 X10E6/UL (ref 4.14–5.8)
SODIUM SERPL-SCNC: 141 MMOL/L (ref 134–144)
T4 FREE SERPL-MCNC: 0.94 NG/DL (ref 0.82–1.77)
TSH SERPL DL<=0.005 MIU/L-ACNC: 1.18 UIU/ML (ref 0.45–4.5)
WBC # BLD AUTO: 12.9 X10E3/UL (ref 3.4–10.8)

## 2020-08-17 RX ORDER — PREDNISONE 2.5 MG
7.5 TABLET ORAL DAILY
Qty: 90 TABLET | Refills: 3 | Status: SHIPPED | OUTPATIENT
Start: 2020-08-17 | End: 2020-10-27

## 2020-08-19 DIAGNOSIS — M79.602 LEFT ARM PAIN: ICD-10-CM

## 2020-08-19 DIAGNOSIS — M47.812 CERVICAL SPONDYLOSIS WITHOUT MYELOPATHY: ICD-10-CM

## 2020-08-19 DIAGNOSIS — M48.02 SPINAL STENOSIS IN CERVICAL REGION: ICD-10-CM

## 2020-08-19 DIAGNOSIS — R20.2 LEFT HAND PARESTHESIA: ICD-10-CM

## 2020-08-19 DIAGNOSIS — M54.12 CERVICAL RADICULOPATHY: ICD-10-CM

## 2020-08-26 DIAGNOSIS — G47.419 PRIMARY NARCOLEPSY WITHOUT CATAPLEXY: ICD-10-CM

## 2020-08-28 RX ORDER — METHYLPHENIDATE HYDROCHLORIDE 20 MG/1
20 TABLET ORAL 3 TIMES DAILY
Qty: 90 TABLET | Refills: 0 | Status: SHIPPED | OUTPATIENT
Start: 2020-09-04 | End: 2020-10-04 | Stop reason: SDUPTHER

## 2020-08-31 DIAGNOSIS — G50.0 TRIGEMINAL NEURALGIA OF RIGHT SIDE OF FACE: ICD-10-CM

## 2020-08-31 RX ORDER — CARBAMAZEPINE 200 MG/1
TABLET ORAL
Qty: 60 TABLET | Refills: 0 | Status: SHIPPED | OUTPATIENT
Start: 2020-08-31 | End: 2020-09-28 | Stop reason: SDUPTHER

## 2020-09-10 ENCOUNTER — OFFICE VISIT (OUTPATIENT)
Dept: NEUROSURGERY | Facility: CLINIC | Age: 80
End: 2020-09-10

## 2020-09-10 VITALS — WEIGHT: 197.5 LBS | BODY MASS INDEX: 29.93 KG/M2 | HEIGHT: 68 IN

## 2020-09-10 DIAGNOSIS — M47.812 CERVICAL SPONDYLOSIS WITHOUT MYELOPATHY: ICD-10-CM

## 2020-09-10 DIAGNOSIS — M79.602 LEFT ARM PAIN: ICD-10-CM

## 2020-09-10 DIAGNOSIS — Z78.9 NONSMOKER: ICD-10-CM

## 2020-09-10 DIAGNOSIS — M48.02 SPINAL STENOSIS IN CERVICAL REGION: Primary | ICD-10-CM

## 2020-09-10 DIAGNOSIS — M50.30 DEGENERATION OF CERVICAL INTERVERTEBRAL DISC: ICD-10-CM

## 2020-09-10 DIAGNOSIS — R20.2 LEFT HAND PARESTHESIA: ICD-10-CM

## 2020-09-10 PROCEDURE — 99214 OFFICE O/P EST MOD 30 MIN: CPT | Performed by: NEUROLOGICAL SURGERY

## 2020-09-10 NOTE — PROGRESS NOTES
SUBJECTIVE:  Patient ID: Spencer Moore is a 80 y.o. male is here today for follow-up.    Chief Complaint: Left upper extremity pain  Chief Complaint   Patient presents with   • CERVICAL RADICULOPATHY     patient is here to discuss results of the EMG/NCV BUE on 8/18/2020 by [facility]       HPI  80-year-old gentleman we been following for left upper extremity numbness and weakness.  He is got complicated history.  He was complaining of severe neck pain and severe radicular pain which was treated with a low daily dose of prednisone by his primary care doctor.  This is been under good control as long as he stays on the low-dose steroids.  But he does have weakness in the left hand and numbness in the left hand.  He had an EMG nerve conduction study in April 2019 which showed moderate to severe carpal tunnel syndrome.  He had carpal tunnel release surgery as well as bilateral thumb surgery in May 2019.  This was successful and that it treated his thumb issues and he felt his hand was getting stronger.  He also complains of severe shoulder pain and has bilateral frozen shoulders.  We sent him for repeat EMG nerve conduction study he is here to discuss the results    The following portions of the patient's history were reviewed and updated as appropriate: allergies, current medications, past family history, past medical history, past social history, past surgical history and problem list.    OBJECTIVE:    Review of Systems   Musculoskeletal: Positive for arthralgias.   Neurological: Positive for weakness.   All other systems reviewed and are negative.         Physical Exam    Neurologic Exam  Mental Status   Oriented to person, place, and time.   Attention: normal.   Speech: speech is normal   Level of consciousness: alert  Knowledge: good.      Cranial Nerves   Cranial nerves II through XII intact.      Motor Exam   Muscle bulk: Thenar eminence wasting and interosseous wasting in the left hand.  Right arm pronator  drift: absent  Left arm pronator drift: absent5 out of 5 on flexion-extension movements with the exception of the left  which is 3- out of 5    Decreased range of motion and mobility of the shoulders bilaterally due to orthopedic issues      Sensory Exam   Light touch normal.   Pinprick normal.      Gait, Coordination, and Reflexes      Gait  Gait: (Walks with a limp due to orthopedic issues)     Coordination   Finger to nose coordination: normal     Tremor   Resting tremor: absent  Intention tremor: absent  Action tremor: absent     Reflexes   Reflexes 2+ except as noted.     Independent Review of Radiographic Studies:   EMG nerve conduction study issue with the carpal tunnel.    ASSESSMENT/PLAN:  The patient has a frozen shoulder as well as some wasting in the left hand and some weakness.  EMG nerve conduction study of the left hand is not conclusive as to whether repeat carpal tunnel surgery would be effective.  It is possible that he is dealing with chronic median nerve injury.  There is no significant leftward foraminal stenosis on the MRI of the cervical spine even though there is several areas of degenerative change.  At this point I do not think there is any neuro-surgical intervention that would help improve the function of his left upper extremity.  I explained this to him and his son very clearly.  Their questions concerns were addressed.      1. Spinal stenosis in cervical region    2. Degeneration of cervical intervertebral disc    3. Cervical spondylosis without myelopathy    4. Left arm pain    5. Left hand paresthesia    6. BMI 30.0-30.9,adult    7. Nonsmoker            Return if symptoms worsen or fail to improve.      Gerber Kang MD

## 2020-09-14 ENCOUNTER — CLINICAL SUPPORT (OUTPATIENT)
Dept: INTERNAL MEDICINE | Facility: CLINIC | Age: 80
End: 2020-09-14

## 2020-09-14 DIAGNOSIS — D53.9 MACROCYTIC ANEMIA: Primary | ICD-10-CM

## 2020-09-15 LAB
BASOPHILS # BLD AUTO: 0.1 X10E3/UL (ref 0–0.2)
BASOPHILS NFR BLD AUTO: 1 %
EOSINOPHIL # BLD AUTO: 0 X10E3/UL (ref 0–0.4)
EOSINOPHIL NFR BLD AUTO: 0 %
ERYTHROCYTE [DISTWIDTH] IN BLOOD BY AUTOMATED COUNT: 10.6 % (ref 11.6–15.4)
HCT VFR BLD AUTO: 37.1 % (ref 37.5–51)
HGB BLD-MCNC: 13 G/DL (ref 13–17.7)
IMM GRANULOCYTES # BLD AUTO: 0 X10E3/UL (ref 0–0.1)
IMM GRANULOCYTES NFR BLD AUTO: 0 %
LYMPHOCYTES # BLD AUTO: 1.9 X10E3/UL (ref 0.7–3.1)
LYMPHOCYTES NFR BLD AUTO: 16 %
MCH RBC QN AUTO: 35.1 PG (ref 26.6–33)
MCHC RBC AUTO-ENTMCNC: 35 G/DL (ref 31.5–35.7)
MCV RBC AUTO: 100 FL (ref 79–97)
MONOCYTES # BLD AUTO: 0.7 X10E3/UL (ref 0.1–0.9)
MONOCYTES NFR BLD AUTO: 6 %
NEUTROPHILS # BLD AUTO: 9.1 X10E3/UL (ref 1.4–7)
NEUTROPHILS NFR BLD AUTO: 77 %
PLATELET # BLD AUTO: 286 X10E3/UL (ref 150–450)
RBC # BLD AUTO: 3.7 X10E6/UL (ref 4.14–5.8)
WBC # BLD AUTO: 11.8 X10E3/UL (ref 3.4–10.8)

## 2020-09-24 ENCOUNTER — OFFICE VISIT (OUTPATIENT)
Dept: UROLOGY | Facility: CLINIC | Age: 80
End: 2020-09-24

## 2020-09-24 VITALS
BODY MASS INDEX: 29.7 KG/M2 | SYSTOLIC BLOOD PRESSURE: 116 MMHG | TEMPERATURE: 98.4 F | DIASTOLIC BLOOD PRESSURE: 70 MMHG | WEIGHT: 196 LBS | HEIGHT: 68 IN | HEART RATE: 65 BPM

## 2020-09-24 DIAGNOSIS — I10 ESSENTIAL HYPERTENSION: ICD-10-CM

## 2020-09-24 DIAGNOSIS — N40.1 BPH WITH OBSTRUCTION/LOWER URINARY TRACT SYMPTOMS: Primary | ICD-10-CM

## 2020-09-24 DIAGNOSIS — N13.8 BPH WITH OBSTRUCTION/LOWER URINARY TRACT SYMPTOMS: Primary | ICD-10-CM

## 2020-09-24 DIAGNOSIS — E78.2 MIXED HYPERLIPIDEMIA: ICD-10-CM

## 2020-09-24 LAB
BILIRUB BLD-MCNC: NEGATIVE MG/DL
CLARITY, POC: CLEAR
COLOR UR: YELLOW
GLUCOSE UR STRIP-MCNC: NEGATIVE MG/DL
KETONES UR QL: NEGATIVE
LEUKOCYTE EST, POC: ABNORMAL
NITRITE UR-MCNC: NEGATIVE MG/ML
PH UR: 5 [PH] (ref 5–8)
PROT UR STRIP-MCNC: NEGATIVE MG/DL
RBC # UR STRIP: ABNORMAL /UL
SP GR UR: 1.02 (ref 1–1.03)
UROBILINOGEN UR QL: NORMAL

## 2020-09-24 PROCEDURE — 99213 OFFICE O/P EST LOW 20 MIN: CPT | Performed by: UROLOGY

## 2020-09-24 PROCEDURE — 81003 URINALYSIS AUTO W/O SCOPE: CPT | Performed by: UROLOGY

## 2020-09-24 NOTE — PROGRESS NOTES
Subjective    Mr. Moore is 80 y.o. male    Chief Complaint: LUTS    History of Present Illness  80-year-old male established patient previously seen by Dr. Gonzalez follow-up for worsening bothersome LUTS.  Quality decreased force of stream and intermittency.  Onset gradual.  Timing constant.  Context he has been on finasteride and tamsulosin for a long time.  He had normal renal ultrasound in 2016.  Associated symptoms he denies gross hematuria or flank pain.  He does have a history of chronic urinary colonization and is seen infectious disease in the past.    I spent time today reviewing and summarizing old records last urology clinic visit with Dr. Gonzalez 6/6/2018.      The following portions of the patient's history were reviewed and updated as appropriate: allergies, current medications, past family history, past medical history, past social history, past surgical history and problem list.    Review of Systems   Genitourinary: Positive for difficulty urinating.   Musculoskeletal: Positive for gait problem.   All other systems reviewed and are negative.        Current Outpatient Medications:   •  aspirin 81 MG EC tablet, Take 81 mg by mouth Daily., Disp: , Rfl:   •  atorvastatin (LIPITOR) 10 MG tablet, Take 10 mg by mouth Daily., Disp: , Rfl:   •  carBAMazepine (TEGretol) 200 MG tablet, TAKE ONE TABLET BY MOUTH TWICE DAILY, Disp: 60 tablet, Rfl: 0  •  CloNIDine (CATAPRES) 0.1 MG tablet, Take 0.1 mg by mouth As Needed for High Blood Pressure (1 tablet if systolic is over 170)., Disp: , Rfl:   •  cyanocobalamin 1000 MCG/ML injection, Inject 1 mL into the appropriate muscle as directed by prescriber Every 28 (Twenty-Eight) Days., Disp: 10 mL, Rfl: 0  •  ferrous sulfate 325 (65 FE) MG tablet, Take 1 tablet by mouth Daily With Breakfast., Disp: 30 tablet, Rfl: 5  •  finasteride (PROSCAR) 5 MG tablet, Take 1 tablet by mouth Daily., Disp: 90 tablet, Rfl: 3  •  FLUoxetine (PROzac) 40 MG capsule, Take 40 mg by mouth  Daily., Disp: , Rfl:   •  hydroCHLOROthiazide (HYDRODIURIL) 12.5 MG tablet, , Disp: , Rfl:   •  hyoscyamine (LEVSIN) 0.125 MG SL tablet, Take 125 mcg by mouth 2 (Two) Times a Day., Disp: , Rfl:   •  irbesartan (AVAPRO) 300 MG tablet, , Disp: , Rfl:   •  methylphenidate (Ritalin) 20 MG tablet, Take 1 tablet by mouth 3 (Three) Times a Day., Disp: 90 tablet, Rfl: 0  •  metoprolol tartrate (LOPRESSOR) 25 MG tablet, Take 1 tablet by mouth 2 (Two) Times a Day. (Patient taking differently: Take 25 mg by mouth Daily.), Disp: 60 tablet, Rfl: 11  •  Multiple Vitamins-Minerals (MULTIVITAMIN ADULT PO), Take  by mouth., Disp: , Rfl:   •  mupirocin (BACTROBAN) 2 % ointment, APPLY TO AFFECTED AREA TWICE DAILY AS NEEDED, Disp: , Rfl:   •  omeprazole (priLOSEC) 40 MG capsule, Take 40 mg by mouth Daily., Disp: , Rfl:   •  oxyCODONE-acetaminophen (PERCOCET) 7.5-325 MG per tablet, Take 1 tablet by mouth Every 6 (Six) Hours As Needed. Takes 7.5mg four times daily, Disp: , Rfl:   •  predniSONE (DELTASONE) 2.5 MG tablet, Take 3 tablets by mouth Daily., Disp: 90 tablet, Rfl: 3  •  tamsulosin (FLOMAX) 0.4 MG capsule 24 hr capsule, Take one capsule by mouth once daily, Disp: 90 capsule, Rfl: 3  •  terazosin (HYTRIN) 2 MG capsule, Take 2 mg by mouth Every Night., Disp: , Rfl:     Current Facility-Administered Medications:   •  methylPREDNISolone sodium succinate (SOLU-Medrol) injection 125 mg, 125 mg, Intramuscular, Q6H, Rosi Baker DO, 125 mg at 05/07/20 1026    Past Medical History:   Diagnosis Date   • Anemia    • Arthritis    • Bilateral impacted cerumen 12/10/2018   • Diverticulitis    • Enlarged prostate    • ETD (eustachian tube dysfunction)    • GERD (gastroesophageal reflux disease)    • Hypertension    • Kidney infection    • Lumbago    • Narcolepsy    • Sensorineural hearing loss (SNHL) of both ears 6/18/2018   • SNHL (sensorineural hearing loss)    • Tinnitus    • Vitamin B12 deficiency    • Weak urinary stream         Past Surgical History:   Procedure Laterality Date   • ABDOMINAL SURGERY     • CATARACT EXTRACTION Left    • HAND SURGERY Left    • HERNIA REPAIR     • REPLACEMENT TOTAL KNEE Left    • ROTATOR CUFF REPAIR     • TONSILLECTOMY         Social History     Socioeconomic History   • Marital status:      Spouse name: Not on file   • Number of children: Not on file   • Years of education: Not on file   • Highest education level: Not on file   Tobacco Use   • Smoking status: Never Smoker   • Smokeless tobacco: Never Used   Substance and Sexual Activity   • Alcohol use: No   • Drug use: No   • Sexual activity: Defer       Family History   Adopted: Yes   Family history unknown: Yes       Objective    There were no vitals taken for this visit.    Physical Exam  Constitutional: Well nourished, Well developed; No apparent distress; Vital reviewed as above  Psychiatric: Appropriate affect; Alert and oriented  Eyes: Unremarkable  Musculoskeletal: Normal gait and station  GI: Abdomen is soft, non-tender  Respiratory: No distress; Unlabored movement; No accessory musculature needed with symmetric movements  Skin: No pallor or diaphoresis  Lymphatic: No adenopathy neck or groin      Results for orders placed or performed in visit on 09/14/20   CBC & Differential    Specimen: Blood    BLOOD  PROCRIT 40,000   Result Value Ref Range    WBC 11.8 (H) 3.4 - 10.8 x10E3/uL    RBC 3.70 (L) 4.14 - 5.80 x10E6/uL    Hemoglobin 13.0 13.0 - 17.7 g/dL    Hematocrit 37.1 (L) 37.5 - 51.0 %     (H) 79 - 97 fL    MCH 35.1 (H) 26.6 - 33.0 pg    MCHC 35.0 31.5 - 35.7 g/dL    RDW 10.6 (L) 11.6 - 15.4 %    Platelets 286 150 - 450 x10E3/uL    Neutrophil Rel % 77 Not Estab. %    Lymphocyte Rel % 16 Not Estab. %    Monocyte Rel % 6 Not Estab. %    Eosinophil Rel % 0 Not Estab. %    Basophil Rel % 1 Not Estab. %    Neutrophils Absolute 9.1 (H) 1.4 - 7.0 x10E3/uL    Lymphocytes Absolute 1.9 0.7 - 3.1 x10E3/uL    Monocytes Absolute 0.7 0.1 -  0.9 x10E3/uL    Eosinophils Absolute 0.0 0.0 - 0.4 x10E3/uL    Basophils Absolute 0.1 0.0 - 0.2 x10E3/uL    Immature Granulocyte Rel % 0 Not Estab. %    Immature Grans Absolute 0.0 0.0 - 0.1 x10E3/uL     Assessment and Plan    Spencer was seen today for benign prostatic hypertrophy.    Diagnoses and all orders for this visit:    BPH with obstruction/lower urinary tract symptoms  -     POC Urinalysis Dipstick, Multipro    Essential hypertension    Mixed hyperlipidemia      Worsening LUTS in setting advanced age and multiple medical problems on combination medical therapy with finasteride and tamsulosin.  I recommended he continue finasteride and tamsulosin.  He may increase the tamsulosin to 0.8 mg nightly as tolerated.  Dr. Gonzalez did not deem the patient a good surgical candidate and I tend to concur with this assessment.  He will follow-up as needed.      This document has been signed by TRISTIN Silva MD on September 24, 2020 17:14 CDT

## 2020-09-28 ENCOUNTER — OFFICE VISIT (OUTPATIENT)
Dept: NEUROLOGY | Facility: CLINIC | Age: 80
End: 2020-09-28

## 2020-09-28 VITALS
HEART RATE: 62 BPM | WEIGHT: 196 LBS | OXYGEN SATURATION: 98 % | DIASTOLIC BLOOD PRESSURE: 70 MMHG | BODY MASS INDEX: 29.7 KG/M2 | SYSTOLIC BLOOD PRESSURE: 128 MMHG | HEIGHT: 68 IN

## 2020-09-28 DIAGNOSIS — G47.419 PRIMARY NARCOLEPSY WITHOUT CATAPLEXY: Primary | ICD-10-CM

## 2020-09-28 DIAGNOSIS — G50.0 TRIGEMINAL NEURALGIA OF RIGHT SIDE OF FACE: ICD-10-CM

## 2020-09-28 DIAGNOSIS — H90.3 SENSORINEURAL HEARING LOSS (SNHL) OF BOTH EARS: ICD-10-CM

## 2020-09-28 DIAGNOSIS — I67.9 CEREBROVASCULAR SMALL VESSEL DISEASE: ICD-10-CM

## 2020-09-28 PROCEDURE — 99214 OFFICE O/P EST MOD 30 MIN: CPT | Performed by: PHYSICIAN ASSISTANT

## 2020-09-28 RX ORDER — CARBAMAZEPINE 200 MG/1
200 TABLET ORAL 2 TIMES DAILY
Qty: 60 TABLET | Refills: 3 | Status: SHIPPED | OUTPATIENT
Start: 2020-09-28 | End: 2021-01-29 | Stop reason: SDUPTHER

## 2020-10-04 DIAGNOSIS — G47.419 PRIMARY NARCOLEPSY WITHOUT CATAPLEXY: ICD-10-CM

## 2020-10-05 ENCOUNTER — TELEPHONE (OUTPATIENT)
Dept: NEUROLOGY | Facility: CLINIC | Age: 80
End: 2020-10-05

## 2020-10-05 RX ORDER — METHYLPHENIDATE HYDROCHLORIDE 20 MG/1
20 TABLET ORAL 3 TIMES DAILY
Qty: 90 TABLET | Refills: 0 | Status: SHIPPED | OUTPATIENT
Start: 2020-10-05 | End: 2020-11-03 | Stop reason: SDUPTHER

## 2020-10-05 NOTE — TELEPHONE ENCOUNTER
PT'S POA CALLED AND NEEDED THE RITALIN REFILLED THEY HAVE ANOTHER SCRIPT AND J& R TO  BUT WANT TO MAKE ONE TRIP INTO [place]OWN PLEASE ADVISE AS PT NEEDS REFILL ON RITALIN. THANKS SO MUCH.

## 2020-10-08 ENCOUNTER — CLINICAL SUPPORT (OUTPATIENT)
Dept: INTERNAL MEDICINE | Facility: CLINIC | Age: 80
End: 2020-10-08

## 2020-10-08 DIAGNOSIS — D50.9 IRON DEFICIENCY ANEMIA, UNSPECIFIED IRON DEFICIENCY ANEMIA TYPE: ICD-10-CM

## 2020-10-08 DIAGNOSIS — N18.30 STAGE 3 CHRONIC KIDNEY DISEASE, UNSPECIFIED WHETHER STAGE 3A OR 3B CKD (HCC): Primary | ICD-10-CM

## 2020-10-08 DIAGNOSIS — Z79.899 ENCOUNTER FOR LONG-TERM (CURRENT) USE OF OTHER MEDICATIONS: ICD-10-CM

## 2020-10-08 DIAGNOSIS — I10 ESSENTIAL HYPERTENSION: ICD-10-CM

## 2020-10-08 NOTE — PROGRESS NOTES
Venipuncture Blood Specimen Collection  Venipuncture performed in the right ac by Estelita Hartley MA with good hemostasis. Patient tolerated the procedure well without complications.   10/08/20   Estelita Hartley MA

## 2020-10-09 LAB
ALBUMIN SERPL-MCNC: 3.7 G/DL (ref 3.7–4.7)
ALBUMIN/GLOB SERPL: 1.4 {RATIO} (ref 1.2–2.2)
ALP SERPL-CCNC: 146 IU/L (ref 39–117)
ALT SERPL-CCNC: 25 IU/L (ref 0–44)
AST SERPL-CCNC: 23 IU/L (ref 0–40)
BASOPHILS # BLD AUTO: 0.1 X10E3/UL (ref 0–0.2)
BASOPHILS NFR BLD AUTO: 0 %
BILIRUB SERPL-MCNC: <0.2 MG/DL (ref 0–1.2)
BUN SERPL-MCNC: 30 MG/DL (ref 8–27)
BUN/CREAT SERPL: 24 (ref 10–24)
CALCIUM SERPL-MCNC: 8.9 MG/DL (ref 8.6–10.2)
CHLORIDE SERPL-SCNC: 102 MMOL/L (ref 96–106)
CO2 SERPL-SCNC: 23 MMOL/L (ref 20–29)
CREAT SERPL-MCNC: 1.27 MG/DL (ref 0.76–1.27)
EOSINOPHIL # BLD AUTO: 0 X10E3/UL (ref 0–0.4)
EOSINOPHIL NFR BLD AUTO: 0 %
ERYTHROCYTE [DISTWIDTH] IN BLOOD BY AUTOMATED COUNT: 11.5 % (ref 11.6–15.4)
FERRITIN SERPL-MCNC: 156 NG/ML (ref 30–400)
GLOBULIN SER CALC-MCNC: 2.7 G/DL (ref 1.5–4.5)
GLUCOSE SERPL-MCNC: 139 MG/DL (ref 65–99)
HCT VFR BLD AUTO: 34.6 % (ref 37.5–51)
HGB BLD-MCNC: 12.2 G/DL (ref 13–17.7)
IMM GRANULOCYTES # BLD AUTO: 0 X10E3/UL (ref 0–0.1)
IMM GRANULOCYTES NFR BLD AUTO: 0 %
IRON SATN MFR SERPL: 39 % (ref 15–55)
IRON SERPL-MCNC: 100 UG/DL (ref 38–169)
LYMPHOCYTES # BLD AUTO: 1.9 X10E3/UL (ref 0.7–3.1)
LYMPHOCYTES NFR BLD AUTO: 17 %
MCH RBC QN AUTO: 35.1 PG (ref 26.6–33)
MCHC RBC AUTO-ENTMCNC: 35.3 G/DL (ref 31.5–35.7)
MCV RBC AUTO: 99 FL (ref 79–97)
MONOCYTES # BLD AUTO: 0.6 X10E3/UL (ref 0.1–0.9)
MONOCYTES NFR BLD AUTO: 6 %
NEUTROPHILS # BLD AUTO: 8.8 X10E3/UL (ref 1.4–7)
NEUTROPHILS NFR BLD AUTO: 77 %
PLATELET # BLD AUTO: 324 X10E3/UL (ref 150–450)
POTASSIUM SERPL-SCNC: 4.4 MMOL/L (ref 3.5–5.2)
PROT SERPL-MCNC: 6.4 G/DL (ref 6–8.5)
RBC # BLD AUTO: 3.48 X10E6/UL (ref 4.14–5.8)
SODIUM SERPL-SCNC: 142 MMOL/L (ref 134–144)
TIBC SERPL-MCNC: 259 UG/DL (ref 250–450)
UIBC SERPL-MCNC: 159 UG/DL (ref 111–343)
WBC # BLD AUTO: 11.4 X10E3/UL (ref 3.4–10.8)

## 2020-10-13 ENCOUNTER — TELEPHONE (OUTPATIENT)
Dept: UROLOGY | Facility: CLINIC | Age: 80
End: 2020-10-13

## 2020-10-13 DIAGNOSIS — N40.0 ENLARGED PROSTATE: ICD-10-CM

## 2020-10-13 DIAGNOSIS — R39.12 WEAK URINARY STREAM: ICD-10-CM

## 2020-10-13 RX ORDER — TAMSULOSIN HYDROCHLORIDE 0.4 MG/1
2 CAPSULE ORAL NIGHTLY
Qty: 180 CAPSULE | Refills: 5 | Status: SHIPPED | OUTPATIENT
Start: 2020-10-13 | End: 2021-10-20 | Stop reason: SDUPTHER

## 2020-10-13 NOTE — TELEPHONE ENCOUNTER
PT IS NEEDING SCRIPT UPPING HIS TANSILOSUM TO 2 PILLS A DAY OR SOMETHING TO HIS PCP DR ALONSO CARBALLOING THIS. HE HAS BEEN TAKING THE 2 PILLS A DAY AND IS DOING BETTER WITH IT AND NO COMPLICATIONS. FERNANDO TIRADO  IS DPA CALLED AND HANDLES ALL HIS APPTS AND MEDICAL NEEDS.

## 2020-10-14 ENCOUNTER — TELEPHONE (OUTPATIENT)
Dept: UROLOGY | Facility: CLINIC | Age: 80
End: 2020-10-14

## 2020-10-14 NOTE — TELEPHONE ENCOUNTER
Advised Mr Larsen that Dr Silva sent the prescription in for 2 capsules of tamsulosin every night.

## 2020-10-25 NOTE — PROGRESS NOTES
Subjective   Spencer Moore is a 80 y.o. male is here today for follow-up.    Long-term stable narcolepsy patient.  The patient has been stable on Ritalin for a number of years.  Trials off of Ritalin have not gone very well.  The patient has good family support.  No issues are identified with narcolepsy breakthrough episodes or management.    Trigeminal neuralgia is well addressed with carbamazepine with intermittent breakthroughs.    Trigeminal Neuralgia  This is a chronic problem. The current episode started more than 1 year ago. The problem occurs intermittently. The problem has been unchanged. Associated symptoms include fatigue and weakness. The symptoms are aggravated by exertion (Speaking, chewing, touch). Treatments tried: Carbamazepine. The treatment provided significant relief.        The following portions of the patient's history were reviewed and updated as appropriate: allergies, current medications, past family history, past medical history, past social history, past surgical history and problem list.    Review of Systems   Constitutional: Positive for fatigue.   HENT: Positive for hearing loss and tinnitus.    Eyes: Positive for visual disturbance.   Respiratory: Positive for shortness of breath.    Cardiovascular: Negative.    Gastrointestinal: Negative.  Positive for GERD.   Endocrine: Negative.    Genitourinary: Positive for difficulty urinating.   Musculoskeletal: Positive for back pain and gait problem.   Skin: Negative.    Allergic/Immunologic: Negative.    Neurological: Positive for dizziness, tremors, weakness, headache and memory problem.   Hematological: Negative.    Psychiatric/Behavioral: Positive for decreased concentration, dysphoric mood and sleep disturbance. The patient is nervous/anxious.          Current Outpatient Medications:   •  aspirin 81 MG EC tablet, Take 81 mg by mouth Daily., Disp: , Rfl:   •  atorvastatin (LIPITOR) 10 MG tablet, Take 10 mg by mouth Daily., Disp: , Rfl:    •  CloNIDine (CATAPRES) 0.1 MG tablet, Take 0.1 mg by mouth As Needed for High Blood Pressure (1 tablet if systolic is over 170)., Disp: , Rfl:   •  cyanocobalamin 1000 MCG/ML injection, Inject 1 mL into the appropriate muscle as directed by prescriber Every 28 (Twenty-Eight) Days., Disp: 10 mL, Rfl: 0  •  finasteride (PROSCAR) 5 MG tablet, Take 1 tablet by mouth Daily., Disp: 90 tablet, Rfl: 3  •  FLUoxetine (PROzac) 40 MG capsule, Take 40 mg by mouth Daily., Disp: , Rfl:   •  hydroCHLOROthiazide (HYDRODIURIL) 12.5 MG tablet, Take 12.5 mg by mouth Daily., Disp: , Rfl:   •  hyoscyamine (LEVSIN) 0.125 MG SL tablet, Take 125 mcg by mouth 2 (Two) Times a Day., Disp: , Rfl:   •  irbesartan (AVAPRO) 300 MG tablet, Take 300 mg by mouth Daily., Disp: , Rfl:   •  metoprolol tartrate (LOPRESSOR) 25 MG tablet, Take 1 tablet by mouth 2 (Two) Times a Day. (Patient taking differently: Take 25 mg by mouth Daily.), Disp: 60 tablet, Rfl: 11  •  Multiple Vitamins-Minerals (MULTIVITAMIN ADULT PO), Take  by mouth., Disp: , Rfl:   •  mupirocin (BACTROBAN) 2 % ointment, APPLY TO AFFECTED AREA TWICE DAILY AS NEEDED, Disp: , Rfl:   •  omeprazole (priLOSEC) 40 MG capsule, Take 40 mg by mouth Daily., Disp: , Rfl:   •  oxyCODONE-acetaminophen (PERCOCET) 7.5-325 MG per tablet, Take 1 tablet by mouth Every 6 (Six) Hours As Needed. Takes 7.5mg four times daily, Disp: , Rfl:   •  predniSONE (DELTASONE) 2.5 MG tablet, Take 3 tablets by mouth Daily., Disp: 90 tablet, Rfl: 3  •  terazosin (HYTRIN) 2 MG capsule, Take 2 mg by mouth Every Night., Disp: , Rfl:   •  carBAMazepine (TEGretol) 200 MG tablet, Take 1 tablet by mouth 2 (Two) Times a Day., Disp: 60 tablet, Rfl: 3  •  methylphenidate (Ritalin) 20 MG tablet, Take 1 tablet by mouth 3 (Three) Times a Day., Disp: 90 tablet, Rfl: 0  •  tamsulosin (FLOMAX) 0.4 MG capsule 24 hr capsule, Take 2 capsules by mouth Every Night., Disp: 180 capsule, Rfl: 5     Objective   Physical  Exam      Assessment/Plan   Diagnoses and all orders for this visit:    1. 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 Trigeminal neuralgia of right side of face  -     carBAMazepine (TEGretol) 200 MG tablet; Take 1 tablet by mouth 2 (Two) Times a Day.  Dispense: 60 tablet; Refill: 3    3. Cerebrovascular small vessel disease    4. Sensorineural hearing loss (SNHL) of both ears    No changes are recommended in his present narcolepsy regimen.  He remains compliant.  Narcolepsy treatment is discussed with the patient and his family.  The patient has been on stable therapy for decades.    Right-sided trigeminal pain is well addressed with carbamazepine.  The patient is followed with heme-onc, recent labs are reviewed.    Small vessel cerebrovascular disease the patient will maintain aspirin and Lipitor.    Extra time and effort is taken to be sure the patient's hearing loss is accounted for and explanations and discussions today.    The patient and family questions and concerns are reviewed.             Dictated utilizing Dragon dictation.

## 2020-10-27 RX ORDER — PREDNISONE 2.5 MG
TABLET ORAL
Qty: 90 TABLET | Refills: 3 | Status: SHIPPED | OUTPATIENT
Start: 2020-10-27 | End: 2021-02-08

## 2020-11-03 DIAGNOSIS — G47.419 PRIMARY NARCOLEPSY WITHOUT CATAPLEXY: ICD-10-CM

## 2020-11-04 RX ORDER — METHYLPHENIDATE HYDROCHLORIDE 20 MG/1
20 TABLET ORAL 3 TIMES DAILY
Qty: 90 TABLET | Refills: 0 | Status: SHIPPED | OUTPATIENT
Start: 2020-11-04 | End: 2020-12-03 | Stop reason: SDUPTHER

## 2020-11-10 ENCOUNTER — CLINICAL SUPPORT (OUTPATIENT)
Dept: INTERNAL MEDICINE | Facility: CLINIC | Age: 80
End: 2020-11-10

## 2020-11-10 DIAGNOSIS — D53.9 MACROCYTIC ANEMIA: Primary | ICD-10-CM

## 2020-11-10 PROCEDURE — 36415 COLL VENOUS BLD VENIPUNCTURE: CPT | Performed by: INTERNAL MEDICINE

## 2020-11-10 NOTE — PROGRESS NOTES
Venipuncture Blood Specimen Collection  Venipuncture performed in right ac by Samanta Van MA with good hemostasis. Patient tolerated the procedure well without complications.   11/10/20   Samanta Van MA

## 2020-11-11 LAB
BASOPHILS # BLD AUTO: 0.1 X10E3/UL (ref 0–0.2)
BASOPHILS NFR BLD AUTO: 1 %
EOSINOPHIL # BLD AUTO: 0 X10E3/UL (ref 0–0.4)
EOSINOPHIL NFR BLD AUTO: 0 %
ERYTHROCYTE [DISTWIDTH] IN BLOOD BY AUTOMATED COUNT: 11.8 % (ref 11.6–15.4)
HCT VFR BLD AUTO: 34.5 % (ref 37.5–51)
HGB BLD-MCNC: 12 G/DL (ref 13–17.7)
IMM GRANULOCYTES # BLD AUTO: 0.1 X10E3/UL (ref 0–0.1)
IMM GRANULOCYTES NFR BLD AUTO: 1 %
LYMPHOCYTES # BLD AUTO: 1.9 X10E3/UL (ref 0.7–3.1)
LYMPHOCYTES NFR BLD AUTO: 15 %
MCH RBC QN AUTO: 35 PG (ref 26.6–33)
MCHC RBC AUTO-ENTMCNC: 34.8 G/DL (ref 31.5–35.7)
MCV RBC AUTO: 101 FL (ref 79–97)
MONOCYTES # BLD AUTO: 0.8 X10E3/UL (ref 0.1–0.9)
MONOCYTES NFR BLD AUTO: 6 %
NEUTROPHILS # BLD AUTO: 9.8 X10E3/UL (ref 1.4–7)
NEUTROPHILS NFR BLD AUTO: 77 %
PLATELET # BLD AUTO: 278 X10E3/UL (ref 150–450)
RBC # BLD AUTO: 3.43 X10E6/UL (ref 4.14–5.8)
WBC # BLD AUTO: 12.6 X10E3/UL (ref 3.4–10.8)

## 2020-12-03 DIAGNOSIS — G47.419 PRIMARY NARCOLEPSY WITHOUT CATAPLEXY: ICD-10-CM

## 2020-12-03 RX ORDER — METHYLPHENIDATE HYDROCHLORIDE 20 MG/1
20 TABLET ORAL 3 TIMES DAILY
Qty: 90 TABLET | Refills: 0 | Status: SHIPPED | OUTPATIENT
Start: 2020-12-03 | End: 2021-01-03 | Stop reason: SDUPTHER

## 2020-12-09 ENCOUNTER — CLINICAL SUPPORT (OUTPATIENT)
Dept: INTERNAL MEDICINE | Facility: CLINIC | Age: 80
End: 2020-12-09

## 2020-12-09 DIAGNOSIS — D53.9 MACROCYTIC ANEMIA: Primary | ICD-10-CM

## 2020-12-09 PROCEDURE — 36415 COLL VENOUS BLD VENIPUNCTURE: CPT | Performed by: FAMILY MEDICINE

## 2020-12-09 NOTE — PROGRESS NOTES
Venipuncture Blood Specimen Collection  Venipuncture performed in right ac by Samanta Van MA with good hemostasis. Patient tolerated the procedure well without complications.   12/09/20   Samanta Van MA

## 2020-12-11 LAB
ALBUMIN SERPL-MCNC: NORMAL G/DL
ALP SERPL-CCNC: NORMAL U/L
ALT SERPL-CCNC: NORMAL U/L
AST SERPL-CCNC: NORMAL U/L
BASOPHILS # BLD AUTO: 0.1 X10E3/UL (ref 0–0.2)
BASOPHILS NFR BLD AUTO: 1 %
BILIRUB SERPL-MCNC: NORMAL MG/DL
BUN SERPL-MCNC: NORMAL MG/DL
CALCIUM SERPL-MCNC: NORMAL MG/DL
CHLORIDE SERPL-SCNC: NORMAL MMOL/L
CO2 SERPL-SCNC: NORMAL MMOL/L
CREAT SERPL-MCNC: NORMAL MG/DL
EOSINOPHIL # BLD AUTO: 0 X10E3/UL (ref 0–0.4)
EOSINOPHIL NFR BLD AUTO: 0 %
ERYTHROCYTE [DISTWIDTH] IN BLOOD BY AUTOMATED COUNT: 11.8 % (ref 11.6–15.4)
FERRITIN SERPL-MCNC: NORMAL NG/ML
GLUCOSE SERPL-MCNC: NORMAL MG/DL
HCT VFR BLD AUTO: 36.6 % (ref 37.5–51)
HGB BLD-MCNC: 12.4 G/DL (ref 13–17.7)
IMM GRANULOCYTES # BLD AUTO: 0 X10E3/UL (ref 0–0.1)
IMM GRANULOCYTES NFR BLD AUTO: 0 %
LYMPHOCYTES # BLD AUTO: 2 X10E3/UL (ref 0.7–3.1)
LYMPHOCYTES NFR BLD AUTO: 19 %
MCH RBC QN AUTO: 34.6 PG (ref 26.6–33)
MCHC RBC AUTO-ENTMCNC: 33.9 G/DL (ref 31.5–35.7)
MCV RBC AUTO: 102 FL (ref 79–97)
MONOCYTES # BLD AUTO: 0.7 X10E3/UL (ref 0.1–0.9)
MONOCYTES NFR BLD AUTO: 7 %
NEUTROPHILS # BLD AUTO: 7.7 X10E3/UL (ref 1.4–7)
NEUTROPHILS NFR BLD AUTO: 73 %
PLATELET # BLD AUTO: 309 X10E3/UL (ref 150–450)
POTASSIUM SERPL-SCNC: NORMAL MMOL/L
PROT SERPL-MCNC: NORMAL G/DL
RBC # BLD AUTO: 3.58 X10E6/UL (ref 4.14–5.8)
SODIUM SERPL-SCNC: NORMAL MMOL/L
SPECIMEN STATUS: NORMAL
WBC # BLD AUTO: 10.5 X10E3/UL (ref 3.4–10.8)

## 2021-01-03 DIAGNOSIS — G47.419 PRIMARY NARCOLEPSY WITHOUT CATAPLEXY: ICD-10-CM

## 2021-01-04 RX ORDER — METHYLPHENIDATE HYDROCHLORIDE 20 MG/1
20 TABLET ORAL 3 TIMES DAILY
Qty: 90 TABLET | Refills: 0 | Status: SHIPPED | OUTPATIENT
Start: 2021-01-04 | End: 2021-02-01 | Stop reason: SDUPTHER

## 2021-01-07 DIAGNOSIS — D53.9 MACROCYTIC ANEMIA: Primary | ICD-10-CM

## 2021-01-07 RX ORDER — CYANOCOBALAMIN 1000 UG/ML
1000 INJECTION, SOLUTION INTRAMUSCULAR; SUBCUTANEOUS
Status: SHIPPED | OUTPATIENT
Start: 2021-01-07

## 2021-01-13 ENCOUNTER — CLINICAL SUPPORT (OUTPATIENT)
Dept: INTERNAL MEDICINE | Facility: CLINIC | Age: 81
End: 2021-01-13

## 2021-01-13 DIAGNOSIS — D72.829 LEUKOCYTOSIS, UNSPECIFIED TYPE: ICD-10-CM

## 2021-01-13 DIAGNOSIS — D50.9 IRON DEFICIENCY ANEMIA, UNSPECIFIED IRON DEFICIENCY ANEMIA TYPE: ICD-10-CM

## 2021-01-13 DIAGNOSIS — N18.30 STAGE 3 CHRONIC KIDNEY DISEASE, UNSPECIFIED WHETHER STAGE 3A OR 3B CKD (HCC): ICD-10-CM

## 2021-01-13 DIAGNOSIS — D53.9 MACROCYTIC ANEMIA: Primary | ICD-10-CM

## 2021-01-13 PROCEDURE — 96372 THER/PROPH/DIAG INJ SC/IM: CPT | Performed by: FAMILY MEDICINE

## 2021-01-13 RX ADMIN — CYANOCOBALAMIN 1000 MCG: 1000 INJECTION, SOLUTION INTRAMUSCULAR; SUBCUTANEOUS at 14:55

## 2021-01-13 NOTE — PROGRESS NOTES
Venipuncture Blood Specimen Collection  Venipuncture performed in right ac by Samanta Van MA with good hemostasis. Patient tolerated the procedure well without complications.   01/13/21   Samanta Van MA

## 2021-01-14 LAB
ALBUMIN SERPL-MCNC: 4 G/DL (ref 3.7–4.7)
ALBUMIN/GLOB SERPL: 1.6 {RATIO} (ref 1.2–2.2)
ALP SERPL-CCNC: 158 IU/L (ref 39–117)
ALT SERPL-CCNC: 21 IU/L (ref 0–44)
AST SERPL-CCNC: 23 IU/L (ref 0–40)
BASOPHILS # BLD AUTO: 0 X10E3/UL (ref 0–0.2)
BASOPHILS NFR BLD AUTO: 0 %
BILIRUB SERPL-MCNC: 0.2 MG/DL (ref 0–1.2)
BUN SERPL-MCNC: 22 MG/DL (ref 8–27)
BUN/CREAT SERPL: 16 (ref 10–24)
CALCIUM SERPL-MCNC: 9.1 MG/DL (ref 8.6–10.2)
CHLORIDE SERPL-SCNC: 105 MMOL/L (ref 96–106)
CO2 SERPL-SCNC: 18 MMOL/L (ref 20–29)
CREAT SERPL-MCNC: 1.36 MG/DL (ref 0.76–1.27)
EOSINOPHIL # BLD AUTO: 0 X10E3/UL (ref 0–0.4)
EOSINOPHIL NFR BLD AUTO: 0 %
ERYTHROCYTE [DISTWIDTH] IN BLOOD BY AUTOMATED COUNT: 11.5 % (ref 11.6–15.4)
FERRITIN SERPL-MCNC: 114 NG/ML (ref 30–400)
GLOBULIN SER CALC-MCNC: 2.5 G/DL (ref 1.5–4.5)
GLUCOSE SERPL-MCNC: 152 MG/DL (ref 65–99)
HCT VFR BLD AUTO: 37.4 % (ref 37.5–51)
HGB BLD-MCNC: 12.4 G/DL (ref 13–17.7)
IMM GRANULOCYTES # BLD AUTO: 0 X10E3/UL (ref 0–0.1)
IMM GRANULOCYTES NFR BLD AUTO: 0 %
IRON SATN MFR SERPL: 38 % (ref 15–55)
IRON SERPL-MCNC: 106 UG/DL (ref 38–169)
LYMPHOCYTES # BLD AUTO: 1.8 X10E3/UL (ref 0.7–3.1)
LYMPHOCYTES NFR BLD AUTO: 19 %
MCH RBC QN AUTO: 34.2 PG (ref 26.6–33)
MCHC RBC AUTO-ENTMCNC: 33.2 G/DL (ref 31.5–35.7)
MCV RBC AUTO: 103 FL (ref 79–97)
MONOCYTES # BLD AUTO: 0.6 X10E3/UL (ref 0.1–0.9)
MONOCYTES NFR BLD AUTO: 6 %
NEUTROPHILS # BLD AUTO: 7.1 X10E3/UL (ref 1.4–7)
NEUTROPHILS NFR BLD AUTO: 75 %
PLATELET # BLD AUTO: 288 X10E3/UL (ref 150–450)
POTASSIUM SERPL-SCNC: 4.8 MMOL/L (ref 3.5–5.2)
PROT SERPL-MCNC: 6.5 G/DL (ref 6–8.5)
RBC # BLD AUTO: 3.63 X10E6/UL (ref 4.14–5.8)
SODIUM SERPL-SCNC: 141 MMOL/L (ref 134–144)
TIBC SERPL-MCNC: 282 UG/DL (ref 250–450)
UIBC SERPL-MCNC: 176 UG/DL (ref 111–343)
WBC # BLD AUTO: 9.5 X10E3/UL (ref 3.4–10.8)
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
## 2021-01-25 ENCOUNTER — OFFICE VISIT (OUTPATIENT)
Dept: ONCOLOGY | Facility: CLINIC | Age: 81
End: 2021-01-25

## 2021-01-25 VITALS
WEIGHT: 194.8 LBS | RESPIRATION RATE: 16 BRPM | SYSTOLIC BLOOD PRESSURE: 132 MMHG | DIASTOLIC BLOOD PRESSURE: 84 MMHG | TEMPERATURE: 96.8 F | HEIGHT: 68 IN | BODY MASS INDEX: 29.52 KG/M2 | OXYGEN SATURATION: 96 % | HEART RATE: 66 BPM

## 2021-01-25 DIAGNOSIS — D53.9 MACROCYTIC ANEMIA: Primary | ICD-10-CM

## 2021-01-25 PROCEDURE — 99214 OFFICE O/P EST MOD 30 MIN: CPT | Performed by: INTERNAL MEDICINE

## 2021-01-29 ENCOUNTER — OFFICE VISIT (OUTPATIENT)
Dept: NEUROLOGY | Facility: CLINIC | Age: 81
End: 2021-01-29

## 2021-01-29 VITALS
DIASTOLIC BLOOD PRESSURE: 88 MMHG | SYSTOLIC BLOOD PRESSURE: 128 MMHG | OXYGEN SATURATION: 99 % | WEIGHT: 194 LBS | BODY MASS INDEX: 29.4 KG/M2 | HEART RATE: 62 BPM | HEIGHT: 68 IN

## 2021-01-29 DIAGNOSIS — G47.419 PRIMARY NARCOLEPSY WITHOUT CATAPLEXY: Primary | ICD-10-CM

## 2021-01-29 DIAGNOSIS — I67.9 CEREBROVASCULAR SMALL VESSEL DISEASE: ICD-10-CM

## 2021-01-29 DIAGNOSIS — Z51.81 MEDICATION MONITORING ENCOUNTER: ICD-10-CM

## 2021-01-29 DIAGNOSIS — G50.0 TRIGEMINAL NEURALGIA OF RIGHT SIDE OF FACE: ICD-10-CM

## 2021-01-29 PROCEDURE — 99214 OFFICE O/P EST MOD 30 MIN: CPT | Performed by: PHYSICIAN ASSISTANT

## 2021-01-29 RX ORDER — CARBAMAZEPINE 200 MG/1
200 TABLET ORAL 2 TIMES DAILY
Qty: 60 TABLET | Refills: 3 | Status: SHIPPED | OUTPATIENT
Start: 2021-01-29 | End: 2021-04-09

## 2021-02-01 DIAGNOSIS — G47.419 PRIMARY NARCOLEPSY WITHOUT CATAPLEXY: ICD-10-CM

## 2021-02-02 RX ORDER — METHYLPHENIDATE HYDROCHLORIDE 20 MG/1
20 TABLET ORAL 3 TIMES DAILY
Qty: 90 TABLET | Refills: 0 | Status: SHIPPED | OUTPATIENT
Start: 2021-02-02 | End: 2021-03-02 | Stop reason: SDUPTHER

## 2021-02-08 RX ORDER — PREDNISONE 2.5 MG
TABLET ORAL
Qty: 90 TABLET | Refills: 3 | Status: SHIPPED | OUTPATIENT
Start: 2021-02-08 | End: 2021-05-07

## 2021-02-23 ENCOUNTER — CLINICAL SUPPORT (OUTPATIENT)
Dept: INTERNAL MEDICINE | Facility: CLINIC | Age: 81
End: 2021-02-23

## 2021-02-23 DIAGNOSIS — N18.30 STAGE 3 CHRONIC KIDNEY DISEASE, UNSPECIFIED WHETHER STAGE 3A OR 3B CKD (HCC): ICD-10-CM

## 2021-02-23 DIAGNOSIS — D53.9 MACROCYTIC ANEMIA: Primary | ICD-10-CM

## 2021-02-23 DIAGNOSIS — Z51.81 ENCOUNTER FOR THERAPEUTIC DRUG MONITORING: ICD-10-CM

## 2021-02-23 DIAGNOSIS — E53.8 B12 DEFICIENCY: ICD-10-CM

## 2021-02-23 PROCEDURE — 96372 THER/PROPH/DIAG INJ SC/IM: CPT | Performed by: FAMILY MEDICINE

## 2021-02-23 RX ADMIN — CYANOCOBALAMIN 1000 MCG: 1000 INJECTION, SOLUTION INTRAMUSCULAR; SUBCUTANEOUS at 14:14

## 2021-02-23 NOTE — PROGRESS NOTES
Venipuncture Blood Specimen Collection  Venipuncture performed in RAC by Bethany Talamantes MA with good hemostasis. Patient tolerated the procedure well without complications.   02/23/21   Maria L Sevilla RN

## 2021-02-24 LAB
ME-PHENIDATE UR-MCNC: NORMAL NG/ML
PPAA UR-MCNC: NORMAL NG/ML
SPECIMEN STATUS: NORMAL

## 2021-02-25 LAB
ALBUMIN SERPL-MCNC: 4 G/DL (ref 3.7–4.7)
ALBUMIN/GLOB SERPL: 1.6 {RATIO} (ref 1.2–2.2)
ALP SERPL-CCNC: 138 IU/L (ref 39–117)
ALT SERPL-CCNC: 18 IU/L (ref 0–44)
AST SERPL-CCNC: 18 IU/L (ref 0–40)
BASOPHILS # BLD AUTO: 0.1 X10E3/UL (ref 0–0.2)
BASOPHILS NFR BLD AUTO: 1 %
BILIRUB SERPL-MCNC: 0.3 MG/DL (ref 0–1.2)
BUN SERPL-MCNC: 24 MG/DL (ref 8–27)
BUN/CREAT SERPL: 17 (ref 10–24)
CALCIUM SERPL-MCNC: 8.8 MG/DL (ref 8.6–10.2)
CHLORIDE SERPL-SCNC: 104 MMOL/L (ref 96–106)
CO2 SERPL-SCNC: 23 MMOL/L (ref 20–29)
CREAT SERPL-MCNC: 1.4 MG/DL (ref 0.76–1.27)
EOSINOPHIL # BLD AUTO: 0.2 X10E3/UL (ref 0–0.4)
EOSINOPHIL NFR BLD AUTO: 2 %
ERYTHROCYTE [DISTWIDTH] IN BLOOD BY AUTOMATED COUNT: 11.6 % (ref 11.6–15.4)
FERRITIN SERPL-MCNC: 142 NG/ML (ref 30–400)
GLOBULIN SER CALC-MCNC: 2.5 G/DL (ref 1.5–4.5)
GLUCOSE SERPL-MCNC: 129 MG/DL (ref 65–99)
HCT VFR BLD AUTO: 35.9 % (ref 37.5–51)
HGB BLD-MCNC: 12 G/DL (ref 13–17.7)
IMM GRANULOCYTES # BLD AUTO: 0.1 X10E3/UL (ref 0–0.1)
IMM GRANULOCYTES NFR BLD AUTO: 1 %
IRON SATN MFR SERPL: 46 % (ref 15–55)
IRON SERPL-MCNC: 125 UG/DL (ref 38–169)
LYMPHOCYTES # BLD AUTO: 2.8 X10E3/UL (ref 0.7–3.1)
LYMPHOCYTES NFR BLD AUTO: 32 %
MCH RBC QN AUTO: 34.5 PG (ref 26.6–33)
MCHC RBC AUTO-ENTMCNC: 33.4 G/DL (ref 31.5–35.7)
MCV RBC AUTO: 103 FL (ref 79–97)
MONOCYTES # BLD AUTO: 0.8 X10E3/UL (ref 0.1–0.9)
MONOCYTES NFR BLD AUTO: 9 %
MORPHOLOGY BLD-IMP: ABNORMAL
NEUTROPHILS # BLD AUTO: 4.8 X10E3/UL (ref 1.4–7)
NEUTROPHILS NFR BLD AUTO: 55 %
PLATELET # BLD AUTO: 268 X10E3/UL (ref 150–450)
POTASSIUM SERPL-SCNC: 3.8 MMOL/L (ref 3.5–5.2)
PROT SERPL-MCNC: 6.5 G/DL (ref 6–8.5)
RBC # BLD AUTO: 3.48 X10E6/UL (ref 4.14–5.8)
SODIUM SERPL-SCNC: 143 MMOL/L (ref 134–144)
TIBC SERPL-MCNC: 272 UG/DL (ref 250–450)
UIBC SERPL-MCNC: 147 UG/DL (ref 111–343)
WBC # BLD AUTO: 8.6 X10E3/UL (ref 3.4–10.8)

## 2021-03-02 DIAGNOSIS — G47.419 PRIMARY NARCOLEPSY WITHOUT CATAPLEXY: ICD-10-CM

## 2021-03-03 DIAGNOSIS — G47.419 PRIMARY NARCOLEPSY WITHOUT CATAPLEXY: ICD-10-CM

## 2021-03-03 DIAGNOSIS — Z51.81 THERAPEUTIC DRUG MONITORING: Primary | ICD-10-CM

## 2021-03-04 DIAGNOSIS — G47.419 PRIMARY NARCOLEPSY WITHOUT CATAPLEXY: ICD-10-CM

## 2021-03-04 RX ORDER — METHYLPHENIDATE HYDROCHLORIDE 20 MG/1
20 TABLET ORAL 3 TIMES DAILY
Qty: 90 TABLET | Refills: 0 | Status: CANCELLED | OUTPATIENT
Start: 2021-03-04

## 2021-03-04 RX ORDER — METHYLPHENIDATE HYDROCHLORIDE 20 MG/1
20 TABLET ORAL 3 TIMES DAILY
Qty: 90 TABLET | Refills: 0 | Status: SHIPPED | OUTPATIENT
Start: 2021-03-04 | End: 2021-03-30 | Stop reason: SDUPTHER

## 2021-03-19 ENCOUNTER — CLINICAL SUPPORT (OUTPATIENT)
Dept: INTERNAL MEDICINE | Facility: CLINIC | Age: 81
End: 2021-03-19

## 2021-03-19 DIAGNOSIS — E53.8 B12 DEFICIENCY: ICD-10-CM

## 2021-03-19 DIAGNOSIS — Z51.81 THERAPEUTIC DRUG MONITORING: ICD-10-CM

## 2021-03-19 DIAGNOSIS — G47.419 PRIMARY NARCOLEPSY WITHOUT CATAPLEXY: ICD-10-CM

## 2021-03-19 DIAGNOSIS — D53.9 MACROCYTIC ANEMIA: ICD-10-CM

## 2021-03-19 DIAGNOSIS — Z51.81 MEDICATION MONITORING ENCOUNTER: Primary | ICD-10-CM

## 2021-03-19 PROCEDURE — 36415 COLL VENOUS BLD VENIPUNCTURE: CPT | Performed by: FAMILY MEDICINE

## 2021-03-19 PROCEDURE — 96372 THER/PROPH/DIAG INJ SC/IM: CPT | Performed by: FAMILY MEDICINE

## 2021-03-19 RX ADMIN — CYANOCOBALAMIN 1000 MCG: 1000 INJECTION, SOLUTION INTRAMUSCULAR; SUBCUTANEOUS at 17:29

## 2021-03-19 NOTE — PROGRESS NOTES
Venipuncture Blood Specimen Collection  Venipuncture performed in right ac by Samanta Van MA with good hemostasis. Patient tolerated the procedure well without complications.   03/19/21   Samanta Van MA

## 2021-03-21 LAB
BASOPHILS # BLD AUTO: 0 X10E3/UL (ref 0–0.2)
BASOPHILS NFR BLD AUTO: 0 %
EOSINOPHIL # BLD AUTO: 0 X10E3/UL (ref 0–0.4)
EOSINOPHIL NFR BLD AUTO: 0 %
ERYTHROCYTE [DISTWIDTH] IN BLOOD BY AUTOMATED COUNT: 11.8 % (ref 11.6–15.4)
HCT VFR BLD AUTO: 34.3 % (ref 37.5–51)
HGB BLD-MCNC: 11.7 G/DL (ref 13–17.7)
IMM GRANULOCYTES # BLD AUTO: 0 X10E3/UL (ref 0–0.1)
IMM GRANULOCYTES NFR BLD AUTO: 0 %
LYMPHOCYTES # BLD AUTO: 1.3 X10E3/UL (ref 0.7–3.1)
LYMPHOCYTES NFR BLD AUTO: 13 %
MCH RBC QN AUTO: 34.6 PG (ref 26.6–33)
MCHC RBC AUTO-ENTMCNC: 34.1 G/DL (ref 31.5–35.7)
MCV RBC AUTO: 102 FL (ref 79–97)
MONOCYTES # BLD AUTO: 0.4 X10E3/UL (ref 0.1–0.9)
MONOCYTES NFR BLD AUTO: 4 %
NEUTROPHILS # BLD AUTO: 7.9 X10E3/UL (ref 1.4–7)
NEUTROPHILS NFR BLD AUTO: 83 %
PLATELET # BLD AUTO: 267 X10E3/UL (ref 150–450)
RBC # BLD AUTO: 3.38 X10E6/UL (ref 4.14–5.8)
WBC # BLD AUTO: 9.7 X10E3/UL (ref 3.4–10.8)

## 2021-03-26 LAB
AMPHETAMINES UR QL SCN: NEGATIVE NG/ML
BARBITURATES UR QL SCN: NEGATIVE NG/ML
BENZODIAZ UR QL SCN: NEGATIVE NG/ML
BZE UR QL SCN: NEGATIVE NG/ML
CANNABINOIDS UR QL SCN: NEGATIVE NG/ML
CREAT UR-MCNC: 139.5 MG/DL (ref 20–300)
LABORATORY COMMENT REPORT: ABNORMAL
ME-PHENIDATE UR-MCNC: >2000 NG/ML
METHADONE UR QL SCN: NEGATIVE NG/ML
OPIATES UR QL SCN: POSITIVE NG/ML
OXYCODONE+OXYMORPHONE UR QL SCN: POSITIVE NG/ML
PCP UR QL: NEGATIVE NG/ML
PH UR: 5.6 [PH] (ref 4.5–8.9)
PPAA UR-MCNC: NORMAL NG/ML
PROPOXYPH UR QL SCN: NEGATIVE NG/ML

## 2021-03-30 DIAGNOSIS — G47.419 PRIMARY NARCOLEPSY WITHOUT CATAPLEXY: ICD-10-CM

## 2021-03-30 RX ORDER — METHYLPHENIDATE HYDROCHLORIDE 20 MG/1
20 TABLET ORAL 3 TIMES DAILY
Qty: 90 TABLET | Refills: 0 | Status: SHIPPED | OUTPATIENT
Start: 2021-03-30 | End: 2021-05-03 | Stop reason: SDUPTHER

## 2021-04-09 DIAGNOSIS — G50.0 TRIGEMINAL NEURALGIA OF RIGHT SIDE OF FACE: ICD-10-CM

## 2021-04-09 RX ORDER — CARBAMAZEPINE 200 MG/1
TABLET ORAL
Qty: 60 TABLET | Refills: 5 | Status: SHIPPED | OUTPATIENT
Start: 2021-04-09 | End: 2021-11-01 | Stop reason: SDUPTHER

## 2021-04-16 ENCOUNTER — CLINICAL SUPPORT (OUTPATIENT)
Dept: INTERNAL MEDICINE | Facility: CLINIC | Age: 81
End: 2021-04-16

## 2021-04-16 DIAGNOSIS — E53.8 B12 DEFICIENCY: ICD-10-CM

## 2021-04-16 DIAGNOSIS — D53.9 MACROCYTIC ANEMIA: Primary | ICD-10-CM

## 2021-04-16 PROCEDURE — 96372 THER/PROPH/DIAG INJ SC/IM: CPT | Performed by: FAMILY MEDICINE

## 2021-04-16 RX ADMIN — CYANOCOBALAMIN 1000 MCG: 1000 INJECTION, SOLUTION INTRAMUSCULAR; SUBCUTANEOUS at 01:15

## 2021-04-17 LAB
BASOPHILS # BLD AUTO: 0.1 X10E3/UL (ref 0–0.2)
BASOPHILS NFR BLD AUTO: 1 %
EOSINOPHIL # BLD AUTO: 0.1 X10E3/UL (ref 0–0.4)
EOSINOPHIL NFR BLD AUTO: 1 %
ERYTHROCYTE [DISTWIDTH] IN BLOOD BY AUTOMATED COUNT: 11.6 % (ref 11.6–15.4)
HCT VFR BLD AUTO: 34 % (ref 37.5–51)
HGB BLD-MCNC: 11.8 G/DL (ref 13–17.7)
IMM GRANULOCYTES # BLD AUTO: 0.1 X10E3/UL (ref 0–0.1)
IMM GRANULOCYTES NFR BLD AUTO: 1 %
LYMPHOCYTES # BLD AUTO: 1.4 X10E3/UL (ref 0.7–3.1)
LYMPHOCYTES NFR BLD AUTO: 13 %
MCH RBC QN AUTO: 34.9 PG (ref 26.6–33)
MCHC RBC AUTO-ENTMCNC: 34.7 G/DL (ref 31.5–35.7)
MCV RBC AUTO: 101 FL (ref 79–97)
MONOCYTES # BLD AUTO: 0.6 X10E3/UL (ref 0.1–0.9)
MONOCYTES NFR BLD AUTO: 5 %
NEUTROPHILS # BLD AUTO: 8.8 X10E3/UL (ref 1.4–7)
NEUTROPHILS NFR BLD AUTO: 79 %
PLATELET # BLD AUTO: 272 X10E3/UL (ref 150–450)
RBC # BLD AUTO: 3.38 X10E6/UL (ref 4.14–5.8)
WBC # BLD AUTO: 11 X10E3/UL (ref 3.4–10.8)

## 2021-04-29 ENCOUNTER — HOSPITAL ENCOUNTER (OUTPATIENT)
Dept: GENERAL RADIOLOGY | Facility: HOSPITAL | Age: 81
Discharge: HOME | End: 2021-04-29
Admitting: PHYSICIAN ASSISTANT

## 2021-04-29 ENCOUNTER — OFFICE VISIT (OUTPATIENT)
Dept: NEUROLOGY | Facility: CLINIC | Age: 81
End: 2021-04-29

## 2021-04-29 VITALS
HEIGHT: 68 IN | SYSTOLIC BLOOD PRESSURE: 120 MMHG | DIASTOLIC BLOOD PRESSURE: 80 MMHG | BODY MASS INDEX: 29.55 KG/M2 | WEIGHT: 195 LBS | HEART RATE: 68 BPM | OXYGEN SATURATION: 99 %

## 2021-04-29 DIAGNOSIS — M25.552 LEFT HIP PAIN: ICD-10-CM

## 2021-04-29 DIAGNOSIS — G50.0 TRIGEMINAL NEURALGIA OF RIGHT SIDE OF FACE: ICD-10-CM
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
## 2021-04-29 DIAGNOSIS — I67.9 CEREBROVASCULAR SMALL VESSEL DISEASE: ICD-10-CM

## 2021-04-29 DIAGNOSIS — G47.419 PRIMARY NARCOLEPSY WITHOUT CATAPLEXY: Primary | ICD-10-CM

## 2021-04-29 PROCEDURE — 73502 X-RAY EXAM HIP UNI 2-3 VIEWS: CPT

## 2021-04-29 PROCEDURE — 99213 OFFICE O/P EST LOW 20 MIN: CPT | Performed by: PHYSICIAN ASSISTANT

## 2021-04-29 RX ORDER — METHOCARBAMOL 750 MG/1
750 TABLET, FILM COATED ORAL 2 TIMES DAILY
COMMUNITY
Start: 2021-04-27 | End: 2021-05-07

## 2021-05-03 DIAGNOSIS — G47.419 PRIMARY NARCOLEPSY WITHOUT CATAPLEXY: ICD-10-CM

## 2021-05-03 RX ORDER — METHYLPHENIDATE HYDROCHLORIDE 20 MG/1
20 TABLET ORAL 3 TIMES DAILY
Qty: 90 TABLET | Refills: 0 | Status: SHIPPED | OUTPATIENT
Start: 2021-05-03 | End: 2021-06-01 | Stop reason: SDUPTHER

## 2021-05-07 RX ORDER — PREDNISONE 2.5 MG
TABLET ORAL
Qty: 90 TABLET | Refills: 3 | Status: SHIPPED | OUTPATIENT
Start: 2021-05-07 | End: 2021-08-17

## 2021-05-12 ENCOUNTER — CLINICAL SUPPORT (OUTPATIENT)
Dept: INTERNAL MEDICINE | Facility: CLINIC | Age: 81
End: 2021-05-12

## 2021-05-12 DIAGNOSIS — D53.9 MACROCYTIC ANEMIA: Primary | ICD-10-CM

## 2021-05-12 DIAGNOSIS — E53.8 B12 DEFICIENCY: ICD-10-CM

## 2021-05-12 PROCEDURE — 96372 THER/PROPH/DIAG INJ SC/IM: CPT | Performed by: NURSE PRACTITIONER

## 2021-05-12 PROCEDURE — 36415 COLL VENOUS BLD VENIPUNCTURE: CPT | Performed by: NURSE PRACTITIONER

## 2021-05-12 RX ADMIN — CYANOCOBALAMIN 1000 MCG: 1000 INJECTION, SOLUTION INTRAMUSCULAR; SUBCUTANEOUS at 14:12

## 2021-05-12 NOTE — PROGRESS NOTES
Venipuncture Blood Specimen Collection  Venipuncture performed in right arm by Bethany Talamantes CMA with good hemostasis. Patient tolerated the procedure well without complications.  Patient also stated it was time for his B12 injection.    05/12/21   Bethany Talamantes CMA

## 2021-05-14 LAB
BASOPHILS # BLD AUTO: 0 X10E3/UL (ref 0–0.2)
BASOPHILS NFR BLD AUTO: 0 %
EOSINOPHIL # BLD AUTO: 0 X10E3/UL (ref 0–0.4)
EOSINOPHIL NFR BLD AUTO: 0 %
ERYTHROCYTE [DISTWIDTH] IN BLOOD BY AUTOMATED COUNT: 11.6 % (ref 11.6–15.4)
HCT VFR BLD AUTO: 36.2 % (ref 37.5–51)
HGB BLD-MCNC: 12.3 G/DL (ref 13–17.7)
IMM GRANULOCYTES # BLD AUTO: 0 X10E3/UL (ref 0–0.1)
IMM GRANULOCYTES NFR BLD AUTO: 0 %
LYMPHOCYTES # BLD AUTO: 1.8 X10E3/UL (ref 0.7–3.1)
LYMPHOCYTES NFR BLD AUTO: 15 %
MCH RBC QN AUTO: 35 PG (ref 26.6–33)
MCHC RBC AUTO-ENTMCNC: 34 G/DL (ref 31.5–35.7)
MCV RBC AUTO: 103 FL (ref 79–97)
MONOCYTES # BLD AUTO: 0.6 X10E3/UL (ref 0.1–0.9)
MONOCYTES NFR BLD AUTO: 5 %
NEUTROPHILS # BLD AUTO: 9.5 X10E3/UL (ref 1.4–7)
NEUTROPHILS NFR BLD AUTO: 80 %
PLATELET # BLD AUTO: 281 X10E3/UL (ref 150–450)
RBC # BLD AUTO: 3.51 X10E6/UL (ref 4.14–5.8)
WBC # BLD AUTO: 12 X10E3/UL (ref 3.4–10.8)

## 2021-05-16 NOTE — PROGRESS NOTES
Subjective   Spencer Moore is a 80 y.o. male is here today for follow-up.    Long-term stable narcolepsy patient.  The patient has been stable on Ritalin for a number of years.  Trials off of Ritalin have not gone very well.  The patient has good family support.  No issues are identified with narcolepsy breakthrough episodes or management.    Trigeminal neuralgia is well addressed with carbamazepine with intermittent breakthroughs.    Trigeminal Neuralgia  This is a chronic problem. The current episode started more than 1 year ago. The problem occurs intermittently. The problem has been unchanged. Associated symptoms include fatigue and weakness. The symptoms are aggravated by exertion (Speaking, chewing, touch). Treatments tried: Carbamazepine. The treatment provided significant relief.        The following portions of the patient's history were reviewed and updated as appropriate: allergies, current medications, past family history, past medical history, past social history, past surgical history and problem list.    Review of Systems   Constitutional: Positive for fatigue.   HENT: Positive for hearing loss and tinnitus.    Eyes: Positive for visual disturbance.   Respiratory: Positive for shortness of breath.    Cardiovascular: Negative.    Gastrointestinal: Negative.  Positive for GERD.   Endocrine: Negative.    Genitourinary: Positive for difficulty urinating.   Musculoskeletal: Positive for back pain and gait problem.   Skin: Negative.    Allergic/Immunologic: Negative.    Neurological: Positive for dizziness, tremors, weakness, headache and memory problem.   Hematological: Negative.    Psychiatric/Behavioral: Positive for decreased concentration, dysphoric mood and sleep disturbance. The patient is nervous/anxious.          Current Outpatient Medications:   •  aspirin 81 MG EC tablet, Take 81 mg by mouth Daily., Disp: , Rfl:   •  atorvastatin (LIPITOR) 10 MG tablet, Take 10 mg by mouth Daily., Disp: , Rfl:    •  carBAMazepine (TEGretol) 200 MG tablet, TAKE ONE TABLET BY MOUTH TWICE DAILY (Patient taking differently: Take 200 mg by mouth 2 (Two) Times a Day.), Disp: 60 tablet, Rfl: 5  •  CloNIDine (CATAPRES) 0.1 MG tablet, Take 0.1 mg by mouth As Needed for High Blood Pressure (1 tablet if systolic is over 170)., Disp: , Rfl:   •  cyanocobalamin 1000 MCG/ML injection, Inject 1 mL into the appropriate muscle as directed by prescriber Every 28 (Twenty-Eight) Days., Disp: 10 mL, Rfl: 0  •  finasteride (PROSCAR) 5 MG tablet, Take 1 tablet by mouth Daily., Disp: 90 tablet, Rfl: 3  •  FLUoxetine (PROzac) 40 MG capsule, Take 40 mg by mouth Daily., Disp: , Rfl:   •  hydroCHLOROthiazide (HYDRODIURIL) 12.5 MG tablet, Take 12.5 mg by mouth Daily., Disp: , Rfl:   •  hyoscyamine (LEVSIN) 0.125 MG SL tablet, Take 125 mcg by mouth 2 (Two) Times a Day., Disp: , Rfl:   •  irbesartan (AVAPRO) 300 MG tablet, Take 300 mg by mouth Daily., Disp: , Rfl:   •  metoprolol tartrate (LOPRESSOR) 25 MG tablet, Take 1 tablet by mouth 2 (Two) Times a Day. (Patient taking differently: Take 25 mg by mouth Daily.), Disp: 60 tablet, Rfl: 11  •  Multiple Vitamins-Minerals (MULTIVITAMIN ADULT PO), Take  by mouth., Disp: , Rfl:   •  mupirocin (BACTROBAN) 2 % ointment, APPLY TO AFFECTED AREA TWICE DAILY AS NEEDED, Disp: , Rfl:   •  omeprazole (priLOSEC) 40 MG capsule, Take 40 mg by mouth Daily., Disp: , Rfl:   •  oxyCODONE-acetaminophen (PERCOCET) 7.5-325 MG per tablet, Take 1 tablet by mouth Every 6 (Six) Hours As Needed. Takes 7.5mg four times daily, Disp: , Rfl:   •  tamsulosin (FLOMAX) 0.4 MG capsule 24 hr capsule, Take 2 capsules by mouth Every Night., Disp: 180 capsule, Rfl: 5  •  terazosin (HYTRIN) 2 MG capsule, Take 2 mg by mouth Every Night., Disp: , Rfl:   •  methylphenidate (Ritalin) 20 MG tablet, Take 1 tablet by mouth 3 (Three) Times a Day., Disp: 90 tablet, Rfl: 0  •  predniSONE (DELTASONE) 2.5 MG tablet, TAKE THREE TABLETS BY MOUTH DAILY, Disp:  90 tablet, Rfl: 3    Current Facility-Administered Medications:   •  cyanocobalamin injection 1,000 mcg, 1,000 mcg, Intramuscular, Q30 Days, Rosi Baker DO, 1,000 mcg at 05/12/21 1412     Objective   Physical Exam  Vitals and nursing note reviewed.   HENT:      Head: Normocephalic.      Right Ear: External ear normal. Decreased hearing noted.      Left Ear: External ear normal. Decreased hearing noted.   Eyes:      General: Lids are normal. Vision grossly intact. Gaze aligned appropriately. No scleral icterus.     Extraocular Movements: Extraocular movements intact.      Conjunctiva/sclera: Conjunctivae normal.   Neck:      Vascular: No JVD.      Trachea: Trachea and phonation normal.   Cardiovascular:      Rate and Rhythm: Normal rate.      Heart sounds: Normal heart sounds.   Pulmonary:      Effort: Pulmonary effort is normal.   Skin:     General: Skin is warm and dry.   Neurological:      Mental Status: He is alert.      GCS: GCS eye subscore is 4. GCS verbal subscore is 5. GCS motor subscore is 6.      Cranial Nerves: Cranial nerve deficit present.      Sensory: Sensation is intact.      Motor: Weakness and tremor present.      Coordination: Coordination is intact.      Gait: Gait abnormal.      Deep Tendon Reflexes: Reflexes are normal and symmetric.   Psychiatric:         Attention and Perception: Attention and perception normal.         Mood and Affect: Mood and affect normal.         Speech: Speech normal.         Behavior: Behavior normal.         Thought Content: Thought content normal.         Cognition and Memory: Cognition and memory normal.         Judgment: Judgment normal.           Assessment/Plan   Diagnoses and all orders for this visit:    1. 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 Trigeminal neuralgia of right side of face    3. Cerebrovascular small vessel disease    4. Left hip pain  -     Cancel: XR hip w or wo pelvis 4 view left  -     XR Hip With or Without Pelvis 2 -  3 View Left    5. Fall, initial encounter  -     Cancel: XR hip w or wo pelvis 4 view left  -     XR Hip With or Without Pelvis 2 - 3 View Left      Continue Ritalin per Dr. Bal directions.  This patient has been stable on this medication for a number of years and does very well with it.    Trigeminal neuralgia is stable continue Tegretol.    Continue to recommend aspirin and Lipitor for established cerebrovascular disease.    The patient had a recent fall and has pain concentrated about his left hip.  He has a reassuring physical exam, however I will recommend x-ray.    Today's findings and recommendations are reviewed with the patient and his family.               Dictated utilizing Dragon dictation.

## 2021-06-01 DIAGNOSIS — G47.419 PRIMARY NARCOLEPSY WITHOUT CATAPLEXY: ICD-10-CM

## 2021-06-01 RX ORDER — METHYLPHENIDATE HYDROCHLORIDE 20 MG/1
20 TABLET ORAL 3 TIMES DAILY
Qty: 90 TABLET | Refills: 0 | Status: SHIPPED | OUTPATIENT
Start: 2021-06-01 | End: 2021-06-28 | Stop reason: SDUPTHER

## 2021-06-08 ENCOUNTER — CLINICAL SUPPORT (OUTPATIENT)
Dept: INTERNAL MEDICINE | Facility: CLINIC | Age: 81
End: 2021-06-08

## 2021-06-08 DIAGNOSIS — D53.9 MACROCYTIC ANEMIA: Primary | ICD-10-CM

## 2021-06-08 PROCEDURE — 36415 COLL VENOUS BLD VENIPUNCTURE: CPT | Performed by: INTERNAL MEDICINE

## 2021-06-08 PROCEDURE — 96372 THER/PROPH/DIAG INJ SC/IM: CPT | Performed by: INTERNAL MEDICINE

## 2021-06-08 RX ADMIN — CYANOCOBALAMIN 1000 MCG: 1000 INJECTION, SOLUTION INTRAMUSCULAR; SUBCUTANEOUS at 12:16

## 2021-06-08 NOTE — PROGRESS NOTES
Venipuncture Blood Specimen Collection  Venipuncture performed in right arm by Bethany Talamantes CMA with good hemostasis. Patient tolerated the procedure well without complications.   06/08/21   Bethany Talamantes CMA

## 2021-06-09 LAB
BASOPHILS # BLD AUTO: 0.1 X10E3/UL (ref 0–0.2)
BASOPHILS NFR BLD AUTO: 1 %
EOSINOPHIL # BLD AUTO: 0.1 X10E3/UL (ref 0–0.4)
EOSINOPHIL NFR BLD AUTO: 1 %
ERYTHROCYTE [DISTWIDTH] IN BLOOD BY AUTOMATED COUNT: 11.3 % (ref 11.6–15.4)
HCT VFR BLD AUTO: 35.3 % (ref 37.5–51)
HGB BLD-MCNC: 12.1 G/DL (ref 13–17.7)
IMM GRANULOCYTES # BLD AUTO: 0 X10E3/UL (ref 0–0.1)
IMM GRANULOCYTES NFR BLD AUTO: 0 %
LYMPHOCYTES # BLD AUTO: 1.3 X10E3/UL (ref 0.7–3.1)
LYMPHOCYTES NFR BLD AUTO: 13 %
MCH RBC QN AUTO: 34.7 PG (ref 26.6–33)
MCHC RBC AUTO-ENTMCNC: 34.3 G/DL (ref 31.5–35.7)
MCV RBC AUTO: 101 FL (ref 79–97)
MONOCYTES # BLD AUTO: 0.5 X10E3/UL (ref 0.1–0.9)
MONOCYTES NFR BLD AUTO: 5 %
NEUTROPHILS # BLD AUTO: 8 X10E3/UL (ref 1.4–7)
NEUTROPHILS NFR BLD AUTO: 80 %
PLATELET # BLD AUTO: 276 X10E3/UL (ref 150–450)
RBC # BLD AUTO: 3.49 X10E6/UL (ref 4.14–5.8)
WBC # BLD AUTO: 9.9 X10E3/UL (ref 3.4–10.8)

## 2021-06-28 DIAGNOSIS — G47.419 PRIMARY NARCOLEPSY WITHOUT CATAPLEXY: ICD-10-CM

## 2021-06-28 RX ORDER — METHYLPHENIDATE HYDROCHLORIDE 20 MG/1
20 TABLET ORAL 3 TIMES DAILY
Qty: 90 TABLET | Refills: 0 | Status: SHIPPED | OUTPATIENT
Start: 2021-06-28 | End: 2021-07-30 | Stop reason: SDUPTHER

## 2021-07-06 ENCOUNTER — CLINICAL SUPPORT (OUTPATIENT)
Dept: INTERNAL MEDICINE | Facility: CLINIC | Age: 81
End: 2021-07-06

## 2021-07-06 DIAGNOSIS — D53.9 MACROCYTIC ANEMIA: Primary | ICD-10-CM

## 2021-07-06 DIAGNOSIS — E53.8 B12 DEFICIENCY: ICD-10-CM

## 2021-07-06 PROCEDURE — 36415 COLL VENOUS BLD VENIPUNCTURE: CPT | Performed by: FAMILY MEDICINE

## 2021-07-06 PROCEDURE — 96372 THER/PROPH/DIAG INJ SC/IM: CPT | Performed by: FAMILY MEDICINE

## 2021-07-06 RX ADMIN — CYANOCOBALAMIN 1000 MCG: 1000 INJECTION, SOLUTION INTRAMUSCULAR; SUBCUTANEOUS at 11:35

## 2021-07-06 NOTE — PROGRESS NOTES
Venipuncture Blood Specimen Collection  Venipuncture performed in right ac by Samanta Van MA with good hemostasis. Patient tolerated the procedure well without complications.   07/06/21   Samanta Van MA

## 2021-07-07 LAB
ALBUMIN SERPL-MCNC: 4 G/DL (ref 3.7–4.7)
ALBUMIN/GLOB SERPL: 1.5 {RATIO} (ref 1.2–2.2)
ALP SERPL-CCNC: 156 IU/L (ref 48–121)
ALT SERPL-CCNC: 21 IU/L (ref 0–44)
AST SERPL-CCNC: 19 IU/L (ref 0–40)
BASOPHILS # BLD AUTO: 0.1 X10E3/UL (ref 0–0.2)
BASOPHILS NFR BLD AUTO: 1 %
BILIRUB SERPL-MCNC: 0.3 MG/DL (ref 0–1.2)
BUN SERPL-MCNC: 37 MG/DL (ref 8–27)
BUN/CREAT SERPL: 24 (ref 10–24)
CALCIUM SERPL-MCNC: 9.2 MG/DL (ref 8.6–10.2)
CHLORIDE SERPL-SCNC: 100 MMOL/L (ref 96–106)
CO2 SERPL-SCNC: 21 MMOL/L (ref 20–29)
CREAT SERPL-MCNC: 1.54 MG/DL (ref 0.76–1.27)
EOSINOPHIL # BLD AUTO: 0.1 X10E3/UL (ref 0–0.4)
EOSINOPHIL NFR BLD AUTO: 1 %
ERYTHROCYTE [DISTWIDTH] IN BLOOD BY AUTOMATED COUNT: 11.3 % (ref 11.6–15.4)
FERRITIN SERPL-MCNC: 137 NG/ML (ref 30–400)
GLOBULIN SER CALC-MCNC: 2.7 G/DL (ref 1.5–4.5)
GLUCOSE SERPL-MCNC: 112 MG/DL (ref 65–99)
HCT VFR BLD AUTO: 35.6 % (ref 37.5–51)
HGB BLD-MCNC: 12.5 G/DL (ref 13–17.7)
IMM GRANULOCYTES # BLD AUTO: 0 X10E3/UL (ref 0–0.1)
IMM GRANULOCYTES NFR BLD AUTO: 0 %
IRON SATN MFR SERPL: 42 % (ref 15–55)
IRON SERPL-MCNC: 120 UG/DL (ref 38–169)
LYMPHOCYTES # BLD AUTO: 1.6 X10E3/UL (ref 0.7–3.1)
LYMPHOCYTES NFR BLD AUTO: 11 %
MCH RBC QN AUTO: 34.8 PG (ref 26.6–33)
MCHC RBC AUTO-ENTMCNC: 35.1 G/DL (ref 31.5–35.7)
MCV RBC AUTO: 99 FL (ref 79–97)
MONOCYTES # BLD AUTO: 1 X10E3/UL (ref 0.1–0.9)
MONOCYTES NFR BLD AUTO: 7 %
NEUTROPHILS # BLD AUTO: 11.6 X10E3/UL (ref 1.4–7)
NEUTROPHILS NFR BLD AUTO: 80 %
PLATELET # BLD AUTO: 278 X10E3/UL (ref 150–450)
POTASSIUM SERPL-SCNC: 4.5 MMOL/L (ref 3.5–5.2)
PROT SERPL-MCNC: 6.7 G/DL (ref 6–8.5)
RBC # BLD AUTO: 3.59 X10E6/UL (ref 4.14–5.8)
SODIUM SERPL-SCNC: 138 MMOL/L (ref 134–144)
TIBC SERPL-MCNC: 283 UG/DL (ref 250–450)
UIBC SERPL-MCNC: 163 UG/DL (ref 111–343)
WBC # BLD AUTO: 14.4 X10E3/UL (ref 3.4–10.8)

## 2021-07-07 NOTE — PROGRESS NOTES
I have reviewed the notes, assessments, and/or procedures performed by MYLES Van MA, I concur with her/his documentation of Spencer Moore.
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
## 2021-07-26 ENCOUNTER — OFFICE VISIT (OUTPATIENT)
Dept: ONCOLOGY | Facility: CLINIC | Age: 81
End: 2021-07-26

## 2021-07-26 VITALS
DIASTOLIC BLOOD PRESSURE: 96 MMHG | HEART RATE: 78 BPM | WEIGHT: 194 LBS | OXYGEN SATURATION: 93 % | RESPIRATION RATE: 16 BRPM | BODY MASS INDEX: 29.4 KG/M2 | SYSTOLIC BLOOD PRESSURE: 160 MMHG | HEIGHT: 68 IN | TEMPERATURE: 96.8 F

## 2021-07-26 DIAGNOSIS — N18.9 HISTORY OF ANEMIA DUE TO CHRONIC KIDNEY DISEASE: Primary | ICD-10-CM

## 2021-07-26 DIAGNOSIS — Z86.2 HISTORY OF ANEMIA DUE TO CHRONIC KIDNEY DISEASE: Primary | ICD-10-CM

## 2021-07-26 PROCEDURE — 99214 OFFICE O/P EST MOD 30 MIN: CPT | Performed by: INTERNAL MEDICINE

## 2021-07-27 NOTE — ADDENDUM NOTE
Addended by: RHEA VALENTIN on: 7/26/2021 02:31 PM     Modules accepted: Orders    
Addended by: RHEA VALENTIN on: 7/26/2021 07:12 PM     Modules accepted: Orders    
Addended by: YVAN FLOWER on: 7/26/2021 02:51 PM     Modules accepted: Orders    
no difficulty in swallowing

## 2021-07-30 DIAGNOSIS — G47.419 PRIMARY NARCOLEPSY WITHOUT CATAPLEXY: ICD-10-CM

## 2021-07-30 RX ORDER — METHYLPHENIDATE HYDROCHLORIDE 20 MG/1
20 TABLET ORAL 3 TIMES DAILY
Qty: 90 TABLET | Refills: 0 | Status: SHIPPED | OUTPATIENT
Start: 2021-07-30 | End: 2021-08-27 | Stop reason: SDUPTHER

## 2021-08-11 ENCOUNTER — CLINICAL SUPPORT (OUTPATIENT)
Dept: INTERNAL MEDICINE | Facility: CLINIC | Age: 81
End: 2021-08-11

## 2021-08-11 DIAGNOSIS — E53.8 B12 DEFICIENCY: ICD-10-CM

## 2021-08-11 DIAGNOSIS — N18.9 HISTORY OF ANEMIA DUE TO CHRONIC KIDNEY DISEASE: ICD-10-CM

## 2021-08-11 DIAGNOSIS — Z86.2 HISTORY OF ANEMIA DUE TO CHRONIC KIDNEY DISEASE: ICD-10-CM

## 2021-08-11 PROCEDURE — 96372 THER/PROPH/DIAG INJ SC/IM: CPT | Performed by: FAMILY MEDICINE

## 2021-08-11 PROCEDURE — 36415 COLL VENOUS BLD VENIPUNCTURE: CPT | Performed by: FAMILY MEDICINE

## 2021-08-11 RX ADMIN — CYANOCOBALAMIN 1000 MCG: 1000 INJECTION, SOLUTION INTRAMUSCULAR; SUBCUTANEOUS at 11:06

## 2021-08-11 NOTE — PROGRESS NOTES
Venipuncture Blood Specimen Collection  Venipuncture performed in Right Arm by Bethany Talamantes CMA with good hemostasis. Patient tolerated the procedure well without complications.   Pt was given a B12 injection today as well,  He tolerated the injection.   08/11/21   Bethany Talamantes CMA

## 2021-08-12 LAB
BASOPHILS # BLD AUTO: 0.1 X10E3/UL (ref 0–0.2)
BASOPHILS NFR BLD AUTO: 1 %
EOSINOPHIL # BLD AUTO: 0.1 X10E3/UL (ref 0–0.4)
EOSINOPHIL NFR BLD AUTO: 1 %
ERYTHROCYTE [DISTWIDTH] IN BLOOD BY AUTOMATED COUNT: 11.8 % (ref 11.6–15.4)
HCT VFR BLD AUTO: 34.2 % (ref 37.5–51)
HGB BLD-MCNC: 11.6 G/DL (ref 13–17.7)
IMM GRANULOCYTES # BLD AUTO: 0.1 X10E3/UL (ref 0–0.1)
IMM GRANULOCYTES NFR BLD AUTO: 1 %
LYMPHOCYTES # BLD AUTO: 1.5 X10E3/UL (ref 0.7–3.1)
LYMPHOCYTES NFR BLD AUTO: 13 %
MCH RBC QN AUTO: 34.2 PG (ref 26.6–33)
MCHC RBC AUTO-ENTMCNC: 33.9 G/DL (ref 31.5–35.7)
MCV RBC AUTO: 101 FL (ref 79–97)
MONOCYTES # BLD AUTO: 0.7 X10E3/UL (ref 0.1–0.9)
MONOCYTES NFR BLD AUTO: 6 %
NEUTROPHILS # BLD AUTO: 9.4 X10E3/UL (ref 1.4–7)
NEUTROPHILS NFR BLD AUTO: 78 %
PLATELET # BLD AUTO: 277 X10E3/UL (ref 150–450)
RBC # BLD AUTO: 3.39 X10E6/UL (ref 4.14–5.8)
WBC # BLD AUTO: 11.8 X10E3/UL (ref 3.4–10.8)

## 2021-08-17 RX ORDER — PREDNISONE 10 MG/1
10 TABLET ORAL DAILY
Qty: 30 TABLET | Refills: 6 | Status: SHIPPED | OUTPATIENT
Start: 2021-08-17 | End: 2022-03-07

## 2021-08-27 DIAGNOSIS — G47.419 PRIMARY NARCOLEPSY WITHOUT CATAPLEXY: ICD-10-CM

## 2021-08-27 RX ORDER — METHYLPHENIDATE HYDROCHLORIDE 20 MG/1
20 TABLET ORAL 3 TIMES DAILY
Qty: 90 TABLET | Refills: 0 | Status: SHIPPED | OUTPATIENT
Start: 2021-08-27 | End: 2021-09-28 | Stop reason: SDUPTHER

## 2021-09-16 ENCOUNTER — CLINICAL SUPPORT (OUTPATIENT)
Dept: INTERNAL MEDICINE | Facility: CLINIC | Age: 81
End: 2021-09-16

## 2021-09-16 DIAGNOSIS — Z86.2 HISTORY OF ANEMIA DUE TO CHRONIC KIDNEY DISEASE: Primary | ICD-10-CM

## 2021-09-16 DIAGNOSIS — N18.9 HISTORY OF ANEMIA DUE TO CHRONIC KIDNEY DISEASE: Primary | ICD-10-CM

## 2021-09-16 DIAGNOSIS — E55.9 VITAMIN D DEFICIENCY: ICD-10-CM

## 2021-09-16 PROCEDURE — 96372 THER/PROPH/DIAG INJ SC/IM: CPT | Performed by: FAMILY MEDICINE

## 2021-09-16 PROCEDURE — 36415 COLL VENOUS BLD VENIPUNCTURE: CPT | Performed by: FAMILY MEDICINE

## 2021-09-16 RX ADMIN — CYANOCOBALAMIN 1000 MCG: 1000 INJECTION, SOLUTION INTRAMUSCULAR; SUBCUTANEOUS at 11:05

## 2021-09-16 NOTE — PROGRESS NOTES
Venipuncture Blood Specimen Collection  Venipuncture performed in right arm by Bethany Talamantes CMA with good hemostasis. Patient tolerated the procedure well without complications.  Pt got a B12 injection as well, tolerated it well.   09/16/21   Bethany Talamantes CMA

## 2021-09-17 LAB
BASOPHILS # BLD AUTO: 0.1 X10E3/UL (ref 0–0.2)
BASOPHILS NFR BLD AUTO: 1 %
EOSINOPHIL # BLD AUTO: 0.2 X10E3/UL (ref 0–0.4)
EOSINOPHIL NFR BLD AUTO: 2 %
ERYTHROCYTE [DISTWIDTH] IN BLOOD BY AUTOMATED COUNT: 11.8 % (ref 11.6–15.4)
HCT VFR BLD AUTO: 35.1 % (ref 37.5–51)
HGB BLD-MCNC: 11.9 G/DL (ref 13–17.7)
IMM GRANULOCYTES # BLD AUTO: 0.1 X10E3/UL (ref 0–0.1)
IMM GRANULOCYTES NFR BLD AUTO: 1 %
LYMPHOCYTES # BLD AUTO: 1.6 X10E3/UL (ref 0.7–3.1)
LYMPHOCYTES NFR BLD AUTO: 12 %
MCH RBC QN AUTO: 34.4 PG (ref 26.6–33)
MCHC RBC AUTO-ENTMCNC: 33.9 G/DL (ref 31.5–35.7)
MCV RBC AUTO: 101 FL (ref 79–97)
MONOCYTES # BLD AUTO: 0.8 X10E3/UL (ref 0.1–0.9)
MONOCYTES NFR BLD AUTO: 6 %
NEUTROPHILS # BLD AUTO: 10.9 X10E3/UL (ref 1.4–7)
NEUTROPHILS NFR BLD AUTO: 78 %
PLATELET # BLD AUTO: 341 X10E3/UL (ref 150–450)
RBC # BLD AUTO: 3.46 X10E6/UL (ref 4.14–5.8)
WBC # BLD AUTO: 13.6 X10E3/UL (ref 3.4–10.8)

## 2021-09-28 DIAGNOSIS — G47.419 PRIMARY NARCOLEPSY WITHOUT CATAPLEXY: ICD-10-CM

## 2021-09-29 DIAGNOSIS — G47.419 PRIMARY NARCOLEPSY WITHOUT CATAPLEXY: ICD-10-CM

## 2021-09-29 RX ORDER — METHYLPHENIDATE HYDROCHLORIDE 20 MG/1
20 TABLET ORAL 3 TIMES DAILY
Qty: 90 TABLET | Refills: 0 | Status: CANCELLED | OUTPATIENT
Start: 2021-09-29

## 2021-09-29 RX ORDER — METHYLPHENIDATE HYDROCHLORIDE 20 MG/1
20 TABLET ORAL 3 TIMES DAILY
Qty: 90 TABLET | Refills: 0 | Status: SHIPPED | OUTPATIENT
Start: 2021-09-29 | End: 2021-10-26 | Stop reason: SDUPTHER

## 2021-10-12 ENCOUNTER — CLINICAL SUPPORT (OUTPATIENT)
Dept: INTERNAL MEDICINE | Facility: CLINIC | Age: 81
End: 2021-10-12

## 2021-10-12 DIAGNOSIS — N18.9 HISTORY OF ANEMIA DUE TO CHRONIC KIDNEY DISEASE: Primary | ICD-10-CM

## 2021-10-12 DIAGNOSIS — Z86.2 HISTORY OF ANEMIA DUE TO CHRONIC KIDNEY DISEASE: Primary | ICD-10-CM

## 2021-10-12 PROCEDURE — 96372 THER/PROPH/DIAG INJ SC/IM: CPT | Performed by: FAMILY MEDICINE

## 2021-10-12 PROCEDURE — 36415 COLL VENOUS BLD VENIPUNCTURE: CPT | Performed by: FAMILY MEDICINE

## 2021-10-12 RX ADMIN — CYANOCOBALAMIN 1000 MCG: 1000 INJECTION, SOLUTION INTRAMUSCULAR; SUBCUTANEOUS at 11:16

## 2021-10-12 NOTE — PROGRESS NOTES
Venipuncture Blood Specimen Collection  Venipuncture performed in Right arm by Bethany Talamantes CMA with good hemostasis. Patient tolerated the procedure well without complications.   10/12/21   Bethany Talamantes CMA

## 2021-10-13 LAB
BASOPHILS # BLD AUTO: 0.1 X10E3/UL (ref 0–0.2)
BASOPHILS NFR BLD AUTO: 1 %
EOSINOPHIL # BLD AUTO: 0.7 X10E3/UL (ref 0–0.4)
EOSINOPHIL NFR BLD AUTO: 5 %
ERYTHROCYTE [DISTWIDTH] IN BLOOD BY AUTOMATED COUNT: 11.6 % (ref 11.6–15.4)
HCT VFR BLD AUTO: 33.2 % (ref 37.5–51)
HGB BLD-MCNC: 11.5 G/DL (ref 13–17.7)
IMM GRANULOCYTES # BLD AUTO: 0.1 X10E3/UL (ref 0–0.1)
IMM GRANULOCYTES NFR BLD AUTO: 1 %
LYMPHOCYTES # BLD AUTO: 1.6 X10E3/UL (ref 0.7–3.1)
LYMPHOCYTES NFR BLD AUTO: 11 %
MCH RBC QN AUTO: 35.1 PG (ref 26.6–33)
MCHC RBC AUTO-ENTMCNC: 34.6 G/DL (ref 31.5–35.7)
MCV RBC AUTO: 101 FL (ref 79–97)
MONOCYTES # BLD AUTO: 1.1 X10E3/UL (ref 0.1–0.9)
MONOCYTES NFR BLD AUTO: 8 %
NEUTROPHILS # BLD AUTO: 10.2 X10E3/UL (ref 1.4–7)
NEUTROPHILS NFR BLD AUTO: 74 %
PLATELET # BLD AUTO: 321 X10E3/UL (ref 150–450)
RBC # BLD AUTO: 3.28 X10E6/UL (ref 4.14–5.8)
WBC # BLD AUTO: 13.7 X10E3/UL (ref 3.4–10.8)

## 2021-10-20 DIAGNOSIS — R39.12 WEAK URINARY STREAM: ICD-10-CM

## 2021-10-20 DIAGNOSIS — N40.0 ENLARGED PROSTATE: ICD-10-CM

## 2021-10-20 RX ORDER — TAMSULOSIN HYDROCHLORIDE 0.4 MG/1
2 CAPSULE ORAL NIGHTLY
Qty: 180 CAPSULE | Refills: 5 | Status: SHIPPED | OUTPATIENT
Start: 2021-10-20 | End: 2022-11-16 | Stop reason: SDUPTHER

## 2021-10-26 DIAGNOSIS — G47.419 PRIMARY NARCOLEPSY WITHOUT CATAPLEXY: ICD-10-CM

## 2021-10-27 RX ORDER — METHYLPHENIDATE HYDROCHLORIDE 20 MG/1
20 TABLET ORAL 3 TIMES DAILY
Qty: 90 TABLET | Refills: 0 | Status: SHIPPED | OUTPATIENT
Start: 2021-10-27 | End: 2021-11-29 | Stop reason: SDUPTHER

## 2021-11-01 ENCOUNTER — OFFICE VISIT (OUTPATIENT)
Dept: NEUROLOGY | Facility: CLINIC | Age: 81
End: 2021-11-01

## 2021-11-01 VITALS
DIASTOLIC BLOOD PRESSURE: 78 MMHG | HEART RATE: 66 BPM | OXYGEN SATURATION: 99 % | SYSTOLIC BLOOD PRESSURE: 126 MMHG | WEIGHT: 195 LBS | BODY MASS INDEX: 29.55 KG/M2 | HEIGHT: 68 IN

## 2021-11-01 DIAGNOSIS — G50.0 TRIGEMINAL NEURALGIA OF RIGHT SIDE OF FACE: ICD-10-CM

## 2021-11-01 DIAGNOSIS — G47.419 PRIMARY NARCOLEPSY WITHOUT CATAPLEXY: Primary | ICD-10-CM

## 2021-11-01 PROCEDURE — 99214 OFFICE O/P EST MOD 30 MIN: CPT | Performed by: PHYSICIAN ASSISTANT

## 2021-11-01 RX ORDER — CARBAMAZEPINE 200 MG/1
200 TABLET ORAL 2 TIMES DAILY
Qty: 60 TABLET | Refills: 3 | Status: SHIPPED | OUTPATIENT
Start: 2021-11-01 | End: 2022-11-09 | Stop reason: SDUPTHER

## 2021-11-01 RX ORDER — ATORVASTATIN CALCIUM 20 MG/1
20 TABLET, FILM COATED ORAL DAILY
COMMUNITY
Start: 2021-10-20

## 2021-11-01 NOTE — PROGRESS NOTES
Subjective   Spencer Moore is a 81 y.o. male is here today for follow-up.    Long-term stable narcolepsy patient.  The patient has been stable on Ritalin for a number of years.  Trials off of Ritalin have not gone very well.  The patient has good family support.  No issues are identified with narcolepsy breakthrough episodes or management.    Trigeminal neuralgia is well addressed with carbamazepine with intermittent breakthroughs.    Followed by Dr. Clotilde MD with laboratory evaluations.    Trigeminal Neuralgia  This is a chronic problem. The current episode started more than 1 year ago. The problem occurs intermittently. The problem has been unchanged. Associated symptoms include fatigue and weakness. The symptoms are aggravated by exertion (Speaking, chewing, touch). Treatments tried: Carbamazepine. The treatment provided significant relief.        The following portions of the patient's history were reviewed and updated as appropriate: allergies, current medications, past family history, past medical history, past social history, past surgical history and problem list.    Review of Systems   Constitutional: Positive for fatigue.   HENT: Positive for hearing loss and tinnitus.    Eyes: Positive for visual disturbance.   Respiratory: Positive for shortness of breath.    Cardiovascular: Negative.    Gastrointestinal: Negative.  Positive for GERD.   Endocrine: Negative.    Genitourinary: Positive for difficulty urinating.   Musculoskeletal: Positive for back pain and gait problem.   Skin: Negative.    Allergic/Immunologic: Negative.    Neurological: Positive for dizziness, tremors, weakness, headache and memory problem.   Hematological: Negative.    Psychiatric/Behavioral: Positive for decreased concentration, dysphoric mood and sleep disturbance. The patient is nervous/anxious.          Current Outpatient Medications:   •  aspirin 81 MG EC tablet, Take 81 mg by mouth Daily., Disp: , Rfl:   •  atorvastatin  (LIPITOR) 20 MG tablet, Take 20 mg by mouth Daily., Disp: , Rfl:   •  CloNIDine (CATAPRES) 0.1 MG tablet, Take 0.1 mg by mouth As Needed for High Blood Pressure (1 tablet if systolic is over 170)., Disp: , Rfl:   •  cyanocobalamin 1000 MCG/ML injection, Inject 1 mL into the appropriate muscle as directed by prescriber Every 28 (Twenty-Eight) Days., Disp: 10 mL, Rfl: 0  •  finasteride (PROSCAR) 5 MG tablet, Take 1 tablet by mouth Daily., Disp: 90 tablet, Rfl: 3  •  FLUoxetine (PROzac) 40 MG capsule, Take 40 mg by mouth Daily., Disp: , Rfl:   •  hydroCHLOROthiazide (HYDRODIURIL) 12.5 MG tablet, Take 12.5 mg by mouth Daily., Disp: , Rfl:   •  hyoscyamine (LEVSIN) 0.125 MG SL tablet, Take 125 mcg by mouth 2 (Two) Times a Day., Disp: , Rfl:   •  irbesartan (AVAPRO) 300 MG tablet, Take 300 mg by mouth Daily., Disp: , Rfl:   •  methylphenidate (Ritalin) 20 MG tablet, Take 1 tablet by mouth 3 (Three) Times a Day., Disp: 90 tablet, Rfl: 0  •  metoprolol tartrate (LOPRESSOR) 25 MG tablet, Take 1 tablet by mouth 2 (Two) Times a Day. (Patient taking differently: Take 25 mg by mouth Daily.), Disp: 60 tablet, Rfl: 11  •  Multiple Vitamins-Minerals (MULTIVITAMIN ADULT PO), Take  by mouth., Disp: , Rfl:   •  mupirocin (BACTROBAN) 2 % ointment, APPLY TO AFFECTED AREA TWICE DAILY AS NEEDED, Disp: , Rfl:   •  omeprazole (priLOSEC) 40 MG capsule, Take 40 mg by mouth Daily., Disp: , Rfl:   •  oxyCODONE-acetaminophen (PERCOCET) 7.5-325 MG per tablet, Take 1 tablet by mouth Every 6 (Six) Hours As Needed. Takes 7.5mg four times daily, Disp: , Rfl:   •  predniSONE (DELTASONE) 10 MG tablet, Take 1 tablet by mouth Daily., Disp: 30 tablet, Rfl: 6  •  tamsulosin (FLOMAX) 0.4 MG capsule 24 hr capsule, Take 2 capsules by mouth Every Night., Disp: 180 capsule, Rfl: 5  •  terazosin (HYTRIN) 2 MG capsule, Take 2 mg by mouth Every Night., Disp: , Rfl:   •  carBAMazepine (TEGretol) 200 MG tablet, Take 1 tablet by mouth 2 (Two) Times a Day., Disp: 60  tablet, Rfl: 3    Current Facility-Administered Medications:   •  cyanocobalamin injection 1,000 mcg, 1,000 mcg, Intramuscular, Q30 Days, Rosi Baker DO, 1,000 mcg at 10/12/21 1116     Objective   Physical Exam  Vitals and nursing note reviewed.   HENT:      Head: Normocephalic.      Right Ear: External ear normal. Decreased hearing noted.      Left Ear: External ear normal. Decreased hearing noted.   Eyes:      General: Lids are normal. Vision grossly intact. Gaze aligned appropriately. No scleral icterus.     Extraocular Movements: Extraocular movements intact.      Conjunctiva/sclera: Conjunctivae normal.   Neck:      Vascular: No JVD.      Trachea: Trachea and phonation normal.   Cardiovascular:      Rate and Rhythm: Normal rate.      Heart sounds: Normal heart sounds.   Pulmonary:      Effort: Pulmonary effort is normal.   Skin:     General: Skin is warm and dry.   Neurological:      Mental Status: He is alert.      GCS: GCS eye subscore is 4. GCS verbal subscore is 5. GCS motor subscore is 6.      Cranial Nerves: Cranial nerve deficit present.      Sensory: Sensation is intact.      Motor: Weakness and tremor present.      Coordination: Coordination is intact.      Gait: Gait abnormal.      Deep Tendon Reflexes: Reflexes are normal and symmetric.      Comments: Psychomotor degeneration with hesitant gait   Psychiatric:         Attention and Perception: Attention normal.         Mood and Affect: Mood and affect normal.         Speech: Speech is delayed.         Behavior: Behavior normal.         Thought Content: Thought content normal.         Cognition and Memory: Cognition and memory normal.         Judgment: Judgment is impulsive.       CBC & Differential (10/12/2021 09:48)       Assessment/Plan   Diagnoses and all orders for this visit:    1. 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 Trigeminal neuralgia of right side of face  -     carBAMazepine (TEGretol) 200 MG tablet; Take 1 tablet  by mouth 2 (Two) Times a Day.  Dispense: 60 tablet; Refill: 3      Neurologically stable continue Ritalin unchanged continue carbamazepine unchanged.    Today's findings and recommendations are reviewed in detail with the patient and family.           Dictated utilizing Dragon dictation.

## 2021-11-11 ENCOUNTER — CLINICAL SUPPORT (OUTPATIENT)
Dept: INTERNAL MEDICINE | Facility: CLINIC | Age: 81
End: 2021-11-11

## 2021-11-11 DIAGNOSIS — N18.9 HISTORY OF ANEMIA DUE TO CHRONIC KIDNEY DISEASE: Primary | ICD-10-CM

## 2021-11-11 DIAGNOSIS — Z86.2 HISTORY OF ANEMIA DUE TO CHRONIC KIDNEY DISEASE: Primary | ICD-10-CM

## 2021-11-11 PROCEDURE — 96372 THER/PROPH/DIAG INJ SC/IM: CPT | Performed by: NURSE PRACTITIONER

## 2021-11-11 RX ADMIN — CYANOCOBALAMIN 1000 MCG: 1000 INJECTION, SOLUTION INTRAMUSCULAR; SUBCUTANEOUS at 11:24

## 2021-11-11 NOTE — PROGRESS NOTES
After obtaining consent, and per orders of Kena Torres, injection of B12 given by Bethany Talamantes CMA. Patient instructed to remain in clinic for 20 minutes afterwards, and to report any adverse reaction to me immediately.      Venipuncture Blood Specimen Collection  Venipuncture performed in Right arm by Bethany Talamantes CMA with good hemostasis. Patient tolerated the procedure well without complications.   11/29/21   Bethany Talamantes CMA

## 2021-11-12 LAB
BASOPHILS # BLD AUTO: 0.1 X10E3/UL (ref 0–0.2)
BASOPHILS NFR BLD AUTO: 1 %
EOSINOPHIL # BLD AUTO: 0.2 X10E3/UL (ref 0–0.4)
EOSINOPHIL NFR BLD AUTO: 1 %
ERYTHROCYTE [DISTWIDTH] IN BLOOD BY AUTOMATED COUNT: 11.5 % (ref 11.6–15.4)
HCT VFR BLD AUTO: 34.6 % (ref 37.5–51)
HGB BLD-MCNC: 11.7 G/DL (ref 13–17.7)
IMM GRANULOCYTES # BLD AUTO: 0.1 X10E3/UL (ref 0–0.1)
IMM GRANULOCYTES NFR BLD AUTO: 1 %
LYMPHOCYTES # BLD AUTO: 1.6 X10E3/UL (ref 0.7–3.1)
LYMPHOCYTES NFR BLD AUTO: 13 %
MCH RBC QN AUTO: 34.2 PG (ref 26.6–33)
MCHC RBC AUTO-ENTMCNC: 33.8 G/DL (ref 31.5–35.7)
MCV RBC AUTO: 101 FL (ref 79–97)
MONOCYTES # BLD AUTO: 0.7 X10E3/UL (ref 0.1–0.9)
MONOCYTES NFR BLD AUTO: 5 %
NEUTROPHILS # BLD AUTO: 9.7 X10E3/UL (ref 1.4–7)
NEUTROPHILS NFR BLD AUTO: 79 %
PLATELET # BLD AUTO: 300 X10E3/UL (ref 150–450)
RBC # BLD AUTO: 3.42 X10E6/UL (ref 4.14–5.8)
WBC # BLD AUTO: 12.2 X10E3/UL (ref 3.4–10.8)

## 2021-11-29 DIAGNOSIS — G47.419 PRIMARY NARCOLEPSY WITHOUT CATAPLEXY: ICD-10-CM

## 2021-11-29 RX ORDER — METHYLPHENIDATE HYDROCHLORIDE 20 MG/1
20 TABLET ORAL 3 TIMES DAILY
Qty: 90 TABLET | Refills: 0 | Status: SHIPPED | OUTPATIENT
Start: 2021-11-29 | End: 2022-01-03 | Stop reason: SDUPTHER

## 2021-12-09 ENCOUNTER — CLINICAL SUPPORT (OUTPATIENT)
Dept: INTERNAL MEDICINE | Facility: CLINIC | Age: 81
End: 2021-12-09

## 2021-12-09 DIAGNOSIS — N18.9 HISTORY OF ANEMIA DUE TO CHRONIC KIDNEY DISEASE: Primary | ICD-10-CM

## 2021-12-09 DIAGNOSIS — Z86.2 HISTORY OF ANEMIA DUE TO CHRONIC KIDNEY DISEASE: Primary | ICD-10-CM

## 2021-12-09 PROCEDURE — 96372 THER/PROPH/DIAG INJ SC/IM: CPT | Performed by: FAMILY MEDICINE

## 2021-12-09 PROCEDURE — 36415 COLL VENOUS BLD VENIPUNCTURE: CPT | Performed by: FAMILY MEDICINE

## 2021-12-09 RX ADMIN — CYANOCOBALAMIN 1000 MCG: 1000 INJECTION, SOLUTION INTRAMUSCULAR; SUBCUTANEOUS at 11:55

## 2021-12-09 NOTE — PROGRESS NOTES
Venipuncture Blood Specimen Collection  Venipuncture performed in right arm by Bethany Talamantes CMA with good hemostasis. Patient tolerated the procedure well without complications.   12/09/21   Bethany Talamantes CMA     After obtaining consent, and per orders of Dr. Baker, injection of B12 given by Bethany Talamantes CMA. Patient instructed to remain in clinic for 20 minutes afterwards, and to report any adverse reaction to me immediately.

## 2021-12-10 LAB
BASOPHILS # BLD AUTO: 0.1 X10E3/UL (ref 0–0.2)
BASOPHILS NFR BLD AUTO: 1 %
EOSINOPHIL # BLD AUTO: 0.2 X10E3/UL (ref 0–0.4)
EOSINOPHIL NFR BLD AUTO: 1 %
ERYTHROCYTE [DISTWIDTH] IN BLOOD BY AUTOMATED COUNT: 11.4 % (ref 11.6–15.4)
HCT VFR BLD AUTO: 33.7 % (ref 37.5–51)
HGB BLD-MCNC: 11.3 G/DL (ref 13–17.7)
IMM GRANULOCYTES # BLD AUTO: 0 X10E3/UL (ref 0–0.1)
IMM GRANULOCYTES NFR BLD AUTO: 0 %
LYMPHOCYTES # BLD AUTO: 1.6 X10E3/UL (ref 0.7–3.1)
LYMPHOCYTES NFR BLD AUTO: 13 %
MCH RBC QN AUTO: 33.4 PG (ref 26.6–33)
MCHC RBC AUTO-ENTMCNC: 33.5 G/DL (ref 31.5–35.7)
MCV RBC AUTO: 100 FL (ref 79–97)
MONOCYTES # BLD AUTO: 0.9 X10E3/UL (ref 0.1–0.9)
MONOCYTES NFR BLD AUTO: 7 %
NEUTROPHILS # BLD AUTO: 9.8 X10E3/UL (ref 1.4–7)
NEUTROPHILS NFR BLD AUTO: 78 %
PLATELET # BLD AUTO: 271 X10E3/UL (ref 150–450)
RBC # BLD AUTO: 3.38 X10E6/UL (ref 4.14–5.8)
WBC # BLD AUTO: 12.5 X10E3/UL (ref 3.4–10.8)

## 2022-01-03 DIAGNOSIS — G47.419 PRIMARY NARCOLEPSY WITHOUT CATAPLEXY: ICD-10-CM

## 2022-01-03 RX ORDER — METHYLPHENIDATE HYDROCHLORIDE 20 MG/1
20 TABLET ORAL 3 TIMES DAILY
Qty: 90 TABLET | Refills: 0 | Status: SHIPPED | OUTPATIENT
Start: 2022-01-03 | End: 2022-01-20 | Stop reason: SDUPTHER

## 2022-01-10 ENCOUNTER — CLINICAL SUPPORT (OUTPATIENT)
Dept: INTERNAL MEDICINE | Facility: CLINIC | Age: 82
End: 2022-01-10

## 2022-01-10 DIAGNOSIS — N18.9 HISTORY OF ANEMIA DUE TO CHRONIC KIDNEY DISEASE: Primary | ICD-10-CM

## 2022-01-10 DIAGNOSIS — Z86.2 HISTORY OF ANEMIA DUE TO CHRONIC KIDNEY DISEASE: Primary | ICD-10-CM

## 2022-01-10 PROCEDURE — 36415 COLL VENOUS BLD VENIPUNCTURE: CPT | Performed by: FAMILY MEDICINE

## 2022-01-10 PROCEDURE — 96372 THER/PROPH/DIAG INJ SC/IM: CPT | Performed by: FAMILY MEDICINE

## 2022-01-10 RX ADMIN — CYANOCOBALAMIN 1000 MCG: 1000 INJECTION, SOLUTION INTRAMUSCULAR; SUBCUTANEOUS at 13:15

## 2022-01-10 NOTE — PROGRESS NOTES
Venipuncture Blood Specimen Collection  Venipuncture performed in left ac by Samanta Van MA with good hemostasis. Patient tolerated the procedure well without complications.   01/10/22   Samanta Van MA

## 2022-01-11 LAB
ALBUMIN SERPL-MCNC: 3.8 G/DL (ref 3.7–4.7)
ALBUMIN/GLOB SERPL: 1.4 {RATIO} (ref 1.2–2.2)
ALP SERPL-CCNC: 145 IU/L (ref 44–121)
ALT SERPL-CCNC: 39 IU/L (ref 0–44)
AST SERPL-CCNC: 28 IU/L (ref 0–40)
BASOPHILS # BLD AUTO: 0.1 X10E3/UL (ref 0–0.2)
BASOPHILS NFR BLD AUTO: 1 %
BILIRUB SERPL-MCNC: 0.3 MG/DL (ref 0–1.2)
BUN SERPL-MCNC: 31 MG/DL (ref 8–27)
BUN/CREAT SERPL: 25 (ref 10–24)
CALCIUM SERPL-MCNC: 8.8 MG/DL (ref 8.6–10.2)
CHLORIDE SERPL-SCNC: 104 MMOL/L (ref 96–106)
CO2 SERPL-SCNC: 20 MMOL/L (ref 20–29)
CREAT SERPL-MCNC: 1.22 MG/DL (ref 0.76–1.27)
EOSINOPHIL # BLD AUTO: 0.4 X10E3/UL (ref 0–0.4)
EOSINOPHIL NFR BLD AUTO: 4 %
ERYTHROCYTE [DISTWIDTH] IN BLOOD BY AUTOMATED COUNT: 12.1 % (ref 11.6–15.4)
FERRITIN SERPL-MCNC: 132 NG/ML (ref 30–400)
GLOBULIN SER CALC-MCNC: 2.8 G/DL (ref 1.5–4.5)
GLUCOSE SERPL-MCNC: 139 MG/DL (ref 65–99)
HCT VFR BLD AUTO: 37 % (ref 37.5–51)
HGB BLD-MCNC: 13 G/DL (ref 13–17.7)
IMM GRANULOCYTES # BLD AUTO: 0 X10E3/UL (ref 0–0.1)
IMM GRANULOCYTES NFR BLD AUTO: 0 %
IRON SATN MFR SERPL: 57 % (ref 15–55)
IRON SERPL-MCNC: 150 UG/DL (ref 38–169)
LYMPHOCYTES # BLD AUTO: 2.5 X10E3/UL (ref 0.7–3.1)
LYMPHOCYTES NFR BLD AUTO: 20 %
MCH RBC QN AUTO: 34.6 PG (ref 26.6–33)
MCHC RBC AUTO-ENTMCNC: 35.1 G/DL (ref 31.5–35.7)
MCV RBC AUTO: 98 FL (ref 79–97)
MONOCYTES # BLD AUTO: 1 X10E3/UL (ref 0.1–0.9)
MONOCYTES NFR BLD AUTO: 8 %
NEUTROPHILS # BLD AUTO: 8.2 X10E3/UL (ref 1.4–7)
NEUTROPHILS NFR BLD AUTO: 67 %
PLATELET # BLD AUTO: 270 X10E3/UL (ref 150–450)
POTASSIUM SERPL-SCNC: 4 MMOL/L (ref 3.5–5.2)
PROT SERPL-MCNC: 6.6 G/DL (ref 6–8.5)
RBC # BLD AUTO: 3.76 X10E6/UL (ref 4.14–5.8)
SODIUM SERPL-SCNC: 140 MMOL/L (ref 134–144)
TIBC SERPL-MCNC: 264 UG/DL (ref 250–450)
UIBC SERPL-MCNC: 114 UG/DL (ref 111–343)
WBC # BLD AUTO: 12.1 X10E3/UL (ref 3.4–10.8)

## 2022-01-14 DIAGNOSIS — R06.09 DYSPNEA ON EXERTION: Primary | ICD-10-CM

## 2022-01-20 DIAGNOSIS — G47.419 PRIMARY NARCOLEPSY WITHOUT CATAPLEXY: ICD-10-CM

## 2022-01-20 RX ORDER — METHYLPHENIDATE HYDROCHLORIDE 20 MG/1
20 TABLET ORAL 3 TIMES DAILY
Qty: 90 TABLET | Refills: 0 | Status: SHIPPED | OUTPATIENT
Start: 2022-02-01 | End: 2022-02-28 | Stop reason: SDUPTHER

## 2022-02-07 DIAGNOSIS — N20.0 NEPHROLITHIASIS: Primary | ICD-10-CM

## 2022-02-07 DIAGNOSIS — R10.9 ABDOMINAL PAIN, UNSPECIFIED ABDOMINAL LOCATION: ICD-10-CM

## 2022-02-08 ENCOUNTER — CLINICAL SUPPORT (OUTPATIENT)
Dept: INTERNAL MEDICINE | Facility: CLINIC | Age: 82
End: 2022-02-08

## 2022-02-08 DIAGNOSIS — R10.9 ABDOMINAL PAIN, UNSPECIFIED ABDOMINAL LOCATION: ICD-10-CM

## 2022-02-08 DIAGNOSIS — N20.0 NEPHROLITHIASIS: ICD-10-CM

## 2022-02-08 PROCEDURE — 36415 COLL VENOUS BLD VENIPUNCTURE: CPT | Performed by: NURSE PRACTITIONER

## 2022-02-09 LAB
ALBUMIN SERPL-MCNC: 4 G/DL (ref 3.7–4.7)
ALBUMIN/GLOB SERPL: 1.7 {RATIO} (ref 1.2–2.2)
ALP SERPL-CCNC: 107 IU/L (ref 44–121)
ALT SERPL-CCNC: 23 IU/L (ref 0–44)
AST SERPL-CCNC: 21 IU/L (ref 0–40)
BASOPHILS # BLD AUTO: 0.1 X10E3/UL (ref 0–0.2)
BASOPHILS NFR BLD AUTO: 1 %
BILIRUB SERPL-MCNC: 0.4 MG/DL (ref 0–1.2)
BUN SERPL-MCNC: 33 MG/DL (ref 8–27)
BUN/CREAT SERPL: 19 (ref 10–24)
CALCIUM SERPL-MCNC: 8.7 MG/DL (ref 8.6–10.2)
CHLORIDE SERPL-SCNC: 97 MMOL/L (ref 96–106)
CO2 SERPL-SCNC: 19 MMOL/L (ref 20–29)
CREAT SERPL-MCNC: 1.71 MG/DL (ref 0.76–1.27)
EOSINOPHIL # BLD AUTO: 0.1 X10E3/UL (ref 0–0.4)
EOSINOPHIL NFR BLD AUTO: 1 %
ERYTHROCYTE [DISTWIDTH] IN BLOOD BY AUTOMATED COUNT: 12.4 % (ref 11.6–15.4)
GLOBULIN SER CALC-MCNC: 2.3 G/DL (ref 1.5–4.5)
GLUCOSE SERPL-MCNC: 138 MG/DL (ref 65–99)
HCT VFR BLD AUTO: 34 % (ref 37.5–51)
HGB BLD-MCNC: 11.9 G/DL (ref 13–17.7)
IMM GRANULOCYTES # BLD AUTO: 0.1 X10E3/UL (ref 0–0.1)
IMM GRANULOCYTES NFR BLD AUTO: 1 %
LYMPHOCYTES # BLD AUTO: 1.7 X10E3/UL (ref 0.7–3.1)
LYMPHOCYTES NFR BLD AUTO: 11 %
MCH RBC QN AUTO: 34.6 PG (ref 26.6–33)
MCHC RBC AUTO-ENTMCNC: 35 G/DL (ref 31.5–35.7)
MCV RBC AUTO: 99 FL (ref 79–97)
MONOCYTES # BLD AUTO: 0.8 X10E3/UL (ref 0.1–0.9)
MONOCYTES NFR BLD AUTO: 6 %
NEUTROPHILS # BLD AUTO: 12.4 X10E3/UL (ref 1.4–7)
NEUTROPHILS NFR BLD AUTO: 80 %
PLATELET # BLD AUTO: 323 X10E3/UL (ref 150–450)
POTASSIUM SERPL-SCNC: 4.5 MMOL/L (ref 3.5–5.2)
PROT SERPL-MCNC: 6.3 G/DL (ref 6–8.5)
RBC # BLD AUTO: 3.44 X10E6/UL (ref 4.14–5.8)
SODIUM SERPL-SCNC: 136 MMOL/L (ref 134–144)
WBC # BLD AUTO: 15.2 X10E3/UL (ref 3.4–10.8)

## 2022-02-11 NOTE — PROGRESS NOTES
Venipuncture Blood Specimen Collection  Venipuncture performed in the right ac by Estelita Hartley CMA with good hemostasis. Patient tolerated the procedure well without complications.   02/11/22   Estelita Hartley CMA

## 2022-02-11 NOTE — PROGRESS NOTES
He is Dr. Baker's uncle. Both she and Dr. Mabry typically manage/order his labs. I do not believe he lives in a nursing home.
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
## 2022-02-18 ENCOUNTER — OFFICE VISIT (OUTPATIENT)
Dept: ONCOLOGY | Facility: CLINIC | Age: 82
End: 2022-02-18

## 2022-02-18 ENCOUNTER — APPOINTMENT (OUTPATIENT)
Dept: CT IMAGING | Facility: HOSPITAL | Age: 82
End: 2022-02-18

## 2022-02-18 ENCOUNTER — HOSPITAL ENCOUNTER (EMERGENCY)
Facility: HOSPITAL | Age: 82
Discharge: HOME | End: 2022-02-18
Admitting: FAMILY MEDICINE

## 2022-02-18 ENCOUNTER — APPOINTMENT (OUTPATIENT)
Dept: GENERAL RADIOLOGY | Facility: HOSPITAL | Age: 82
End: 2022-02-18

## 2022-02-18 VITALS
HEART RATE: 77 BPM | SYSTOLIC BLOOD PRESSURE: 143 MMHG | RESPIRATION RATE: 18 BRPM | BODY MASS INDEX: 34.23 KG/M2 | HEIGHT: 62 IN | OXYGEN SATURATION: 95 % | WEIGHT: 186 LBS | TEMPERATURE: 98.2 F | DIASTOLIC BLOOD PRESSURE: 74 MMHG

## 2022-02-18 VITALS
DIASTOLIC BLOOD PRESSURE: 52 MMHG | HEART RATE: 70 BPM | TEMPERATURE: 95.8 F | RESPIRATION RATE: 16 BRPM | OXYGEN SATURATION: 97 % | BODY MASS INDEX: 29.55 KG/M2 | WEIGHT: 195 LBS | SYSTOLIC BLOOD PRESSURE: 90 MMHG | HEIGHT: 68 IN

## 2022-02-18 DIAGNOSIS — D53.9 MACROCYTIC ANEMIA: ICD-10-CM

## 2022-02-18 DIAGNOSIS — L98.9 SKIN LESION OF HAND: Primary | ICD-10-CM

## 2022-02-18 DIAGNOSIS — R06.02 CHRONIC SHORTNESS OF BREATH: Primary | ICD-10-CM

## 2022-02-18 LAB
ALBUMIN SERPL-MCNC: 4 G/DL (ref 3.5–5.2)
ALBUMIN/GLOB SERPL: 1.7 G/DL
ALP SERPL-CCNC: 117 U/L (ref 39–117)
ALT SERPL W P-5'-P-CCNC: 24 U/L (ref 1–41)
ANION GAP SERPL CALCULATED.3IONS-SCNC: 10 MMOL/L (ref 5–15)
ARTERIAL PATENCY WRIST A: NORMAL
AST SERPL-CCNC: 21 U/L (ref 1–40)
ATMOSPHERIC PRESS: 758 MMHG
BACTERIA UR QL AUTO: ABNORMAL /HPF
BASE EXCESS BLDA CALC-SCNC: 0.4 MMOL/L (ref 0–2)
BASOPHILS # BLD AUTO: 0.03 10*3/MM3 (ref 0–0.2)
BASOPHILS NFR BLD AUTO: 0.3 % (ref 0–1.5)
BDY SITE: NORMAL
BILIRUB SERPL-MCNC: 0.3 MG/DL (ref 0–1.2)
BILIRUB UR QL STRIP: ABNORMAL
BODY TEMPERATURE: 37 C
BUN SERPL-MCNC: 34 MG/DL (ref 8–23)
BUN/CREAT SERPL: 23.4 (ref 7–25)
CALCIUM SPEC-SCNC: 8.8 MG/DL (ref 8.6–10.5)
CARBAMAZEPINE SERPL-MCNC: 6.3 MCG/ML (ref 4–12)
CHLORIDE SERPL-SCNC: 99 MMOL/L (ref 98–107)
CLARITY UR: ABNORMAL
CO2 SERPL-SCNC: 26 MMOL/L (ref 22–29)
COLOR UR: YELLOW
CREAT SERPL-MCNC: 1.45 MG/DL (ref 0.76–1.27)
CRP SERPL-MCNC: <0.3 MG/DL (ref 0–0.5)
D DIMER PPP FEU-MCNC: 1.51 MG/L (FEU) (ref 0–0.5)
D-LACTATE SERPL-SCNC: 2.9 MMOL/L (ref 0.5–2)
D-LACTATE SERPL-SCNC: 3.1 MMOL/L (ref 0.5–2)
DEPRECATED RDW RBC AUTO: 46.1 FL (ref 37–54)
EOSINOPHIL # BLD AUTO: 0.03 10*3/MM3 (ref 0–0.4)
EOSINOPHIL NFR BLD AUTO: 0.3 % (ref 0.3–6.2)
ERYTHROCYTE [DISTWIDTH] IN BLOOD BY AUTOMATED COUNT: 12.2 % (ref 12.3–15.4)
GFR SERPL CREATININE-BSD FRML MDRD: 47 ML/MIN/1.73
GLOBULIN UR ELPH-MCNC: 2.4 GM/DL
GLUCOSE SERPL-MCNC: 121 MG/DL (ref 65–99)
GLUCOSE UR STRIP-MCNC: NEGATIVE MG/DL
HCO3 BLDA-SCNC: 24.2 MMOL/L (ref 20–26)
HCT VFR BLD AUTO: 35.2 % (ref 37.5–51)
HGB BLD-MCNC: 11.7 G/DL (ref 13–17.7)
HGB UR QL STRIP.AUTO: ABNORMAL
HYALINE CASTS UR QL AUTO: ABNORMAL /LPF
IMM GRANULOCYTES # BLD AUTO: 0.07 10*3/MM3 (ref 0–0.05)
IMM GRANULOCYTES NFR BLD AUTO: 0.6 % (ref 0–0.5)
KETONES UR QL STRIP: ABNORMAL
LEUKOCYTE ESTERASE UR QL STRIP.AUTO: ABNORMAL
LYMPHOCYTES # BLD AUTO: 1.17 10*3/MM3 (ref 0.7–3.1)
LYMPHOCYTES NFR BLD AUTO: 10.3 % (ref 19.6–45.3)
Lab: NORMAL
MCH RBC QN AUTO: 34.2 PG (ref 26.6–33)
MCHC RBC AUTO-ENTMCNC: 33.2 G/DL (ref 31.5–35.7)
MCV RBC AUTO: 102.9 FL (ref 79–97)
MODALITY: NORMAL
MONOCYTES # BLD AUTO: 0.77 10*3/MM3 (ref 0.1–0.9)
MONOCYTES NFR BLD AUTO: 6.8 % (ref 5–12)
NEUTROPHILS NFR BLD AUTO: 81.7 % (ref 42.7–76)
NEUTROPHILS NFR BLD AUTO: 9.27 10*3/MM3 (ref 1.7–7)
NITRITE UR QL STRIP: NEGATIVE
NRBC BLD AUTO-RTO: 0 /100 WBC (ref 0–0.2)
NT-PROBNP SERPL-MCNC: 211.8 PG/ML (ref 0–1800)
PCO2 BLDA: 35.5 MM HG (ref 35–45)
PCO2 TEMP ADJ BLD: 35.5 MM HG (ref 35–45)
PH BLDA: 7.44 PH UNITS (ref 7.35–7.45)
PH UR STRIP.AUTO: <=5 [PH] (ref 5–8)
PH, TEMP CORRECTED: 7.44 PH UNITS (ref 7.35–7.45)
PLATELET # BLD AUTO: 341 10*3/MM3 (ref 140–450)
PMV BLD AUTO: 9.5 FL (ref 6–12)
PO2 BLDA: 84.8 MM HG (ref 83–108)
PO2 TEMP ADJ BLD: 84.8 MM HG (ref 83–108)
POTASSIUM SERPL-SCNC: 5.1 MMOL/L (ref 3.5–5.2)
PROCALCITONIN SERPL-MCNC: 0.05 NG/ML (ref 0–0.25)
PROT SERPL-MCNC: 6.4 G/DL (ref 6–8.5)
PROT UR QL STRIP: ABNORMAL
RBC # BLD AUTO: 3.42 10*6/MM3 (ref 4.14–5.8)
RBC # UR STRIP: ABNORMAL /HPF
REF LAB TEST METHOD: ABNORMAL
SAO2 % BLDCOA: 97.5 % (ref 94–99)
SARS-COV-2 RNA PNL SPEC NAA+PROBE: NOT DETECTED
SODIUM SERPL-SCNC: 135 MMOL/L (ref 136–145)
SP GR UR STRIP: 1.02 (ref 1–1.03)
SQUAMOUS #/AREA URNS HPF: ABNORMAL /HPF
TROPONIN T SERPL-MCNC: 0.02 NG/ML (ref 0–0.03)
TROPONIN T SERPL-MCNC: <0.01 NG/ML (ref 0–0.03)
UROBILINOGEN UR QL STRIP: ABNORMAL
VENTILATOR MODE: NORMAL
WBC # UR STRIP: ABNORMAL /HPF
WBC NRBC COR # BLD: 11.34 10*3/MM3 (ref 3.4–10.8)

## 2022-02-18 PROCEDURE — 87635 SARS-COV-2 COVID-19 AMP PRB: CPT | Performed by: PHYSICIAN ASSISTANT

## 2022-02-18 PROCEDURE — 87040 BLOOD CULTURE FOR BACTERIA: CPT | Performed by: PHYSICIAN ASSISTANT

## 2022-02-18 PROCEDURE — 83605 ASSAY OF LACTIC ACID: CPT | Performed by: PHYSICIAN ASSISTANT

## 2022-02-18 PROCEDURE — 86140 C-REACTIVE PROTEIN: CPT | Performed by: PHYSICIAN ASSISTANT

## 2022-02-18 PROCEDURE — 99283 EMERGENCY DEPT VISIT LOW MDM: CPT

## 2022-02-18 PROCEDURE — 87186 SC STD MICRODIL/AGAR DIL: CPT | Performed by: PHYSICIAN ASSISTANT

## 2022-02-18 PROCEDURE — 36415 COLL VENOUS BLD VENIPUNCTURE: CPT

## 2022-02-18 PROCEDURE — 87086 URINE CULTURE/COLONY COUNT: CPT | Performed by: PHYSICIAN ASSISTANT

## 2022-02-18 PROCEDURE — 0 IOPAMIDOL PER 1 ML: Performed by: PHYSICIAN ASSISTANT

## 2022-02-18 PROCEDURE — 84145 PROCALCITONIN (PCT): CPT | Performed by: PHYSICIAN ASSISTANT

## 2022-02-18 PROCEDURE — 85025 COMPLETE CBC W/AUTO DIFF WBC: CPT | Performed by: PHYSICIAN ASSISTANT

## 2022-02-18 PROCEDURE — 93005 ELECTROCARDIOGRAM TRACING: CPT | Performed by: PHYSICIAN ASSISTANT

## 2022-02-18 PROCEDURE — 71045 X-RAY EXAM CHEST 1 VIEW: CPT

## 2022-02-18 PROCEDURE — 84484 ASSAY OF TROPONIN QUANT: CPT | Performed by: PHYSICIAN ASSISTANT

## 2022-02-18 PROCEDURE — 81001 URINALYSIS AUTO W/SCOPE: CPT | Performed by: PHYSICIAN ASSISTANT

## 2022-02-18 PROCEDURE — 80156 ASSAY CARBAMAZEPINE TOTAL: CPT | Performed by: PHYSICIAN ASSISTANT

## 2022-02-18 PROCEDURE — 96361 HYDRATE IV INFUSION ADD-ON: CPT

## 2022-02-18 PROCEDURE — 93010 ELECTROCARDIOGRAM REPORT: CPT | Performed by: INTERNAL MEDICINE

## 2022-02-18 PROCEDURE — 36600 WITHDRAWAL OF ARTERIAL BLOOD: CPT

## 2022-02-18 PROCEDURE — 99214 OFFICE O/P EST MOD 30 MIN: CPT | Performed by: INTERNAL MEDICINE

## 2022-02-18 PROCEDURE — P9612 CATHETERIZE FOR URINE SPEC: HCPCS

## 2022-02-18 PROCEDURE — 82803 BLOOD GASES ANY COMBINATION: CPT

## 2022-02-18 PROCEDURE — 96360 HYDRATION IV INFUSION INIT: CPT

## 2022-02-18 PROCEDURE — 85379 FIBRIN DEGRADATION QUANT: CPT | Performed by: PHYSICIAN ASSISTANT

## 2022-02-18 PROCEDURE — 80053 COMPREHEN METABOLIC PANEL: CPT | Performed by: PHYSICIAN ASSISTANT

## 2022-02-18 PROCEDURE — 83880 ASSAY OF NATRIURETIC PEPTIDE: CPT | Performed by: PHYSICIAN ASSISTANT

## 2022-02-18 PROCEDURE — 71275 CT ANGIOGRAPHY CHEST: CPT

## 2022-02-18 PROCEDURE — 87077 CULTURE AEROBIC IDENTIFY: CPT | Performed by: PHYSICIAN ASSISTANT

## 2022-02-18 RX ORDER — SODIUM CHLORIDE 0.9 % (FLUSH) 0.9 %
10 SYRINGE (ML) INJECTION AS NEEDED
Status: DISCONTINUED | OUTPATIENT
Start: 2022-02-18 | End: 2022-02-18 | Stop reason: HOSPADM

## 2022-02-18 RX ORDER — SODIUM CHLORIDE, SODIUM LACTATE, POTASSIUM CHLORIDE, CALCIUM CHLORIDE 600; 310; 30; 20 MG/100ML; MG/100ML; MG/100ML; MG/100ML
100 INJECTION, SOLUTION INTRAVENOUS CONTINUOUS
Status: DISCONTINUED | OUTPATIENT
Start: 2022-02-18 | End: 2022-02-18 | Stop reason: HOSPADM

## 2022-02-18 RX ADMIN — IOPAMIDOL 100 ML: 755 INJECTION, SOLUTION INTRAVENOUS at 17:49

## 2022-02-18 RX ADMIN — SODIUM CHLORIDE, POTASSIUM CHLORIDE, SODIUM LACTATE AND CALCIUM CHLORIDE 100 ML/HR: 600; 310; 30; 20 INJECTION, SOLUTION INTRAVENOUS at 17:34

## 2022-02-18 NOTE — ED PROVIDER NOTES
Subjective   History of Present Illness    Patient is a pleasant 81-year-old gentleman who presents to ED with his power of .  Chief complaint is suddenly worsening shortness of breath and weakness.  However Power of  describes the patient has had a gradual decline in health and shortness of breath over the past 2+ years.  In the past 24 hours, he had sudden worsening shortness of breath notable whenever he sitting in a chair.  He denies this affecting him when he sleeps which she sleeps in a chair.  He reports is much worse when he is awake particularly this morning.  He felt so bad that he did not want to see his oncologist today for his routine follow-up for his anemia of chronic kidney disease.  His power of  recommended the patient go.  Upon arrival to Dr. Mabry's office, the patient's blood pressure was 90 systolic.  Typically, the patient ambulates with a cane, but with his weakness in his lower extremities, he arrived via wheelchair.  He informed Dr. Mabry that he has felt chest tightness intermittently but denies any chest tightness presently.  He denies any fever or cough.  He denies any abdominal pain.  He does have chronic arthritic pain and back pain that remains unchanged.    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  This has been noted years ago and the patient has been under urological care.  He denies any abdominal pain.  He denies any urinary complaints.    His appetite has remained the same.  He has normal bowel movement.  He denies leg pain, cramping, or charley horses.  He denies any history of DVT or PE.    His shortness of breath has progressively worsened in the past year specifically change in the past 24 hours per patient and power of .  He denies hemoptysis.  He has a history congestive heart failure.  He does have history of hypertension.    Review of Systems    Constitutional: Positive for activity change and fatigue. Negative for fever.   HENT: Negative.    Respiratory: Positive for chest tightness and shortness of breath.    Cardiovascular: Positive for chest pain.   Gastrointestinal: Negative.  Negative for abdominal pain.   Genitourinary: Negative.    Musculoskeletal: Positive for back pain.   Skin: Negative.    Neurological: Positive for weakness.   Psychiatric/Behavioral: Negative.        Past Medical History:   Diagnosis Date   • Anemia    • Arthritis    • Bilateral impacted cerumen 12/10/2018   • Diverticulitis    • Enlarged prostate    • ETD (eustachian tube dysfunction)    • GERD (gastroesophageal reflux disease)    • Hypertension    • Kidney infection    • Lumbago    • Narcolepsy    • Sensorineural hearing loss (SNHL) of both ears 6/18/2018   • SNHL (sensorineural hearing loss)    • Tinnitus    • Vitamin B12 deficiency    • Weak urinary stream        Allergies   Allergen Reactions   • Biaxin [Clarithromycin] GI Intolerance     unknown   • Parafon Forte Dsc [Chlorzoxazone] Provider Review Needed     Swallowing issues       Past Surgical History:   Procedure Laterality Date   • ABDOMINAL SURGERY     • CATARACT EXTRACTION Left    • HAND SURGERY Left    • HERNIA REPAIR     • REPLACEMENT TOTAL KNEE Left    • ROTATOR CUFF REPAIR     • TONSILLECTOMY         Family History   Adopted: Yes   Family history unknown: Yes       Social History     Socioeconomic History   • Marital status:    Tobacco Use   • Smoking status: Never Smoker   • Smokeless tobacco: Never Used   Substance and Sexual Activity   • Alcohol use: No   • Drug use: No   • Sexual activity: Defer       Prior to Admission medications    Medication Sig Start Date End Date Taking? Authorizing Provider   aspirin 81 MG EC tablet Take 81 mg by mouth Daily.    Caroline Stephenson MD   atorvastatin (LIPITOR) 20 MG tablet Take 20 mg by mouth Daily. 10/20/21   Caroline Stephenson MD   carBAMazepine  (TEGretol) 200 MG tablet Take 1 tablet by mouth 2 (Two) Times a Day. 11/1/21   Cyril Kurtz PA   CloNIDine (CATAPRES) 0.1 MG tablet Take 0.1 mg by mouth As Needed for High Blood Pressure (1 tablet if systolic is over 170).    Caroline Stephenson MD   cyanocobalamin 1000 MCG/ML injection Inject 1 mL into the appropriate muscle as directed by prescriber Every 28 (Twenty-Eight) Days. 4/2/20   Rosi Baker DO   finasteride (PROSCAR) 5 MG tablet Take 1 tablet by mouth Daily. 6/6/18   Giuseppe Gonzalez MD   FLUoxetine (PROzac) 40 MG capsule Take 40 mg by mouth Daily. 11/3/16   Caroline Stephenson MD   hydroCHLOROthiazide (HYDRODIURIL) 12.5 MG tablet Take 12.5 mg by mouth Daily. 1/20/20   Caroline Stephenson MD   hyoscyamine (LEVSIN) 0.125 MG SL tablet Take 125 mcg by mouth 2 (Two) Times a Day. 7/3/20   Caroline Stephenson MD   irbesartan (AVAPRO) 300 MG tablet Take 300 mg by mouth Daily. 1/20/20   Caroline Stephenson MD   methylphenidate (Ritalin) 20 MG tablet Take 1 tablet by mouth 3 (Three) Times a Day. 2/1/22   Bhupinder Bal MD   metoprolol tartrate (LOPRESSOR) 25 MG tablet Take 1 tablet by mouth 2 (Two) Times a Day.  Patient taking differently: Take 25 mg by mouth Daily. 10/2/17   Oh Brandon MD   Multiple Vitamins-Minerals (MULTIVITAMIN ADULT PO) Take  by mouth.    Caroline Stephenson MD   mupirocin (BACTROBAN) 2 % ointment APPLY TO AFFECTED AREA TWICE DAILY AS NEEDED 9/1/20   Caroline Stephenson MD   omeprazole (priLOSEC) 40 MG capsule Take 40 mg by mouth Daily. 12/4/16   Caroline Stephenson MD   oxyCODONE-acetaminophen (PERCOCET) 7.5-325 MG per tablet Take 1 tablet by mouth Every 6 (Six) Hours As Needed. Takes 7.5mg four times daily    Caroline Stephenson MD   predniSONE (DELTASONE) 10 MG tablet Take 1 tablet by mouth Daily. 8/17/21   Rosi Baker DO   tamsulosin (FLOMAX) 0.4 MG capsule 24 hr capsule Take 2 capsules by mouth Every Night. 10/20/21    "Abdoul Silva MD   terazosin (HYTRIN) 2 MG capsule Take 2 mg by mouth Every Night. 11/8/16   Provider, MD Caroline       Medications   sodium chloride 0.9 % flush 10 mL (has no administration in time range)   lactated ringers infusion (100 mL/hr Intravenous New Bag 2/18/22 2198)   iopamidol (ISOVUE-370) 76 % injection 100 mL (100 mL Intravenous Given 2/18/22 1579)       /74   Pulse 77   Temp 98.2 °F (36.8 °C) (Oral)   Resp 18   Ht 157.5 cm (62\")   Wt 84.4 kg (186 lb)   SpO2 95%   BMI 34.02 kg/m²       Objective   Physical Exam  Vitals reviewed.   Constitutional:       Appearance: He is well-developed. He is obese.   HENT:      Head: Normocephalic and atraumatic.      Right Ear: External ear normal.      Left Ear: External ear normal.      Nose: Nose normal.   Eyes:      Conjunctiva/sclera: Conjunctivae normal.      Pupils: Pupils are equal, round, and reactive to light.   Neck:      Trachea: No tracheal deviation.   Cardiovascular:      Rate and Rhythm: Normal rate and regular rhythm.      Heart sounds: Normal heart sounds. No murmur heard.  No friction rub. No gallop.    Pulmonary:      Effort: Pulmonary effort is normal. No respiratory distress.      Breath sounds: Normal breath sounds. No wheezing or rales.   Chest:      Chest wall: No tenderness.   Abdominal:      General: Bowel sounds are normal. There is no distension.      Palpations: Abdomen is soft. There is no mass.      Tenderness: There is no abdominal tenderness. There is no guarding or rebound.   Musculoskeletal:         General: No tenderness or deformity. Normal range of motion.      Cervical back: Normal range of motion and neck supple.      Right lower leg: No tenderness. No edema.      Left lower leg: No tenderness. No edema.   Skin:     General: Skin is warm and dry.      Coloration: Skin is not pale.      Findings: No erythema or rash.   Neurological:      General: No focal deficit present.      Mental Status: He is alert " and oriented to person, place, and time.      Cranial Nerves: No cranial nerve deficit.      Motor: No weakness.      Deep Tendon Reflexes: Reflexes are normal and symmetric.   Psychiatric:         Mood and Affect: Mood normal.         Behavior: Behavior normal.         Thought Content: Thought content normal.         Judgment: Judgment normal.         Procedures         Lab Results (last 24 hours)     Procedure Component Value Units Date/Time    CBC & Differential [[id number]]  (Abnormal) Collected: 02/18/22 1415    Specimen: Blood Updated: 02/18/22 1437    Narrative:      The following orders were created for panel order CBC & Differential.  Procedure                               Abnormality         Status                     ---------                               -----------         ------                     CBC Auto Differential[[id number]]        Abnormal            Final result                 Please view results for these tests on the individual orders.    Comprehensive Metabolic Panel [[id number]]  (Abnormal) Collected: 02/18/22 1415    Specimen: Blood Updated: 02/18/22 1459     Glucose 121 mg/dL      BUN 34 mg/dL      Creatinine 1.45 mg/dL      Sodium 135 mmol/L      Potassium 5.1 mmol/L      Comment: Slight hemolysis detected by analyzer. Results may be affected.        Chloride 99 mmol/L      CO2 26.0 mmol/L      Calcium 8.8 mg/dL      Total Protein 6.4 g/dL      Albumin 4.00 g/dL      ALT (SGPT) 24 U/L      AST (SGOT) 21 U/L      Alkaline Phosphatase 117 U/L      Total Bilirubin 0.3 mg/dL      eGFR Non African Amer 47 mL/min/1.73      Globulin 2.4 gm/dL      A/G Ratio 1.7 g/dL      BUN/Creatinine Ratio 23.4     Anion Gap 10.0 mmol/L     Narrative:      GFR Normal >60  Chronic Kidney Disease <60  Kidney Failure <15      COVID PRE-OP / PRE-PROCEDURE SCREENING ORDER (NO ISOLATION) - Swab, Nasal Cavity [[id number]]  (Normal) Collected: 02/18/22 1415    Specimen: Swab from Nasal Cavity Updated: 02/18/22 9094     Narrative:      The following orders were created for panel order COVID PRE-OP / PRE-PROCEDURE SCREENING ORDER (NO ISOLATION) - Swab, Nasal Cavity.  Procedure                               Abnormality         Status                     ---------                               -----------         ------                     COVID-19,[facility]...[[id number]]  Normal              Final result                 Please view results for these tests on the individual orders.    BNP [[id number]]  (Normal) Collected: 02/18/22 1415    Specimen: Blood Updated: 02/18/22 1455     proBNP 211.8 pg/mL     Narrative:      Among patients with dyspnea, NT-proBNP is highly sensitive for the detection of acute congestive heart failure. In addition NT-proBNP of <300 pg/ml effectively rules out acute congestive heart failure with 99% negative predictive value.    Results may be falsely decreased if patient taking Biotin.      D-dimer, Quantitative [[id number]]  (Abnormal) Collected: 02/18/22 1415    Specimen: Blood Updated: 02/18/22 1445     D-Dimer, Quantitative 1.51 mg/L (FEU)     Narrative:      Reference Range is 0-0.50 mg/L FEU. However, results <0.50 mg/L FEU tends to rule out DVT or PE. Results >0.50 mg/L FEU are not useful in predicting absence or presence of DVT or PE.      Troponin [[id number]]  (Normal) Collected: 02/18/22 1415    Specimen: Blood Updated: 02/18/22 1455     Troponin T 0.017 ng/mL     Narrative:      Troponin T Reference Range:  <= 0.03 ng/mL-   Negative for AMI  >0.03 ng/mL-     Abnormal for myocardial necrosis.  Clinicians would have to utilize clinical acumen, EKG, Troponin and serial changes to determine if it is an Acute Myocardial Infarction or myocardial injury due to an underlying chronic condition.       Results may be falsely decreased if patient taking Biotin.      Blood Culture - Blood, Arm, Left [[id number]] Collected: 02/18/22 1415    Specimen: Blood from Arm, Left Updated: 02/18/22 1435    Blood  "Culture - Blood, Arm, Right [[id number]] Collected: 02/18/22 1415    Specimen: Blood from Arm, Right Updated: 02/18/22 1435    Lactic Acid, Plasma [[id number]]  (Abnormal) Collected: 02/18/22 1415    Specimen: Blood Updated: 02/18/22 1452     Lactate 2.9 mmol/L     Procalcitonin [[id number]]  (Normal) Collected: 02/18/22 1415    Specimen: Blood Updated: 02/18/22 1505     Procalcitonin 0.05 ng/mL     Narrative:      As a Marker for Sepsis (Non-Neonates):     1. <0.5 ng/mL represents a low risk of severe sepsis and/or septic shock.  2. >2 ng/mL represents a high risk of severe sepsis and/or septic shock.    As a Marker for Lower Respiratory Tract Infections that require antibiotic therapy:  PCT on Admission     Antibiotic Therapy             6-12 Hrs later  >0.5                          Strongly Recommended            >0.25 - <0.5             Recommended  0.1 - 0.25                  Discouraged                       Remeasure/reassess PCT  <0.1                         Strongly Discouraged         Remeasure/reassess PCT      As 28 day mortality risk marker: \"Change in Procalcitonin Result\" (>80% or <=80%) if Day 0 (or Day 1) and Day 4 values are available. Refer to http://www.[employer]-pct-calculator.com/    Change in PCT <=80 %   A decrease of PCT levels below or equal to 80% defines a positive change in PCT test result representing a higher risk for 28-day all-cause mortality of patients diagnosed with severe sepsis or septic shock.    Change in PCT >80 %   A decrease of PCT levels of more than 80% defines a negative change in PCT result representing a lower risk for 28-day all-cause mortality of patients diagnosed with severe sepsis or septic shock.                C-reactive Protein [[id number]]  (Normal) Collected: 02/18/22 1415    Specimen: Blood Updated: 02/18/22 1459     C-Reactive Protein <0.30 mg/dL     Carbamazepine Level, Total [[id number]]  (Normal) Collected: 02/18/22 1415    Specimen: Blood Updated: 02/18/22 1988 "     Carbamazepine Level 6.3 mcg/mL     CBC Auto Differential [[id number]]  (Abnormal) Collected: 02/18/22 1415    Specimen: Blood Updated: 02/18/22 1437     WBC 11.34 10*3/mm3      RBC 3.42 10*6/mm3      Hemoglobin 11.7 g/dL      Hematocrit 35.2 %      .9 fL      MCH 34.2 pg      MCHC 33.2 g/dL      RDW 12.2 %      RDW-SD 46.1 fl      MPV 9.5 fL      Platelets 341 10*3/mm3      Neutrophil % 81.7 %      Lymphocyte % 10.3 %      Monocyte % 6.8 %      Eosinophil % 0.3 %      Basophil % 0.3 %      Immature Grans % 0.6 %      Neutrophils, Absolute 9.27 10*3/mm3      Lymphocytes, Absolute 1.17 10*3/mm3      Monocytes, Absolute 0.77 10*3/mm3      Eosinophils, Absolute 0.03 10*3/mm3      Basophils, Absolute 0.03 10*3/mm3      Immature Grans, Absolute 0.07 10*3/mm3      nRBC 0.0 /100 WBC     COVID-19,[facility],Nasal Swab No Transport Media 3-4 HR TAT - Swab, Nasal Cavity [[id number]]  (Normal) Collected: 02/18/22 1415    Specimen: Swab from Nasal Cavity Updated: 02/18/22 1514     COVID19 Not Detected    Narrative:      Fact sheet for providers: https://www.fda.gov/media/735076/download     Fact sheet for patients: https://www.fda.gov/media/567992/download    Test performed by PCR.    Consider negative results in combination with clinical observations, patient history, and epidemiological information.    Urinalysis With Culture If Indicated - Urine, Clean Catch [[id number]]  (Abnormal) Collected: 02/18/22 1415    Specimen: Urine, Clean Catch Updated: 02/18/22 1524     Color, UA Yellow     Appearance, UA Slightly Cloudy     pH, UA <=5.0     Specific Gravity, UA 1.020     Glucose, UA Negative     Ketones, UA Trace     Bilirubin, UA Small (1+)     Blood, UA Trace     Protein, UA 30 mg/dL (1+)     Leuk Esterase, UA Moderate (2+)     Nitrite, UA Negative     Urobilinogen, UA 0.2 E.U./dL    Urinalysis, Microscopic Only - Urine, Clean Catch [[id number]]  (Abnormal) Collected: 02/18/22 1415    Specimen: Urine, Clean Catch  Updated: 02/18/22 1524     RBC, UA 0-2 /HPF      WBC, UA 31-50 /HPF      Bacteria, UA 2+ /HPF      Squamous Epithelial Cells, UA 0-2 /HPF      Hyaline Casts, UA None Seen /LPF      Methodology Manual Light Microscopy    Urine Culture - Urine, Urine, Clean Catch [[id number]] Collected: 02/18/22 1415    Specimen: Urine, Clean Catch Updated: 02/18/22 1524    Blood Gas, Arterial - [[id number]] Collected: 02/18/22 1421    Specimen: Arterial Blood Updated: 02/18/22 1424     Site Left Brachial     Jordon's Test N/A     pH, Arterial 7.442 pH units      pCO2, Arterial 35.5 mm Hg      pO2, Arterial 84.8 mm Hg      HCO3, Arterial 24.2 mmol/L      Base Excess, Arterial 0.4 mmol/L      O2 Saturation, Arterial 97.5 %      Temperature 37.0 C      Barometric Pressure for Blood Gas 758 mmHg      Modality Room Air     Ventilator Mode NA     Collected by [id number]     Comment: Meter: [id number]     :  [id number]        pCO2, Temperature Corrected 35.5 mm Hg      pH, Temp Corrected 7.442 pH Units      pO2, Temperature Corrected 84.8 mm Hg     Troponin [[id number]]  (Normal) Collected: 02/18/22 1633    Specimen: Blood Updated: 02/18/22 1703     Troponin T <0.010 ng/mL     Narrative:      Troponin T Reference Range:  <= 0.03 ng/mL-   Negative for AMI  >0.03 ng/mL-     Abnormal for myocardial necrosis.  Clinicians would have to utilize clinical acumen, EKG, Troponin and serial changes to determine if it is an Acute Myocardial Infarction or myocardial injury due to an underlying chronic condition.       Results may be falsely decreased if patient taking Biotin.      STAT Lactic Acid, Reflex [[id number]]  (Abnormal) Collected: 02/18/22 1633    Specimen: Blood Updated: 02/18/22 1704     Lactate 3.1 mmol/L           XR Chest 1 View    Result Date: 2/18/2022  Narrative: Frontal upright radiograph of the chest 2/18/2022 2:45 PM CST  HISTORY: Chest pain, shortness of breath  COMPARISON: Chest exam dated 4/9/2015.  FINDINGS:  No lung  consolidation. No pleural effusion or pneumothorax. The cardiomediastinal silhouette and pulmonary vascularity are within normal limits. Age indeterminant impacted right proximal humerus surgical neck fracture.      Impression: 1. Age indeterminant impacted right proximal humerus surgical neck fracture. 2. Otherwise stable chest exam. No visualized infiltrate or pulmonary edema.   This report was finalized on 02/18/2022 14:56 by Dr Jared Sims, .    US Renal Bilateral    Result Date: 2/8/2022  Narrative: RENAL ULTRASOUND COMPLETE 2/8/2022 8:28 AM CST  REASON FOR EXAM: kidney stone; N20.0-Calculus of kidney; R10.9-Unspecified abdominal pain   COMPARISON: None   TECHNIQUE: Multiple longitudinal and transverse realtime sonographic images of the kidneys and urinary bladder are obtained.  FINDINGS:  RIGHT KIDNEY: 9.0 cm. Normal in size, shape, contour and position. There is a cortical cyst on the lower pole the right kidney measuring 2.0 x 1.8 x 1.9 cm.. The central echo complex is compact with no evidence for hydronephrosis. Punctate echogenic foci are noted at the corticomedullary junction of the right kidney suggesting nonobstructing nephrolithiasis.. No hydroureter.  LEFT KIDNEY: 8.0 cm. Normal in size, shape, contour and position. No solid or cystic masses. The central echo complex is compact with no evidence for hydronephrosis. Punctate echogenic foci are noted at the corticomedullary junction of the left kidney suggesting nonobstructing nephrolithiasis.. No hydroureter.  PELVIS: The bladder is mildly distended with anechoic urine and demonstrates no significant wall thickening or internal echogenicities. There is no surrounding ascites.      Impression: 1. Punctate echogenic foci at the corticomedullary junction of both kidneys suggesting bilateral punctate nonobstructing nephrolithiasis. There is a small cortical cyst lower pole of the right kidney. 2. Otherwise normal renal ultrasound..   This report was  finalized on 02/08/2022 09:20 by Dr. Lauro Navarro MD.    CT Angiogram Chest    Result Date: 2/18/2022  Narrative: CT ANGIOGRAM CHEST- 2/18/2022 5:38 PM CST  HISTORY: cp sob  COMPARISON: None.  Radiation dose equals  mGy-cm.  Automated exposure control dose reduction technique was implemented.   TECHNIQUE: Helical tomographic images of the chest were obtained after the administration of intravenous contrast following angiogram protocol. Additionally, 3D reformatted images were provided.    FINDINGS:  Pulmonary arteries: There is adequate enhancement of the pulmonary arteries to evaluate for central and segmental pulmonary emboli. There are no filling defects within the main, lobar, segmental or visualized subsegmental pulmonary arteries. The pulmonary arteries are within normal limits.  Aorta and great vessels: There is atherosclerosis in the aorta without aneurysm or dissection. There is atherosclerosis in the great vessels. Coronary artery disease.  Visualized neck base: Heterogeneous thyroid gland..  Lungs: There is no mass, worrisome nodule, or consolidation. No pleural effusion is seen. The trachea and bronchial tree are patent.  Heart: The heart is normal in size. There is no pericardial effusion.  Mediastinum and lymph nodes: No enlarged mediastinal, hilar, or axillary lymph nodes are present.  Skeletal and soft tissues: The osseous structures of the thorax and surrounding soft tissues demonstrate no acute process.  Upper abdomen: The imaged portion of the upper abdomen demonstrates no acute process.      Impression: 1. No evidence of pulmonary embolus or other acute cardiopulmonary process.   This report was finalized on 02/18/2022 18:18 by Dr. Marilia Mccray MD.      ED Course  ED Course as of 02/18/22 1858 Fri Feb 18, 2022   1855 I have reviewed this case with Dr. Alston who has also reexamined the patient.  Patient feels comfortable since he has been here. Patient has been able to ambulate  with the use of his cane and his power of  next to him without any evidence of shortness of breath during ambulation.We have discussed all the test results.  At this time, we do not have anything to really admit the patient, but advised the POA to keep an eye on him closely to see there is any acute abnormalities.  He is welcome return the ED for any issues.  Patient and POA voiced understanding.  He will be discharged in stable condition. [TK]      ED Course User Index  [TK] Estiven Avendaño PA          MDM  Number of Diagnoses or Management Options  Chronic shortness of breath  Diagnosis management comments: Shortness of breath differential includes but not limited to:  Pneumonia, COPD, CHF, bronchitis, pericardial effusion with or without tamponade, pulmonary embolism, myocardial infarction, pulmonary edema, pulmonary effusion, asthma, allergic reaction, anxiety reaction, etc...       Amount and/or Complexity of Data Reviewed  Clinical lab tests: reviewed  Tests in the radiology section of CPT®: reviewed  Tests in the medicine section of CPT®: reviewed  Decide to obtain previous medical records or to obtain history from someone other than the patient: yes        Final diagnoses:   Chronic shortness of breath          Estiven Avendaño PA  02/18/22 4802

## 2022-02-19 LAB
QT INTERVAL: 364 MS
QT INTERVAL: 372 MS
QTC INTERVAL: 417 MS
QTC INTERVAL: 418 MS

## 2022-02-19 NOTE — ED NOTES
Pt ambulated to BR with standby assist per Guille MCCLELLAN .  Pt tolerated.     Oskar Swann RN  02/18/22 9331

## 2022-02-21 ENCOUNTER — TELEPHONE (OUTPATIENT)
Dept: ONCOLOGY | Facility: CLINIC | Age: 82
End: 2022-02-21

## 2022-02-21 NOTE — TELEPHONE ENCOUNTER
Caller: Orestes Larsen  (POA)    Relationship: Emergency Contact    Best call back number: [phone]    What is the best time to reach you: ASAP    Who are you requesting to speak with (clinical staff, provider,  specific staff member):     Do you know the name of the person who called:     What was the call regarding: PT NEEDS TO SCHEDULE 6 MONTH    Do you require a callback: YES

## 2022-02-22 ENCOUNTER — TELEPHONE (OUTPATIENT)
Dept: EMERGENCY DEPT | Facility: HOSPITAL | Age: 82
End: 2022-02-22

## 2022-02-22 LAB — BACTERIA SPEC AEROBE CULT: ABNORMAL

## 2022-02-23 LAB
BACTERIA SPEC AEROBE CULT: NORMAL
BACTERIA SPEC AEROBE CULT: NORMAL

## 2022-02-25 ENCOUNTER — HOSPITAL ENCOUNTER (OUTPATIENT)
Dept: CARDIOLOGY | Facility: HOSPITAL | Age: 82
Discharge: HOME | End: 2022-02-25
Admitting: FAMILY MEDICINE

## 2022-02-25 VITALS
BODY MASS INDEX: 34.23 KG/M2 | WEIGHT: 186 LBS | DIASTOLIC BLOOD PRESSURE: 52 MMHG | HEIGHT: 62 IN | SYSTOLIC BLOOD PRESSURE: 87 MMHG

## 2022-02-25 DIAGNOSIS — R06.09 DYSPNEA ON EXERTION: ICD-10-CM

## 2022-02-25 PROCEDURE — 93306 TTE W/DOPPLER COMPLETE: CPT

## 2022-02-25 PROCEDURE — 93306 TTE W/DOPPLER COMPLETE: CPT | Performed by: INTERNAL MEDICINE

## 2022-02-25 RX ORDER — CEFDINIR 300 MG/1
300 CAPSULE ORAL 2 TIMES DAILY
Qty: 14 CAPSULE | Refills: 0 | Status: SHIPPED | OUTPATIENT
Start: 2022-02-25 | End: 2022-08-18 | Stop reason: ALTCHOICE

## 2022-02-28 DIAGNOSIS — G47.419 PRIMARY NARCOLEPSY WITHOUT CATAPLEXY: ICD-10-CM

## 2022-02-28 LAB
BH CV ECHO MEAS - AO MAX PG (FULL): 19 MMHG
BH CV ECHO MEAS - AO MAX PG: 25.2 MMHG
BH CV ECHO MEAS - AO MEAN PG (FULL): 9.3 MMHG
BH CV ECHO MEAS - AO MEAN PG: 13.3 MMHG
BH CV ECHO MEAS - AO ROOT AREA (BSA CORRECTED): 1.9
BH CV ECHO MEAS - AO ROOT AREA: 9.6 CM^2
BH CV ECHO MEAS - AO ROOT DIAM: 3.5 CM
BH CV ECHO MEAS - AO V2 MAX: 251 CM/SEC
BH CV ECHO MEAS - AO V2 MEAN: 173.7 CM/SEC
BH CV ECHO MEAS - AO V2 VTI: 52.2 CM
BH CV ECHO MEAS - AVA(I,A): 2.4 CM^2
BH CV ECHO MEAS - AVA(I,D): 2.4 CM^2
BH CV ECHO MEAS - AVA(V,A): 1.7 CM^2
BH CV ECHO MEAS - AVA(V,D): 1.7 CM^2
BH CV ECHO MEAS - BSA(HAYCOCK): 2 M^2
BH CV ECHO MEAS - BSA: 1.9 M^2
BH CV ECHO MEAS - BZI_BMI: 34 KILOGRAMS/M^2
BH CV ECHO MEAS - BZI_METRIC_HEIGHT: 157.5 CM
BH CV ECHO MEAS - BZI_METRIC_WEIGHT: 84.4 KG
BH CV ECHO MEAS - EDV(CUBED): 80.1 ML
BH CV ECHO MEAS - EDV(MOD-SP4): 42.8 ML
BH CV ECHO MEAS - EDV(TEICH): 83.5 ML
BH CV ECHO MEAS - EF(CUBED): 86.3 %
BH CV ECHO MEAS - EF(MOD-SP4): 77.9 %
BH CV ECHO MEAS - EF(TEICH): 80.2 %
BH CV ECHO MEAS - ESV(CUBED): 10.9 ML
BH CV ECHO MEAS - ESV(MOD-SP4): 9.4 ML
BH CV ECHO MEAS - ESV(TEICH): 16.6 ML
BH CV ECHO MEAS - FS: 48.5 %
BH CV ECHO MEAS - IVS/LVPW: 0.87
BH CV ECHO MEAS - IVSD: 0.96 CM
BH CV ECHO MEAS - LA DIMENSION: 3.6 CM
BH CV ECHO MEAS - LA/AO: 1
BH CV ECHO MEAS - LAT PEAK E' VEL: 7.3 CM/SEC
BH CV ECHO MEAS - LV DIASTOLIC VOL/BSA (35-75): 23.1 ML/M^2
BH CV ECHO MEAS - LV MASS(C)D: 150.3 GRAMS
BH CV ECHO MEAS - LV MASS(C)DI: 81.1 GRAMS/M^2
BH CV ECHO MEAS - LV MAX PG: 6.2 MMHG
BH CV ECHO MEAS - LV MEAN PG: 4 MMHG
BH CV ECHO MEAS - LV SYSTOLIC VOL/BSA (12-30): 5.1 ML/M^2
BH CV ECHO MEAS - LV V1 MAX: 124 CM/SEC
BH CV ECHO MEAS - LV V1 MEAN: 96.7 CM/SEC
BH CV ECHO MEAS - LV V1 VTI: 36.5 CM
BH CV ECHO MEAS - LVIDD: 4.3 CM
BH CV ECHO MEAS - LVIDS: 2.2 CM
BH CV ECHO MEAS - LVLD AP4: 8.1 CM
BH CV ECHO MEAS - LVLS AP4: 6.6 CM
BH CV ECHO MEAS - LVOT AREA (M): 3.5 CM^2
BH CV ECHO MEAS - LVOT AREA: 3.5 CM^2
BH CV ECHO MEAS - LVOT DIAM: 2.1 CM
BH CV ECHO MEAS - LVPWD: 1.1 CM
BH CV ECHO MEAS - MED PEAK E' VEL: 4.03 CM/SEC
BH CV ECHO MEAS - MV A MAX VEL: 65.5 CM/SEC
BH CV ECHO MEAS - MV DEC TIME: 0.4 SEC
BH CV ECHO MEAS - MV E MAX VEL: 67.9 CM/SEC
BH CV ECHO MEAS - MV E/A: 1
BH CV ECHO MEAS - PA MAX PG: 2.7 MMHG
BH CV ECHO MEAS - PA V2 MAX: 82.9 CM/SEC
BH CV ECHO MEAS - RAP SYSTOLE: 5 MMHG
BH CV ECHO MEAS - RVSP: 31.4 MMHG
BH CV ECHO MEAS - SI(AO): 271 ML/M^2
BH CV ECHO MEAS - SI(CUBED): 37.3 ML/M^2
BH CV ECHO MEAS - SI(LVOT): 68.2 ML/M^2
BH CV ECHO MEAS - SI(MOD-SP4): 18 ML/M^2
BH CV ECHO MEAS - SI(TEICH): 36.1 ML/M^2
BH CV ECHO MEAS - SV(AO): 502.2 ML
BH CV ECHO MEAS - SV(CUBED): 69.1 ML
BH CV ECHO MEAS - SV(LVOT): 126.4 ML
BH CV ECHO MEAS - SV(MOD-SP4): 33.4 ML
BH CV ECHO MEAS - SV(TEICH): 66.9 ML
BH CV ECHO MEAS - TR MAX VEL: 257 CM/SEC
BH CV ECHO MEASUREMENTS AVERAGE E/E' RATIO: 11.99
LEFT ATRIUM VOLUME INDEX: 27.4 ML/M2
LEFT ATRIUM VOLUME: 50.7 CM3
MAXIMAL PREDICTED HEART RATE: 140 BPM
STRESS TARGET HR: 119 BPM

## 2022-02-28 RX ORDER — METHYLPHENIDATE HYDROCHLORIDE 20 MG/1
20 TABLET ORAL 3 TIMES DAILY
Qty: 90 TABLET | Refills: 0 | Status: SHIPPED | OUTPATIENT
Start: 2022-02-28 | End: 2022-03-30 | Stop reason: SDUPTHER

## 2022-03-04 NOTE — TELEPHONE ENCOUNTER
Rx Refill Note  Requested Prescriptions     Pending Prescriptions Disp Refills   • predniSONE (DELTASONE) 10 MG tablet [Pharmacy Med Name: prednisone 10 mg tablet] 30 tablet 6     Sig: TAKE ONE TABLET BY MOUTH DAILY      Last office visit with prescribing clinician: 5/1/2020      Next office visit with prescribing clinician: Visit date not found            Samanta Van MA  03/04/22, 08:21 CST

## 2022-03-07 ENCOUNTER — OFFICE VISIT (OUTPATIENT)
Dept: NEUROLOGY | Facility: CLINIC | Age: 82
End: 2022-03-07

## 2022-03-07 ENCOUNTER — LAB (OUTPATIENT)
Dept: LAB | Facility: HOSPITAL | Age: 82
End: 2022-03-07

## 2022-03-07 VITALS
SYSTOLIC BLOOD PRESSURE: 110 MMHG | HEART RATE: 64 BPM | HEIGHT: 68 IN | DIASTOLIC BLOOD PRESSURE: 60 MMHG | OXYGEN SATURATION: 98 % | BODY MASS INDEX: 28.49 KG/M2 | WEIGHT: 188 LBS

## 2022-03-07 DIAGNOSIS — Z51.81 MEDICATION MONITORING ENCOUNTER: ICD-10-CM

## 2022-03-07 DIAGNOSIS — G50.0 TRIGEMINAL NEURALGIA OF RIGHT SIDE OF FACE: ICD-10-CM

## 2022-03-07 DIAGNOSIS — G47.419 PRIMARY NARCOLEPSY WITHOUT CATAPLEXY: Primary | ICD-10-CM

## 2022-03-07 LAB
AMPHET+METHAMPHET UR QL: NEGATIVE
AMPHETAMINES UR QL: NEGATIVE
BARBITURATES UR QL SCN: NEGATIVE
BENZODIAZ UR QL SCN: NEGATIVE
BUPRENORPHINE SERPL-MCNC: NEGATIVE NG/ML
CANNABINOIDS SERPL QL: NEGATIVE
COCAINE UR QL: NEGATIVE
METHADONE UR QL SCN: NEGATIVE
OPIATES UR QL: NEGATIVE
OXYCODONE UR QL SCN: POSITIVE
PCP UR QL SCN: NEGATIVE
PROPOXYPH UR QL: NEGATIVE
TRICYCLICS UR QL SCN: NEGATIVE

## 2022-03-07 PROCEDURE — 99214 OFFICE O/P EST MOD 30 MIN: CPT | Performed by: PHYSICIAN ASSISTANT

## 2022-03-07 PROCEDURE — 80306 DRUG TEST PRSMV INSTRMNT: CPT | Performed by: PHYSICIAN ASSISTANT

## 2022-03-07 PROCEDURE — G0480 DRUG TEST DEF 1-7 CLASSES: HCPCS | Performed by: PHYSICIAN ASSISTANT

## 2022-03-07 RX ORDER — PREDNISONE 10 MG/1
TABLET ORAL
Qty: 30 TABLET | Refills: 6 | Status: SHIPPED | OUTPATIENT
Start: 2022-03-07 | End: 2022-09-20 | Stop reason: SDUPTHER

## 2022-03-07 NOTE — PROGRESS NOTES
Subjective   Spencer Moore is a 81 y.o. 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 History, longstanding patient with primary narcolepsy treated for decades with Ritalin on stable doses. He has good family support and arrives with his primary support person, Orestes.      History of Present Illness      Eye Pain  The patient reports that he is experiencing eye pain from a triggering nerve within his eye. He states that his optometrist has administered a shot within the nerve but it did not change anything. His primary support states that the patients optometrist has done this more than once and he has explained this to the patient that the shots are not going to fix this issue. He thinks the patient is bringing it up today to make sure that this is not something else going on.     Medications  The primary support reports that when the patient takes Tegretol twice a day he would start to experience imbalance issues.     Ambulation  The primary support states that the patient has a hard time staying on his feet for a long time. He states since 01/14/2022 he has noticed an overall decline and weakness present.     The following portions of the patient's history were reviewed and updated as appropriate: allergies, current medications, past family history, past medical history, past social history, past surgical history and problem list.    Review of Systems     A review of systems was performed, and the pertinent positives are noted in the HPI.      Current Outpatient Medications:   •  aspirin 81 MG EC tablet, Take 81 mg by mouth Daily., Disp: , Rfl:   •  atorvastatin (LIPITOR) 20 MG tablet, Take 20 mg by mouth Daily., Disp: , Rfl:   •  carBAMazepine (TEGretol) 200 MG tablet, Take 1 tablet by mouth 2 (Two) Times a Day., Disp: 60 tablet, Rfl: 3  •  cefdinir (OMNICEF) 300 MG capsule, Take 1 capsule by mouth 2 (Two) Times a Day., Disp: 14 capsule, Rfl: 0  •  CloNIDine (CATAPRES)  0.1 MG tablet, Take 0.1 mg by mouth As Needed for High Blood Pressure (1 tablet if systolic is over 170)., Disp: , Rfl:   •  cyanocobalamin 1000 MCG/ML injection, Inject 1 mL into the appropriate muscle as directed by prescriber Every 28 (Twenty-Eight) Days., Disp: 10 mL, Rfl: 0  •  finasteride (PROSCAR) 5 MG tablet, Take 1 tablet by mouth Daily., Disp: 90 tablet, Rfl: 3  •  FLUoxetine (PROzac) 40 MG capsule, Take 40 mg by mouth Daily., Disp: , Rfl:   •  hydroCHLOROthiazide (HYDRODIURIL) 12.5 MG tablet, Take 12.5 mg by mouth Daily., Disp: , Rfl:   •  hyoscyamine (LEVSIN) 0.125 MG SL tablet, Take 125 mcg by mouth 2 (Two) Times a Day., Disp: , Rfl:   •  irbesartan (AVAPRO) 300 MG tablet, Take 300 mg by mouth Daily., Disp: , Rfl:   •  methylphenidate (Ritalin) 20 MG tablet, Take 1 tablet by mouth 3 (Three) Times a Day., Disp: 90 tablet, Rfl: 0  •  metoprolol tartrate (LOPRESSOR) 25 MG tablet, Take 1 tablet by mouth 2 (Two) Times a Day. (Patient taking differently: Take 25 mg by mouth Daily.), Disp: 60 tablet, Rfl: 11  •  Multiple Vitamins-Minerals (MULTIVITAMIN ADULT PO), Take  by mouth., Disp: , Rfl:   •  mupirocin (BACTROBAN) 2 % ointment, Apply 1 application topically to the appropriate area as directed As Needed., Disp: , Rfl:   •  omeprazole (priLOSEC) 40 MG capsule, Take 40 mg by mouth Daily., Disp: , Rfl:   •  oxyCODONE-acetaminophen (PERCOCET) 7.5-325 MG per tablet, Take 1 tablet by mouth Every 6 (Six) Hours As Needed. Takes 7.5mg four times daily, Disp: , Rfl:   •  predniSONE (DELTASONE) 10 MG tablet, TAKE ONE TABLET BY MOUTH DAILY (Patient taking differently: Take 10 mg by mouth Daily.), Disp: 30 tablet, Rfl: 6  •  tamsulosin (FLOMAX) 0.4 MG capsule 24 hr capsule, Take 2 capsules by mouth Every Night., Disp: 180 capsule, Rfl: 5  •  terazosin (HYTRIN) 2 MG capsule, Take 2 mg by mouth Every Night., Disp: , Rfl:     Current Facility-Administered Medications:   •  cyanocobalamin injection 1,000 mcg, 1,000 mcg,  Intramuscular, Q30 Days, Rosi Baker DO, 1,000 mcg at 01/10/22 1315     Objective      Physical Exam  Vitals and nursing note reviewed.   HENT:      Head: Normocephalic.      Right Ear: External ear normal. Decreased hearing noted.      Left Ear: External ear normal. Decreased hearing noted.      Ears:      Comments: Decreased hearing bilaterally using a bone conduction hearing assisted device today.     Nose: Nose normal.      Mouth/Throat:      Pharynx: Oropharynx is clear.   Eyes:      General: Lids are normal. Vision grossly intact. Gaze aligned appropriately. No scleral icterus.     Extraocular Movements: Extraocular movements intact.      Conjunctiva/sclera: Conjunctivae normal.      Pupils: Pupils are equal, round, and reactive to light.      Visual Fields: Right eye visual fields normal and left eye visual fields normal.   Neck:      Vascular: No carotid bruit or JVD.      Trachea: Trachea and phonation normal.   Cardiovascular:      Rate and Rhythm: Normal rate.      Heart sounds: Normal heart sounds.   Pulmonary:      Effort: Pulmonary effort is normal.      Breath sounds: Normal breath sounds.   Musculoskeletal:      Cervical back: Normal range of motion.   Skin:     General: Skin is warm and dry.   Neurological:      Mental Status: He is alert and oriented to person, place, and time.      GCS: GCS eye subscore is 4. GCS verbal subscore is 5. GCS motor subscore is 6.      Cranial Nerves: Cranial nerve deficit present.      Sensory: Sensation is intact.      Motor: Weakness and tremor present.      Coordination: Coordination is intact.      Gait: Gait abnormal.      Deep Tendon Reflexes: Reflexes are normal and symmetric.      Comments: Cranial nerve deficit present with presbycusis. Motor, generalized weakness and physiologic tremor present bilaterally. Gait is abnormal with significant psychomotor and musculoskeletal impairment, consistent with multiple age exacerbated diagnoses   Psychiatric:          Attention and Perception: Attention and perception normal.         Mood and Affect: Mood and affect normal.         Speech: Speech is delayed.         Behavior: Behavior normal.         Thought Content: Thought content normal.         Cognition and Memory: Cognition and memory normal.         Judgment: Judgment is impulsive.           Assessment/Plan   Diagnoses and all orders for this visit:    1. Primary narcolepsy without cataplexy (Primary)  -     Urine Drug Screen - Urine, Clean Catch    2. Trigeminal neuralgia of right side of face  -     Urine Drug Screen - Urine, Clean Catch    3. Medication monitoring encounter  -     Urine Drug Screen - Urine, Clean Catch  -     Methylphenidate & MTB, Urine - Urine, Clean Catch        1. Preventative Care  - The patient is to continue Ritalin for narcolepsy without cataplexy. Continue carbamazepine. Discussed dosing strategies for significant exacerbations. The patient is only able to typically tolerate 200 mg once per day will utilize 2-3 times per day during periods of exacerbation depending on tolerance and gait impairment associated with higher doses. Strategies for management of trigeminal neuralgia were discussed in detail with the patient and NAJMA Maldonado.       Dictated utilizing Dragon dictation.    Transcribed from ambient dictation for RAUL Brewer by Taylor Humes.  03/07/22   13:12 CST    Patient verbalized consent to the visit recording.  I have personally performed the services described in this document as transcribed by the above individual, and it is both accurate and complete.  RAUL Brewer  3/20/2022  14:59 CDT

## 2022-03-11 ENCOUNTER — CLINICAL SUPPORT (OUTPATIENT)
Dept: INTERNAL MEDICINE | Facility: CLINIC | Age: 82
End: 2022-03-11

## 2022-03-11 DIAGNOSIS — D53.9 MACROCYTIC ANEMIA: Primary | ICD-10-CM

## 2022-03-11 PROCEDURE — 36415 COLL VENOUS BLD VENIPUNCTURE: CPT | Performed by: NURSE PRACTITIONER

## 2022-03-11 PROCEDURE — 96372 THER/PROPH/DIAG INJ SC/IM: CPT | Performed by: NURSE PRACTITIONER

## 2022-03-11 RX ADMIN — CYANOCOBALAMIN 1000 MCG: 1000 INJECTION, SOLUTION INTRAMUSCULAR; SUBCUTANEOUS at 11:15

## 2022-03-11 NOTE — PROGRESS NOTES
Venipuncture Blood Specimen Collection  Venipuncture performed in the left ac by Estelita Hartley CMA with good hemostasis. Patient tolerated the procedure well without complications.   03/11/22   Estelita Hartley CMA

## 2022-03-12 LAB
BASOPHILS # BLD AUTO: 0.1 X10E3/UL (ref 0–0.2)
BASOPHILS NFR BLD AUTO: 0 %
EOSINOPHIL # BLD AUTO: 0.2 X10E3/UL (ref 0–0.4)
EOSINOPHIL NFR BLD AUTO: 1 %
ERYTHROCYTE [DISTWIDTH] IN BLOOD BY AUTOMATED COUNT: 12.3 % (ref 11.6–15.4)
HCT VFR BLD AUTO: 33.3 % (ref 37.5–51)
HGB BLD-MCNC: 11.6 G/DL (ref 13–17.7)
IMM GRANULOCYTES # BLD AUTO: 0.1 X10E3/UL (ref 0–0.1)
IMM GRANULOCYTES NFR BLD AUTO: 1 %
LYMPHOCYTES # BLD AUTO: 1.7 X10E3/UL (ref 0.7–3.1)
LYMPHOCYTES NFR BLD AUTO: 11 %
MCH RBC QN AUTO: 35.5 PG (ref 26.6–33)
MCHC RBC AUTO-ENTMCNC: 34.8 G/DL (ref 31.5–35.7)
MCV RBC AUTO: 102 FL (ref 79–97)
MONOCYTES # BLD AUTO: 0.9 X10E3/UL (ref 0.1–0.9)
MONOCYTES NFR BLD AUTO: 6 %
NEUTROPHILS # BLD AUTO: 12.9 X10E3/UL (ref 1.4–7)
NEUTROPHILS NFR BLD AUTO: 81 %
PLATELET # BLD AUTO: 295 X10E3/UL (ref 150–450)
RBC # BLD AUTO: 3.27 X10E6/UL (ref 4.14–5.8)
WBC # BLD AUTO: 15.8 X10E3/UL (ref 3.4–10.8)

## 2022-03-13 LAB
ME-PHENIDATE UR-MCNC: 1165 NG/ML
PPAA UR-MCNC: NORMAL NG/ML

## 2022-03-15 ENCOUNTER — CLINICAL SUPPORT (OUTPATIENT)
Dept: INTERNAL MEDICINE | Facility: CLINIC | Age: 82
End: 2022-03-15

## 2022-03-15 DIAGNOSIS — N39.0 URINARY TRACT INFECTION WITHOUT HEMATURIA, SITE UNSPECIFIED: Primary | ICD-10-CM

## 2022-03-15 LAB
BILIRUB BLD-MCNC: NEGATIVE MG/DL
CLARITY, POC: CLEAR
COLOR UR: YELLOW
GLUCOSE PRE LAC PO SERPL-MCNC: ABNORMAL MG/DL
KETONES UR QL: NEGATIVE
LEUKOCYTE EST, POC: NEGATIVE
NITRITE UR-MCNC: NEGATIVE MG/ML
PH UR: 5 [PH] (ref 5–8)
PROT UR STRIP-MCNC: NEGATIVE MG/DL
RBC # UR STRIP: ABNORMAL /UL
SPECIFIC GRAVITY: 1.2
UROBILINOGEN UR QL: NORMAL

## 2022-03-15 PROCEDURE — 81003 URINALYSIS AUTO W/O SCOPE: CPT | Performed by: INTERNAL MEDICINE

## 2022-03-15 NOTE — PROGRESS NOTES
Patient came into office to have a repeat urine, after he finished his antibiotics.   He is doing well.

## 2022-03-30 DIAGNOSIS — G47.419 PRIMARY NARCOLEPSY WITHOUT CATAPLEXY: ICD-10-CM

## 2022-03-30 RX ORDER — METHYLPHENIDATE HYDROCHLORIDE 20 MG/1
20 TABLET ORAL 3 TIMES DAILY
Qty: 90 TABLET | Refills: 0 | Status: SHIPPED | OUTPATIENT
Start: 2022-03-30 | End: 2022-04-30 | Stop reason: SDUPTHER

## 2022-04-01 DIAGNOSIS — G50.0 TRIGEMINAL NEURALGIA OF RIGHT SIDE OF FACE: ICD-10-CM

## 2022-04-01 RX ORDER — CARBAMAZEPINE 200 MG/1
TABLET ORAL
Qty: 60 TABLET | Refills: 5 | OUTPATIENT
Start: 2022-04-01

## 2022-04-11 ENCOUNTER — CLINICAL SUPPORT (OUTPATIENT)
Dept: INTERNAL MEDICINE | Facility: CLINIC | Age: 82
End: 2022-04-11

## 2022-04-11 DIAGNOSIS — D53.9 MACROCYTIC ANEMIA: Primary | ICD-10-CM

## 2022-04-11 DIAGNOSIS — L98.9 SKIN LESION OF HAND: ICD-10-CM

## 2022-04-11 PROCEDURE — 36415 COLL VENOUS BLD VENIPUNCTURE: CPT | Performed by: INTERNAL MEDICINE

## 2022-04-11 PROCEDURE — 96372 THER/PROPH/DIAG INJ SC/IM: CPT | Performed by: INTERNAL MEDICINE

## 2022-04-11 PROCEDURE — 99211 OFF/OP EST MAY X REQ PHY/QHP: CPT | Performed by: INTERNAL MEDICINE

## 2022-04-11 RX ADMIN — CYANOCOBALAMIN 1000 MCG: 1000 INJECTION, SOLUTION INTRAMUSCULAR; SUBCUTANEOUS at 11:33

## 2022-04-11 NOTE — PROGRESS NOTES
Venipuncture Blood Specimen Collection  Venipuncture performed in Right by Bethany Talamantes CMA with good hemostasis. Patient tolerated the procedure well without complications.   04/11/22   Bethany Talamantes CMA     After obtaining consent, and per orders of Dr. Marcelino, injection of B12 given by Bethany Talamantes CMA. Patient instructed to remain in clinic for 20 minutes afterwards, and to report any adverse reaction to me immediately.

## 2022-04-12 LAB
BASOPHILS # BLD AUTO: 0.1 X10E3/UL (ref 0–0.2)
BASOPHILS NFR BLD AUTO: 1 %
EOSINOPHIL # BLD AUTO: 0.1 X10E3/UL (ref 0–0.4)
EOSINOPHIL NFR BLD AUTO: 1 %
ERYTHROCYTE [DISTWIDTH] IN BLOOD BY AUTOMATED COUNT: 11.7 % (ref 11.6–15.4)
HCT VFR BLD AUTO: 31.9 % (ref 37.5–51)
HGB BLD-MCNC: 11.1 G/DL (ref 13–17.7)
IMM GRANULOCYTES # BLD AUTO: 0 X10E3/UL (ref 0–0.1)
IMM GRANULOCYTES NFR BLD AUTO: 0 %
LYMPHOCYTES # BLD AUTO: 1.3 X10E3/UL (ref 0.7–3.1)
LYMPHOCYTES NFR BLD AUTO: 12 %
MCH RBC QN AUTO: 35.8 PG (ref 26.6–33)
MCHC RBC AUTO-ENTMCNC: 34.8 G/DL (ref 31.5–35.7)
MCV RBC AUTO: 103 FL (ref 79–97)
MONOCYTES # BLD AUTO: 0.6 X10E3/UL (ref 0.1–0.9)
MONOCYTES NFR BLD AUTO: 6 %
NEUTROPHILS # BLD AUTO: 9.2 X10E3/UL (ref 1.4–7)
NEUTROPHILS NFR BLD AUTO: 80 %
PLATELET # BLD AUTO: 253 X10E3/UL (ref 150–450)
RBC # BLD AUTO: 3.1 X10E6/UL (ref 4.14–5.8)
WBC # BLD AUTO: 11.3 X10E3/UL (ref 3.4–10.8)

## 2022-04-30 DIAGNOSIS — G47.419 PRIMARY NARCOLEPSY WITHOUT CATAPLEXY: ICD-10-CM

## 2022-05-02 RX ORDER — METHYLPHENIDATE HYDROCHLORIDE 20 MG/1
20 TABLET ORAL 3 TIMES DAILY
Qty: 90 TABLET | Refills: 0 | Status: SHIPPED | OUTPATIENT
Start: 2022-05-02 | End: 2022-06-01 | Stop reason: SDUPTHER

## 2022-05-09 ENCOUNTER — LAB (OUTPATIENT)
Dept: INTERNAL MEDICINE | Facility: CLINIC | Age: 82
End: 2022-05-09
Payer: MEDICARE

## 2022-05-09 ENCOUNTER — CLINICAL SUPPORT (OUTPATIENT)
Dept: INTERNAL MEDICINE | Facility: CLINIC | Age: 82
End: 2022-05-09

## 2022-05-09 DIAGNOSIS — D53.9 MACROCYTIC ANEMIA: Primary | ICD-10-CM

## 2022-05-09 DIAGNOSIS — E53.8 B12 DEFICIENCY: Primary | ICD-10-CM

## 2022-05-09 PROCEDURE — 96372 THER/PROPH/DIAG INJ SC/IM: CPT | Performed by: FAMILY MEDICINE

## 2022-05-09 RX ADMIN — CYANOCOBALAMIN 1000 MCG: 1000 INJECTION, SOLUTION INTRAMUSCULAR; SUBCUTANEOUS at 10:30

## 2022-05-10 LAB
BASOPHILS # BLD AUTO: 0.1 X10E3/UL (ref 0–0.2)
BASOPHILS NFR BLD AUTO: 1 %
EOSINOPHIL # BLD AUTO: 0.1 X10E3/UL (ref 0–0.4)
EOSINOPHIL NFR BLD AUTO: 1 %
ERYTHROCYTE [DISTWIDTH] IN BLOOD BY AUTOMATED COUNT: 11.4 % (ref 11.6–15.4)
HCT VFR BLD AUTO: 36.3 % (ref 37.5–51)
HGB BLD-MCNC: 12.1 G/DL (ref 13–17.7)
IMM GRANULOCYTES # BLD AUTO: 0.1 X10E3/UL (ref 0–0.1)
IMM GRANULOCYTES NFR BLD AUTO: 1 %
LYMPHOCYTES # BLD AUTO: 1.4 X10E3/UL (ref 0.7–3.1)
LYMPHOCYTES NFR BLD AUTO: 14 %
MCH RBC QN AUTO: 35.1 PG (ref 26.6–33)
MCHC RBC AUTO-ENTMCNC: 33.3 G/DL (ref 31.5–35.7)
MCV RBC AUTO: 105 FL (ref 79–97)
MONOCYTES # BLD AUTO: 0.6 X10E3/UL (ref 0.1–0.9)
MONOCYTES NFR BLD AUTO: 6 %
NEUTROPHILS # BLD AUTO: 8.2 X10E3/UL (ref 1.4–7)
NEUTROPHILS NFR BLD AUTO: 77 %
PLATELET # BLD AUTO: 249 X10E3/UL (ref 150–450)
RBC # BLD AUTO: 3.45 X10E6/UL (ref 4.14–5.8)
WBC # BLD AUTO: 10.4 X10E3/UL (ref 3.4–10.8)

## 2022-06-01 DIAGNOSIS — G47.419 PRIMARY NARCOLEPSY WITHOUT CATAPLEXY: ICD-10-CM

## 2022-06-02 RX ORDER — METHYLPHENIDATE HYDROCHLORIDE 20 MG/1
20 TABLET ORAL 3 TIMES DAILY
Qty: 90 TABLET | Refills: 0 | Status: SHIPPED | OUTPATIENT
Start: 2022-06-02 | End: 2022-07-01 | Stop reason: SDUPTHER

## 2022-06-13 ENCOUNTER — LAB (OUTPATIENT)
Dept: INTERNAL MEDICINE | Facility: CLINIC | Age: 82
End: 2022-06-13
Payer: MEDICARE

## 2022-06-13 ENCOUNTER — CLINICAL SUPPORT (OUTPATIENT)
Dept: INTERNAL MEDICINE | Facility: CLINIC | Age: 82
End: 2022-06-13

## 2022-06-13 DIAGNOSIS — N40.1 BENIGN NON-NODULAR PROSTATIC HYPERPLASIA WITH LOWER URINARY TRACT SYMPTOMS: Primary | ICD-10-CM

## 2022-06-13 DIAGNOSIS — E53.8 B12 DEFICIENCY: Primary | ICD-10-CM

## 2022-06-13 DIAGNOSIS — L98.9 SKIN DISORDER: ICD-10-CM

## 2022-06-13 PROCEDURE — 96372 THER/PROPH/DIAG INJ SC/IM: CPT | Performed by: NURSE PRACTITIONER

## 2022-06-13 RX ADMIN — CYANOCOBALAMIN 1000 MCG: 1000 INJECTION, SOLUTION INTRAMUSCULAR; SUBCUTANEOUS at 11:45

## 2022-06-14 LAB
BASOPHILS # BLD AUTO: 0 X10E3/UL (ref 0–0.2)
BASOPHILS NFR BLD AUTO: 0 %
EOSINOPHIL # BLD AUTO: 0.1 X10E3/UL (ref 0–0.4)
EOSINOPHIL NFR BLD AUTO: 1 %
ERYTHROCYTE [DISTWIDTH] IN BLOOD BY AUTOMATED COUNT: 10.7 % (ref 11.6–15.4)
HCT VFR BLD AUTO: 38.7 % (ref 37.5–51)
HGB BLD-MCNC: 12.7 G/DL (ref 13–17.7)
IMM GRANULOCYTES # BLD AUTO: 0 X10E3/UL (ref 0–0.1)
IMM GRANULOCYTES NFR BLD AUTO: 0 %
LYMPHOCYTES # BLD AUTO: 1.4 X10E3/UL (ref 0.7–3.1)
LYMPHOCYTES NFR BLD AUTO: 13 %
MCH RBC QN AUTO: 34.3 PG (ref 26.6–33)
MCHC RBC AUTO-ENTMCNC: 32.8 G/DL (ref 31.5–35.7)
MCV RBC AUTO: 105 FL (ref 79–97)
MONOCYTES # BLD AUTO: 0.6 X10E3/UL (ref 0.1–0.9)
MONOCYTES NFR BLD AUTO: 5 %
NEUTROPHILS # BLD AUTO: 9 X10E3/UL (ref 1.4–7)
NEUTROPHILS NFR BLD AUTO: 81 %
PLATELET # BLD AUTO: 273 X10E3/UL (ref 150–450)
RBC # BLD AUTO: 3.7 X10E6/UL (ref 4.14–5.8)
WBC # BLD AUTO: 11.2 X10E3/UL (ref 3.4–10.8)

## 2022-07-01 DIAGNOSIS — G47.419 PRIMARY NARCOLEPSY WITHOUT CATAPLEXY: ICD-10-CM

## 2022-07-01 RX ORDER — METHYLPHENIDATE HYDROCHLORIDE 20 MG/1
20 TABLET ORAL 3 TIMES DAILY
Qty: 90 TABLET | Refills: 0 | Status: SHIPPED | OUTPATIENT
Start: 2022-07-01 | End: 2022-07-27 | Stop reason: SDUPTHER

## 2022-07-18 ENCOUNTER — TELEPHONE (OUTPATIENT)
Dept: NEUROLOGY | Facility: CLINIC | Age: 82
End: 2022-07-18

## 2022-07-18 NOTE — TELEPHONE ENCOUNTER
Pharmacy Name:  [facility]    Pharmacy representative name: MARIA ELENA    Pharmacy representative phone number: [phone]    COVER MY MEDS KEY : [id number]    What medication are you calling in regards to: BOTOX    What question does the pharmacy have: WANTS TO CHECK ON THE P.A FOR THE NEXT REFILL

## 2022-07-19 ENCOUNTER — LAB (OUTPATIENT)
Dept: INTERNAL MEDICINE | Facility: CLINIC | Age: 82
End: 2022-07-19

## 2022-07-19 DIAGNOSIS — L98.9 SKIN LESION OF HAND: ICD-10-CM

## 2022-07-19 DIAGNOSIS — D53.9 MACROCYTIC ANEMIA: ICD-10-CM

## 2022-07-19 DIAGNOSIS — L98.9 SKIN LESION OF HAND: Primary | ICD-10-CM

## 2022-07-19 DIAGNOSIS — D53.9 MACROCYTIC ANEMIA: Primary | ICD-10-CM

## 2022-07-19 PROCEDURE — 96372 THER/PROPH/DIAG INJ SC/IM: CPT | Performed by: FAMILY MEDICINE

## 2022-07-19 RX ADMIN — CYANOCOBALAMIN 1000 MCG: 1000 INJECTION, SOLUTION INTRAMUSCULAR; SUBCUTANEOUS at 10:27

## 2022-07-20 LAB
BASOPHILS # BLD AUTO: 0.1 X10E3/UL (ref 0–0.2)
BASOPHILS NFR BLD AUTO: 1 %
EOSINOPHIL # BLD AUTO: 0.3 X10E3/UL (ref 0–0.4)
EOSINOPHIL NFR BLD AUTO: 2 %
ERYTHROCYTE [DISTWIDTH] IN BLOOD BY AUTOMATED COUNT: 11.1 % (ref 11.6–15.4)
HCT VFR BLD AUTO: 35.8 % (ref 37.5–51)
HGB BLD-MCNC: 12.2 G/DL (ref 13–17.7)
IMM GRANULOCYTES # BLD AUTO: 0 X10E3/UL (ref 0–0.1)
IMM GRANULOCYTES NFR BLD AUTO: 0 %
LYMPHOCYTES # BLD AUTO: 3 X10E3/UL (ref 0.7–3.1)
LYMPHOCYTES NFR BLD AUTO: 25 %
MCH RBC QN AUTO: 34.4 PG (ref 26.6–33)
MCHC RBC AUTO-ENTMCNC: 34.1 G/DL (ref 31.5–35.7)
MCV RBC AUTO: 101 FL (ref 79–97)
MONOCYTES # BLD AUTO: 1.2 X10E3/UL (ref 0.1–0.9)
MONOCYTES NFR BLD AUTO: 10 %
NEUTROPHILS # BLD AUTO: 7.3 X10E3/UL (ref 1.4–7)
NEUTROPHILS NFR BLD AUTO: 62 %
PLATELET # BLD AUTO: 275 X10E3/UL (ref 150–450)
RBC # BLD AUTO: 3.55 X10E6/UL (ref 4.14–5.8)
WBC # BLD AUTO: 11.8 X10E3/UL (ref 3.4–10.8)

## 2022-07-27 DIAGNOSIS — G47.419 PRIMARY NARCOLEPSY WITHOUT CATAPLEXY: ICD-10-CM

## 2022-08-01 DIAGNOSIS — G47.419 PRIMARY NARCOLEPSY WITHOUT CATAPLEXY: ICD-10-CM

## 2022-08-01 RX ORDER — METHYLPHENIDATE HYDROCHLORIDE 20 MG/1
20 TABLET ORAL 3 TIMES DAILY
Qty: 90 TABLET | Refills: 0 | Status: SHIPPED | OUTPATIENT
Start: 2022-08-01 | End: 2022-09-02 | Stop reason: SDUPTHER

## 2022-08-01 RX ORDER — METHYLPHENIDATE HYDROCHLORIDE 20 MG/1
20 TABLET ORAL 3 TIMES DAILY
Qty: 90 TABLET | Refills: 0 | Status: CANCELLED | OUTPATIENT
Start: 2022-08-01
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
## 2022-08-15 ENCOUNTER — CLINICAL SUPPORT (OUTPATIENT)
Dept: INTERNAL MEDICINE | Facility: CLINIC | Age: 82
End: 2022-08-15

## 2022-08-15 ENCOUNTER — LAB (OUTPATIENT)
Dept: INTERNAL MEDICINE | Facility: CLINIC | Age: 82
End: 2022-08-15
Payer: MEDICARE

## 2022-08-15 DIAGNOSIS — E53.8 VITAMIN B12 DEFICIENCY: ICD-10-CM

## 2022-08-15 DIAGNOSIS — D53.9 MACROCYTIC ANEMIA: Primary | ICD-10-CM

## 2022-08-15 DIAGNOSIS — L98.9 SKIN LESION OF HAND: ICD-10-CM

## 2022-08-15 PROCEDURE — 96372 THER/PROPH/DIAG INJ SC/IM: CPT | Performed by: INTERNAL MEDICINE

## 2022-08-15 RX ADMIN — CYANOCOBALAMIN 1000 MCG: 1000 INJECTION, SOLUTION INTRAMUSCULAR; SUBCUTANEOUS at 11:14

## 2022-08-15 NOTE — PROGRESS NOTES
After obtaining consent, and per orders of Dr. Marcelino, injection of B12 given by Bethany Talamantes CMA. Patient instructed to remain in clinic for 20 minutes afterwards, and to report any adverse reaction to me immediately.

## 2022-08-16 ENCOUNTER — TELEPHONE (OUTPATIENT)
Dept: ONCOLOGY | Facility: CLINIC | Age: 82
End: 2022-08-16

## 2022-08-16 LAB
ALBUMIN SERPL-MCNC: 3.9 G/DL (ref 3.6–4.6)
ALBUMIN/GLOB SERPL: 1.7 {RATIO} (ref 1.2–2.2)
ALP SERPL-CCNC: 116 IU/L (ref 44–121)
ALT SERPL-CCNC: 23 IU/L (ref 0–44)
AST SERPL-CCNC: 24 IU/L (ref 0–40)
BASOPHILS # BLD AUTO: 0.1 X10E3/UL (ref 0–0.2)
BASOPHILS NFR BLD AUTO: 1 %
BILIRUB SERPL-MCNC: 0.4 MG/DL (ref 0–1.2)
BUN SERPL-MCNC: 27 MG/DL (ref 8–27)
BUN/CREAT SERPL: 21 (ref 10–24)
CALCIUM SERPL-MCNC: 8.8 MG/DL (ref 8.6–10.2)
CHLORIDE SERPL-SCNC: 101 MMOL/L (ref 96–106)
CO2 SERPL-SCNC: 21 MMOL/L (ref 20–29)
CREAT SERPL-MCNC: 1.31 MG/DL (ref 0.76–1.27)
EGFRCR-CYS SERPLBLD CKD-EPI 2021: 55 ML/MIN/1.73
EOSINOPHIL # BLD AUTO: 0 X10E3/UL (ref 0–0.4)
EOSINOPHIL NFR BLD AUTO: 0 %
ERYTHROCYTE [DISTWIDTH] IN BLOOD BY AUTOMATED COUNT: 11.8 % (ref 11.6–15.4)
FERRITIN SERPL-MCNC: 72 NG/ML (ref 30–400)
GLOBULIN SER CALC-MCNC: 2.3 G/DL (ref 1.5–4.5)
GLUCOSE SERPL-MCNC: 101 MG/DL (ref 65–99)
HCT VFR BLD AUTO: 36 % (ref 37.5–51)
HGB BLD-MCNC: 12.1 G/DL (ref 13–17.7)
IMM GRANULOCYTES # BLD AUTO: 0 X10E3/UL (ref 0–0.1)
IMM GRANULOCYTES NFR BLD AUTO: 0 %
IRON SATN MFR SERPL: 49 % (ref 15–55)
IRON SERPL-MCNC: 134 UG/DL (ref 38–169)
LYMPHOCYTES # BLD AUTO: 1.1 X10E3/UL (ref 0.7–3.1)
LYMPHOCYTES NFR BLD AUTO: 10 %
MCH RBC QN AUTO: 33.8 PG (ref 26.6–33)
MCHC RBC AUTO-ENTMCNC: 33.6 G/DL (ref 31.5–35.7)
MCV RBC AUTO: 101 FL (ref 79–97)
MONOCYTES # BLD AUTO: 0.6 X10E3/UL (ref 0.1–0.9)
MONOCYTES NFR BLD AUTO: 5 %
NEUTROPHILS # BLD AUTO: 9 X10E3/UL (ref 1.4–7)
NEUTROPHILS NFR BLD AUTO: 84 %
PLATELET # BLD AUTO: 254 X10E3/UL (ref 150–450)
POTASSIUM SERPL-SCNC: 5.3 MMOL/L (ref 3.5–5.2)
PROT SERPL-MCNC: 6.2 G/DL (ref 6–8.5)
RBC # BLD AUTO: 3.58 X10E6/UL (ref 4.14–5.8)
SODIUM SERPL-SCNC: 141 MMOL/L (ref 134–144)
TIBC SERPL-MCNC: 274 UG/DL (ref 250–450)
UIBC SERPL-MCNC: 140 UG/DL (ref 111–343)
WBC # BLD AUTO: 10.9 X10E3/UL (ref 3.4–10.8)

## 2022-08-16 NOTE — TELEPHONE ENCOUNTER
----- Message from Danish Mabry MD sent at 8/16/2022  7:52 AM CDT -----  K+5.3-please fax these labs to PCP and nephrology Re: Current GFR and hyperkalemia

## 2022-08-18 ENCOUNTER — OFFICE VISIT (OUTPATIENT)
Dept: ONCOLOGY | Facility: CLINIC | Age: 82
End: 2022-08-18

## 2022-08-18 VITALS
DIASTOLIC BLOOD PRESSURE: 78 MMHG | BODY MASS INDEX: 29.4 KG/M2 | HEIGHT: 68 IN | WEIGHT: 194 LBS | RESPIRATION RATE: 18 BRPM | TEMPERATURE: 97.5 F | SYSTOLIC BLOOD PRESSURE: 134 MMHG | OXYGEN SATURATION: 97 % | HEART RATE: 72 BPM

## 2022-08-18 DIAGNOSIS — N18.9 HISTORY OF ANEMIA DUE TO CHRONIC KIDNEY DISEASE: Primary | ICD-10-CM

## 2022-08-18 DIAGNOSIS — Z86.2 HISTORY OF ANEMIA DUE TO CHRONIC KIDNEY DISEASE: Primary | ICD-10-CM

## 2022-08-18 PROCEDURE — 99214 OFFICE O/P EST MOD 30 MIN: CPT | Performed by: INTERNAL MEDICINE

## 2022-09-02 DIAGNOSIS — G47.419 PRIMARY NARCOLEPSY WITHOUT CATAPLEXY: ICD-10-CM

## 2022-09-02 RX ORDER — METHYLPHENIDATE HYDROCHLORIDE 20 MG/1
20 TABLET ORAL 3 TIMES DAILY
Qty: 90 TABLET | Refills: 0 | Status: SHIPPED | OUTPATIENT
Start: 2022-09-02 | End: 2022-10-03 | Stop reason: SDUPTHER

## 2022-09-13 ENCOUNTER — CLINICAL SUPPORT (OUTPATIENT)
Dept: INTERNAL MEDICINE | Facility: CLINIC | Age: 82
End: 2022-09-13

## 2022-09-13 ENCOUNTER — LAB (OUTPATIENT)
Dept: INTERNAL MEDICINE | Facility: CLINIC | Age: 82
End: 2022-09-13
Payer: MEDICARE

## 2022-09-13 DIAGNOSIS — E53.8 B12 DEFICIENCY: ICD-10-CM

## 2022-09-13 DIAGNOSIS — Z86.2 HISTORY OF ANEMIA DUE TO CHRONIC KIDNEY DISEASE: Primary | ICD-10-CM

## 2022-09-13 DIAGNOSIS — N18.30 STAGE 3 CHRONIC KIDNEY DISEASE, UNSPECIFIED WHETHER STAGE 3A OR 3B CKD: Primary | ICD-10-CM

## 2022-09-13 DIAGNOSIS — N18.9 HISTORY OF ANEMIA DUE TO CHRONIC KIDNEY DISEASE: Primary | ICD-10-CM

## 2022-09-13 PROCEDURE — 96372 THER/PROPH/DIAG INJ SC/IM: CPT | Performed by: NURSE PRACTITIONER

## 2022-09-13 RX ADMIN — CYANOCOBALAMIN 1000 MCG: 1000 INJECTION, SOLUTION INTRAMUSCULAR; SUBCUTANEOUS at 10:40

## 2022-09-13 NOTE — PROGRESS NOTES
After obtaining consent, and per orders of Kena TURPIN, injection of B12 given by Bethany Talamantes CMA. Patient instructed to remain in clinic for 20 minutes afterwards, and to report any adverse reaction to me immediately.

## 2022-09-14 LAB
BASOPHILS # BLD AUTO: 0.1 X10E3/UL (ref 0–0.2)
BASOPHILS NFR BLD AUTO: 1 %
EOSINOPHIL # BLD AUTO: 0.1 X10E3/UL (ref 0–0.4)
EOSINOPHIL NFR BLD AUTO: 1 %
ERYTHROCYTE [DISTWIDTH] IN BLOOD BY AUTOMATED COUNT: 12.1 % (ref 11.6–15.4)
HCT VFR BLD AUTO: 34.8 % (ref 37.5–51)
HGB BLD-MCNC: 11.6 G/DL (ref 13–17.7)
IMM GRANULOCYTES # BLD AUTO: 0.1 X10E3/UL (ref 0–0.1)
IMM GRANULOCYTES NFR BLD AUTO: 1 %
LYMPHOCYTES # BLD AUTO: 1.5 X10E3/UL (ref 0.7–3.1)
LYMPHOCYTES NFR BLD AUTO: 13 %
MCH RBC QN AUTO: 34.1 PG (ref 26.6–33)
MCHC RBC AUTO-ENTMCNC: 33.3 G/DL (ref 31.5–35.7)
MCV RBC AUTO: 102 FL (ref 79–97)
MONOCYTES # BLD AUTO: 0.8 X10E3/UL (ref 0.1–0.9)
MONOCYTES NFR BLD AUTO: 7 %
NEUTROPHILS # BLD AUTO: 9.4 X10E3/UL (ref 1.4–7)
NEUTROPHILS NFR BLD AUTO: 77 %
PLATELET # BLD AUTO: 256 X10E3/UL (ref 150–450)
RBC # BLD AUTO: 3.4 X10E6/UL (ref 4.14–5.8)
WBC # BLD AUTO: 12 X10E3/UL (ref 3.4–10.8)

## 2022-09-14 RX ORDER — PREDNISONE 10 MG/1
TABLET ORAL
Qty: 30 TABLET | Refills: 6 | OUTPATIENT
Start: 2022-09-14

## 2022-09-20 RX ORDER — PREDNISONE 10 MG/1
10 TABLET ORAL DAILY
Qty: 30 TABLET | Refills: 6 | Status: SHIPPED | OUTPATIENT
Start: 2022-09-20

## 2022-09-29 ENCOUNTER — TRANSCRIBE ORDERS (OUTPATIENT)
Dept: ADMINISTRATIVE | Facility: HOSPITAL | Age: 82
End: 2022-09-29

## 2022-09-29 DIAGNOSIS — R10.9 ABDOMINAL PAIN, UNSPECIFIED ABDOMINAL LOCATION: ICD-10-CM

## 2022-09-29 DIAGNOSIS — R10.13 EPIGASTRIC PAIN: Primary | ICD-10-CM

## 2022-09-30 ENCOUNTER — APPOINTMENT (OUTPATIENT)
Dept: ULTRASOUND IMAGING | Facility: HOSPITAL | Age: 82
End: 2022-09-30

## 2022-09-30 ENCOUNTER — TRANSCRIBE ORDERS (OUTPATIENT)
Dept: ADMINISTRATIVE | Facility: HOSPITAL | Age: 82
End: 2022-09-30

## 2022-09-30 DIAGNOSIS — R06.00 DYSPNEA, UNSPECIFIED TYPE: Primary | ICD-10-CM

## 2022-10-03 ENCOUNTER — HOSPITAL ENCOUNTER (OUTPATIENT)
Dept: ULTRASOUND IMAGING | Facility: HOSPITAL | Age: 82
Discharge: HOME | End: 2022-10-03
Admitting: FAMILY MEDICINE

## 2022-10-03 DIAGNOSIS — G47.419 PRIMARY NARCOLEPSY WITHOUT CATAPLEXY: ICD-10-CM

## 2022-10-03 DIAGNOSIS — R10.9 ABDOMINAL PAIN, UNSPECIFIED ABDOMINAL LOCATION: ICD-10-CM

## 2022-10-03 PROCEDURE — 76700 US EXAM ABDOM COMPLETE: CPT

## 2022-10-03 RX ORDER — METHYLPHENIDATE HYDROCHLORIDE 20 MG/1
20 TABLET ORAL 3 TIMES DAILY
Qty: 90 TABLET | Refills: 0 | Status: SHIPPED | OUTPATIENT
Start: 2022-10-03 | End: 2022-11-02 | Stop reason: SDUPTHER

## 2022-10-06 ENCOUNTER — HOSPITAL ENCOUNTER (OUTPATIENT)
Dept: CT IMAGING | Facility: HOSPITAL | Age: 82
Discharge: HOME | End: 2022-10-06
Admitting: FAMILY MEDICINE

## 2022-10-06 LAB — CREAT BLDA-MCNC: 1.2 MG/DL (ref 0.6–1.3)

## 2022-10-06 PROCEDURE — 71275 CT ANGIOGRAPHY CHEST: CPT

## 2022-10-06 PROCEDURE — 82565 ASSAY OF CREATININE: CPT

## 2022-10-06 PROCEDURE — 0 IOPAMIDOL PER 1 ML: Performed by: FAMILY MEDICINE

## 2022-10-06 RX ADMIN — IOPAMIDOL 100 ML: 755 INJECTION, SOLUTION INTRAVENOUS at 10:30

## 2022-10-11 ENCOUNTER — LAB (OUTPATIENT)
Dept: INTERNAL MEDICINE | Facility: CLINIC | Age: 82
End: 2022-10-11
Payer: MEDICARE

## 2022-10-11 ENCOUNTER — CLINICAL SUPPORT (OUTPATIENT)
Dept: INTERNAL MEDICINE | Facility: CLINIC | Age: 82
End: 2022-10-11

## 2022-10-11 DIAGNOSIS — E53.8 B12 DEFICIENCY: Primary | ICD-10-CM

## 2022-10-11 DIAGNOSIS — Z86.2 HISTORY OF ANEMIA DUE TO CHRONIC KIDNEY DISEASE: ICD-10-CM

## 2022-10-11 DIAGNOSIS — N18.9 HISTORY OF ANEMIA DUE TO CHRONIC KIDNEY DISEASE: ICD-10-CM

## 2022-10-11 PROCEDURE — 96372 THER/PROPH/DIAG INJ SC/IM: CPT

## 2022-10-11 RX ADMIN — CYANOCOBALAMIN 1000 MCG: 1000 INJECTION, SOLUTION INTRAMUSCULAR; SUBCUTANEOUS at 10:24

## 2022-11-02 DIAGNOSIS — G47.419 PRIMARY NARCOLEPSY WITHOUT CATAPLEXY: ICD-10-CM

## 2022-11-02 RX ORDER — METHYLPHENIDATE HYDROCHLORIDE 20 MG/1
20 TABLET ORAL 3 TIMES DAILY
Qty: 90 TABLET | Refills: 0 | Status: SHIPPED | OUTPATIENT
Start: 2022-11-02 | End: 2022-11-30 | Stop reason: SDUPTHER

## 2022-11-09 ENCOUNTER — OFFICE VISIT (OUTPATIENT)
Dept: NEUROLOGY | Facility: CLINIC | Age: 82
End: 2022-11-09

## 2022-11-09 VITALS
DIASTOLIC BLOOD PRESSURE: 80 MMHG | HEIGHT: 68 IN | OXYGEN SATURATION: 98 % | WEIGHT: 200 LBS | BODY MASS INDEX: 30.31 KG/M2 | HEART RATE: 53 BPM | SYSTOLIC BLOOD PRESSURE: 120 MMHG

## 2022-11-09 DIAGNOSIS — G47.419 PRIMARY NARCOLEPSY WITHOUT CATAPLEXY: Primary | ICD-10-CM

## 2022-11-09 DIAGNOSIS — I67.9 CEREBROVASCULAR SMALL VESSEL DISEASE: ICD-10-CM

## 2022-11-09 DIAGNOSIS — G50.0 TRIGEMINAL NEURALGIA OF RIGHT SIDE OF FACE: ICD-10-CM

## 2022-11-09 PROCEDURE — 99214 OFFICE O/P EST MOD 30 MIN: CPT | Performed by: PHYSICIAN ASSISTANT

## 2022-11-09 RX ORDER — CARBAMAZEPINE 200 MG/1
200 TABLET ORAL 2 TIMES DAILY
Qty: 60 TABLET | Refills: 8 | Status: SHIPPED | OUTPATIENT
Start: 2022-11-09

## 2022-11-09 NOTE — PROGRESS NOTES
Subjective   Spencer Moore is a 82 y.o. male is here today for follow-up.    History of Present Illness     Facial neuralgia  The patient reports that he is doing well. He states that he recently received a different  of his medication, which is a different shape, capsule, and color. He states that it is difficult for him to keep track of the medication. He states that he has 2 to 3 pills left from a different . Orestes states that he was told to keep it written as such in case he needed a second pill. Orestes states that he has not had any more trouble with his eye. He notes that he has not had to use the extra medicine for his eye. Orestes states that he was told that the shot that Dr. Cho did was not going to help that. He notes that he increased his pain medication.    Narcolepsy and cataplexy  Orestes states that the patient is still taking the Ritalin. Orestes states the patient might forget to take one once in a while.    Bowel and bladder incontinence  Orestes states that the patient has had some terrible accidents. Orestes states the patient says he has always had something in his pants. Orestes states that the latest episode was a few days ago, where he patient had to throw his underclothes away in the morning. Orestes does not know if the patients body is not telling him that he needs to move. The patient does not have a diet.    The following portions of the patient's history were reviewed and updated as appropriate: allergies, current medications, past family history, past medical history, past social history, past surgical history and problem list.    Review of Systems:  A review of systems was performed, and positive findings are noted in the HPI.      Current Outpatient Medications:   •  aspirin 81 MG EC tablet, Take 81 mg by mouth Daily., Disp: , Rfl:   •  atorvastatin (LIPITOR) 20 MG tablet, Take 20 mg by mouth Daily., Disp: , Rfl:   •  carBAMazepine (TEGretol)  200 MG tablet, Take 1 tablet by mouth 2 (Two) Times a Day., Disp: 60 tablet, Rfl: 8  •  CloNIDine (CATAPRES) 0.1 MG tablet, Take 0.1 mg by mouth As Needed for High Blood Pressure (1 tablet if systolic is over 170)., Disp: , Rfl:   •  cyanocobalamin 1000 MCG/ML injection, Inject 1 mL into the appropriate muscle as directed by prescriber Every 28 (Twenty-Eight) Days., Disp: 10 mL, Rfl: 0  •  finasteride (PROSCAR) 5 MG tablet, Take 1 tablet by mouth Daily., Disp: 90 tablet, Rfl: 3  •  FLUoxetine (PROzac) 40 MG capsule, Take 40 mg by mouth Daily., Disp: , Rfl:   •  hyoscyamine (LEVSIN) 0.125 MG SL tablet, Take 125 mcg by mouth 2 (Two) Times a Day., Disp: , Rfl:   •  irbesartan (AVAPRO) 300 MG tablet, Take 300 mg by mouth Daily., Disp: , Rfl:   •  methylphenidate (Ritalin) 20 MG tablet, Take 1 tablet by mouth 3 (Three) Times a Day., Disp: 90 tablet, Rfl: 0  •  metoprolol tartrate (LOPRESSOR) 25 MG tablet, Take 1 tablet by mouth 2 (Two) Times a Day. (Patient taking differently: Take 1 tablet by mouth Daily.), Disp: 60 tablet, Rfl: 11  •  Multiple Vitamins-Minerals (MULTIVITAMIN ADULT PO), Take  by mouth., Disp: , Rfl:   •  mupirocin (BACTROBAN) 2 % ointment, Apply 1 application topically to the appropriate area as directed As Needed., Disp: , Rfl:   •  omeprazole (priLOSEC) 40 MG capsule, Take 40 mg by mouth Daily., Disp: , Rfl:   •  oxyCODONE-acetaminophen (PERCOCET) 7.5-325 MG per tablet, Take 1 tablet by mouth Every 6 (Six) Hours As Needed. Takes 7.5mg four times daily, Disp: , Rfl:   •  predniSONE (DELTASONE) 10 MG tablet, Take 1 tablet by mouth Daily., Disp: 30 tablet, Rfl: 6  •  tamsulosin (FLOMAX) 0.4 MG capsule 24 hr capsule, Take 2 capsules by mouth Every Night., Disp: 180 capsule, Rfl: 5  •  terazosin (HYTRIN) 2 MG capsule, Take 2 mg by mouth Every Night., Disp: , Rfl:     Current Facility-Administered Medications:   •  cyanocobalamin injection 1,000 mcg, 1,000 mcg, Intramuscular, Q30 Days, Rosi Baker,  DO, 1,000 mcg at 10/11/22 1024     Objective   Physical Exam  Vitals and nursing note reviewed.   HENT:      Head: Normocephalic.      Right Ear: External ear normal. Decreased hearing noted.      Left Ear: External ear normal. Decreased hearing noted.      Ears:      Comments: Decreased hearing bilaterally using a bone conduction hearing assisted device today.     Nose: Nose normal.      Mouth/Throat:      Pharynx: Oropharynx is clear.   Eyes:      General: Lids are normal. Vision grossly intact. Gaze aligned appropriately. No scleral icterus.     Extraocular Movements: Extraocular movements intact.      Conjunctiva/sclera: Conjunctivae normal.      Pupils: Pupils are equal, round, and reactive to light.      Visual Fields: Right eye visual fields normal and left eye visual fields normal.   Neck:      Vascular: No carotid bruit or JVD.      Trachea: Trachea and phonation normal.   Cardiovascular:      Rate and Rhythm: Normal rate.      Heart sounds: Normal heart sounds.   Pulmonary:      Effort: Pulmonary effort is normal.      Breath sounds: Normal breath sounds.   Musculoskeletal:      Cervical back: Normal range of motion.   Skin:     General: Skin is warm and dry.   Neurological:      Mental Status: He is alert and oriented to person, place, and time.      GCS: GCS eye subscore is 4. GCS verbal subscore is 5. GCS motor subscore is 6.      Cranial Nerves: Cranial nerve deficit present.      Sensory: Sensation is intact.      Motor: Weakness and tremor present.      Coordination: Coordination is intact.      Gait: Gait abnormal.      Deep Tendon Reflexes: Reflexes are normal and symmetric.      Comments: Cranial nerve deficit present with presbycusis. Motor, generalized weakness and physiologic tremor present bilaterally. Gait is abnormal with significant psychomotor and musculoskeletal impairment, consistent with multiple age exacerbated diagnoses   Psychiatric:         Attention and Perception: Attention and  perception normal.         Mood and Affect: Mood and affect normal.         Speech: Speech is delayed.         Behavior: Behavior normal.         Thought Content: Thought content normal.         Cognition and Memory: Cognition and memory normal.         Judgment: Judgment is impulsive.           Assessment & Plan   Diagnoses and all orders for this visit:    1. 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 Trigeminal neuralgia of right side of face  -     carBAMazepine (TEGretol) 200 MG tablet; Take 1 tablet by mouth 2 (Two) Times a Day.  Dispense: 60 tablet; Refill: 8    3. Cerebrovascular small vessel disease        1. Facial neuralgia  - Under good control.    2. Narcolepsy and cataplexy  - Under good control with long term use of Ritalin successfully. No indications for change there. Discussed with him today the problem with some fecal incontinence occurring in context of spinal stenosis stage. Significant issues with mobility. Apparently has some positive response to the use of Levsin, which I do not have any issue with. Advised them that is not likely a surgical candidate with regard to any spine issues at the present time and patient certainly is preferential to avoid this. Patient might benefit from a GI type exam to help clarify matters. Also, his diet is quite challenging. Discussed briefly with him about bowel training regimens, which I feel might not be easy to accomplish with his present situation. He will keep us posted with issues regarding this.             Transcribed from ambient dictation for RAUL Brewer by Talita Piper.  11/09/22   17:09 CST    Patient or patient representative verbalized consent to the visit recording.

## 2022-11-11 ENCOUNTER — CLINICAL SUPPORT (OUTPATIENT)
Dept: INTERNAL MEDICINE | Facility: CLINIC | Age: 82
End: 2022-11-11

## 2022-11-11 ENCOUNTER — LAB (OUTPATIENT)
Dept: INTERNAL MEDICINE | Facility: CLINIC | Age: 82
End: 2022-11-11
Payer: MEDICARE

## 2022-11-11 DIAGNOSIS — E53.8 B12 DEFICIENCY: ICD-10-CM

## 2022-11-11 DIAGNOSIS — N18.9 HISTORY OF ANEMIA DUE TO CHRONIC KIDNEY DISEASE: Primary | ICD-10-CM

## 2022-11-11 DIAGNOSIS — Z86.2 HISTORY OF ANEMIA DUE TO CHRONIC KIDNEY DISEASE: Primary | ICD-10-CM

## 2022-11-11 PROCEDURE — 96372 THER/PROPH/DIAG INJ SC/IM: CPT | Performed by: NURSE PRACTITIONER

## 2022-11-11 RX ADMIN — CYANOCOBALAMIN 1000 MCG: 1000 INJECTION, SOLUTION INTRAMUSCULAR; SUBCUTANEOUS at 11:08

## 2022-11-12 LAB
BASOPHILS # BLD AUTO: 0.1 X10E3/UL (ref 0–0.2)
BASOPHILS NFR BLD AUTO: 1 %
EOSINOPHIL # BLD AUTO: 0.1 X10E3/UL (ref 0–0.4)
EOSINOPHIL NFR BLD AUTO: 0 %
ERYTHROCYTE [DISTWIDTH] IN BLOOD BY AUTOMATED COUNT: 11.1 % (ref 11.6–15.4)
HCT VFR BLD AUTO: 34.3 % (ref 37.5–51)
HGB BLD-MCNC: 11.8 G/DL (ref 13–17.7)
IMM GRANULOCYTES # BLD AUTO: 0 X10E3/UL (ref 0–0.1)
IMM GRANULOCYTES NFR BLD AUTO: 0 %
LYMPHOCYTES # BLD AUTO: 1.6 X10E3/UL (ref 0.7–3.1)
LYMPHOCYTES NFR BLD AUTO: 12 %
MCH RBC QN AUTO: 34.7 PG (ref 26.6–33)
MCHC RBC AUTO-ENTMCNC: 34.4 G/DL (ref 31.5–35.7)
MCV RBC AUTO: 101 FL (ref 79–97)
MONOCYTES # BLD AUTO: 0.8 X10E3/UL (ref 0.1–0.9)
MONOCYTES NFR BLD AUTO: 6 %
NEUTROPHILS # BLD AUTO: 11.4 X10E3/UL (ref 1.4–7)
NEUTROPHILS NFR BLD AUTO: 81 %
PLATELET # BLD AUTO: 264 X10E3/UL (ref 150–450)
RBC # BLD AUTO: 3.4 X10E6/UL (ref 4.14–5.8)
WBC # BLD AUTO: 14.1 X10E3/UL (ref 3.4–10.8)

## 2022-11-14 DIAGNOSIS — N40.0 ENLARGED PROSTATE: ICD-10-CM

## 2022-11-14 DIAGNOSIS — R39.12 WEAK URINARY STREAM: ICD-10-CM

## 2022-11-14 RX ORDER — TAMSULOSIN HYDROCHLORIDE 0.4 MG/1
CAPSULE ORAL
Qty: 180 CAPSULE | Refills: 5 | OUTPATIENT
Start: 2022-11-14

## 2022-11-15 NOTE — TELEPHONE ENCOUNTER
They want to keep him at [facility] if possible. Made him appointment at [facility] on 12.15.22 and if med can't be refilled for a month till he's seen there, then they'd be willing to see Parker or Josephine.

## 2022-11-16 ENCOUNTER — TELEPHONE (OUTPATIENT)
Dept: UROLOGY | Facility: CLINIC | Age: 82
End: 2022-11-16

## 2022-11-16 DIAGNOSIS — R39.12 WEAK URINARY STREAM: ICD-10-CM

## 2022-11-16 DIAGNOSIS — N40.0 ENLARGED PROSTATE: ICD-10-CM

## 2022-11-16 RX ORDER — TAMSULOSIN HYDROCHLORIDE 0.4 MG/1
2 CAPSULE ORAL NIGHTLY
Qty: 60 CAPSULE | Refills: 0 | Status: SHIPPED | OUTPATIENT
Start: 2022-11-16 | End: 2022-12-15 | Stop reason: SDUPTHER

## 2022-11-16 NOTE — TELEPHONE ENCOUNTER
Pt POA called requesting a refill. Pt has not been seen in over a year. Sent message to Dr. Silva medical assistant regarding a refill. pt has appt in December. Does he need to be seen sooner for a refill?

## 2022-11-17 ENCOUNTER — TELEPHONE (OUTPATIENT)
Dept: UROLOGY | Facility: CLINIC | Age: 82
End: 2022-11-17

## 2022-11-17 DIAGNOSIS — N40.0 ENLARGED PROSTATE: ICD-10-CM

## 2022-11-17 DIAGNOSIS — R39.12 WEAK URINARY STREAM: ICD-10-CM

## 2022-11-17 RX ORDER — FINASTERIDE 5 MG/1
5 TABLET, FILM COATED ORAL DAILY
Qty: 30 TABLET | Refills: 0 | Status: SHIPPED | OUTPATIENT
Start: 2022-11-17 | End: 2022-12-15 | Stop reason: SDUPTHER

## 2022-11-17 NOTE — TELEPHONE ENCOUNTER
----- Message from Meghann Jung MA sent at 11/16/2022  9:05 AM CST -----  Pt POA called requesting a refill. Pt has not been seen in over a year. Sent message to Dr. Silva medical assistant regarding a refill. pt has appt in December. Does he need to be seen sooner for a refill?

## 2022-11-30 DIAGNOSIS — G47.419 PRIMARY NARCOLEPSY WITHOUT CATAPLEXY: ICD-10-CM

## 2022-12-01 RX ORDER — METHYLPHENIDATE HYDROCHLORIDE 20 MG/1
20 TABLET ORAL 3 TIMES DAILY
Qty: 90 TABLET | Refills: 0 | Status: SHIPPED | OUTPATIENT
Start: 2022-12-01 | End: 2023-01-02 | Stop reason: SDUPTHER

## 2022-12-12 ENCOUNTER — LAB (OUTPATIENT)
Dept: INTERNAL MEDICINE | Facility: CLINIC | Age: 82
End: 2022-12-12
Payer: MEDICARE

## 2022-12-12 ENCOUNTER — CLINICAL SUPPORT (OUTPATIENT)
Dept: INTERNAL MEDICINE | Facility: CLINIC | Age: 82
End: 2022-12-12

## 2022-12-12 DIAGNOSIS — E53.8 B12 DEFICIENCY: Primary | ICD-10-CM

## 2022-12-12 PROCEDURE — 96372 THER/PROPH/DIAG INJ SC/IM: CPT | Performed by: INTERNAL MEDICINE

## 2022-12-12 RX ADMIN — CYANOCOBALAMIN 1000 MCG: 1000 INJECTION, SOLUTION INTRAMUSCULAR; SUBCUTANEOUS at 11:17

## 2022-12-12 NOTE — PROGRESS NOTES
Subjective    Mr. Moore is 82 y.o. male    Chief Complaint: BPH    History of Present Illness    82-year-old male established patient by annual follow-up for enlarged prostate.  His LUTS are currently tolerable on combination therapy.  He needs refills of finasteride and tamsulosin 0.8 mg.   He feels like the increased dose of tamsulosin is helping him.  He denies gross hematuria or flank pain.  He does have a history of chronic urinary colonization and has seen infectious disease in the past.       The following portions of the patient's history were reviewed and updated as appropriate: allergies, current medications, past family history, past medical history, past social history, past surgical history and problem list.    Review of Systems      Current Outpatient Medications:   •  aspirin 81 MG EC tablet, Take 81 mg by mouth Daily., Disp: , Rfl:   •  atorvastatin (LIPITOR) 20 MG tablet, Take 20 mg by mouth Daily., Disp: , Rfl:   •  carBAMazepine (TEGretol) 200 MG tablet, Take 1 tablet by mouth 2 (Two) Times a Day., Disp: 60 tablet, Rfl: 8  •  CloNIDine (CATAPRES) 0.1 MG tablet, Take 0.1 mg by mouth As Needed for High Blood Pressure (1 tablet if systolic is over 170)., Disp: , Rfl:   •  cyanocobalamin 1000 MCG/ML injection, Inject 1 mL into the appropriate muscle as directed by prescriber Every 28 (Twenty-Eight) Days., Disp: 10 mL, Rfl: 0  •  finasteride (PROSCAR) 5 MG tablet, Take 1 tablet by mouth Daily., Disp: 180 tablet, Rfl: 3  •  FLUoxetine (PROzac) 40 MG capsule, Take 40 mg by mouth Daily., Disp: , Rfl:   •  hyoscyamine (LEVSIN) 0.125 MG SL tablet, Take 125 mcg by mouth 2 (Two) Times a Day., Disp: , Rfl:   •  irbesartan (AVAPRO) 300 MG tablet, Take 300 mg by mouth Daily., Disp: , Rfl:   •  methylphenidate (Ritalin) 20 MG tablet, Take 1 tablet by mouth 3 (Three) Times a Day., Disp: 90 tablet, Rfl: 0  •  metoprolol tartrate (LOPRESSOR) 25 MG tablet, Take 1 tablet by mouth 2 (Two) Times a Day. (Patient taking  differently: Take 25 mg by mouth Daily.), Disp: 60 tablet, Rfl: 11  •  Multiple Vitamins-Minerals (MULTIVITAMIN ADULT PO), Take  by mouth., Disp: , Rfl:   •  mupirocin (BACTROBAN) 2 % ointment, Apply 1 application topically to the appropriate area as directed As Needed., Disp: , Rfl:   •  omeprazole (priLOSEC) 40 MG capsule, Take 40 mg by mouth Daily., Disp: , Rfl:   •  oxyCODONE-acetaminophen (PERCOCET) 7.5-325 MG per tablet, Take 1 tablet by mouth Every 6 (Six) Hours As Needed. Takes 7.5mg four times daily, Disp: , Rfl:   •  predniSONE (DELTASONE) 10 MG tablet, Take 1 tablet by mouth Daily., Disp: 30 tablet, Rfl: 6  •  tamsulosin (FLOMAX) 0.4 MG capsule 24 hr capsule, Take 2 capsules by mouth Every Night., Disp: 180 capsule, Rfl: 3  •  terazosin (HYTRIN) 2 MG capsule, Take 2 mg by mouth Every Night., Disp: , Rfl:     Current Facility-Administered Medications:   •  cyanocobalamin injection 1,000 mcg, 1,000 mcg, Intramuscular, Q30 Days, Rosi Baker DO, 1,000 mcg at 12/12/22 1117    Past Medical History:   Diagnosis Date   • Anemia    • Arthritis    • Bilateral impacted cerumen 12/10/2018   • Diverticulitis    • Enlarged prostate    • ETD (eustachian tube dysfunction)    • GERD (gastroesophageal reflux disease)    • Hypertension    • Kidney infection    • Lumbago    • Narcolepsy    • Sensorineural hearing loss (SNHL) of both ears 6/18/2018   • SNHL (sensorineural hearing loss)    • Tinnitus    • Vitamin B12 deficiency    • Weak urinary stream        Past Surgical History:   Procedure Laterality Date   • ABDOMINAL SURGERY     • CATARACT EXTRACTION Left    • HAND SURGERY Left    • HERNIA REPAIR     • REPLACEMENT TOTAL KNEE Left    • ROTATOR CUFF REPAIR     • TONSILLECTOMY         Social History     Socioeconomic History   • Marital status:    Tobacco Use   • Smoking status: Never   • Smokeless tobacco: Never   Substance and Sexual Activity   • Alcohol use: No   • Drug use: No   • Sexual activity: Defer  "      Family History   Adopted: Yes   Family history unknown: Yes       Objective    Temp 97.8 °F (36.6 °C)   Ht 172.7 cm (68\")   Wt 90.7 kg (200 lb)   BMI 30.41 kg/m²     Physical Exam        Results for orders placed or performed in visit on 11/11/22   CBC & Differential    Specimen: Blood   Result Value Ref Range    WBC 14.1 (H) 3.4 - 10.8 x10E3/uL    RBC 3.40 (L) 4.14 - 5.80 x10E6/uL    Hemoglobin 11.8 (L) 13.0 - 17.7 g/dL    Hematocrit 34.3 (L) 37.5 - 51.0 %     (H) 79 - 97 fL    MCH 34.7 (H) 26.6 - 33.0 pg    MCHC 34.4 31.5 - 35.7 g/dL    RDW 11.1 (L) 11.6 - 15.4 %    Platelets 264 150 - 450 x10E3/uL    Neutrophil Rel % 81 Not Estab. %    Lymphocyte Rel % 12 Not Estab. %    Monocyte Rel % 6 Not Estab. %    Eosinophil Rel % 0 Not Estab. %    Basophil Rel % 1 Not Estab. %    Neutrophils Absolute 11.4 (H) 1.4 - 7.0 x10E3/uL    Lymphocytes Absolute 1.6 0.7 - 3.1 x10E3/uL    Monocytes Absolute 0.8 0.1 - 0.9 x10E3/uL    Eosinophils Absolute 0.1 0.0 - 0.4 x10E3/uL    Basophils Absolute 0.1 0.0 - 0.2 x10E3/uL    Immature Granulocyte Rel % 0 Not Estab. %    Immature Grans Absolute 0.0 0.0 - 0.1 x10E3/uL     Assessment and Plan    Diagnoses and all orders for this visit:    1. 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 Enlarged prostate  -     tamsulosin (FLOMAX) 0.4 MG capsule 24 hr capsule; Take 2 capsules by mouth Every Night.  Dispense: 180 capsule; Refill: 3  -     finasteride (PROSCAR) 5 MG tablet; Take 1 tablet by mouth Daily.  Dispense: 180 tablet; Refill: 3    3. Weak urinary stream  -     tamsulosin (FLOMAX) 0.4 MG capsule 24 hr capsule; Take 2 capsules by mouth Every Night.  Dispense: 180 capsule; Refill: 3  -     finasteride (PROSCAR) 5 MG tablet; Take 1 tablet by mouth Daily.  Dispense: 180 tablet; Refill: 3      Overall stable LUTS in setting advanced age and multiple medical problems on combination medical therapy with " finasteride and tamsulosin.  I recommended he continue finasteride and tamsulosin.    He will follow-up annually or sooner as needed.      This document has been signed by TRISTIN Silva MD on December 16, 2022 15:42 CST

## 2022-12-12 NOTE — PROGRESS NOTES
Injection  Injection performed in LD by Maria L Sevilla RN. Patient tolerated the procedure well without complications.  12/12/22   Maria L Sevilla RN

## 2022-12-15 ENCOUNTER — OFFICE VISIT (OUTPATIENT)
Dept: UROLOGY | Facility: CLINIC | Age: 82
End: 2022-12-15

## 2022-12-15 VITALS — TEMPERATURE: 97.8 F | HEIGHT: 68 IN | WEIGHT: 200 LBS | BODY MASS INDEX: 30.31 KG/M2

## 2022-12-15 DIAGNOSIS — N13.8 BPH WITH OBSTRUCTION/LOWER URINARY TRACT SYMPTOMS: Primary | ICD-10-CM

## 2022-12-15 DIAGNOSIS — N40.1 BPH WITH OBSTRUCTION/LOWER URINARY TRACT SYMPTOMS: Primary | ICD-10-CM

## 2022-12-15 DIAGNOSIS — N40.0 ENLARGED PROSTATE: ICD-10-CM

## 2022-12-15 DIAGNOSIS — R39.12 WEAK URINARY STREAM: ICD-10-CM

## 2022-12-15 PROCEDURE — 99213 OFFICE O/P EST LOW 20 MIN: CPT | Performed by: UROLOGY

## 2022-12-15 RX ORDER — FINASTERIDE 5 MG/1
5 TABLET, FILM COATED ORAL DAILY
Qty: 180 TABLET | Refills: 3 | Status: SHIPPED | OUTPATIENT
Start: 2022-12-15

## 2022-12-15 RX ORDER — TAMSULOSIN HYDROCHLORIDE 0.4 MG/1
2 CAPSULE ORAL NIGHTLY
Qty: 180 CAPSULE | Refills: 3 | Status: SHIPPED | OUTPATIENT
Start: 2022-12-15

## 2023-01-02 DIAGNOSIS — G47.419 PRIMARY NARCOLEPSY WITHOUT CATAPLEXY: ICD-10-CM

## 2023-01-03 RX ORDER — METHYLPHENIDATE HYDROCHLORIDE 20 MG/1
20 TABLET ORAL 3 TIMES DAILY
Qty: 90 TABLET | Refills: 0 | Status: SHIPPED | OUTPATIENT
Start: 2023-01-03 | End: 2023-02-01 | Stop reason: SDUPTHER

## 2023-01-11 ENCOUNTER — LAB (OUTPATIENT)
Dept: INTERNAL MEDICINE | Facility: CLINIC | Age: 83
End: 2023-01-11
Payer: MEDICARE

## 2023-01-11 DIAGNOSIS — D50.9 IRON DEFICIENCY ANEMIA, UNSPECIFIED IRON DEFICIENCY ANEMIA TYPE: ICD-10-CM

## 2023-01-11 DIAGNOSIS — E55.9 VITAMIN D DEFICIENCY, UNSPECIFIED: ICD-10-CM

## 2023-01-11 DIAGNOSIS — N18.9 HISTORY OF ANEMIA DUE TO CHRONIC KIDNEY DISEASE: ICD-10-CM

## 2023-01-11 DIAGNOSIS — E53.8 B12 DEFICIENCY: Primary | ICD-10-CM

## 2023-01-11 DIAGNOSIS — Z86.2 HISTORY OF ANEMIA DUE TO CHRONIC KIDNEY DISEASE: ICD-10-CM

## 2023-01-12 LAB
BASOPHILS # BLD AUTO: 0.1 X10E3/UL (ref 0–0.2)
BASOPHILS NFR BLD AUTO: 0 %
EOSINOPHIL # BLD AUTO: 0.1 X10E3/UL (ref 0–0.4)
EOSINOPHIL NFR BLD AUTO: 0 %
ERYTHROCYTE [DISTWIDTH] IN BLOOD BY AUTOMATED COUNT: 11.6 % (ref 11.6–15.4)
HCT VFR BLD AUTO: 35.1 % (ref 37.5–51)
HGB BLD-MCNC: 11.9 G/DL (ref 13–17.7)
IMM GRANULOCYTES # BLD AUTO: 0 X10E3/UL (ref 0–0.1)
IMM GRANULOCYTES NFR BLD AUTO: 0 %
LYMPHOCYTES # BLD AUTO: 1.4 X10E3/UL (ref 0.7–3.1)
LYMPHOCYTES NFR BLD AUTO: 12 %
MCH RBC QN AUTO: 34.4 PG (ref 26.6–33)
MCHC RBC AUTO-ENTMCNC: 33.9 G/DL (ref 31.5–35.7)
MCV RBC AUTO: 101 FL (ref 79–97)
MONOCYTES # BLD AUTO: 0.6 X10E3/UL (ref 0.1–0.9)
MONOCYTES NFR BLD AUTO: 5 %
NEUTROPHILS # BLD AUTO: 9.7 X10E3/UL (ref 1.4–7)
NEUTROPHILS NFR BLD AUTO: 83 %
PLATELET # BLD AUTO: 266 X10E3/UL (ref 150–450)
RBC # BLD AUTO: 3.46 X10E6/UL (ref 4.14–5.8)
WBC # BLD AUTO: 11.9 X10E3/UL (ref 3.4–10.8)

## 2023-02-01 DIAGNOSIS — G47.419 PRIMARY NARCOLEPSY WITHOUT CATAPLEXY: ICD-10-CM

## 2023-02-02 RX ORDER — METHYLPHENIDATE HYDROCHLORIDE 20 MG/1
20 TABLET ORAL 3 TIMES DAILY
Qty: 90 TABLET | Refills: 0 | Status: SHIPPED | OUTPATIENT
Start: 2023-02-02 | End: 2023-02-28 | Stop reason: SDUPTHER

## 2023-02-06 DIAGNOSIS — Z86.2 HISTORY OF ANEMIA DUE TO CHRONIC KIDNEY DISEASE: Primary | ICD-10-CM

## 2023-02-06 DIAGNOSIS — N18.9 HISTORY OF ANEMIA DUE TO CHRONIC KIDNEY DISEASE: Primary | ICD-10-CM

## 2023-02-09 ENCOUNTER — LAB (OUTPATIENT)
Dept: INTERNAL MEDICINE | Facility: CLINIC | Age: 83
End: 2023-02-09
Payer: MEDICARE

## 2023-02-09 DIAGNOSIS — Z86.2 HISTORY OF ANEMIA DUE TO CHRONIC KIDNEY DISEASE: ICD-10-CM

## 2023-02-09 DIAGNOSIS — N18.9 HISTORY OF ANEMIA DUE TO CHRONIC KIDNEY DISEASE: ICD-10-CM

## 2023-02-16 ENCOUNTER — OFFICE VISIT (OUTPATIENT)
Dept: ONCOLOGY | Facility: CLINIC | Age: 83
End: 2023-02-16
Payer: MEDICARE

## 2023-02-16 VITALS
TEMPERATURE: 97 F | BODY MASS INDEX: 30.58 KG/M2 | RESPIRATION RATE: 16 BRPM | OXYGEN SATURATION: 96 % | WEIGHT: 201.8 LBS | DIASTOLIC BLOOD PRESSURE: 68 MMHG | SYSTOLIC BLOOD PRESSURE: 132 MMHG | HEIGHT: 68 IN | HEART RATE: 51 BPM

## 2023-02-16 DIAGNOSIS — D64.9 ANEMIA, UNSPECIFIED TYPE: ICD-10-CM

## 2023-02-16 DIAGNOSIS — N18.2 STAGE 2 CHRONIC KIDNEY DISEASE: ICD-10-CM

## 2023-02-16 DIAGNOSIS — D72.829 LEUKOCYTOSIS, UNSPECIFIED TYPE: Primary | ICD-10-CM

## 2023-02-16 PROCEDURE — 99214 OFFICE O/P EST MOD 30 MIN: CPT | Performed by: NURSE PRACTITIONER

## 2023-02-16 NOTE — PROGRESS NOTES
[facility] ONC [facility] HEMATOLOGY & ONCOLOGY [place]  [address]  [facility] [address] [phone]    Patient Name: Spencer Moore  Encounter Date: 02/16/2023   YOB: 1940  Patient Number: [id number]    Hematology / Oncology Progress Note    HPI / REASON FOR VISIT: Spencer Moore is a 82 y.o. male who is followed by this office for anemia in chronic kidney disease and chronic leukocytosis.  He has health history of CKD, narcolepsy. GERD, HTN, Enlarged Prostate.      He presents to clinic for continued follow up.  He states he is taking prednisone 10 mg daily for pain.  He has no acute complaint.  Denies nausea, vomiting, fever.      He had labs drawn on Feb 9, 2023 and results were reviewed with him today.       LABS    Lab Results - Last 18 Months   Lab Units 02/09/23  1030 01/11/23  1032 11/11/22  1020 09/13/22  0930 08/15/22  1010 07/19/22  0948 03/11/22  0947 02/18/22  1415   HEMOGLOBIN g/dL 12.2* 11.9* 11.8* 11.6* 12.1* 12.2*   < > 11.7*   HEMATOCRIT % 35.6* 35.1* 34.3* 34.8* 36.0* 35.8*   < > 35.2*   MCV fL 101* 101* 101* 102* 101* 101*   < > 102.9*   WBC x10E3/uL 13.6* 11.9* 14.1* 12.0* 10.9* 11.8*   < > 11.34*   RDW % 12.1 11.6 11.1* 12.1 11.8 11.1*   < > 12.2*   MPV fL  --   --   --   --   --   --   --  9.5   PLATELETS x10E3/uL 282 266 264 256 254 275   < > 341   IMM GRAN % %  --   --   --   --   --   --   --  0.6*   NEUTROS ABS x10E3/uL 11.3* 9.7* 11.4* 9.4* 9.0* 7.3*   < > 9.27*   LYMPHS ABS x10E3/uL 1.6 1.4 1.6 1.5 1.1 3.0   < > 1.17   MONOS ABS x10E3/uL 0.6 0.6 0.8 0.8 0.6 1.2*   < > 0.77   EOS ABS x10E3/uL 0.0 0.1 0.1 0.1 0.0 0.3   < > 0.03   BASOS ABS x10E3/uL 0.1 0.1 0.1 0.1 0.1 0.1   < > 0.03   IMMATURE GRANS (ABS) 10*3/mm3  --   --   --   --   --   --   --  0.07*   NRBC /100 WBC  --   --   --   --   --   --   --  0.0    < > = values in this interval not displayed.       Lab Results - Last 18 Months   Lab Units  02/09/23  1030 10/06/22  1014 08/15/22  1010 02/18/22  1415 02/08/22  1037 01/10/22  1004   GLUCOSE mg/dL 163*  --  101* 121* 138* 139*   SODIUM mmol/L 142  --  141 135* 136 140   POTASSIUM mmol/L 4.5  --  5.3* 5.1 4.5 4.0   TOTAL CO2 mmol/L 21  --  21  --  19* 20   CO2 mmol/L  --   --   --  26.0  --   --    CHLORIDE mmol/L 103  --  101 99 97 104   ANION GAP mmol/L  --   --   --  10.0  --   --    CREATININE mg/dL 1.17 1.20 1.31* 1.45* 1.71* 1.22   BUN mg/dL 24  --  27 34* 33* 31*   BUN / CREAT RATIO  21  --  21 23.4 19 25*   CALCIUM mg/dL 8.8  --  8.8 8.8 8.7 8.8   EGFR IF NONAFRICN AM mL/min/1.73  --   --   --  47* 37* 56*   ALK PHOS IU/L 132*  --  116 117 107 145*   TOTAL PROTEIN g/dL  --   --   --  6.4  --   --    ALT (SGPT) IU/L 24  --  23 24 23 39   AST (SGOT) IU/L 27  --  24 21 21 28   BILIRUBIN mg/dL 0.3  --  0.4 0.3 0.4 0.3   ALBUMIN g/dL 4.0  --  3.9 4.00 4.0 3.8   GLOBULIN gm/dL  --   --   --  2.4  --   --        No results for input(s): MSPIKE, KAPPALAMB, IGLFLC, URICACID, FREEKAPPAL, CEA, LDH, REFLABREPO in the last 31632 hours.    Lab Results - Last 18 Months   Lab Units 02/09/23  1030 08/15/22  1010 01/10/22  1004   TIBC ug/dL 331 274 264   IRON SATURATION % 28 49 57*   FERRITIN ng/mL 49 72 132         PAST MEDICAL HISTORY:  ALLERGIES:  Allergies   Allergen Reactions   • Biaxin [Clarithromycin] GI Intolerance     unknown   • Parafon Forte Dsc [Chlorzoxazone] Provider Review Needed     Swallowing issues     CURRENT MEDICATIONS:  Outpatient Encounter Medications as of 2/16/2023   Medication Sig Dispense Refill   • aspirin 81 MG EC tablet Take 81 mg by mouth Daily.     • atorvastatin (LIPITOR) 20 MG tablet Take 20 mg by mouth Daily.     • carBAMazepine (TEGretol) 200 MG tablet Take 1 tablet by mouth 2 (Two) Times a Day. 60 tablet 8   • CloNIDine (CATAPRES) 0.1 MG tablet Take 0.1 mg by mouth As Needed for High Blood Pressure (1 tablet if systolic is over 170).     • cyanocobalamin 1000 MCG/ML injection  Inject 1 mL into the appropriate muscle as directed by prescriber Every 28 (Twenty-Eight) Days. 10 mL 0   • finasteride (PROSCAR) 5 MG tablet Take 1 tablet by mouth Daily. 180 tablet 3   • FLUoxetine (PROzac) 40 MG capsule Take 40 mg by mouth Daily.     • hyoscyamine (LEVSIN) 0.125 MG SL tablet Take 125 mcg by mouth 2 (Two) Times a Day. Taking it 1 every othr day 2/16/23     • irbesartan (AVAPRO) 300 MG tablet Take 300 mg by mouth Daily.     • methylphenidate (Ritalin) 20 MG tablet Take 1 tablet by mouth 3 (Three) Times a Day. 90 tablet 0   • metoprolol tartrate (LOPRESSOR) 25 MG tablet Take 1 tablet by mouth 2 (Two) Times a Day. (Patient taking differently: Take 25 mg by mouth Daily.) 60 tablet 11   • Multiple Vitamins-Minerals (MULTIVITAMIN ADULT PO) Take  by mouth.     • mupirocin (BACTROBAN) 2 % ointment Apply 1 application topically to the appropriate area as directed As Needed.     • omeprazole (priLOSEC) 40 MG capsule Take 40 mg by mouth Daily.     • oxyCODONE-acetaminophen (PERCOCET) 7.5-325 MG per tablet Take 1 tablet by mouth Every 6 (Six) Hours As Needed. Takes 7.5mg four times daily     • predniSONE (DELTASONE) 10 MG tablet Take 1 tablet by mouth Daily. 30 tablet 6   • tamsulosin (FLOMAX) 0.4 MG capsule 24 hr capsule Take 2 capsules by mouth Every Night. 180 capsule 3   • terazosin (HYTRIN) 2 MG capsule Take 2 mg by mouth Every Night.       Facility-Administered Encounter Medications as of 2/16/2023   Medication Dose Route Frequency Provider Last Rate Last Admin   • cyanocobalamin injection 1,000 mcg  1,000 mcg Intramuscular Q30 Days Rosi Baker   1,000 mcg at 12/12/22 1117     ADULT ILLNESSES:  Patient Active Problem List   Diagnosis Code   • Cervical spondylosis without myelopathy M47.812   • Nonsmoker Z78.9   • BMI 29.0-29.9,adult Z68.29   • Benign non-nodular prostatic hyperplasia with lower urinary tract symptoms N40.1   • Trigeminal neuralgia of right side of face G50.0   • Primary  narcolepsy without cataplexy G47.419   • Hypertension I10   • Mixed hyperlipidemia E78.2   • Cerebrovascular small vessel disease I67.9   • Sensorineural hearing loss (SNHL) of both ears H90.3   • Macrocytic anemia D53.9   • Left arm pain M79.602   • Triceps tendon rupture, left, initial encounter S46.312A   • Left hand paresthesia R20.2   • Spinal stenosis in cervical region M48.02   • Cervical radiculopathy M54.12   • Degeneration of cervical intervertebral disc M50.30   • BMI 30.0-30.9,adult Z68.30   • History of anemia due to chronic kidney disease N18.9, Z86.2     SURGERIES:  Past Surgical History:   Procedure Laterality Date   • ABDOMINAL SURGERY     • CATARACT EXTRACTION Left    • HAND SURGERY Left    • HERNIA REPAIR     • REPLACEMENT TOTAL KNEE Left    • ROTATOR CUFF REPAIR     • TONSILLECTOMY       HEALTH MAINTENANCE ITEMS:  Health Maintenance Due   Topic Date Due   • COLORECTAL CANCER SCREENING  Never done   • TDAP/TD VACCINES (1 - Tdap) Never done   • ZOSTER VACCINE (1 of 2) Never done   • Pneumococcal Vaccine 65+ (1 - PCV) Never done   • COVID-19 Vaccine (4 - Booster for Moderna series) 04/18/2022   • LIPID PANEL  09/28/2022       <no information>  Last Completed Colonoscopy     This patient has no relevant Health Maintenance data.        Immunization History   Administered Date(s) Administered   • COVID-19 (MODERNA) 1st, 2nd, 3rd Dose Only 08/19/2021, 09/16/2021, 02/21/2022   • FLUAD TRI 65YR+ 09/01/2020, 10/06/2022   • Flu Vaccine Split Quad 10/29/2019   • Fluzone High Dose =>65 Years ([employer] ONLY) 09/30/2015, 09/26/2016   • Influenza TIV (IM) 10/29/2014   • Influenza, Unspecified 09/28/2021     Last Completed Mammogram     This patient has no relevant Health Maintenance data.            FAMILY HISTORY:  Family History   Adopted: Yes   Family history unknown: Yes     SOCIAL HISTORY:  Social History     Socioeconomic History   • Marital status:    Tobacco Use   • Smoking status: Never   •  "Smokeless tobacco: Never   Substance and Sexual Activity   • Alcohol use: No   • Drug use: No   • Sexual activity: Defer       REVIEW OF SYSTEMS:  Review of Systems   Constitutional: Negative for activity change, appetite change, fatigue, fever, unexpected weight gain and unexpected weight loss.   HENT: Negative for dental problem, facial swelling, swollen glands and trouble swallowing.         Hearing aids   Eyes: Negative for double vision and discharge.   Respiratory: Negative for cough, shortness of breath and wheezing.    Cardiovascular: Negative for chest pain, palpitations and leg swelling.   Gastrointestinal: Negative for abdominal pain, blood in stool, nausea and vomiting.   Endocrine: Negative.    Genitourinary: Negative for dysuria and hematuria.   Musculoskeletal: Negative for arthralgias and myalgias.   Skin: Negative for rash, skin lesions and wound.   Allergic/Immunologic: Negative for immunocompromised state.   Neurological: Negative for speech difficulty, light-headedness, headache, memory problem and confusion.        Nerve pain     Hematological: Negative for adenopathy.   Psychiatric/Behavioral: Negative for self-injury, suicidal ideas and depressed mood. The patient is not nervous/anxious.        /68   Pulse 51   Temp 97 °F (36.1 °C)   Resp 16   Ht 172.7 cm (68\")   Wt 91.5 kg (201 lb 12.8 oz)   SpO2 96%   BMI 30.68 kg/m²  Body surface area is 2.05 meters squared.    Pain Score    02/16/23 1309   PainSc: 0-No pain       Physical Exam  Constitutional:       Appearance: Normal appearance.   HENT:      Head: Normocephalic and atraumatic.   Cardiovascular:      Rate and Rhythm: Normal rate and regular rhythm.   Pulmonary:      Effort: Pulmonary effort is normal.      Breath sounds: Normal breath sounds.   Abdominal:      General: Bowel sounds are normal.      Palpations: Abdomen is soft.   Skin:     General: Skin is warm and dry.      Findings: Bruising present.   Neurological:      " Mental Status: He is alert and oriented to person, place, and time.   Psychiatric:         Attention and Perception: Attention normal.         Mood and Affect: Mood normal.         Judgment: Judgment normal.         Spencer Moore reports a pain score of 0.  Given his pain assessment as noted, treatment options were discussed and the following options were decided upon as a follow-up plan to address the patient's pain: continuation of current treatment plan for pain and pt is taking prednisone daily. .           ASSESSMENT / PLAN    1. Leukocytosis, unspecified type    2. Stage 2 chronic kidney disease    3. Anemia, unspecified type         ASSESSMENT:      1.  Leukocytosis   -WBC today 13.6  -Pt is taking prednisone daily which can cause leukocytosis  -Stable for observation     2.  Stage II Chronic Kidney Disease  -Today's labs BUN 24, Creatinine 1.17, GFR 63  -GFR has improved  -Pt has seen nephrology in the past but GFR is stable now  -Followed by PCP    3.  Anemia   -Hgb 12.2, Hct 35.6.  Stable   -Anemia profile normal:  Iron 92, Ferritin 49, Sat 28%, TIBC 331  -Stable for observation     PLAN:  Stable for observation.    Pt will have monthly labs at PCP office  Return to office in 6 months  Patient will have preoffice labs for CBC, CMP, Iron Profile, Ferritin  Continue current medications, treatment plans and follow up with PCP and any other providers.  Care discussed with patient.  Understanding expressed.  Patient agreeable with plan.        ACP discussion was held with the patient during this visit. Patient has an advance directive in EMR which is still valid.         Katie Diaz, APRN  02/16/2023          Right hand cyst.    Takes prednisone daily 10 mg for pain    Doesn't see nephrology any  More.

## 2023-02-28 DIAGNOSIS — G47.419 PRIMARY NARCOLEPSY WITHOUT CATAPLEXY: ICD-10-CM

## 2023-03-01 RX ORDER — METHYLPHENIDATE HYDROCHLORIDE 20 MG/1
20 TABLET ORAL 3 TIMES DAILY
Qty: 90 TABLET | Refills: 0 | Status: SHIPPED | OUTPATIENT
Start: 2023-03-01 | End: 2023-03-31 | Stop reason: SDUPTHER

## 2023-03-10 ENCOUNTER — LAB (OUTPATIENT)
Dept: INTERNAL MEDICINE | Facility: CLINIC | Age: 83
End: 2023-03-10
Payer: MEDICARE

## 2023-03-10 DIAGNOSIS — D64.9 ANEMIA, UNSPECIFIED TYPE: ICD-10-CM

## 2023-03-10 DIAGNOSIS — N18.2 STAGE 2 CHRONIC KIDNEY DISEASE: ICD-10-CM

## 2023-03-21 ENCOUNTER — TELEPHONE (OUTPATIENT)
Dept: INTERNAL MEDICINE | Facility: CLINIC | Age: 83
End: 2023-03-21
Payer: MEDICARE

## 2023-03-21 ENCOUNTER — TELEPHONE (OUTPATIENT)
Dept: ONCOLOGY | Facility: CLINIC | Age: 83
End: 2023-03-21

## 2023-03-21 DIAGNOSIS — N18.2 STAGE 2 CHRONIC KIDNEY DISEASE: ICD-10-CM

## 2023-03-21 NOTE — TELEPHONE ENCOUNTER
Called and spoke with Mr. Orestes Larsen, patients POA. Let him know results would go to Dr. Mabry's office and they may not have had a chance to review them yet. He voiced understanding and had no further questions.

## 2023-03-21 NOTE — TELEPHONE ENCOUNTER
Caller: Orestes Larsen  (POA)    Relationship: Emergency Contact    Best call back number: [phone]    Caller requesting test results: ALL LAB RESULTS    What test was performed: LAB    When was the test performed: 3/10/2023    Where was the test performed: [facility]    Additional notes: ORESTES WAS CALLING ON BEHALF ON PATIENT.  STATED THAT THE LAB RESULTS ARE NOT IN MY CHART.  WOULD LIKE A CALL BACK AND UPDATE

## 2023-03-21 NOTE — TELEPHONE ENCOUNTER
Caller: Orestes Larsen  (POA)    Relationship: Emergency Contact    Best call back number: [phone]    What is the best time to reach you: ANYTIME     Who are you requesting to speak with (clinical staff, provider,  specific staff member): CLINICAL    What was the call regarding: CALLING TO SEE WHEN LAB RESULTS FROM 3- WILL BE RELEASED TO [facility]     Do you require a callback: YES PLEASE TO ADVISE

## 2023-03-30 RX ORDER — PREDNISONE 10 MG/1
TABLET ORAL
Qty: 30 TABLET | Refills: 6 | OUTPATIENT
Start: 2023-03-30

## 2023-03-31 DIAGNOSIS — G47.419 PRIMARY NARCOLEPSY WITHOUT CATAPLEXY: ICD-10-CM

## 2023-04-03 RX ORDER — METHYLPHENIDATE HYDROCHLORIDE 20 MG/1
20 TABLET ORAL 3 TIMES DAILY
Qty: 90 TABLET | Refills: 0 | Status: SHIPPED | OUTPATIENT
Start: 2023-04-03

## 2023-04-10 ENCOUNTER — LAB (OUTPATIENT)
Dept: INTERNAL MEDICINE | Facility: CLINIC | Age: 83
End: 2023-04-10
Payer: MEDICARE

## 2023-05-01 DIAGNOSIS — G47.419 PRIMARY NARCOLEPSY WITHOUT CATAPLEXY: ICD-10-CM

## 2023-05-01 RX ORDER — METHYLPHENIDATE HYDROCHLORIDE 20 MG/1
20 TABLET ORAL 3 TIMES DAILY
Qty: 90 TABLET | Refills: 0 | Status: SHIPPED | OUTPATIENT
Start: 2023-05-01

## 2023-05-11 ENCOUNTER — LAB (OUTPATIENT)
Dept: INTERNAL MEDICINE | Facility: CLINIC | Age: 83
End: 2023-05-11
Payer: MEDICARE

## 2023-05-11 DIAGNOSIS — N18.2 STAGE 2 CHRONIC KIDNEY DISEASE: ICD-10-CM

## 2023-05-11 DIAGNOSIS — D64.9 ANEMIA, UNSPECIFIED TYPE: ICD-10-CM

## 2023-05-12 LAB
ALBUMIN SERPL-MCNC: 3.9 G/DL (ref 3.6–4.6)
ALBUMIN/GLOB SERPL: 1.7 {RATIO} (ref 1.2–2.2)
ALP SERPL-CCNC: 119 IU/L (ref 44–121)
ALT SERPL-CCNC: 23 IU/L (ref 0–44)
AST SERPL-CCNC: 25 IU/L (ref 0–40)
BASOPHILS # BLD AUTO: 0.1 X10E3/UL (ref 0–0.2)
BASOPHILS NFR BLD AUTO: 0 %
BILIRUB SERPL-MCNC: 0.4 MG/DL (ref 0–1.2)
BUN SERPL-MCNC: 29 MG/DL (ref 8–27)
BUN/CREAT SERPL: 22 (ref 10–24)
CALCIUM SERPL-MCNC: 8.9 MG/DL (ref 8.6–10.2)
CHLORIDE SERPL-SCNC: 103 MMOL/L (ref 96–106)
CO2 SERPL-SCNC: 20 MMOL/L (ref 20–29)
CREAT SERPL-MCNC: 1.29 MG/DL (ref 0.76–1.27)
EGFRCR SERPLBLD CKD-EPI 2021: 56 ML/MIN/1.73
EOSINOPHIL # BLD AUTO: 0.1 X10E3/UL (ref 0–0.4)
EOSINOPHIL NFR BLD AUTO: 0 %
ERYTHROCYTE [DISTWIDTH] IN BLOOD BY AUTOMATED COUNT: 11.7 % (ref 11.6–15.4)
GLOBULIN SER CALC-MCNC: 2.3 G/DL (ref 1.5–4.5)
GLUCOSE SERPL-MCNC: 130 MG/DL (ref 70–99)
HCT VFR BLD AUTO: 34.3 % (ref 37.5–51)
HGB BLD-MCNC: 11.7 G/DL (ref 13–17.7)
IMM GRANULOCYTES # BLD AUTO: 0.1 X10E3/UL (ref 0–0.1)
IMM GRANULOCYTES NFR BLD AUTO: 1 %
LYMPHOCYTES # BLD AUTO: 1.5 X10E3/UL (ref 0.7–3.1)
LYMPHOCYTES NFR BLD AUTO: 12 %
MCH RBC QN AUTO: 34.1 PG (ref 26.6–33)
MCHC RBC AUTO-ENTMCNC: 34.1 G/DL (ref 31.5–35.7)
MCV RBC AUTO: 100 FL (ref 79–97)
MONOCYTES # BLD AUTO: 0.6 X10E3/UL (ref 0.1–0.9)
MONOCYTES NFR BLD AUTO: 5 %
NEUTROPHILS # BLD AUTO: 10.2 X10E3/UL (ref 1.4–7)
NEUTROPHILS NFR BLD AUTO: 82 %
PLATELET # BLD AUTO: 263 X10E3/UL (ref 150–450)
POTASSIUM SERPL-SCNC: 4.8 MMOL/L (ref 3.5–5.2)
PROT SERPL-MCNC: 6.2 G/DL (ref 6–8.5)
RBC # BLD AUTO: 3.43 X10E6/UL (ref 4.14–5.8)
SODIUM SERPL-SCNC: 142 MMOL/L (ref 134–144)
WBC # BLD AUTO: 12.5 X10E3/UL (ref 3.4–10.8)

## 2023-05-31 DIAGNOSIS — G47.419 PRIMARY NARCOLEPSY WITHOUT CATAPLEXY: ICD-10-CM

## 2023-06-01 DIAGNOSIS — G47.419 PRIMARY NARCOLEPSY WITHOUT CATAPLEXY: ICD-10-CM

## 2023-06-01 RX ORDER — METHYLPHENIDATE HYDROCHLORIDE 20 MG/1
20 TABLET ORAL 3 TIMES DAILY
Qty: 90 TABLET | Refills: 0 | Status: CANCELLED | OUTPATIENT
Start: 2023-06-01

## 2023-06-01 RX ORDER — METHYLPHENIDATE HYDROCHLORIDE 20 MG/1
20 TABLET ORAL 3 TIMES DAILY
Qty: 90 TABLET | Refills: 0 | Status: SHIPPED | OUTPATIENT
Start: 2023-06-01

## 2023-06-12 ENCOUNTER — LAB (OUTPATIENT)
Dept: INTERNAL MEDICINE | Facility: CLINIC | Age: 83
End: 2023-06-12
Payer: MEDICARE

## 2023-07-20 ENCOUNTER — OFFICE VISIT (OUTPATIENT)
Dept: NEUROLOGY | Facility: CLINIC | Age: 83
End: 2023-07-20
Payer: MEDICARE

## 2023-07-20 VITALS
DIASTOLIC BLOOD PRESSURE: 70 MMHG | WEIGHT: 201 LBS | HEIGHT: 68 IN | OXYGEN SATURATION: 98 % | SYSTOLIC BLOOD PRESSURE: 120 MMHG | BODY MASS INDEX: 30.46 KG/M2 | HEART RATE: 62 BPM

## 2023-07-20 DIAGNOSIS — G47.419 PRIMARY NARCOLEPSY WITHOUT CATAPLEXY: Primary | ICD-10-CM

## 2023-07-20 DIAGNOSIS — I67.9 CEREBROVASCULAR SMALL VESSEL DISEASE: ICD-10-CM

## 2023-07-20 DIAGNOSIS — G50.0 TRIGEMINAL NEURALGIA OF RIGHT SIDE OF FACE: ICD-10-CM

## 2023-07-20 PROCEDURE — 3078F DIAST BP <80 MM HG: CPT | Performed by: PHYSICIAN ASSISTANT

## 2023-07-20 PROCEDURE — 3074F SYST BP LT 130 MM HG: CPT | Performed by: PHYSICIAN ASSISTANT

## 2023-07-20 PROCEDURE — 1160F RVW MEDS BY RX/DR IN RCRD: CPT | Performed by: PHYSICIAN ASSISTANT

## 2023-07-20 PROCEDURE — 1159F MED LIST DOCD IN RCRD: CPT | Performed by: PHYSICIAN ASSISTANT

## 2023-07-20 PROCEDURE — 99214 OFFICE O/P EST MOD 30 MIN: CPT | Performed by: PHYSICIAN ASSISTANT

## 2023-07-20 RX ORDER — OXYCODONE AND ACETAMINOPHEN 10; 325 MG/1; MG/1
1 TABLET ORAL EVERY 6 HOURS
COMMUNITY
Start: 2023-06-21

## 2023-08-10 ENCOUNTER — LAB (OUTPATIENT)
Dept: INTERNAL MEDICINE | Facility: CLINIC | Age: 83
End: 2023-08-10
Payer: MEDICARE

## 2023-08-11 DIAGNOSIS — D64.9 ANEMIA, UNSPECIFIED TYPE: Primary | ICD-10-CM

## 2023-08-15 DIAGNOSIS — G47.419 PRIMARY NARCOLEPSY WITHOUT CATAPLEXY: ICD-10-CM

## 2023-08-15 RX ORDER — METHYLPHENIDATE HYDROCHLORIDE 20 MG/1
20 TABLET ORAL 3 TIMES DAILY
Qty: 90 TABLET | Refills: 0 | Status: SHIPPED | OUTPATIENT
Start: 2023-08-15

## 2023-08-15 RX ORDER — METHYLPHENIDATE HYDROCHLORIDE 20 MG/1
20 TABLET ORAL 3 TIMES DAILY
Qty: 90 TABLET | Refills: 0 | Status: CANCELLED | OUTPATIENT
Start: 2023-08-15

## 2023-08-15 NOTE — TELEPHONE ENCOUNTER
Caller: Orestes Larsen  (POA)    Relationship: Emergency Contact    Best call back number: [phone]    Requested Prescriptions:   Requested Prescriptions     Pending Prescriptions Disp Refills    methylphenidate (Ritalin) 20 MG tablet 90 tablet 0     Sig: Take 1 tablet by mouth 3 (Three) Times a Day.        Pharmacy where request should be sent: [address] [phone] [facility] [phone]      Last office visit with prescribing clinician: 7/20/2023   Last telemedicine visit with prescribing clinician: Visit date not found   Next office visit with prescribing clinician: 11/7/2023     Additional details provided by patient: PATIENT HAS ENOUGH TO LAST HIM UNTIL THE 20TH    Does the patient have less than a 3 day supply:  [] Yes  [x] No    Would you like a call back once the refill request has been completed: [x] Yes [] No    If the office needs to give you a call back, can they leave a voicemail: [x] Yes [] No    Alphonso Delcid Rep   08/15/23 10:42 CDT

## 2023-08-17 ENCOUNTER — OFFICE VISIT (OUTPATIENT)
Dept: ONCOLOGY | Facility: CLINIC | Age: 83
End: 2023-08-17
Payer: MEDICARE

## 2023-08-17 ENCOUNTER — LAB (OUTPATIENT)
Dept: LAB | Facility: HOSPITAL | Age: 83
End: 2023-08-17
Payer: MEDICARE

## 2023-08-17 VITALS
DIASTOLIC BLOOD PRESSURE: 82 MMHG | OXYGEN SATURATION: 97 % | HEIGHT: 68 IN | WEIGHT: 202.6 LBS | HEART RATE: 62 BPM | SYSTOLIC BLOOD PRESSURE: 144 MMHG | BODY MASS INDEX: 30.71 KG/M2 | RESPIRATION RATE: 16 BRPM | TEMPERATURE: 97.9 F

## 2023-08-17 DIAGNOSIS — D63.1 ANEMIA IN STAGE 3A CHRONIC KIDNEY DISEASE: ICD-10-CM

## 2023-08-17 DIAGNOSIS — D72.829 LEUKOCYTOSIS, UNSPECIFIED TYPE: Primary | ICD-10-CM

## 2023-08-17 DIAGNOSIS — N18.31 ANEMIA IN STAGE 3A CHRONIC KIDNEY DISEASE: ICD-10-CM

## 2023-08-17 DIAGNOSIS — N18.2 STAGE 2 CHRONIC KIDNEY DISEASE: Primary | ICD-10-CM

## 2023-08-17 DIAGNOSIS — D64.9 ANEMIA, UNSPECIFIED TYPE: ICD-10-CM

## 2023-08-17 LAB
FERRITIN SERPL-MCNC: 65.63 NG/ML (ref 30–400)
HOLD SPECIMEN: NORMAL
IRON 24H UR-MRATE: 74 MCG/DL (ref 59–158)
IRON SATN MFR SERPL: 19 % (ref 20–50)
TIBC SERPL-MCNC: 386 MCG/DL (ref 298–536)
TRANSFERRIN SERPL-MCNC: 259 MG/DL (ref 200–360)
VIT B12 BLD-MCNC: 756 PG/ML (ref 211–946)
WHOLE BLOOD HOLD SPECIMEN: NORMAL

## 2023-08-17 PROCEDURE — 83540 ASSAY OF IRON: CPT

## 2023-08-17 PROCEDURE — 82607 VITAMIN B-12: CPT

## 2023-08-17 PROCEDURE — 82728 ASSAY OF FERRITIN: CPT

## 2023-08-17 PROCEDURE — 84466 ASSAY OF TRANSFERRIN: CPT

## 2023-08-17 PROCEDURE — 36415 COLL VENOUS BLD VENIPUNCTURE: CPT

## 2023-08-17 NOTE — PROGRESS NOTES
[facility] HEMATOLOGY & ONCOLOGY [place]  [address]  [place] [address] [phone]    Patient Name: Spencer Moore  Encounter Date: 08/17/2023   YOB: 1940  Patient Number: [id number]    Hematology / Oncology Progress Note    HPI / REASON FOR VISIT: Spencer Moore is a 83 y.o. male who is followed by this office for anemia in chronic kidney disease and chronic leukocytosis.  He has health history of CKD, narcolepsy. GERD, HTN, Enlarged Prostate.    He presents to clinic for continued follow up.  He states he is taking prednisone 10 mg daily for pain.  He has no acute complaint.  Denies nausea, vomiting, fever.      Percocet recently increased to 10 mg.      He had labs drawn  and results were reviewed with him in office.       LABS    Lab Results - Last 18 Months   Lab Units 08/10/23  1003 05/11/23  1025 02/09/23  1030 01/11/23  1032 11/11/22  1020 09/13/22  0930   HEMOGLOBIN g/dL 11.4* 11.7* 12.2* 11.9* 11.8* 11.6*   HEMATOCRIT % 34.0* 34.3* 35.6* 35.1* 34.3* 34.8*   MCV fL 102* 100* 101* 101* 101* 102*   WBC x10E3/uL 12.8* 12.5* 13.6* 11.9* 14.1* 12.0*   RDW % 11.5* 11.7 12.1 11.6 11.1* 12.1   PLATELETS x10E3/uL 248 263 282 266 264 256   NEUTROS ABS x10E3/uL 10.6* 10.2* 11.3* 9.7* 11.4* 9.4*   LYMPHS ABS x10E3/uL 1.3 1.5 1.6 1.4 1.6 1.5   MONOS ABS x10E3/uL 0.7 0.6 0.6 0.6 0.8 0.8   EOS ABS x10E3/uL 0.1 0.1 0.0 0.1 0.1 0.1   BASOS ABS x10E3/uL 0.1 0.1 0.1 0.1 0.1 0.1       Lab Results - Last 18 Months   Lab Units 08/10/23  1003 05/11/23  1025 02/09/23  1030 10/06/22  1014 08/15/22  1010   GLUCOSE mg/dL 148* 130* 163*  --  101*   SODIUM mmol/L 143 142 142  --  141   POTASSIUM mmol/L 4.9 4.8 4.5  --  5.3*   CO2 mmol/L 19* 20 21  --  21   CHLORIDE mmol/L 104 103 103  --  101   CREATININE mg/dL 1.33* 1.29* 1.17 1.20 1.31*   BUN mg/dL 22 29* 24  --  27   BUN / CREAT RATIO  17 22 21  --  21   CALCIUM mg/dL 8.8 8.9 8.8  --  8.8    ALK PHOS IU/L 122* 119 132*  --  116   ALT (SGPT) IU/L 23 23 24  --  23   AST (SGOT) IU/L 24 25 27  --  24   BILIRUBIN mg/dL 0.3 0.4 0.3  --  0.4   ALBUMIN g/dL 3.9 3.9 4.0  --  3.9       No results for input(s): MSPIKE, KAPPALAMB, IGLFLC, URICACID, FREEKAPPAL, CEA, LDH, REFLABREPO in the last 57838 hours.    Lab Results - Last 18 Months   Lab Units 08/17/23  1233 02/09/23  1030 08/15/22  1010   IRON mcg/dL 74  --   --    TIBC mcg/dL 386 331 274   IRON SATURATION %  --  28 49   IRON SATURATION (TSAT) % 19*  --   --    FERRITIN ng/mL 65.63 49 72         PAST MEDICAL HISTORY:  ALLERGIES:  Allergies   Allergen Reactions    Biaxin [Clarithromycin] GI Intolerance     unknown    Parafon Forte Dsc [Chlorzoxazone] Provider Review Needed     Swallowing issues     CURRENT MEDICATIONS:  Outpatient Encounter Medications as of 8/17/2023   Medication Sig Dispense Refill    aspirin 81 MG EC tablet Take 1 tablet by mouth Daily.      atorvastatin (LIPITOR) 20 MG tablet Take 1 tablet by mouth Daily.      carBAMazepine (TEGretol) 200 MG tablet Take 1 tablet by mouth 2 (Two) Times a Day. 60 tablet 8    CloNIDine (CATAPRES) 0.1 MG tablet Take 1 tablet by mouth As Needed for High Blood Pressure (1 tablet if systolic is over 170).      cyanocobalamin 1000 MCG/ML injection Inject 1 mL into the appropriate muscle as directed by prescriber Every 28 (Twenty-Eight) Days. 10 mL 0    finasteride (PROSCAR) 5 MG tablet Take 1 tablet by mouth Daily. 180 tablet 3    FLUoxetine (PROzac) 40 MG capsule Take 1 capsule by mouth Daily.      hyoscyamine (LEVSIN) 0.125 MG SL tablet Take 1 tablet by mouth Daily As Needed.      irbesartan (AVAPRO) 300 MG tablet Take 1 tablet by mouth Daily.      methylphenidate (RITALIN) 20 MG tablet TAKE 1 TABLET BY MOUTH 3 (THREE) TIMES A DAY. 90 tablet 0    metoprolol tartrate (LOPRESSOR) 25 MG tablet Take 1 tablet by mouth 2 (Two) Times a Day. (Patient taking differently: Take 1 tablet by mouth Daily.) 60 tablet 11     Multiple Vitamins-Minerals (MULTIVITAMIN ADULT PO) Take  by mouth.      mupirocin (BACTROBAN) 2 % ointment Apply 1 application  topically to the appropriate area as directed As Needed.      omeprazole (priLOSEC) 40 MG capsule Take 1 capsule by mouth Daily.      oxyCODONE-acetaminophen (PERCOCET)  MG per tablet Take 1 tablet by mouth Every 6 (Six) Hours.      predniSONE (DELTASONE) 10 MG tablet Take 1 tablet by mouth Daily. 30 tablet 6    tamsulosin (FLOMAX) 0.4 MG capsule 24 hr capsule Take 2 capsules by mouth Every Night. 180 capsule 3    terazosin (HYTRIN) 2 MG capsule Take 1 capsule by mouth Every Night.       Facility-Administered Encounter Medications as of 8/17/2023   Medication Dose Route Frequency Provider Last Rate Last Admin    cyanocobalamin injection 1,000 mcg  1,000 mcg Intramuscular Q30 Days Rosi Baker   1,000 mcg at 12/12/22 1117     ADULT ILLNESSES:  Patient Active Problem List   Diagnosis Code    Cervical spondylosis without myelopathy M47.812    Nonsmoker Z78.9    BMI 29.0-29.9,adult Z68.29    Benign non-nodular prostatic hyperplasia with lower urinary tract symptoms N40.1    Trigeminal neuralgia of right side of face G50.0    Primary narcolepsy without cataplexy G47.419    Hypertension I10    Mixed hyperlipidemia E78.2    Cerebrovascular small vessel disease I67.9    Sensorineural hearing loss (SNHL) of both ears H90.3    Macrocytic anemia D53.9    Left arm pain M79.602    Triceps tendon rupture, left, initial encounter S46.312A    Left hand paresthesia R20.2    Spinal stenosis in cervical region M48.02    Cervical radiculopathy M54.12    Degeneration of cervical intervertebral disc M50.30    BMI 30.0-30.9,adult Z68.30    History of anemia due to chronic kidney disease N18.9, Z86.2     SURGERIES:  Past Surgical History:   Procedure Laterality Date    ABDOMINAL SURGERY      CATARACT EXTRACTION Left     HAND SURGERY Left     HERNIA REPAIR      REPLACEMENT TOTAL KNEE Left     ROTATOR  CUFF REPAIR      TONSILLECTOMY       HEALTH MAINTENANCE ITEMS:  Health Maintenance Due   Topic Date Due    URINE MICROALBUMIN  Never done    COLORECTAL CANCER SCREENING  Never done    Pneumococcal Vaccine 65+ (1 - PCV) Never done    TDAP/TD VACCINES (1 - Tdap) Never done    ZOSTER VACCINE (1 of 2) Never done    DIABETIC EYE EXAM  06/24/2021    COVID-19 Vaccine (4 - Moderna series) 04/18/2022    ANNUAL WELLNESS VISIT  07/28/2022    LIPID PANEL  09/28/2022       <no information>  Last Completed Colonoscopy       This patient has no relevant Health Maintenance data.          Immunization History   Administered Date(s) Administered    31-influenza Vac Quardvalent Preservativ 10/29/2019    COVID-19 (MODERNA) 1st,2nd,3rd Dose Monovalent 08/19/2021, 09/16/2021, 02/21/2022    FLUAD TRI 65YR+ 09/01/2020, 10/06/2022    Flu Vaccine Split Quad 10/29/2019    Fluzone High Dose =>65 Years (Vaxcare ONLY) 09/30/2015, 09/26/2016    Influenza Injectable Mdck Pf Quad 09/28/2021    Influenza TIV (IM) 10/29/2014    Influenza, Unspecified 09/28/2021     Last Completed Mammogram       This patient has no relevant Health Maintenance data.              FAMILY HISTORY:  Family History   Adopted: Yes   Family history unknown: Yes     SOCIAL HISTORY:  Social History     Socioeconomic History    Marital status:    Tobacco Use    Smoking status: Never    Smokeless tobacco: Never   Substance and Sexual Activity    Alcohol use: No    Drug use: No    Sexual activity: Defer       REVIEW OF SYSTEMS:  Review of Systems   Constitutional:  Negative for activity change, appetite change, fatigue, fever, unexpected weight gain and unexpected weight loss.   HENT:  Negative for dental problem, facial swelling, swollen glands and trouble swallowing.         Hearing aids   Eyes:  Negative for double vision and discharge.   Respiratory:  Negative for cough, shortness of breath and wheezing.    Cardiovascular:  Negative for chest pain, palpitations and leg  "swelling.   Gastrointestinal:  Negative for abdominal pain, blood in stool, nausea and vomiting.   Endocrine: Negative.    Genitourinary:  Negative for dysuria and hematuria.   Musculoskeletal:  Negative for arthralgias and myalgias.   Skin:  Negative for rash, skin lesions and wound.   Allergic/Immunologic: Negative for immunocompromised state.   Neurological:  Negative for speech difficulty, light-headedness, headache, memory problem and confusion.        Nerve pain     Hematological:  Negative for adenopathy.   Psychiatric/Behavioral:  Negative for self-injury, suicidal ideas and depressed mood. The patient is not nervous/anxious.      /82   Pulse 62   Temp 97.9 øF (36.6 øC)   Resp 16   Ht 172.7 cm (68\")   Wt 91.9 kg (202 lb 9.6 oz)   SpO2 97%   BMI 30.81 kg/mý  Body surface area is 2.06 meters squared.    Pain Score    08/17/23 1307   PainSc:   2   PainLoc: Leg       Physical Exam  Constitutional:       Appearance: Normal appearance.   HENT:      Head: Normocephalic and atraumatic.   Cardiovascular:      Rate and Rhythm: Normal rate and regular rhythm.   Pulmonary:      Effort: Pulmonary effort is normal.      Breath sounds: Normal breath sounds.   Abdominal:      General: Bowel sounds are normal.      Palpations: Abdomen is soft.   Skin:     General: Skin is warm and dry.      Findings: Bruising present.   Neurological:      Mental Status: He is alert and oriented to person, place, and time.   Psychiatric:         Attention and Perception: Attention normal.         Mood and Affect: Mood normal.         Judgment: Judgment normal.       Spencer Moore reports a pain score of 2.  Given his pain assessment as noted, treatment options were discussed and the following options were decided upon as a follow-up plan to address the patient's pain: continuation of current treatment plan for pain and pt is taking prednisone daily.  .           ASSESSMENT / PLAN    1. Leukocytosis, unspecified type    2. " Anemia in stage 3a chronic kidney disease         ASSESSMENT:      1.  Leukocytosis   -WBC today 12.8, ANC 10.6  -Pt is taking prednisone 10mg daily which can cause leukocytosis  -Stable for observation     2.  Anemia Stage IIIa Chronic Kidney Disease  -Labs:  BUN 22, Creatinine 1.33, GFR 53, Hgb 11.4, Hct 34.0, Iron 74, Ferritin 65, Sat 19%, TIBC 386  -Pt has seen nephrology in the past   -Followed by PCP      PLAN:  Stable for observation.    Pt will have monthly labs at PCP office  Return to office in 6 months  Patient will have preoffice labs for CBC, CMP, Iron Profile, Ferritin  Continue current medications, treatment plans and follow up with PCP and any other providers.  Care discussed with patient.  Understanding expressed.  Patient agreeable with plan.        ACP discussion was held with the patient during this visit. Patient has an advance directive in EMR which is still valid.         Katie Diaz, DYANA  08/17/2023

## 2023-08-28 PROBLEM — R79.89 LOW VITAMIN B12 LEVEL: Status: ACTIVE | Noted: 2023-08-28

## 2023-08-28 PROBLEM — E53.8 LOW VITAMIN B12 LEVEL: Status: ACTIVE | Noted: 2023-08-28

## 2023-08-28 RX ORDER — CYANOCOBALAMIN 1000 UG/ML
1000 INJECTION, SOLUTION INTRAMUSCULAR; SUBCUTANEOUS ONCE
OUTPATIENT
Start: 2023-09-11

## 2023-09-11 ENCOUNTER — TRANSCRIBE ORDERS (OUTPATIENT)
Dept: LAB | Facility: HOSPITAL | Age: 83
End: 2023-09-11
Payer: MEDICARE

## 2023-09-11 ENCOUNTER — LAB (OUTPATIENT)
Dept: LAB | Facility: HOSPITAL | Age: 83
End: 2023-09-11
Payer: MEDICARE

## 2023-09-11 ENCOUNTER — INFUSION (OUTPATIENT)
Dept: ONCOLOGY | Facility: HOSPITAL | Age: 83
End: 2023-09-11
Payer: MEDICARE

## 2023-09-11 VITALS
BODY MASS INDEX: 29.58 KG/M2 | SYSTOLIC BLOOD PRESSURE: 126 MMHG | DIASTOLIC BLOOD PRESSURE: 56 MMHG | HEART RATE: 58 BPM | WEIGHT: 195.2 LBS | HEIGHT: 68 IN | OXYGEN SATURATION: 97 % | TEMPERATURE: 97.2 F | RESPIRATION RATE: 16 BRPM

## 2023-09-11 DIAGNOSIS — E78.5 DYSLIPIDEMIA: ICD-10-CM

## 2023-09-11 DIAGNOSIS — H91.90 HEARING DIFFICULTY, UNSPECIFIED LATERALITY: ICD-10-CM

## 2023-09-11 DIAGNOSIS — I10 BENIGN HYPERTENSION: ICD-10-CM

## 2023-09-11 DIAGNOSIS — M19.90 ARTHRITIS: Primary | ICD-10-CM

## 2023-09-11 DIAGNOSIS — M19.90 ARTHRITIS: ICD-10-CM

## 2023-09-11 DIAGNOSIS — R73.01 IMPAIRED FASTING GLUCOSE: ICD-10-CM

## 2023-09-11 DIAGNOSIS — E53.8 LOW VITAMIN B12 LEVEL: Primary | ICD-10-CM

## 2023-09-11 LAB
ALBUMIN SERPL-MCNC: 4 G/DL (ref 3.5–5.2)
ALBUMIN/GLOB SERPL: 1.8 G/DL
ALP SERPL-CCNC: 111 U/L (ref 39–117)
ALT SERPL W P-5'-P-CCNC: 18 U/L (ref 1–41)
ANION GAP SERPL CALCULATED.3IONS-SCNC: 13 MMOL/L (ref 5–15)
AST SERPL-CCNC: 20 U/L (ref 1–40)
BILIRUB SERPL-MCNC: 0.4 MG/DL (ref 0–1.2)
BUN SERPL-MCNC: 23 MG/DL (ref 8–23)
BUN/CREAT SERPL: 20.2 (ref 7–25)
CALCIUM SPEC-SCNC: 8.6 MG/DL (ref 8.6–10.5)
CHLORIDE SERPL-SCNC: 104 MMOL/L (ref 98–107)
CHOLEST SERPL-MCNC: 164 MG/DL (ref 0–200)
CO2 SERPL-SCNC: 23 MMOL/L (ref 22–29)
CREAT SERPL-MCNC: 1.14 MG/DL (ref 0.76–1.27)
DEPRECATED RDW RBC AUTO: 46.6 FL (ref 37–54)
EGFRCR SERPLBLD CKD-EPI 2021: 64.2 ML/MIN/1.73
ERYTHROCYTE [DISTWIDTH] IN BLOOD BY AUTOMATED COUNT: 12.1 % (ref 12.3–15.4)
GLOBULIN UR ELPH-MCNC: 2.2 GM/DL
GLUCOSE SERPL-MCNC: 125 MG/DL (ref 65–99)
HBA1C MFR BLD: 5.9 % (ref 4.8–5.6)
HCT VFR BLD AUTO: 34.3 % (ref 37.5–51)
HDLC SERPL-MCNC: 53 MG/DL (ref 40–60)
HGB BLD-MCNC: 11 G/DL (ref 13–17.7)
LDLC SERPL CALC-MCNC: 90 MG/DL (ref 0–100)
LDLC/HDLC SERPL: 1.66 {RATIO}
MCH RBC QN AUTO: 33.5 PG (ref 26.6–33)
MCHC RBC AUTO-ENTMCNC: 32.1 G/DL (ref 31.5–35.7)
MCV RBC AUTO: 104.6 FL (ref 79–97)
PLATELET # BLD AUTO: 243 10*3/MM3 (ref 140–450)
PMV BLD AUTO: 10.7 FL (ref 6–12)
POTASSIUM SERPL-SCNC: 4.4 MMOL/L (ref 3.5–5.2)
PROT SERPL-MCNC: 6.2 G/DL (ref 6–8.5)
RBC # BLD AUTO: 3.28 10*6/MM3 (ref 4.14–5.8)
SODIUM SERPL-SCNC: 140 MMOL/L (ref 136–145)
TRIGL SERPL-MCNC: 115 MG/DL (ref 0–150)
VLDLC SERPL-MCNC: 21 MG/DL (ref 5–40)
WBC NRBC COR # BLD: 9.63 10*3/MM3 (ref 3.4–10.8)

## 2023-09-11 PROCEDURE — 80061 LIPID PANEL: CPT

## 2023-09-11 PROCEDURE — 83036 HEMOGLOBIN GLYCOSYLATED A1C: CPT

## 2023-09-11 PROCEDURE — 36415 COLL VENOUS BLD VENIPUNCTURE: CPT

## 2023-09-11 PROCEDURE — 80053 COMPREHEN METABOLIC PANEL: CPT

## 2023-09-11 PROCEDURE — 96372 THER/PROPH/DIAG INJ SC/IM: CPT

## 2023-09-11 PROCEDURE — 85027 COMPLETE CBC AUTOMATED: CPT

## 2023-09-11 PROCEDURE — 25010000002 CYANOCOBALAMIN PER 1000 MCG: Performed by: NURSE PRACTITIONER

## 2023-09-11 RX ORDER — CYANOCOBALAMIN 1000 UG/ML
1000 INJECTION, SOLUTION INTRAMUSCULAR; SUBCUTANEOUS ONCE
Status: COMPLETED | OUTPATIENT
Start: 2023-09-11 | End: 2023-09-11

## 2023-09-11 RX ORDER — CYANOCOBALAMIN 1000 UG/ML
1000 INJECTION, SOLUTION INTRAMUSCULAR; SUBCUTANEOUS ONCE
OUTPATIENT
Start: 2023-10-09

## 2023-09-11 RX ADMIN — CYANOCOBALAMIN 1000 MCG: 1000 INJECTION, SOLUTION INTRAMUSCULAR at 13:53

## 2023-09-19 DIAGNOSIS — G47.419 PRIMARY NARCOLEPSY WITHOUT CATAPLEXY: ICD-10-CM

## 2023-09-19 NOTE — TELEPHONE ENCOUNTER
Caller: Orestes Larsen  (POA)    Relationship: Emergency Contact    Best call back number: [phone]    Requested Prescriptions:   Requested Prescriptions     Pending Prescriptions Disp Refills    methylphenidate (RITALIN) 20 MG tablet 90 tablet 0     Sig: Take 1 tablet by mouth 3 (Three) Times a Day.        Pharmacy where request should be sent:  LISTED, [facility]    Last office visit with prescribing clinician: Visit date not found   Last telemedicine visit with prescribing clinician: Visit date not found   Next office visit with prescribing clinician: Visit date not found     Additional details provided by patient:     Does the patient have less than a 3 day supply:  [] Yes  [x] No    Would you like a call back once the refill request has been completed: [x] Yes [] No    If the office needs to give you a call back, can they leave a voicemail: [x] Yes [] No    Alphonso Fermin Rep   09/19/23 09:39 CDT

## 2023-09-20 ENCOUNTER — TELEPHONE (OUTPATIENT)
Dept: NEUROLOGY | Facility: CLINIC | Age: 83
End: 2023-09-20
Payer: MEDICARE

## 2023-09-20 NOTE — TELEPHONE ENCOUNTER
----- Message from RAUL Brewer sent at 9/19/2023  3:48 PM CDT -----  Talked to [facility] - OK to continue      ----- Message -----  From: Shani Bravo LPN  Sent: 9/18/2023   3:24 PM CDT  To: RAUL Brewer, #    Received Ritalin refill request.  He hasn't been referred to [facility].

## 2023-09-20 NOTE — TELEPHONE ENCOUNTER
I received a call from Orestes mercado [facility] for the patient requesting a refill on his Ritalin and he would like this sent to [facility] in [facility]. He stated in the past they have had to send it to a different pharmacy due to a medication shortage but that [facility] has it available now.

## 2023-09-21 DIAGNOSIS — G47.419 PRIMARY NARCOLEPSY WITHOUT CATAPLEXY: ICD-10-CM

## 2023-09-21 RX ORDER — METHYLPHENIDATE HYDROCHLORIDE 20 MG/1
20 TABLET ORAL 3 TIMES DAILY
Qty: 90 TABLET | Refills: 0 | Status: SHIPPED | OUTPATIENT
Start: 2023-09-21

## 2023-09-21 RX ORDER — METHYLPHENIDATE HYDROCHLORIDE 20 MG/1
20 TABLET ORAL 3 TIMES DAILY
Qty: 90 TABLET | Refills: 0 | Status: CANCELLED | OUTPATIENT
Start: 2023-09-21

## 2023-09-28 DIAGNOSIS — D63.1 ANEMIA IN STAGE 3A CHRONIC KIDNEY DISEASE: Primary | ICD-10-CM

## 2023-09-28 DIAGNOSIS — N18.31 ANEMIA IN STAGE 3A CHRONIC KIDNEY DISEASE: Primary | ICD-10-CM

## 2023-10-09 ENCOUNTER — LAB (OUTPATIENT)
Dept: INTERNAL MEDICINE | Facility: CLINIC | Age: 83
End: 2023-10-09
Payer: MEDICARE

## 2023-10-20 DIAGNOSIS — G47.419 PRIMARY NARCOLEPSY WITHOUT CATAPLEXY: ICD-10-CM

## 2023-10-20 RX ORDER — METHYLPHENIDATE HYDROCHLORIDE 20 MG/1
20 TABLET ORAL 3 TIMES DAILY
Qty: 90 TABLET | Refills: 0 | Status: SHIPPED | OUTPATIENT
Start: 2023-10-20

## 2023-11-06 ENCOUNTER — LAB (OUTPATIENT)
Dept: INTERNAL MEDICINE | Facility: CLINIC | Age: 83
End: 2023-11-06
Payer: MEDICARE

## 2023-11-06 DIAGNOSIS — D63.1 ANEMIA IN STAGE 3A CHRONIC KIDNEY DISEASE: ICD-10-CM

## 2023-11-06 DIAGNOSIS — N18.31 ANEMIA IN STAGE 3A CHRONIC KIDNEY DISEASE: ICD-10-CM

## 2023-11-07 ENCOUNTER — OFFICE VISIT (OUTPATIENT)
Dept: NEUROLOGY | Facility: CLINIC | Age: 83
End: 2023-11-07
Payer: MEDICARE

## 2023-11-07 VITALS
WEIGHT: 195 LBS | HEIGHT: 68 IN | BODY MASS INDEX: 29.55 KG/M2 | SYSTOLIC BLOOD PRESSURE: 124 MMHG | OXYGEN SATURATION: 97 % | HEART RATE: 62 BPM | DIASTOLIC BLOOD PRESSURE: 70 MMHG

## 2023-11-07 DIAGNOSIS — I67.9 CEREBROVASCULAR SMALL VESSEL DISEASE: ICD-10-CM

## 2023-11-07 DIAGNOSIS — G47.419 PRIMARY NARCOLEPSY WITHOUT CATAPLEXY: Primary | ICD-10-CM

## 2023-11-07 PROCEDURE — 3078F DIAST BP <80 MM HG: CPT | Performed by: PHYSICIAN ASSISTANT

## 2023-11-07 PROCEDURE — 99214 OFFICE O/P EST MOD 30 MIN: CPT | Performed by: PHYSICIAN ASSISTANT

## 2023-11-07 PROCEDURE — 3074F SYST BP LT 130 MM HG: CPT | Performed by: PHYSICIAN ASSISTANT

## 2023-11-07 PROCEDURE — 1160F RVW MEDS BY RX/DR IN RCRD: CPT | Performed by: PHYSICIAN ASSISTANT

## 2023-11-07 PROCEDURE — 1159F MED LIST DOCD IN RCRD: CPT | Performed by: PHYSICIAN ASSISTANT

## 2023-11-07 NOTE — PROGRESS NOTES
Subjective   Spencer Moore is a 83 y.o. male is here today for follow-up.    History of Present Illness     The following portions of the patient's history were reviewed and updated as appropriate: allergies, current medications, past family history, past medical history, past social history, past surgical history and problem list.    Review of Systems   Eyes: Negative.    Respiratory: Negative.     Gastrointestinal: Negative.    Neurological:  Positive for tremors and weakness.       Current Outpatient Medications:     aspirin 81 MG EC tablet, Take 1 tablet by mouth Daily., Disp: , Rfl:     atorvastatin (LIPITOR) 20 MG tablet, Take 1 tablet by mouth Daily., Disp: , Rfl:     carBAMazepine (TEGretol) 200 MG tablet, Take 1 tablet by mouth 2 (Two) Times a Day., Disp: 60 tablet, Rfl: 8    CloNIDine (CATAPRES) 0.1 MG tablet, Take 1 tablet by mouth As Needed for High Blood Pressure (1 tablet if systolic is over 170)., Disp: , Rfl:     cyanocobalamin 1000 MCG/ML injection, Inject 1 mL into the appropriate muscle as directed by prescriber Every 28 (Twenty-Eight) Days., Disp: 10 mL, Rfl: 0    finasteride (PROSCAR) 5 MG tablet, Take 1 tablet by mouth Daily., Disp: 180 tablet, Rfl: 3    FLUoxetine (PROzac) 40 MG capsule, Take 1 capsule by mouth Daily., Disp: , Rfl:     hyoscyamine (LEVSIN) 0.125 MG SL tablet, Take 1 tablet by mouth Daily As Needed., Disp: , Rfl:     irbesartan (AVAPRO) 300 MG tablet, Take 1 tablet by mouth Daily., Disp: , Rfl:     methylphenidate (RITALIN) 20 MG tablet, Take 1 tablet by mouth 3 (Three) Times a Day., Disp: 90 tablet, Rfl: 0    metoprolol tartrate (LOPRESSOR) 25 MG tablet, Take 1 tablet by mouth 2 (Two) Times a Day. (Patient taking differently: Take 1 tablet by mouth Daily.), Disp: 60 tablet, Rfl: 11    Multiple Vitamins-Minerals (MULTIVITAMIN ADULT PO), Take  by mouth., Disp: , Rfl:     mupirocin (BACTROBAN) 2 % ointment, Apply 1 application  topically to the appropriate area as directed As  Needed., Disp: , Rfl:     omeprazole (priLOSEC) 40 MG capsule, Take 1 capsule by mouth Daily., Disp: , Rfl:     oxyCODONE-acetaminophen (PERCOCET)  MG per tablet, Take 1 tablet by mouth Every 6 (Six) Hours., Disp: , Rfl:     predniSONE (DELTASONE) 10 MG tablet, Take 1 tablet by mouth Daily., Disp: 30 tablet, Rfl: 6    tamsulosin (FLOMAX) 0.4 MG capsule 24 hr capsule, Take 2 capsules by mouth Every Night., Disp: 180 capsule, Rfl: 3    terazosin (HYTRIN) 2 MG capsule, Take 1 capsule by mouth Every Night., Disp: , Rfl:     Current Facility-Administered Medications:     cyanocobalamin injection 1,000 mcg, 1,000 mcg, Intramuscular, Q30 Days, Rosi Baker, 1,000 mcg at 12/12/22 1117     Objective   Physical Exam  Vitals and nursing note reviewed.   HENT:      Right Ear: Decreased hearing noted.      Left Ear: Decreased hearing noted.      Ears:      Comments: Decreased hearing bilaterally using a bone conduction hearing assisted device today.  Eyes:      General: Vision grossly intact. Gaze aligned appropriately.   Cardiovascular:      Rate and Rhythm: Normal rate.   Pulmonary:      Effort: Pulmonary effort is normal.   Neurological:      Mental Status: He is alert and oriented to person, place, and time.      GCS: GCS eye subscore is 4. GCS verbal subscore is 5. GCS motor subscore is 6.      Cranial Nerves: Cranial nerve deficit present.      Sensory: Sensation is intact.      Motor: Weakness and tremor present.      Coordination: Coordination is intact.      Gait: Gait abnormal.      Deep Tendon Reflexes: Reflexes are normal and symmetric.      Comments: Cranial nerve deficit present with presbycusis. Motor, generalized weakness and physiologic tremor present bilaterally. Gait is abnormal with significant psychomotor and musculoskeletal impairment, consistent with multiple age exacerbated diagnoses   Psychiatric:         Attention and Perception: Attention normal.         Mood and Affect: Mood normal.          Speech: Speech is delayed.         Behavior: Behavior is slowed.         Cognition and Memory: Cognition normal.         Judgment: Judgment is impulsive.       Assessment & Plan   Diagnoses and all orders for this visit:    1. 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 Cerebrovascular small vessel disease      The patient is stable on his present regimen which she has been on for decades.  No changes were recommended.  Follow-up as scheduled.               Dictated utilizing Dragon dictation.

## 2023-11-20 DIAGNOSIS — G50.0 TRIGEMINAL NEURALGIA OF RIGHT SIDE OF FACE: ICD-10-CM

## 2023-11-22 RX ORDER — CARBAMAZEPINE 200 MG/1
200 TABLET ORAL 2 TIMES DAILY
Qty: 60 TABLET | Refills: 1 | Status: SHIPPED | OUTPATIENT
Start: 2023-11-22

## 2023-11-28 ENCOUNTER — TELEPHONE (OUTPATIENT)
Dept: NEUROLOGY | Facility: CLINIC | Age: 83
End: 2023-11-28
Payer: MEDICARE

## 2023-12-05 ENCOUNTER — LAB (OUTPATIENT)
Dept: INTERNAL MEDICINE | Facility: CLINIC | Age: 83
End: 2023-12-05
Payer: MEDICARE

## 2023-12-05 DIAGNOSIS — G47.419 PRIMARY NARCOLEPSY WITHOUT CATAPLEXY: ICD-10-CM

## 2023-12-05 RX ORDER — METHYLPHENIDATE HYDROCHLORIDE 20 MG/1
20 TABLET ORAL 3 TIMES DAILY
Qty: 90 TABLET | Refills: 0 | Status: SHIPPED | OUTPATIENT
Start: 2023-12-05

## 2023-12-08 NOTE — PROGRESS NOTES
Subjective    Mr. Moore is 83 y.o. male    Chief Complaint: BPH    History of Present Illness    83-year-old male established patient by annual follow-up for enlarged prostate.  His LUTS are currently tolerable on combination therapy.  He needs refills of finasteride and tamsulosin 0.8 mg.  He takes 2 capsules of tamsulosin 1 day and alternates with 1 the next.  He denies gross hematuria or flank pain.  He does have a history of chronic urinary colonization and has seen infectious disease in the past.     The following portions of the patient's history were reviewed and updated as appropriate: allergies, current medications, past family history, past medical history, past social history, past surgical history and problem list.    Review of Systems      Current Outpatient Medications:     aspirin 81 MG EC tablet, Take 1 tablet by mouth Daily., Disp: , Rfl:     atorvastatin (LIPITOR) 20 MG tablet, Take 1 tablet by mouth Daily., Disp: , Rfl:     carBAMazepine (TEGretol) 200 MG tablet, TAKE ONE TABLET BY MOUTH TWICE DAILY, Disp: 60 tablet, Rfl: 1    CloNIDine (CATAPRES) 0.1 MG tablet, Take 1 tablet by mouth As Needed for High Blood Pressure (1 tablet if systolic is over 170)., Disp: , Rfl:     cyanocobalamin 1000 MCG/ML injection, Inject 1 mL into the appropriate muscle as directed by prescriber Every 28 (Twenty-Eight) Days., Disp: 10 mL, Rfl: 0    finasteride (PROSCAR) 5 MG tablet, Take 1 tablet by mouth Daily., Disp: 90 tablet, Rfl: 3    FLUoxetine (PROzac) 40 MG capsule, Take 1 capsule by mouth Daily., Disp: , Rfl:     hyoscyamine (LEVSIN) 0.125 MG SL tablet, Take 1 tablet by mouth Daily As Needed., Disp: , Rfl:     irbesartan (AVAPRO) 300 MG tablet, Take 1 tablet by mouth Daily., Disp: , Rfl:     methylphenidate (RITALIN) 20 MG tablet, Take 1 tablet by mouth 3 (Three) Times a Day., Disp: 90 tablet, Rfl: 0    metoprolol tartrate (LOPRESSOR) 25 MG tablet, Take 1 tablet by mouth 2 (Two) Times a Day. (Patient taking  differently: Take 1 tablet by mouth Daily.), Disp: 60 tablet, Rfl: 11    Multiple Vitamins-Minerals (MULTIVITAMIN ADULT PO), Take  by mouth., Disp: , Rfl:     mupirocin (BACTROBAN) 2 % ointment, Apply 1 application  topically to the appropriate area as directed As Needed., Disp: , Rfl:     omeprazole (priLOSEC) 40 MG capsule, Take 1 capsule by mouth Daily., Disp: , Rfl:     oxyCODONE-acetaminophen (PERCOCET)  MG per tablet, Take 1 tablet by mouth Every 6 (Six) Hours., Disp: , Rfl:     predniSONE (DELTASONE) 10 MG tablet, Take 1 tablet by mouth Daily., Disp: 30 tablet, Rfl: 6    tamsulosin (FLOMAX) 0.4 MG capsule 24 hr capsule, Take 2 capsules by mouth Every Night., Disp: 180 capsule, Rfl: 3    terazosin (HYTRIN) 2 MG capsule, Take 1 capsule by mouth Every Night., Disp: , Rfl:     Current Facility-Administered Medications:     cyanocobalamin injection 1,000 mcg, 1,000 mcg, Intramuscular, Q30 Days, Rosi Baker, 1,000 mcg at 12/12/22 1117    Past Medical History:   Diagnosis Date    Anemia     Arthritis     Bilateral impacted cerumen 12/10/2018    Diverticulitis     Enlarged prostate     ETD (eustachian tube dysfunction)     GERD (gastroesophageal reflux disease)     Hypertension     Kidney infection     Lumbago     Narcolepsy     Sensorineural hearing loss (SNHL) of both ears 6/18/2018    SNHL (sensorineural hearing loss)     Tinnitus     Vitamin B12 deficiency     Weak urinary stream        Past Surgical History:   Procedure Laterality Date    ABDOMINAL SURGERY      CATARACT EXTRACTION Left     HAND SURGERY Left     HERNIA REPAIR      REPLACEMENT TOTAL KNEE Left     ROTATOR CUFF REPAIR      TONSILLECTOMY         Social History     Socioeconomic History    Marital status:    Tobacco Use    Smoking status: Never    Smokeless tobacco: Never   Substance and Sexual Activity    Alcohol use: No    Drug use: No    Sexual activity: Defer       Family History   Adopted: Yes   Family history unknown: Yes  "      Objective    Temp 97.6 °F (36.4 °C)   Ht 172.7 cm (68\")   Wt 88.5 kg (195 lb)   BMI 29.65 kg/m²     Physical Exam        Results for orders placed or performed in visit on 12/14/23   POC Urinalysis Dipstick, Multipro    Specimen: Urine   Result Value Ref Range    Color Yellow Yellow, Straw, Dark Yellow, Traci    Clarity, UA Clear Clear    Glucose, UA Negative Negative mg/dL    Bilirubin Negative Negative    Ketones, UA Negative Negative    Specific Gravity  1.030 1.005 - 1.030    Blood, UA Moderate (A) Negative    pH, Urine 5.0 5.0 - 8.0    Protein, POC 30 mg/dL (A) Negative mg/dL    Urobilinogen, UA Normal Normal, 0.2 E.U./dL    Nitrite, UA Positive (A) Negative    Leukocytes Trace (A) Negative     Assessment and Plan    Diagnoses and all orders for this visit:    1. BPH with obstruction/lower urinary tract symptoms (Primary)  -     POC Urinalysis Dipstick, Multipro  -     Discontinue: tamsulosin (FLOMAX) 0.4 MG capsule 24 hr capsule; Take 1 capsule by mouth Daily.  Dispense: 30 capsule; Refill: 11    2. Enlarged prostate  -     tamsulosin (FLOMAX) 0.4 MG capsule 24 hr capsule; Take 2 capsules by mouth Every Night.  Dispense: 180 capsule; Refill: 3  -     finasteride (PROSCAR) 5 MG tablet; Take 1 tablet by mouth Daily.  Dispense: 90 tablet; Refill: 3    3. Weak urinary stream  -     tamsulosin (FLOMAX) 0.4 MG capsule 24 hr capsule; Take 2 capsules by mouth Every Night.  Dispense: 180 capsule; Refill: 3  -     finasteride (PROSCAR) 5 MG tablet; Take 1 tablet by mouth Daily.  Dispense: 90 tablet; Refill: 3      Overall stable LUTS in setting advanced age and multiple medical problems on combination medical therapy with finasteride and tamsulosin.  Continue finasteride and tamsulosin.    He will follow-up annually or sooner as needed.       This document has been signed by TRISTIN Silva MD on December 14, 2023 15:29 CST              "

## 2023-12-14 ENCOUNTER — OFFICE VISIT (OUTPATIENT)
Dept: UROLOGY | Facility: CLINIC | Age: 83
End: 2023-12-14
Payer: MEDICARE

## 2023-12-14 VITALS — BODY MASS INDEX: 29.55 KG/M2 | TEMPERATURE: 97.6 F | WEIGHT: 195 LBS | HEIGHT: 68 IN

## 2023-12-14 DIAGNOSIS — N40.0 ENLARGED PROSTATE: ICD-10-CM

## 2023-12-14 DIAGNOSIS — N40.1 BPH WITH OBSTRUCTION/LOWER URINARY TRACT SYMPTOMS: Primary | ICD-10-CM

## 2023-12-14 DIAGNOSIS — N13.8 BPH WITH OBSTRUCTION/LOWER URINARY TRACT SYMPTOMS: Primary | ICD-10-CM

## 2023-12-14 DIAGNOSIS — R39.12 WEAK URINARY STREAM: ICD-10-CM

## 2023-12-14 LAB
BILIRUB BLD-MCNC: NEGATIVE MG/DL
CLARITY, POC: CLEAR
COLOR UR: YELLOW
GLUCOSE UR STRIP-MCNC: NEGATIVE MG/DL
KETONES UR QL: NEGATIVE
LEUKOCYTE EST, POC: ABNORMAL
NITRITE UR-MCNC: POSITIVE MG/ML
PH UR: 5 [PH] (ref 5–8)
PROT UR STRIP-MCNC: ABNORMAL MG/DL
RBC # UR STRIP: ABNORMAL /UL
SP GR UR: 1.03 (ref 1–1.03)
UROBILINOGEN UR QL: NORMAL

## 2023-12-14 RX ORDER — FINASTERIDE 5 MG/1
5 TABLET, FILM COATED ORAL DAILY
Qty: 90 TABLET | Refills: 3 | Status: SHIPPED | OUTPATIENT
Start: 2023-12-14

## 2023-12-14 RX ORDER — TAMSULOSIN HYDROCHLORIDE 0.4 MG/1
2 CAPSULE ORAL NIGHTLY
Qty: 180 CAPSULE | Refills: 3 | Status: SHIPPED | OUTPATIENT
Start: 2023-12-14

## 2023-12-14 RX ORDER — TAMSULOSIN HYDROCHLORIDE 0.4 MG/1
1 CAPSULE ORAL DAILY
Qty: 30 CAPSULE | Refills: 11 | Status: SHIPPED | OUTPATIENT
Start: 2023-12-14 | End: 2023-12-14

## 2024-01-02 DIAGNOSIS — G47.419 PRIMARY NARCOLEPSY WITHOUT CATAPLEXY: ICD-10-CM

## 2024-01-02 RX ORDER — METHYLPHENIDATE HYDROCHLORIDE 20 MG/1
20 TABLET ORAL 3 TIMES DAILY
Qty: 90 TABLET | Refills: 0 | Status: SHIPPED | OUTPATIENT
Start: 2024-01-02 | End: 2024-01-03 | Stop reason: SDUPTHER

## 2024-01-03 ENCOUNTER — LAB (OUTPATIENT)
Dept: INTERNAL MEDICINE | Facility: CLINIC | Age: 84
End: 2024-01-03
Payer: MEDICARE

## 2024-01-03 DIAGNOSIS — D63.1 ANEMIA IN STAGE 3A CHRONIC KIDNEY DISEASE: Primary | ICD-10-CM

## 2024-01-03 DIAGNOSIS — N18.31 ANEMIA IN STAGE 3A CHRONIC KIDNEY DISEASE: Primary | ICD-10-CM

## 2024-01-03 DIAGNOSIS — G47.419 PRIMARY NARCOLEPSY WITHOUT CATAPLEXY: ICD-10-CM

## 2024-01-03 RX ORDER — METHYLPHENIDATE HYDROCHLORIDE 20 MG/1
20 TABLET ORAL 3 TIMES DAILY
Qty: 90 TABLET | Refills: 0 | Status: SHIPPED | OUTPATIENT
Start: 2024-01-03

## 2024-01-03 RX ORDER — METHYLPHENIDATE HYDROCHLORIDE 20 MG/1
20 TABLET ORAL 3 TIMES DAILY
Qty: 90 TABLET | Refills: 0 | Status: CANCELLED | OUTPATIENT
Start: 2024-01-03

## 2024-01-03 NOTE — TELEPHONE ENCOUNTER
Received a message from the hub, [facility] in [place] doesn't have Ritalin available.  [facility] has it available, they need a script sent to their pharmacy.

## 2024-01-04 DIAGNOSIS — G50.0 TRIGEMINAL NEURALGIA OF RIGHT SIDE OF FACE: ICD-10-CM

## 2024-01-04 RX ORDER — CARBAMAZEPINE 200 MG/1
200 TABLET ORAL 2 TIMES DAILY
Qty: 60 TABLET | Refills: 1 | Status: SHIPPED | OUTPATIENT
Start: 2024-01-04

## 2024-02-01 ENCOUNTER — LAB (OUTPATIENT)
Dept: INTERNAL MEDICINE | Facility: CLINIC | Age: 84
End: 2024-02-01
Payer: MEDICARE

## 2024-02-01 DIAGNOSIS — N18.31 ANEMIA IN STAGE 3A CHRONIC KIDNEY DISEASE: ICD-10-CM

## 2024-02-01 DIAGNOSIS — G47.419 PRIMARY NARCOLEPSY WITHOUT CATAPLEXY: ICD-10-CM

## 2024-02-01 DIAGNOSIS — D63.1 ANEMIA IN STAGE 3A CHRONIC KIDNEY DISEASE: ICD-10-CM

## 2024-02-01 NOTE — TELEPHONE ENCOUNTER
Caller: Spencer Moore    Relationship: Self    Best call back number: [phone]    Requested Prescriptions:   Requested Prescriptions     Pending Prescriptions Disp Refills    methylphenidate (RITALIN) 20 MG tablet 90 tablet 0     Sig: Take 1 tablet by mouth 3 (Three) Times a Day.        Pharmacy where request should be sent:  [facility] [address] [phone] [facility] [phone]      Last office visit with prescribing clinician: Visit date not found   Last telemedicine visit with prescribing clinician: Visit date not found   Next office visit with prescribing clinician: Visit date not found     Additional details provided by patient: PATIENT INSURANCE REQUIRES A PRIOR AUTHORIZATION FOR RITALIN. PHARMACY IS OUT OF THE THE GENERIC    Does the patient have less than a 3 day supply:  [x] Yes  [] No    Would you like a call back once the refill request has been completed: [] Yes [x] No    If the office needs to give you a call back, can they leave a voicemail: [] Yes [x] No    Alphonso New Rep   02/01/24 16:33 CST

## 2024-02-02 LAB
ALBUMIN SERPL-MCNC: 3.7 G/DL (ref 3.7–4.7)
ALBUMIN/GLOB SERPL: 1.6 {RATIO} (ref 1.2–2.2)
ALP SERPL-CCNC: 119 IU/L (ref 44–121)
ALT SERPL-CCNC: 21 IU/L (ref 0–44)
AST SERPL-CCNC: 22 IU/L (ref 0–40)
BASOPHILS # BLD AUTO: 0.1 X10E3/UL (ref 0–0.2)
BASOPHILS NFR BLD AUTO: 1 %
BILIRUB SERPL-MCNC: 0.3 MG/DL (ref 0–1.2)
BUN SERPL-MCNC: 26 MG/DL (ref 8–27)
BUN/CREAT SERPL: 19 (ref 10–24)
CALCIUM SERPL-MCNC: 9 MG/DL (ref 8.6–10.2)
CHLORIDE SERPL-SCNC: 104 MMOL/L (ref 96–106)
CO2 SERPL-SCNC: 22 MMOL/L (ref 20–29)
CREAT SERPL-MCNC: 1.39 MG/DL (ref 0.76–1.27)
EGFRCR SERPLBLD CKD-EPI 2021: 51 ML/MIN/1.73
EOSINOPHIL # BLD AUTO: 0.1 X10E3/UL (ref 0–0.4)
EOSINOPHIL NFR BLD AUTO: 1 %
ERYTHROCYTE [DISTWIDTH] IN BLOOD BY AUTOMATED COUNT: 12.1 % (ref 11.6–15.4)
FERRITIN SERPL-MCNC: 39 NG/ML (ref 30–400)
GLOBULIN SER CALC-MCNC: 2.3 G/DL (ref 1.5–4.5)
GLUCOSE SERPL-MCNC: 131 MG/DL (ref 70–99)
HCT VFR BLD AUTO: 33.7 % (ref 37.5–51)
HGB BLD-MCNC: 11.1 G/DL (ref 13–17.7)
IMM GRANULOCYTES # BLD AUTO: 0 X10E3/UL (ref 0–0.1)
IMM GRANULOCYTES NFR BLD AUTO: 0 %
IRON SATN MFR SERPL: 23 % (ref 15–55)
IRON SERPL-MCNC: 78 UG/DL (ref 38–169)
LYMPHOCYTES # BLD AUTO: 1 X10E3/UL (ref 0.7–3.1)
LYMPHOCYTES NFR BLD AUTO: 10 %
Lab: NORMAL
MCH RBC QN AUTO: 33.2 PG (ref 26.6–33)
MCHC RBC AUTO-ENTMCNC: 32.9 G/DL (ref 31.5–35.7)
MCV RBC AUTO: 101 FL (ref 79–97)
MONOCYTES # BLD AUTO: 0.7 X10E3/UL (ref 0.1–0.9)
MONOCYTES NFR BLD AUTO: 7 %
NEUTROPHILS # BLD AUTO: 8.7 X10E3/UL (ref 1.4–7)
NEUTROPHILS NFR BLD AUTO: 81 %
PLATELET # BLD AUTO: 289 X10E3/UL (ref 150–450)
POTASSIUM SERPL-SCNC: 5.1 MMOL/L (ref 3.5–5.2)
PROT SERPL-MCNC: 6 G/DL (ref 6–8.5)
RBC # BLD AUTO: 3.34 X10E6/UL (ref 4.14–5.8)
SODIUM SERPL-SCNC: 143 MMOL/L (ref 134–144)
TIBC SERPL-MCNC: 332 UG/DL (ref 250–450)
UIBC SERPL-MCNC: 254 UG/DL (ref 111–343)
WBC # BLD AUTO: 10.7 X10E3/UL (ref 3.4–10.8)

## 2024-02-02 RX ORDER — METHYLPHENIDATE HYDROCHLORIDE 20 MG/1
20 TABLET ORAL 3 TIMES DAILY
Qty: 90 TABLET | Refills: 0 | Status: CANCELLED | OUTPATIENT
Start: 2024-02-02

## 2024-02-02 NOTE — TELEPHONE ENCOUNTER
PT'S POA CALLED BACK AND WANTED TO LET PROVIDER KNOW THERE WAS MISCOMMUNICATION AT PHARMACY AND PT IS ABOUT TO GET HIS GENERIC RITALIN FOR THIS UPCOMING MONTH.

## 2024-02-07 ENCOUNTER — OFFICE VISIT (OUTPATIENT)
Dept: ONCOLOGY | Facility: CLINIC | Age: 84
End: 2024-02-07
Payer: MEDICARE

## 2024-02-07 VITALS
OXYGEN SATURATION: 95 % | SYSTOLIC BLOOD PRESSURE: 148 MMHG | WEIGHT: 191.7 LBS | TEMPERATURE: 97 F | HEIGHT: 68 IN | RESPIRATION RATE: 16 BRPM | DIASTOLIC BLOOD PRESSURE: 78 MMHG | BODY MASS INDEX: 29.05 KG/M2 | HEART RATE: 67 BPM

## 2024-02-07 DIAGNOSIS — D72.829 LEUKOCYTOSIS, UNSPECIFIED TYPE: ICD-10-CM

## 2024-02-07 DIAGNOSIS — N18.31 ANEMIA IN STAGE 3A CHRONIC KIDNEY DISEASE: Primary | ICD-10-CM

## 2024-02-07 DIAGNOSIS — D63.1 ANEMIA IN STAGE 3A CHRONIC KIDNEY DISEASE: Primary | ICD-10-CM

## 2024-03-01 DIAGNOSIS — G47.419 PRIMARY NARCOLEPSY WITHOUT CATAPLEXY: ICD-10-CM

## 2024-03-05 RX ORDER — METHYLPHENIDATE HYDROCHLORIDE 20 MG/1
20 TABLET ORAL 3 TIMES DAILY
Qty: 90 TABLET | Refills: 0 | Status: SHIPPED | OUTPATIENT
Start: 2024-03-05

## 2024-04-02 DIAGNOSIS — G47.419 PRIMARY NARCOLEPSY WITHOUT CATAPLEXY: ICD-10-CM

## 2024-04-03 RX ORDER — METHYLPHENIDATE HYDROCHLORIDE 20 MG/1
20 TABLET ORAL 3 TIMES DAILY
Qty: 90 TABLET | Refills: 0 | Status: SHIPPED | OUTPATIENT
Start: 2024-04-03

## 2024-04-13 DIAGNOSIS — G50.0 TRIGEMINAL NEURALGIA OF RIGHT SIDE OF FACE: ICD-10-CM

## 2024-04-15 RX ORDER — PREDNISONE 10 MG/1
10 TABLET ORAL DAILY
Qty: 30 TABLET | Refills: 12 | OUTPATIENT
Start: 2024-04-15

## 2024-04-16 RX ORDER — CARBAMAZEPINE 200 MG/1
200 TABLET ORAL 2 TIMES DAILY
Qty: 60 TABLET | Refills: 1 | OUTPATIENT
Start: 2024-04-16

## 2024-04-18 DIAGNOSIS — G50.0 TRIGEMINAL NEURALGIA OF RIGHT SIDE OF FACE: ICD-10-CM

## 2024-04-18 RX ORDER — PREDNISONE 10 MG/1
10 TABLET ORAL DAILY
Qty: 30 TABLET | Refills: 12 | OUTPATIENT
Start: 2024-04-18

## 2024-04-18 RX ORDER — CARBAMAZEPINE 200 MG/1
200 TABLET ORAL 2 TIMES DAILY
Qty: 60 TABLET | Refills: 3 | Status: SHIPPED | OUTPATIENT
Start: 2024-04-18

## 2024-05-02 ENCOUNTER — HOSPITAL ENCOUNTER (OUTPATIENT)
Facility: HOSPITAL | Age: 84
LOS: 1 days | Discharge: OTHER INSTITUTION | End: 2024-05-03
Attending: EMERGENCY MEDICINE | Admitting: STUDENT IN AN ORGANIZED HEALTH CARE EDUCATION/TRAINING PROGRAM
Payer: MEDICARE

## 2024-05-02 DIAGNOSIS — D64.9 ANEMIA, UNSPECIFIED TYPE: Primary | ICD-10-CM

## 2024-05-02 DIAGNOSIS — K92.1 HEMATOCHEZIA: ICD-10-CM

## 2024-05-02 DIAGNOSIS — K92.2 ACUTE LOWER GASTROINTESTINAL BLEEDING: ICD-10-CM

## 2024-05-02 PROCEDURE — 82270 OCCULT BLOOD FECES: CPT

## 2024-05-02 PROCEDURE — 86901 BLOOD TYPING SEROLOGIC RH(D): CPT

## 2024-05-02 PROCEDURE — 86850 RBC ANTIBODY SCREEN: CPT

## 2024-05-02 PROCEDURE — 85610 PROTHROMBIN TIME: CPT | Performed by: EMERGENCY MEDICINE

## 2024-05-02 PROCEDURE — 99285 EMERGENCY DEPT VISIT HI MDM: CPT

## 2024-05-02 PROCEDURE — 86900 BLOOD TYPING SEROLOGIC ABO: CPT

## 2024-05-02 PROCEDURE — 86920 COMPATIBILITY TEST SPIN: CPT

## 2024-05-02 PROCEDURE — 85730 THROMBOPLASTIN TIME PARTIAL: CPT | Performed by: EMERGENCY MEDICINE

## 2024-05-02 PROCEDURE — 94799 UNLISTED PULMONARY SVC/PX: CPT

## 2024-05-02 PROCEDURE — 83605 ASSAY OF LACTIC ACID: CPT

## 2024-05-02 PROCEDURE — 94761 N-INVAS EAR/PLS OXIMETRY MLT: CPT

## 2024-05-02 PROCEDURE — 85025 COMPLETE CBC W/AUTO DIFF WBC: CPT

## 2024-05-02 PROCEDURE — 80053 COMPREHEN METABOLIC PANEL: CPT

## 2024-05-02 RX ORDER — SODIUM CHLORIDE 0.9 % (FLUSH) 0.9 %
10 SYRINGE (ML) INJECTION AS NEEDED
Status: DISCONTINUED | OUTPATIENT
Start: 2024-05-02 | End: 2024-05-03 | Stop reason: HOSPADM

## 2024-05-02 NOTE — Clinical Note
Level of Care: Med/Surg [1]   Diagnosis: Acute lower gastrointestinal bleeding [911815]   Admitting Physician: DELONTE PRETTY [[id number]]   Attending Physician: NATALIIA GU [[id number]]

## 2024-05-03 ENCOUNTER — APPOINTMENT (OUTPATIENT)
Dept: CT IMAGING | Facility: HOSPITAL | Age: 84
End: 2024-05-03
Payer: MEDICARE

## 2024-05-03 ENCOUNTER — APPOINTMENT (OUTPATIENT)
Dept: NUCLEAR MEDICINE | Facility: HOSPITAL | Age: 84
End: 2024-05-03
Payer: MEDICARE

## 2024-05-03 VITALS
TEMPERATURE: 98.4 F | SYSTOLIC BLOOD PRESSURE: 161 MMHG | HEIGHT: 68 IN | RESPIRATION RATE: 18 BRPM | WEIGHT: 200 LBS | HEART RATE: 78 BPM | BODY MASS INDEX: 30.31 KG/M2 | OXYGEN SATURATION: 97 % | DIASTOLIC BLOOD PRESSURE: 88 MMHG

## 2024-05-03 PROBLEM — K92.2 ACUTE LOWER GASTROINTESTINAL BLEEDING: Status: ACTIVE | Noted: 2024-05-03

## 2024-05-03 PROBLEM — K92.1 HEMATOCHEZIA: Status: RESOLVED | Noted: 2024-05-03 | Resolved: 2024-05-03

## 2024-05-03 PROBLEM — K62.5 BRBPR (BRIGHT RED BLOOD PER RECTUM): Status: ACTIVE | Noted: 2024-05-03

## 2024-05-03 PROBLEM — K62.5 BRBPR (BRIGHT RED BLOOD PER RECTUM): Status: RESOLVED | Noted: 2024-05-03 | Resolved: 2024-05-03

## 2024-05-03 PROBLEM — K92.1 HEMATOCHEZIA: Status: ACTIVE | Noted: 2024-05-03

## 2024-05-03 LAB
ABO GROUP BLD: NORMAL
ALBUMIN SERPL-MCNC: 3.2 G/DL (ref 3.5–5.2)
ALBUMIN SERPL-MCNC: 3.7 G/DL (ref 3.5–5.2)
ALBUMIN/GLOB SERPL: 1.7 G/DL
ALBUMIN/GLOB SERPL: 1.7 G/DL
ALP SERPL-CCNC: 110 U/L (ref 39–117)
ALP SERPL-CCNC: 89 U/L (ref 39–117)
ALT SERPL W P-5'-P-CCNC: 19 U/L (ref 1–41)
ALT SERPL W P-5'-P-CCNC: 23 U/L (ref 1–41)
ANION GAP SERPL CALCULATED.3IONS-SCNC: 10 MMOL/L (ref 5–15)
ANION GAP SERPL CALCULATED.3IONS-SCNC: 5 MMOL/L (ref 5–15)
APTT PPP: 25.8 SECONDS (ref 24.5–36)
AST SERPL-CCNC: 18 U/L (ref 1–40)
AST SERPL-CCNC: 21 U/L (ref 1–40)
BASOPHILS # BLD AUTO: 0.05 10*3/MM3 (ref 0–0.2)
BASOPHILS # BLD AUTO: 0.09 10*3/MM3 (ref 0–0.2)
BASOPHILS NFR BLD AUTO: 0.4 % (ref 0–1.5)
BASOPHILS NFR BLD AUTO: 0.7 % (ref 0–1.5)
BILIRUB CONJ SERPL-MCNC: <0.2 MG/DL (ref 0–0.3)
BILIRUB SERPL-MCNC: 0.2 MG/DL (ref 0–1.2)
BILIRUB SERPL-MCNC: 0.2 MG/DL (ref 0–1.2)
BLD GP AB SCN SERPL QL: NEGATIVE
BUN SERPL-MCNC: 27 MG/DL (ref 8–23)
BUN SERPL-MCNC: 28 MG/DL (ref 8–23)
BUN/CREAT SERPL: 22.3 (ref 7–25)
BUN/CREAT SERPL: 23.5 (ref 7–25)
CALCIUM SPEC-SCNC: 8.2 MG/DL (ref 8.6–10.5)
CALCIUM SPEC-SCNC: 8.5 MG/DL (ref 8.6–10.5)
CHLORIDE SERPL-SCNC: 107 MMOL/L (ref 98–107)
CHLORIDE SERPL-SCNC: 107 MMOL/L (ref 98–107)
CO2 SERPL-SCNC: 26 MMOL/L (ref 22–29)
CO2 SERPL-SCNC: 28 MMOL/L (ref 22–29)
CREAT SERPL-MCNC: 1.19 MG/DL (ref 0.76–1.27)
CREAT SERPL-MCNC: 1.21 MG/DL (ref 0.76–1.27)
D-LACTATE SERPL-SCNC: 1.8 MMOL/L (ref 0.5–2)
D-LACTATE SERPL-SCNC: 2.7 MMOL/L (ref 0.5–2)
D-LACTATE SERPL-SCNC: 2.9 MMOL/L (ref 0.5–2)
D-LACTATE SERPL-SCNC: 3.9 MMOL/L (ref 0.5–2)
D-LACTATE SERPL-SCNC: 4.8 MMOL/L (ref 0.5–2)
DEPRECATED RDW RBC AUTO: 50.1 FL (ref 37–54)
DEPRECATED RDW RBC AUTO: 51.4 FL (ref 37–54)
DEVELOPER EXPIRATION DATE: ABNORMAL
DEVELOPER LOT NUMBER: 257
EGFRCR SERPLBLD CKD-EPI 2021: 59.8 ML/MIN/1.73
EGFRCR SERPLBLD CKD-EPI 2021: 61 ML/MIN/1.73
EOSINOPHIL # BLD AUTO: 0.19 10*3/MM3 (ref 0–0.4)
EOSINOPHIL # BLD AUTO: 0.33 10*3/MM3 (ref 0–0.4)
EOSINOPHIL NFR BLD AUTO: 1.6 % (ref 0.3–6.2)
EOSINOPHIL NFR BLD AUTO: 2.4 % (ref 0.3–6.2)
ERYTHROCYTE [DISTWIDTH] IN BLOOD BY AUTOMATED COUNT: 13.2 % (ref 12.3–15.4)
ERYTHROCYTE [DISTWIDTH] IN BLOOD BY AUTOMATED COUNT: 13.3 % (ref 12.3–15.4)
EXPIRATION DATE: ABNORMAL
FECAL OCCULT BLOOD SCREEN, POC: POSITIVE
GLOBULIN UR ELPH-MCNC: 1.9 GM/DL
GLOBULIN UR ELPH-MCNC: 2.2 GM/DL
GLUCOSE SERPL-MCNC: 110 MG/DL (ref 65–99)
GLUCOSE SERPL-MCNC: 114 MG/DL (ref 65–99)
HCT VFR BLD AUTO: 25.7 % (ref 37.5–51)
HCT VFR BLD AUTO: 28.6 % (ref 37.5–51)
HCT VFR BLD AUTO: 29.1 % (ref 37.5–51)
HCT VFR BLD AUTO: 31 % (ref 37.5–51)
HCT VFR BLD AUTO: 32.5 % (ref 37.5–51)
HGB BLD-MCNC: 10.6 G/DL (ref 13–17.7)
HGB BLD-MCNC: 8 G/DL (ref 13–17.7)
HGB BLD-MCNC: 9.2 G/DL (ref 13–17.7)
HGB BLD-MCNC: 9.3 G/DL (ref 13–17.7)
HGB BLD-MCNC: 9.9 G/DL (ref 13–17.7)
HOLD SPECIMEN: NORMAL
HOLD SPECIMEN: NORMAL
IMM GRANULOCYTES # BLD AUTO: 0.04 10*3/MM3 (ref 0–0.05)
IMM GRANULOCYTES # BLD AUTO: 0.05 10*3/MM3 (ref 0–0.05)
IMM GRANULOCYTES NFR BLD AUTO: 0.3 % (ref 0–0.5)
IMM GRANULOCYTES NFR BLD AUTO: 0.4 % (ref 0–0.5)
INR PPP: 1.03 (ref 0.91–1.09)
LYMPHOCYTES # BLD AUTO: 1.87 10*3/MM3 (ref 0.7–3.1)
LYMPHOCYTES # BLD AUTO: 2.74 10*3/MM3 (ref 0.7–3.1)
LYMPHOCYTES NFR BLD AUTO: 15.6 % (ref 19.6–45.3)
LYMPHOCYTES NFR BLD AUTO: 20.3 % (ref 19.6–45.3)
Lab: 257
MAGNESIUM SERPL-MCNC: 1.8 MG/DL (ref 1.6–2.4)
MCH RBC QN AUTO: 33.2 PG (ref 26.6–33)
MCH RBC QN AUTO: 33.3 PG (ref 26.6–33)
MCHC RBC AUTO-ENTMCNC: 31.9 G/DL (ref 31.5–35.7)
MCHC RBC AUTO-ENTMCNC: 32 G/DL (ref 31.5–35.7)
MCV RBC AUTO: 104 FL (ref 79–97)
MCV RBC AUTO: 104.3 FL (ref 79–97)
MONOCYTES # BLD AUTO: 1.47 10*3/MM3 (ref 0.1–0.9)
MONOCYTES # BLD AUTO: 1.56 10*3/MM3 (ref 0.1–0.9)
MONOCYTES NFR BLD AUTO: 11.5 % (ref 5–12)
MONOCYTES NFR BLD AUTO: 12.2 % (ref 5–12)
NEGATIVE CONTROL: NEGATIVE
NEUTROPHILS NFR BLD AUTO: 64.7 % (ref 42.7–76)
NEUTROPHILS NFR BLD AUTO: 69.9 % (ref 42.7–76)
NEUTROPHILS NFR BLD AUTO: 8.4 10*3/MM3 (ref 1.7–7)
NEUTROPHILS NFR BLD AUTO: 8.76 10*3/MM3 (ref 1.7–7)
NRBC BLD AUTO-RTO: 0 /100 WBC (ref 0–0.2)
NRBC BLD AUTO-RTO: 0 /100 WBC (ref 0–0.2)
PHOSPHATE SERPL-MCNC: 3.5 MG/DL (ref 2.5–4.5)
PLATELET # BLD AUTO: 221 10*3/MM3 (ref 140–450)
PLATELET # BLD AUTO: 248 10*3/MM3 (ref 140–450)
PMV BLD AUTO: 11 FL (ref 6–12)
PMV BLD AUTO: 11.2 FL (ref 6–12)
POSITIVE CONTROL: POSITIVE
POTASSIUM SERPL-SCNC: 4.5 MMOL/L (ref 3.5–5.2)
POTASSIUM SERPL-SCNC: 4.8 MMOL/L (ref 3.5–5.2)
PROT SERPL-MCNC: 5.1 G/DL (ref 6–8.5)
PROT SERPL-MCNC: 5.9 G/DL (ref 6–8.5)
PROTHROMBIN TIME: 14 SECONDS (ref 11.8–14.8)
RBC # BLD AUTO: 2.79 10*6/MM3 (ref 4.14–5.8)
RBC # BLD AUTO: 2.98 10*6/MM3 (ref 4.14–5.8)
RH BLD: POSITIVE
SODIUM SERPL-SCNC: 140 MMOL/L (ref 136–145)
SODIUM SERPL-SCNC: 143 MMOL/L (ref 136–145)
T&S EXPIRATION DATE: NORMAL
WBC NRBC COR # BLD AUTO: 12.02 10*3/MM3 (ref 3.4–10.8)
WBC NRBC COR # BLD AUTO: 13.53 10*3/MM3 (ref 3.4–10.8)
WHOLE BLOOD HOLD COAG: NORMAL
WHOLE BLOOD HOLD SPECIMEN: NORMAL

## 2024-05-03 PROCEDURE — 25510000001 IOPAMIDOL 61 % SOLUTION: Performed by: EMERGENCY MEDICINE

## 2024-05-03 PROCEDURE — G0378 HOSPITAL OBSERVATION PER HR: HCPCS

## 2024-05-03 PROCEDURE — 82248 BILIRUBIN DIRECT: CPT | Performed by: STUDENT IN AN ORGANIZED HEALTH CARE EDUCATION/TRAINING PROGRAM

## 2024-05-03 PROCEDURE — 85025 COMPLETE CBC W/AUTO DIFF WBC: CPT | Performed by: STUDENT IN AN ORGANIZED HEALTH CARE EDUCATION/TRAINING PROGRAM

## 2024-05-03 PROCEDURE — 25810000003 SODIUM CHLORIDE 0.9 % SOLUTION: Performed by: EMERGENCY MEDICINE

## 2024-05-03 PROCEDURE — 74174 CTA ABD&PLVS W/CONTRAST: CPT

## 2024-05-03 PROCEDURE — 36430 TRANSFUSION BLD/BLD COMPNT: CPT

## 2024-05-03 PROCEDURE — 96374 THER/PROPH/DIAG INJ IV PUSH: CPT

## 2024-05-03 PROCEDURE — 36415 COLL VENOUS BLD VENIPUNCTURE: CPT

## 2024-05-03 PROCEDURE — 84100 ASSAY OF PHOSPHORUS: CPT | Performed by: STUDENT IN AN ORGANIZED HEALTH CARE EDUCATION/TRAINING PROGRAM

## 2024-05-03 PROCEDURE — 86900 BLOOD TYPING SEROLOGIC ABO: CPT

## 2024-05-03 PROCEDURE — 25810000003 LACTATED RINGERS SOLUTION: Performed by: STUDENT IN AN ORGANIZED HEALTH CARE EDUCATION/TRAINING PROGRAM

## 2024-05-03 PROCEDURE — 80053 COMPREHEN METABOLIC PANEL: CPT | Performed by: STUDENT IN AN ORGANIZED HEALTH CARE EDUCATION/TRAINING PROGRAM

## 2024-05-03 PROCEDURE — 85018 HEMOGLOBIN: CPT | Performed by: STUDENT IN AN ORGANIZED HEALTH CARE EDUCATION/TRAINING PROGRAM

## 2024-05-03 PROCEDURE — 96361 HYDRATE IV INFUSION ADD-ON: CPT

## 2024-05-03 PROCEDURE — 85014 HEMATOCRIT: CPT | Performed by: STUDENT IN AN ORGANIZED HEALTH CARE EDUCATION/TRAINING PROGRAM

## 2024-05-03 PROCEDURE — 96376 TX/PRO/DX INJ SAME DRUG ADON: CPT

## 2024-05-03 PROCEDURE — 25810000003 LACTATED RINGERS PER 1000 ML: Performed by: STUDENT IN AN ORGANIZED HEALTH CARE EDUCATION/TRAINING PROGRAM

## 2024-05-03 PROCEDURE — P9040 RBC LEUKOREDUCED IRRADIATED: HCPCS

## 2024-05-03 PROCEDURE — P9016 RBC LEUKOCYTES REDUCED: HCPCS

## 2024-05-03 PROCEDURE — 83605 ASSAY OF LACTIC ACID: CPT

## 2024-05-03 PROCEDURE — 99214 OFFICE O/P EST MOD 30 MIN: CPT | Performed by: INTERNAL MEDICINE

## 2024-05-03 PROCEDURE — 25510000001 IOPAMIDOL PER 1 ML: Performed by: FAMILY MEDICINE

## 2024-05-03 PROCEDURE — 83735 ASSAY OF MAGNESIUM: CPT | Performed by: STUDENT IN AN ORGANIZED HEALTH CARE EDUCATION/TRAINING PROGRAM

## 2024-05-03 PROCEDURE — 74177 CT ABD & PELVIS W/CONTRAST: CPT

## 2024-05-03 RX ORDER — ACETAMINOPHEN 325 MG/1
650 TABLET ORAL EVERY 4 HOURS PRN
Status: DISCONTINUED | OUTPATIENT
Start: 2024-05-03 | End: 2024-05-03 | Stop reason: HOSPADM

## 2024-05-03 RX ORDER — SODIUM CHLORIDE, SODIUM LACTATE, POTASSIUM CHLORIDE, CALCIUM CHLORIDE 600; 310; 30; 20 MG/100ML; MG/100ML; MG/100ML; MG/100ML
100 INJECTION, SOLUTION INTRAVENOUS CONTINUOUS
Status: DISCONTINUED | OUTPATIENT
Start: 2024-05-03 | End: 2024-05-03 | Stop reason: HOSPADM

## 2024-05-03 RX ORDER — NALOXONE HCL 0.4 MG/ML
0.4 VIAL (ML) INJECTION
Status: DISCONTINUED | OUTPATIENT
Start: 2024-05-03 | End: 2024-05-03 | Stop reason: HOSPADM

## 2024-05-03 RX ORDER — SODIUM CHLORIDE, SODIUM LACTATE, POTASSIUM CHLORIDE, CALCIUM CHLORIDE 600; 310; 30; 20 MG/100ML; MG/100ML; MG/100ML; MG/100ML
100 INJECTION, SOLUTION INTRAVENOUS CONTINUOUS
Start: 2024-05-03

## 2024-05-03 RX ORDER — PANTOPRAZOLE SODIUM 40 MG/1
40 TABLET, DELAYED RELEASE ORAL
Status: CANCELLED | OUTPATIENT
Start: 2024-05-03

## 2024-05-03 RX ORDER — FINASTERIDE 5 MG/1
5 TABLET, FILM COATED ORAL DAILY
Status: DISCONTINUED | OUTPATIENT
Start: 2024-05-03 | End: 2024-05-03 | Stop reason: HOSPADM

## 2024-05-03 RX ORDER — FLUOXETINE HYDROCHLORIDE 20 MG/1
40 CAPSULE ORAL DAILY
Status: DISCONTINUED | OUTPATIENT
Start: 2024-05-03 | End: 2024-05-03 | Stop reason: HOSPADM

## 2024-05-03 RX ORDER — CARBAMAZEPINE 200 MG/1
200 TABLET ORAL 2 TIMES DAILY
COMMUNITY

## 2024-05-03 RX ORDER — LOSARTAN POTASSIUM 50 MG/1
100 TABLET ORAL
Status: DISCONTINUED | OUTPATIENT
Start: 2024-05-03 | End: 2024-05-03 | Stop reason: HOSPADM

## 2024-05-03 RX ORDER — ACETAMINOPHEN 650 MG/1
650 SUPPOSITORY RECTAL EVERY 4 HOURS PRN
Status: DISCONTINUED | OUTPATIENT
Start: 2024-05-03 | End: 2024-05-03 | Stop reason: HOSPADM

## 2024-05-03 RX ORDER — PANTOPRAZOLE SODIUM 40 MG/10ML
40 INJECTION, POWDER, LYOPHILIZED, FOR SOLUTION INTRAVENOUS EVERY 12 HOURS SCHEDULED
Start: 2024-05-03

## 2024-05-03 RX ORDER — CLONIDINE HYDROCHLORIDE 0.1 MG/1
0.1 TABLET ORAL EVERY 12 HOURS PRN
Status: DISCONTINUED | OUTPATIENT
Start: 2024-05-03 | End: 2024-05-03 | Stop reason: HOSPADM

## 2024-05-03 RX ORDER — ONDANSETRON 2 MG/ML
4 INJECTION INTRAMUSCULAR; INTRAVENOUS EVERY 6 HOURS PRN
Start: 2024-05-03

## 2024-05-03 RX ORDER — SODIUM CHLORIDE 9 MG/ML
40 INJECTION, SOLUTION INTRAVENOUS AS NEEDED
Status: DISCONTINUED | OUTPATIENT
Start: 2024-05-03 | End: 2024-05-03 | Stop reason: HOSPADM

## 2024-05-03 RX ORDER — ONDANSETRON 4 MG/1
4 TABLET, ORALLY DISINTEGRATING ORAL EVERY 6 HOURS PRN
Status: DISCONTINUED | OUTPATIENT
Start: 2024-05-03 | End: 2024-05-03 | Stop reason: HOSPADM

## 2024-05-03 RX ORDER — MORPHINE SULFATE 2 MG/ML
2 INJECTION, SOLUTION INTRAMUSCULAR; INTRAVENOUS EVERY 4 HOURS PRN
Status: DISCONTINUED | OUTPATIENT
Start: 2024-05-03 | End: 2024-05-03 | Stop reason: HOSPADM

## 2024-05-03 RX ORDER — SODIUM CHLORIDE 0.9 % (FLUSH) 0.9 %
10 SYRINGE (ML) INJECTION AS NEEDED
Status: DISCONTINUED | OUTPATIENT
Start: 2024-05-03 | End: 2024-05-03 | Stop reason: HOSPADM

## 2024-05-03 RX ORDER — MORPHINE SULFATE 2 MG/ML
1 INJECTION, SOLUTION INTRAMUSCULAR; INTRAVENOUS EVERY 4 HOURS PRN
Status: DISCONTINUED | OUTPATIENT
Start: 2024-05-03 | End: 2024-05-03 | Stop reason: HOSPADM

## 2024-05-03 RX ORDER — PREDNISONE 10 MG/1
10 TABLET ORAL DAILY
Status: DISCONTINUED | OUTPATIENT
Start: 2024-05-03 | End: 2024-05-03 | Stop reason: HOSPADM

## 2024-05-03 RX ORDER — ONDANSETRON 2 MG/ML
4 INJECTION INTRAMUSCULAR; INTRAVENOUS EVERY 6 HOURS PRN
Status: DISCONTINUED | OUTPATIENT
Start: 2024-05-03 | End: 2024-05-03 | Stop reason: HOSPADM

## 2024-05-03 RX ORDER — TAMSULOSIN HYDROCHLORIDE 0.4 MG/1
0.8 CAPSULE ORAL NIGHTLY
Status: DISCONTINUED | OUTPATIENT
Start: 2024-05-03 | End: 2024-05-03 | Stop reason: HOSPADM

## 2024-05-03 RX ORDER — PANTOPRAZOLE SODIUM 40 MG/10ML
80 INJECTION, POWDER, LYOPHILIZED, FOR SOLUTION INTRAVENOUS ONCE
Status: COMPLETED | OUTPATIENT
Start: 2024-05-03 | End: 2024-05-03

## 2024-05-03 RX ORDER — SODIUM CHLORIDE 0.9 % (FLUSH) 0.9 %
10 SYRINGE (ML) INJECTION EVERY 12 HOURS SCHEDULED
Status: DISCONTINUED | OUTPATIENT
Start: 2024-05-03 | End: 2024-05-03 | Stop reason: HOSPADM

## 2024-05-03 RX ORDER — CARBAMAZEPINE 200 MG/1
200 TABLET ORAL 2 TIMES DAILY
Status: DISCONTINUED | OUTPATIENT
Start: 2024-05-03 | End: 2024-05-03 | Stop reason: HOSPADM

## 2024-05-03 RX ORDER — PANTOPRAZOLE SODIUM 40 MG/10ML
40 INJECTION, POWDER, LYOPHILIZED, FOR SOLUTION INTRAVENOUS EVERY 12 HOURS SCHEDULED
Status: DISCONTINUED | OUTPATIENT
Start: 2024-05-03 | End: 2024-05-03 | Stop reason: HOSPADM

## 2024-05-03 RX ORDER — ATORVASTATIN CALCIUM 10 MG/1
20 TABLET, FILM COATED ORAL DAILY
Status: DISCONTINUED | OUTPATIENT
Start: 2024-05-03 | End: 2024-05-03 | Stop reason: HOSPADM

## 2024-05-03 RX ORDER — DIPHENHYDRAMINE HYDROCHLORIDE, ZINC ACETATE 2; .1 G/100G; G/100G
1 CREAM TOPICAL 3 TIMES DAILY PRN
Status: DISCONTINUED | OUTPATIENT
Start: 2024-05-03 | End: 2024-05-03 | Stop reason: HOSPADM

## 2024-05-03 RX ORDER — ASPIRIN 81 MG/1
81 TABLET ORAL DAILY
Status: DISCONTINUED | OUTPATIENT
Start: 2024-05-03 | End: 2024-05-03

## 2024-05-03 RX ORDER — TERAZOSIN 2 MG/1
2 CAPSULE ORAL NIGHTLY
Status: DISCONTINUED | OUTPATIENT
Start: 2024-05-03 | End: 2024-05-03 | Stop reason: HOSPADM

## 2024-05-03 RX ORDER — ASPIRIN 81 MG/1
81 TABLET ORAL NIGHTLY
COMMUNITY

## 2024-05-03 RX ORDER — PREDNISONE 10 MG/1
TABLET ORAL DAILY
COMMUNITY
Start: 2024-04-22

## 2024-05-03 RX ORDER — ACETAMINOPHEN 160 MG/5ML
650 SOLUTION ORAL EVERY 4 HOURS PRN
Status: DISCONTINUED | OUTPATIENT
Start: 2024-05-03 | End: 2024-05-03 | Stop reason: HOSPADM

## 2024-05-03 RX ADMIN — SODIUM CHLORIDE 500 ML: 9 INJECTION, SOLUTION INTRAVENOUS at 01:01

## 2024-05-03 RX ADMIN — SODIUM CHLORIDE, POTASSIUM CHLORIDE, SODIUM LACTATE AND CALCIUM CHLORIDE 1000 ML: 600; 310; 30; 20 INJECTION, SOLUTION INTRAVENOUS at 03:34

## 2024-05-03 RX ADMIN — SODIUM CHLORIDE, POTASSIUM CHLORIDE, SODIUM LACTATE AND CALCIUM CHLORIDE 100 ML/HR: 600; 310; 30; 20 INJECTION, SOLUTION INTRAVENOUS at 03:20

## 2024-05-03 RX ADMIN — PANTOPRAZOLE SODIUM 80 MG: 40 INJECTION, POWDER, FOR SOLUTION INTRAVENOUS at 03:29

## 2024-05-03 RX ADMIN — SODIUM CHLORIDE, POTASSIUM CHLORIDE, SODIUM LACTATE AND CALCIUM CHLORIDE 100 ML/HR: 600; 310; 30; 20 INJECTION, SOLUTION INTRAVENOUS at 13:16

## 2024-05-03 RX ADMIN — IOPAMIDOL 100 ML: 755 INJECTION, SOLUTION INTRAVENOUS at 08:30

## 2024-05-03 RX ADMIN — IOPAMIDOL 100 ML: 612 INJECTION, SOLUTION INTRAVENOUS at 01:09

## 2024-05-03 RX ADMIN — Medication 10 ML: at 13:20

## 2024-05-03 RX ADMIN — PANTOPRAZOLE SODIUM 40 MG: 40 INJECTION, POWDER, FOR SOLUTION INTRAVENOUS at 14:39

## 2024-05-03 NOTE — ED NOTES
Pt accepted by [facility], but no bed is available at this time; MD and house supervisor aware      Argelia called to place patient on will call list

## 2024-05-03 NOTE — H&P
[facility] Medicine Services  HISTORY AND PHYSICAL    Date of Admission: 5/2/2024  Primary Care Physician: Miguel Ángel Hinson,     Subjective   Primary Historian: Patient    Chief Complaint:    Blood per rectum    History of Present Illness  83-year-old gentleman presents to the hospital with chief complaint of bright red blood per rectum.  He has had clots and blood x 2 that started today.  First reason he came to the emergency department for further workup and evaluation.  In the emergency department patient is found to have multiple bright red blood per rectum with clots in stool.  Patient is hemodynamically stable but continues to have bowel movements with blood.  Will consult GI as well as monitor hemoglobin and transfuse as needed.  Patient expressed understanding and agreement with plan and had no further questions or concerns at this time.        Review of Systems   Otherwise complete ROS reviewed and negative except as mentioned in the HPI.    Past Medical History:   Past Medical History:   Diagnosis Date    Anemia     Arthritis     Bilateral impacted cerumen 12/10/2018    Diverticulitis     Enlarged prostate     ETD (eustachian tube dysfunction)     GERD (gastroesophageal reflux disease)     Hypertension     Kidney infection     Lumbago     Narcolepsy     Sensorineural hearing loss (SNHL) of both ears 6/18/2018    SNHL (sensorineural hearing loss)     Tinnitus     Vitamin B12 deficiency     Weak urinary stream      Past Surgical History:  Past Surgical History:   Procedure Laterality Date    ABDOMINAL SURGERY      CATARACT EXTRACTION Left     HAND SURGERY Left     HERNIA REPAIR      REPLACEMENT TOTAL KNEE Left     ROTATOR CUFF REPAIR      TONSILLECTOMY       Social History:  reports that he has never smoked. He has never used smokeless tobacco. He reports that he does not drink alcohol and does not use drugs.    Family History: He was adopted. Family history is  unknown by patient.       Allergies:  Allergies   Allergen Reactions    Biaxin [Clarithromycin] GI Intolerance     unknown    Parafon Forte Dsc [Chlorzoxazone] Provider Review Needed     Swallowing issues       Medications:  Prior to Admission medications    Medication Sig Start Date End Date Taking? Authorizing Provider   aspirin 81 MG EC tablet Take 1 tablet by mouth Daily.    Caroline Stephenson MD   atorvastatin (LIPITOR) 20 MG tablet Take 1 tablet by mouth Daily. 10/20/21   Caroline Stephenson MD   carBAMazepine (TEGretol) 200 MG tablet TAKE ONE TABLET BY MOUTH TWICE DAILY 4/18/24   Cyril Kurtz PA   CloNIDine (CATAPRES) 0.1 MG tablet Take 1 tablet by mouth As Needed for High Blood Pressure (1 tablet if systolic is over 170).    Caroline Stephenson MD   cyanocobalamin 1000 MCG/ML injection Inject 1 mL into the appropriate muscle as directed by prescriber Every 28 (Twenty-Eight) Days. 4/2/20   Rosi Baker DO   finasteride (PROSCAR) 5 MG tablet Take 1 tablet by mouth Daily. 12/14/23   Abdoul Silva MD   FLUoxetine (PROzac) 40 MG capsule Take 1 capsule by mouth Daily. 11/3/16   Caroline Stephenson MD   hyoscyamine (LEVSIN) 0.125 MG SL tablet Take 1 tablet by mouth Daily As Needed. 7/3/20   Caroline Stephenson MD   irbesartan (AVAPRO) 300 MG tablet Take 1 tablet by mouth Daily. 1/20/20   Caroline Stephenson MD   methylphenidate (RITALIN) 20 MG tablet Take 1 tablet by mouth 3 (Three) Times a Day. 4/3/24   Spencer Shaffer MD   metoprolol tartrate (LOPRESSOR) 25 MG tablet Take 1 tablet by mouth 2 (Two) Times a Day.  Patient taking differently: Take 1 tablet by mouth Daily. 10/2/17   Oh Brandon MD   Multiple Vitamins-Minerals (MULTIVITAMIN ADULT PO) Take  by mouth.    Caroline Stephenson MD   mupirocin (BACTROBAN) 2 % ointment Apply 1 Application topically to the appropriate area as directed As Needed. 9/1/20   Caroline Stephenson MD   omeprazole (priLOSEC) 40  "MG capsule Take 1 capsule by mouth Daily. 12/4/16   Caroline Stephenson MD   oxyCODONE-acetaminophen (PERCOCET)  MG per tablet Take 1 tablet by mouth Every 6 (Six) Hours. 6/21/23   Caroline Stephenson MD   predniSONE (DELTASONE) 10 MG tablet Take 1 tablet by mouth Daily. 9/20/22   Rosi Baker DO   tamsulosin (FLOMAX) 0.4 MG capsule 24 hr capsule Take 2 capsules by mouth Every Night. 12/14/23   Abdoul Silva MD   terazosin (HYTRIN) 2 MG capsule Take 1 capsule by mouth Every Night. 11/8/16   Caroline Stephenson MD     I have utilized all available immediate resources to obtain, update, or review the patient's current medications (including all prescriptions, over-the-counter products, herbals, cannabis/cannabidiol products, and vitamin/mineral/dietary (nutritional) supplements).    Objective     Vital Signs: /65   Pulse 54   Temp 98.4 °F (36.9 °C) (Oral)   Resp 18   Ht 172.7 cm (68\")   Wt 90.7 kg (200 lb)   SpO2 100%   BMI 30.41 kg/m²   Physical Exam   General:  Awake, looks stated age, and pleasant  HEENT: PEERLA, EOM intact, oral mucosa is moist.  Pale conjunctivae  Skin: No rash, no jaundice, no ulcer.  Neck: no lymphadenopathy  CVS: Normal rate, +S1, +S2, no murmurs or rubs.  Lungs: No abnormal respiratory sounds. No tachypnea.  Abdomen: Nondistended, +BS, Nontender  Extremity: No leg edema. No cyanosis or clubbing.  Neurology: Alert and responsive and oriented to person, place, and time. No gross motor or sensorial deficits.      Results Reviewed:  Lab Results (last 24 hours)       Procedure Component Value Units Date/Time    aPTT [[id number]]  (Normal) Collected: 05/02/24 2344    Specimen: Blood Updated: 05/03/24 0048     PTT 25.8 seconds     Protime-INR [[id number]]  (Normal) Collected: 05/02/24 2344    Specimen: Blood Updated: 05/03/24 0048     Protime 14.0 Seconds      INR 1.03    CBC & Differential [[id number]]  (Abnormal) Collected: 05/02/24 2344    Specimen: Blood " Updated: 05/03/24 0032    Narrative:      The following orders were created for panel order CBC & Differential.  Procedure                               Abnormality         Status                     ---------                               -----------         ------                     CBC Auto Differential[[id number]]        Abnormal            Final result                 Please view results for these tests on the individual orders.    CBC Auto Differential [[id number]]  (Abnormal) Collected: 05/02/24 2344    Specimen: Blood Updated: 05/03/24 0032     WBC 12.02 10*3/mm3      RBC 2.98 10*6/mm3      Hemoglobin 9.9 g/dL      Hematocrit 31.0 %      .0 fL      MCH 33.2 pg      MCHC 31.9 g/dL      RDW 13.2 %      RDW-SD 50.1 fl      MPV 11.2 fL      Platelets 248 10*3/mm3      Neutrophil % 69.9 %      Lymphocyte % 15.6 %      Monocyte % 12.2 %      Eosinophil % 1.6 %      Basophil % 0.4 %      Immature Grans % 0.3 %      Neutrophils, Absolute 8.40 10*3/mm3      Lymphocytes, Absolute 1.87 10*3/mm3      Monocytes, Absolute 1.47 10*3/mm3      Eosinophils, Absolute 0.19 10*3/mm3      Basophils, Absolute 0.05 10*3/mm3      Immature Grans, Absolute 0.04 10*3/mm3      nRBC 0.0 /100 WBC     Lactic Acid, Plasma [[id number]]  (Abnormal) Collected: 05/02/24 2344    Specimen: Blood Updated: 05/03/24 0024     Lactate 3.9 mmol/L     Comprehensive Metabolic Panel [[id number]]  (Abnormal) Collected: 05/02/24 2344    Specimen: Blood Updated: 05/03/24 0017     Glucose 114 mg/dL      BUN 27 mg/dL      Creatinine 1.21 mg/dL      Sodium 143 mmol/L      Potassium 4.5 mmol/L      Chloride 107 mmol/L      CO2 26.0 mmol/L      Calcium 8.5 mg/dL      Total Protein 5.9 g/dL      Albumin 3.7 g/dL      ALT (SGPT) 23 U/L      AST (SGOT) 21 U/L      Alkaline Phosphatase 110 U/L      Total Bilirubin 0.2 mg/dL      Globulin 2.2 gm/dL      A/G Ratio 1.7 g/dL      BUN/Creatinine Ratio 22.3     Anion Gap 10.0 mmol/L      eGFR 59.8 mL/min/1.73      Narrative:      GFR Normal >60  Chronic Kidney Disease <60  Kidney Failure <15    The GFR formula is only valid for adults with stable renal function between ages 18 and 70.    [place] Draw [[id number]] Collected: 05/02/24 2344    Specimen: Blood Updated: 05/03/24 0000    Narrative:      The following orders were created for panel order [place] Draw.  Procedure                               Abnormality         Status                     ---------                               -----------         ------                     Green Top (Gel)[[id number]]                                  Final result               Lavender Top[[id number]]                                     Final result               Red Top[[id number]]                                          Final result               Light Blue Top[[id number]]                                   Final result                 Please view results for these tests on the individual orders.    Green Top (Gel) [[id number]] Collected: 05/02/24 2344    Specimen: Blood Updated: 05/03/24 0000     Extra Tube Hold for add-ons.     Comment: Auto resulted.       Lavender Top [[id number]] Collected: 05/02/24 2344    Specimen: Blood Updated: 05/03/24 0000     Extra Tube hold for add-on     Comment: Auto resulted       Red Top [[id number]] Collected: 05/02/24 2344    Specimen: Blood Updated: 05/03/24 0000     Extra Tube Hold for add-ons.     Comment: Auto resulted.       Light Blue Top [[id number]] Collected: 05/02/24 2344    Specimen: Blood Updated: 05/03/24 0000     Extra Tube Hold for add-ons.     Comment: Auto resulted             Imaging Results (Last 24 Hours)       Procedure Component Value Units Date/Time    CT Abdomen Pelvis With Contrast [[id number]] Resulted: 05/03/24 0056     Updated: 05/03/24 0111          I have personally reviewed and interpreted the radiology studies and ECG obtained at time of admission.     Assessment / Plan   Assessment:   Active Hospital Problems    Diagnosis      **Hematochezia        Treatment Plan  The patient will be admitted to my service here at [facility].     Medical Decision Making  Number and Complexity of problems: High  Differential Diagnosis:   # Bright red blood per rectum  # Anemia secondary to above  # Lactic acidosis likely secondary to above  - Admit to observation  - Keep patient n.p.o.  - Consult GI, appreciate recommendations and assistance in care  - H&H every 4 hours  - Transfuse 1 unit PRBC for hemoglobin if drops below 7 or greater than 25% from baseline  - 1 L bolus LR ordered will recheck lactic acid after completion  - Protonix 40 mg IV twice daily  - If the patient becomes symptomatic or blood pressure starts to drop significantly we will give 1 unit PRBC preemptively    Conditions and Status        Condition is unchanged.     [facility] Data  External documents reviewed: Care Everywhere  Cardiac tracing (EKG, telemetry) interpretation: Normal sinus rhythm  Radiology interpretation: Official read is pending  Labs reviewed: Yes  Any tests that were considered but not ordered: No     Decision rules/scores evaluated (example [id number], Wells, etc): N/A     Discussed with: Patient     Care Planning  Shared decision making: Patient  Code status and discussions: DNR/DNI    Disposition  Social Determinants of Health that impact treatment or disposition: None apparent  Estimated length of stay is 1 to 2 days.     I confirmed that the patient's advanced care plan is present, code status is documented, and a surrogate decision maker is listed in the patient's medical record.     The patient's surrogate decision maker is Orestes Larsen.     The patient was seen and examined by me on 5/3/2024 at 0223.    Electronically signed by Jono Jones DO, 05/03/24, 02:23 CDT.

## 2024-05-03 NOTE — ED PROVIDER NOTES
"EMERGENCY DEPARTMENT ATTENDING NOTE    Patient Name: Spencer Moore    Chief Complaint   Patient presents with    Black or Bloody Stool       PATIENT PRESENTATION:  Spencer Moore is a 83 y.o. male with PMH significant for diverticulitis, chronic anemia who presents to the ED with gross hematochezia x 3 episodes with one of them being in the emergency department that started this evening.  Patient endorses mild abdominal cramping but denies any prior episodes of bleeding like this.  Has a history of anemia that is chronic but has never gone below 10 and has not required transfusions.  Patient's last colonoscopy was over 10 years ago.  No B symptoms.  Patient's power of  and son is present and providing history and was present when the patient first had symptoms tonight.  Has image of gross blood in the stool.  Patient is not on any anticoagulation but does take a daily aspirin      PHYSICAL EXAM:   VS: /72   Pulse 67   Temp 98.4 °F (36.9 °C) (Oral)   Resp 18   Ht 172.7 cm (68\")   Wt 90.7 kg (200 lb)   SpO2 100%   BMI 30.41 kg/m²   GENERAL: well-nourished, well-developed, awake, alert, no acute distress, nontoxic appearing, comfortable  EYES: PERRL, sclerae anicteric, extraocular movements grossly intact, symmetric lids  EARS, NOSE, MOUTH, THROAT: atraumatic external nose and ears, moist mucous membranes  NECK: symmetric, trachea midline  RESPIRATORY: unlabored respiratory effort, clear to auscultation bilaterally, good air movement  CARDIOVASCULAR: no murmurs, peripheral pulses 2+ and equal in all extremities  GI: soft, nontender, nondistended  MUSCULOSKELETAL/EXTREMITIES: extremities without obvious deformity  SKIN: warm and dry with no obvious rashes  NEUROLOGIC: moving all 4 extremities symmetrically, CN II-XII grossly intact  PSYCHIATRIC: alert, pleasant and cooperative. Appropriate mood and affect.      MEDICAL DECISION MAKING:    Spencer Moore is a 83 y.o. male who presented to the " ED with gross hematochezia    Procedures    Differential Diagnosis Considered: Lower GI bleed, vigorous upper GI bleed, mesenteric ischemia    Labs Ordered:  Labs Reviewed   COMPREHENSIVE METABOLIC PANEL - Abnormal; Notable for the following components:       Result Value    Glucose 114 (*)     BUN 27 (*)     Calcium 8.5 (*)     Total Protein 5.9 (*)     eGFR 59.8 (*)     All other components within normal limits    Narrative:     GFR Normal >60  Chronic Kidney Disease <60  Kidney Failure <15    The GFR formula is only valid for adults with stable renal function between ages 18 and 70.   LACTIC ACID, PLASMA - Abnormal; Notable for the following components:    Lactate 3.9 (*)     All other components within normal limits   CBC WITH AUTO DIFFERENTIAL - Abnormal; Notable for the following components:    WBC 12.02 (*)     RBC 2.98 (*)     Hemoglobin 9.9 (*)     Hematocrit 31.0 (*)     .0 (*)     MCH 33.2 (*)     Lymphocyte % 15.6 (*)     Monocyte % 12.2 (*)     Neutrophils, Absolute 8.40 (*)     Monocytes, Absolute 1.47 (*)     All other components within normal limits   APTT - Normal   PROTIME-INR - Normal   RAINBOW DRAW    Narrative:     The following orders were created for panel order [place] Draw.  Procedure                               Abnormality         Status                     ---------                               -----------         ------                     Green Top (Gel)[[id number]]                                  Final result               Lavender Top[[id number]]                                     Final result               Red Top[[id number]]                                          Final result               Light Blue Top[[id number]]                                   Final result                 Please view results for these tests on the individual orders.   OCCULT BLOOD X 1, STOOL   LACTIC ACID, REFLEX   POCT OCCULT BLOOD STOOL (ED ONLY)   TYPE AND SCREEN   CBC AND DIFFERENTIAL     Narrative:     The following orders were created for panel order CBC & Differential.  Procedure                               Abnormality         Status                     ---------                               -----------         ------                     CBC Auto Differential[[id number]]        Abnormal            Final result                 Please view results for these tests on the individual orders.   GREEN TOP   LAVENDER TOP   RED TOP   LIGHT BLUE TOP        Imaging Ordered:   CT Abdomen Pelvis With Contrast    (Results Pending)       Internal chart review:   Past Medical History:   Diagnosis Date    Anemia     Arthritis     Bilateral impacted cerumen 12/10/2018    Diverticulitis     Enlarged prostate     ETD (eustachian tube dysfunction)     GERD (gastroesophageal reflux disease)     Hypertension     Kidney infection     Lumbago     Narcolepsy     Sensorineural hearing loss (SNHL) of both ears 6/18/2018    SNHL (sensorineural hearing loss)     Tinnitus     Vitamin B12 deficiency     Weak urinary stream        Past Surgical History:   Procedure Laterality Date    ABDOMINAL SURGERY      CATARACT EXTRACTION Left     HAND SURGERY Left     HERNIA REPAIR      REPLACEMENT TOTAL KNEE Left     ROTATOR CUFF REPAIR      TONSILLECTOMY         Allergies   Allergen Reactions    Biaxin [Clarithromycin] GI Intolerance     unknown    Parafon Forte Dsc [Chlorzoxazone] Provider Review Needed     Swallowing issues         Current Facility-Administered Medications:     cyanocobalamin injection 1,000 mcg, 1,000 mcg, Intramuscular, Q30 Days, Rosi Baker DO, 1,000 mcg at 12/12/22 1117    sodium chloride 0.9 % flush 10 mL, 10 mL, Intravenous, PRN, Emergency, Triage Protocol, MD    Current Outpatient Medications:     aspirin 81 MG EC tablet, Take 1 tablet by mouth Daily., Disp: , Rfl:     atorvastatin (LIPITOR) 20 MG tablet, Take 1 tablet by mouth Daily., Disp: , Rfl:     carBAMazepine (TEGretol) 200 MG tablet, TAKE ONE  TABLET BY MOUTH TWICE DAILY, Disp: 60 tablet, Rfl: 3    CloNIDine (CATAPRES) 0.1 MG tablet, Take 1 tablet by mouth As Needed for High Blood Pressure (1 tablet if systolic is over 170)., Disp: , Rfl:     cyanocobalamin 1000 MCG/ML injection, Inject 1 mL into the appropriate muscle as directed by prescriber Every 28 (Twenty-Eight) Days., Disp: 10 mL, Rfl: 0    finasteride (PROSCAR) 5 MG tablet, Take 1 tablet by mouth Daily., Disp: 90 tablet, Rfl: 3    FLUoxetine (PROzac) 40 MG capsule, Take 1 capsule by mouth Daily., Disp: , Rfl:     hyoscyamine (LEVSIN) 0.125 MG SL tablet, Take 1 tablet by mouth Daily As Needed., Disp: , Rfl:     irbesartan (AVAPRO) 300 MG tablet, Take 1 tablet by mouth Daily., Disp: , Rfl:     methylphenidate (RITALIN) 20 MG tablet, Take 1 tablet by mouth 3 (Three) Times a Day., Disp: 90 tablet, Rfl: 0    metoprolol tartrate (LOPRESSOR) 25 MG tablet, Take 1 tablet by mouth 2 (Two) Times a Day. (Patient taking differently: Take 1 tablet by mouth Daily.), Disp: 60 tablet, Rfl: 11    Multiple Vitamins-Minerals (MULTIVITAMIN ADULT PO), Take  by mouth., Disp: , Rfl:     mupirocin (BACTROBAN) 2 % ointment, Apply 1 Application topically to the appropriate area as directed As Needed., Disp: , Rfl:     omeprazole (priLOSEC) 40 MG capsule, Take 1 capsule by mouth Daily., Disp: , Rfl:     oxyCODONE-acetaminophen (PERCOCET)  MG per tablet, Take 1 tablet by mouth Every 6 (Six) Hours., Disp: , Rfl:     predniSONE (DELTASONE) 10 MG tablet, Take 1 tablet by mouth Daily., Disp: 30 tablet, Rfl: 6    tamsulosin (FLOMAX) 0.4 MG capsule 24 hr capsule, Take 2 capsules by mouth Every Night., Disp: 180 capsule, Rfl: 3    terazosin (HYTRIN) 2 MG capsule, Take 1 capsule by mouth Every Night., Disp: , Rfl:     External documents reviewed: Reviewed prior hemoglobin    My lab interpretation: Patient with a hemoglobin of 9.9 and a lactate of 3.9 with a white count of 12 elevated BUN.  Normal coags    My imaging  interpretation: CT abdomen without acute findings.    Discussed with: Patient and son    Shared decision making: Given hemoglobin of 9.9, gross hematochezia in the emergency department and 2 times at home since 2100 discussed with patient and son regarding admission for observation since hemoglobin is not always indicative of level of blood loss in the acute setting.  Both believe this is reasonable and patient to be admitted.    ED Course and Re-evaluation: Patient remained normotensive and only had the 1 episode of hematochezia on arrival in the emergency department.  No other follow-up episodes.  Soft nontender repeat abdominal exam.  CT without signs of edema or fat stranding and do not suspect mesenteric ischemia or cancer with no mass.  Patient be admitted for observation for continued bleeding.  He is not on anticoagulation did not require any reversal with normal coagulation studies.  Will hold aspirin    ED Critical Care time:     ED Diagnosis:  (D64.9) Anemia, unspecified type    (K92.1) Hematochezia     Disposition: To medicine for observation        Signed:  Spencer Dougherty MD  Emergency Medicine Physician    Please note that portions of this note were completed with a voice recognition program.      Spencer Dougherty MD  05/03/24 0210

## 2024-05-03 NOTE — ED NOTES
Spoke with house supervisor after MD Moore stated that he did not do EMTALA forms for transfers. House Supervisor, Josefina states that inpatient transfers do not need EMTALA form.   Spoke with ED Director (Ro) about this and she confirmed that EMTALA did not apply to inpatient transfers.

## 2024-05-03 NOTE — CONSULTS
[facility] Gastroenterology  Inpatient Consult Note  Today's date:  05/03/24    Spencer Moore  1940       Referring Provider: Jono Jones DO  Primary Physician: Miguel Ángel Hinson DO   Primary Gastroenterologist: Unknown    Date of Admission: 5/2/2024  Date of Service:  05/03/24    Reason for Consultation/Chief Complaint: Rectal bleeding    History of present illness: 83-year-old patient with a history of diverticulosis and colon resection in the remote past for what sounds like diverticulitis who are asked to see by the emergency room/primary physician for rectal bleeding which started last night.  He had 2-3 episodes of bright red rectal bleeding.  He denies hematemesis, melena, abdominal pain, fevers, chills, dysphagia, odynophagia, nausea or vomiting.  A CTA of the abdomen did show possible bleeding in the rectum with diverticulosis.  Hemoglobin was 9.3 and is now 8.0.  He is hemodynamically stable.  He does take 1 aspirin per day.  The patient is now scheduled to be transferred to the [facility] where they have interventional radiology services and apparently the hospitalist spoke with a gastroenterologist who accepted the transfer.  The patient and patient's medical power of  at his bedside who is his brother-in-law is aware and waiting on the transfer.    Past Medical History:   Diagnosis Date    Anemia     Arthritis     Bilateral impacted cerumen 12/10/2018    Diverticulitis     Enlarged prostate     ETD (eustachian tube dysfunction)     GERD (gastroesophageal reflux disease)     Hypertension     Kidney infection     Lumbago     Narcolepsy     Sensorineural hearing loss (SNHL) of both ears 6/18/2018    SNHL (sensorineural hearing loss)     Tinnitus     Vitamin B12 deficiency     Weak urinary stream        Past Surgical History:   Procedure Laterality Date    ABDOMINAL SURGERY      CATARACT EXTRACTION Left     HAND SURGERY Left     HERNIA REPAIR       "REPLACEMENT TOTAL KNEE Left     ROTATOR CUFF REPAIR      TONSILLECTOMY          Allergies   Allergen Reactions    Biaxin [Clarithromycin] GI Intolerance     unknown    Parafon Forte Dsc [Chlorzoxazone] Other (See Comments)     Swallowing issues       (Not in a hospital admission)      Clinic-Administered Medications         Dose Frequency Start End    cyanocobalamin injection 1,000 mcg 1,000 mcg Every 30 Days 1/7/2021 --    Route: Intramuscular          Hospital Medications (active)         Dose Frequency Start End    acetaminophen (TYLENOL) 160 MG/5ML oral solution 650 mg 650 mg Every 4 Hours PRN 5/3/2024 --    Admin Instructions: If given for fever, use fever parameter: fever greater than 100.4 °F  Based on patient request - if ordered for moderate or severe pain, provider allows for administration of a medication prescribed for a lower pain scale.    Do not exceed 4 grams of acetaminophen in a 24 hr period. Max dose of 2gm for AST/ALT greater than 120 units/L.    If given for pain, use the following pain scale:   Mild Pain = Pain Score of 1-3, CPOT 1-2  Moderate Pain = Pain Score of 4-6, CPOT 3-4  Severe Pain = Pain Score of 7-10, CPOT 5-8    Route: Oral    Linked Group 1: Placed in \"Or\" Linked Group        acetaminophen (TYLENOL) suppository 650 mg 650 mg Every 4 Hours PRN 5/3/2024 --    Admin Instructions: If given for fever, use fever parameter: fever greater than 100.4 °F  Based on patient request - if ordered for moderate or severe pain, provider allows for administration of a medication prescribed for a lower pain scale.    Do not exceed 4 grams of acetaminophen in a 24 hr period. Max dose of 2gm for AST/ALT greater than 120 units/L.    If given for pain, use the following pain scale:   Mild Pain = Pain Score of 1-3, CPOT 1-2  Moderate Pain = Pain Score of 4-6, CPOT 3-4  Severe Pain = Pain Score of 7-10, CPOT 5-8    Route: Rectal    Linked Group 1: Placed in \"Or\" Linked Group        acetaminophen (TYLENOL) " "tablet 650 mg 650 mg Every 4 Hours PRN 5/3/2024 --    Admin Instructions: If given for fever, use fever parameter: fever greater than 100.4 °F  Based on patient request - if ordered for moderate or severe pain, provider allows for administration of a medication prescribed for a lower pain scale.    Do not exceed 4 grams of acetaminophen in a 24 hr period. Max dose of 2gm for AST/ALT greater than 120 units/L.    If given for pain, use the following pain scale:   Mild Pain = Pain Score of 1-3, CPOT 1-2  Moderate Pain = Pain Score of 4-6, CPOT 3-4  Severe Pain = Pain Score of 7-10, CPOT 5-8    Route: Oral    Linked Group 1: Placed in \"Or\" Linked Group        atorvastatin (LIPITOR) tablet 20 mg 20 mg Daily 5/3/2024 --    Admin Instructions: Avoid grapefruit juice.    Route: Oral    carBAMazepine (TEGretol) tablet 200 mg 200 mg 2 Times Daily 5/3/2024 --    Admin Instructions: Group 1 (Yellow) Hazardous Drug - See Handling Guide    Route: Oral    cloNIDine (CATAPRES) tablet 0.1 mg 0.1 mg Every 12 Hours PRN 5/3/2024 --    Admin Instructions: Hold for SBP less than 100, DBP less than 60, or heart rate less than 50. If a dose is held, please contact the provider.  Caution: Look alike/sound alike drug alert.    Route: Oral    diphenhydrAMINE-zinc acetate 2-0.1 % cream 1 Application 1 Application 3 Times Daily PRN 5/3/2024 --    Admin Instructions: Apply to left shoulder area .  Caution: Look alike/sound alike drug alert.  Unknown    Route: Topical    finasteride (PROSCAR) tablet 5 mg 5 mg Daily 5/3/2024 --    Admin Instructions: Group 2 (Pink) Hazardous Drug - Reproductive Risk Only - See Handling Guide    Route: Oral    FLUoxetine (PROzac) capsule 40 mg 40 mg Daily 5/3/2024 --    Admin Instructions: Caution: Look alike/sound alike drug alert    Route: Oral    hyoscyamine (LEVSIN) SL tablet 125 mcg 125 mcg Daily PRN 5/3/2024 --    Route: Oral    lactated ringers infusion 100 mL/hr Continuous 5/3/2024 --    Route: Intravenous " "   losartan (COZAAR) tablet 100 mg ([Held by provider] since 5/3/2024  8:51 AM) 100 mg Every 24 Hours Scheduled 5/3/2024 --    Route: Oral    metoprolol tartrate (LOPRESSOR) tablet 25 mg ([Held by provider] since 5/3/2024  8:51 AM) 25 mg Every 12 Hours Scheduled 5/3/2024 --    Admin Instructions: Hold for SBP less than 100, DBP less than 60, or heart rate less than 50. If a dose is held, please contact the provider.    Route: Oral    Morphine sulfate (PF) injection 1 mg 1 mg Every 4 Hours PRN 5/3/2024 5/8/2024    Admin Instructions: Based on patient request - if ordered for moderate or severe pain, provider allows for administration of a medication prescribed for a lower pain scale.  Unknown    Caution: Look alike/sound alike drug alert    If given for pain, use the following pain scale:  Mild Pain = Pain Score of 1-3, CPOT 1-2  Moderate Pain = Pain Score of 4-6, CPOT 3-4  Severe Pain = Pain Score of 7-10, CPOT 5-8    Route: Intravenous    Linked Group 2: Placed in \"And\" Linked Group        Morphine sulfate (PF) injection 2 mg 2 mg Every 4 Hours PRN 5/3/2024 5/8/2024    Admin Instructions: Based on patient request - if ordered for moderate or severe pain, provider allows for administration of a medication prescribed for a lower pain scale.  Unknown    Caution: Look alike/sound alike drug alert    If given for pain, use the following pain scale:  Mild Pain = Pain Score of 1-3, CPOT 1-2  Moderate Pain = Pain Score of 4-6, CPOT 3-4  Severe Pain = Pain Score of 7-10, CPOT 5-8    Route: Intravenous    Linked Group 3: Placed in \"And\" Linked Group        naloxone (NARCAN) injection 0.4 mg 0.4 mg Every 5 Minutes PRN 5/3/2024 --    Admin Instructions: If respiratory rate is less than 8 breaths/minute or patient is difficult to arouse stop any narcotics and contact physician.   Administer slow IV push. Repeat as ordered until patient's respiratory rate is greater than 12 breaths/minute.    Route: Intravenous    Linked Group " "2: Placed in \"And\" Linked Group        naloxone (NARCAN) injection 0.4 mg 0.4 mg Every 5 Minutes PRN 5/3/2024 --    Admin Instructions: If Respiratory Rate Less Than 8 or Patient is Difficult to Arouse, Stop ALL Narcotics & Contact Provider.  Administer Slow IV Push.  Repeat As Ordered Until Respiratory Rate is Greater Than 12.    Route: Intravenous    Linked Group 3: Placed in \"And\" Linked Group        ondansetron (ZOFRAN) injection 4 mg 4 mg Every 6 Hours PRN 5/3/2024 --    Admin Instructions: If BOTH ondansetron (ZOFRAN) and promethazine (PHENERGAN) are ordered use ondansetron first and THEN promethazine IF ondansetron is ineffective.    Route: Intravenous    Linked Group 4: Placed in \"Or\" Linked Group        ondansetron ODT (ZOFRAN-ODT) disintegrating tablet 4 mg 4 mg Every 6 Hours PRN 5/3/2024 --    Admin Instructions: If BOTH ondansetron (ZOFRAN) and promethazine (PHENERGAN) are ordered use ondansetron first and THEN promethazine IF ondansetron is ineffective.  Place on tongue and allow to dissolve.    Route: Oral    Linked Group 4: Placed in \"Or\" Linked Group        pantoprazole (PROTONIX) injection 40 mg 40 mg Every 12 Hours Scheduled 5/3/2024 --    Admin Instructions: Dilute with 10 mL of 0.9% NaCl and give IV push over 2 minutes.    Route: Intravenous    predniSONE (DELTASONE) tablet 10 mg ([Held by provider] since 5/3/2024  8:51 AM) 10 mg Daily 5/3/2024 --    Admin Instructions: Take with food.    Route: Oral    sodium chloride 0.9 % flush 10 mL 10 mL As Needed 5/2/2024 --    Route: Intravenous    sodium chloride 0.9 % flush 10 mL 10 mL Every 12 Hours Scheduled 5/3/2024 --    Route: Intravenous    sodium chloride 0.9 % flush 10 mL 10 mL As Needed 5/3/2024 --    Route: Intravenous    sodium chloride 0.9 % infusion 40 mL 40 mL As Needed 5/3/2024 --    Admin Instructions: Following administration of an IV intermittent medication, flush line with 40mL NS at 100mL/hr.    Route: Intravenous    tamsulosin " (FLOMAX) 24 hr capsule 0.8 mg 0.8 mg Nightly 5/3/2024 --    Admin Instructions: Do not crush or chew the capsules or tablets. The drug may not work as designed if the capsule or tablet is crushed or chewed. Swallow whole.    Route: Oral    terazosin (HYTRIN) capsule 2 mg ([Held by provider] since 5/3/2024  8:51 AM) 2 mg Nightly 5/3/2024 --    Admin Instructions: Hold for SBP less than 100, DBP less than 60.    Route: Oral            Social History     Tobacco Use    Smoking status: Never    Smokeless tobacco: Never   Substance Use Topics    Alcohol use: No        Past Family History:  Family History   Adopted: Yes   Family history unknown: Yes       Review of Systems:  Constitutional: No unexpected weight change, no fatigue, no unexplained fever, no sweats or chills.   HEENT: No icteric sclera.  No hearing or visual deficits.  No sore throat.  No chronic nasal discharge.  Pulmonary: No chronic cough.  No hemoptysis.  No shortness of breath.  Cardiovascular: No chest pain.  No palpitations.  No shortness of breath.  Gastrointestinal: As above.  Musculoskeletal/extremities: No peripheral edema.  No cyanosis.  No claudications.  No back pain.  Genitourinary: No dysuria.  No blood in stool.  No urethral discharges.  Neurologic: No seizures.  No headaches.  No dizziness.  No gait problems.  Skin: No rash.  No icterus.  Mental: No psychosis.  No confusions.  No hallucinations.      Physical Exam:  Temp:  [97.9 °F (36.6 °C)-98.4 °F (36.9 °C)] 97.9 °F (36.6 °C)  Heart Rate:  [53-81] 63  Resp:  [12-18] 14  BP: (104-174)/(56-87) 150/64  Body mass index is 30.41 kg/m².    Intake/Output Summary (Last 24 hours) at 5/3/2024 0941  Last data filed at 5/3/2024 0921  Gross per 24 hour   Intake 2100 ml   Output 80 ml   Net 2020 ml     I/O this shift:  In: 600 [Blood:600]  Out: -     General appearance:   HEENT: Nonicteric sclerae.  Moist oral mucosa.  PERRLA.  EOMI.  Clear pharynx.  Lungs: Clear to auscultation bilaterally.  No  wheezing, rales or rhonchi.  Heart: Regular rate and rhythm.  Normal S1 and S2, no S3, S4 or murmur.  Abdomen: Soft, nondistended, nontender to palpation, with normoactive bowel sounds, no hepatosplenomegaly, no palpable masses.  Extremities: No cyanosis, edema or pulse deficits.  Skin: No rash or jaundice.    Neurologic: Alert and oriented to person place and time without focal motor deficits.  Moving all 4 extremities.    Results Review:  Lab Results (last 24 hours)       Procedure Component Value Units Date/Time    Hemoglobin & Hematocrit, Blood [[id number]]  (Abnormal) Collected: 05/03/24 0926    Specimen: Blood Updated: 05/03/24 0940     Hemoglobin 9.2 g/dL      Hematocrit 28.6 %     STAT Lactic Acid, Reflex [[id number]] Collected: 05/03/24 0926    Specimen: Blood Updated: 05/03/24 0932    STAT Lactic Acid, Reflex [[id number]]  (Abnormal) Collected: 05/03/24 0539    Specimen: Blood from Arm, Left Updated: 05/03/24 0618     Lactate 2.9 mmol/L     Comprehensive Metabolic Panel [[id number]]  (Abnormal) Collected: 05/03/24 0539    Specimen: Blood from Arm, Left Updated: 05/03/24 0616     Glucose 110 mg/dL      BUN 28 mg/dL      Creatinine 1.19 mg/dL      Sodium 140 mmol/L      Potassium 4.8 mmol/L      Chloride 107 mmol/L      CO2 28.0 mmol/L      Calcium 8.2 mg/dL      Total Protein 5.1 g/dL      Albumin 3.2 g/dL      ALT (SGPT) 19 U/L      AST (SGOT) 18 U/L      Alkaline Phosphatase 89 U/L      Total Bilirubin 0.2 mg/dL      Globulin 1.9 gm/dL      A/G Ratio 1.7 g/dL      BUN/Creatinine Ratio 23.5     Anion Gap 5.0 mmol/L      eGFR 61.0 mL/min/1.73     Narrative:      GFR Normal >60  Chronic Kidney Disease <60  Kidney Failure <15    The GFR formula is only valid for adults with stable renal function between ages 18 and 70.    Phosphorus [[id number]]  (Normal) Collected: 05/03/24 0539    Specimen: Blood from Arm, Left Updated: 05/03/24 0616     Phosphorus 3.5 mg/dL     Magnesium [[id number]]  (Normal) Collected:  05/03/24 0539    Specimen: Blood from Arm, Left Updated: 05/03/24 0616     Magnesium 1.8 mg/dL     Bilirubin, Direct [[id number]]  (Normal) Collected: 05/03/24 0539    Specimen: Blood from Arm, Left Updated: 05/03/24 0616     Bilirubin, Direct <0.2 mg/dL     Hemoglobin & Hematocrit, Blood [[id number]]  (Abnormal) Collected: 05/03/24 0539    Specimen: Blood from Arm, Left Updated: 05/03/24 0601     Hemoglobin 8.0 g/dL      Hematocrit 25.7 %     STAT Lactic Acid, Reflex [[id number]]  (Abnormal) Collected: 05/03/24 0320    Specimen: Blood Updated: 05/03/24 0356     Lactate 4.8 mmol/L     CBC Auto Differential [[id number]]  (Abnormal) Collected: 05/03/24 0320    Specimen: Blood Updated: 05/03/24 0332     WBC 13.53 10*3/mm3      RBC 2.79 10*6/mm3      Hemoglobin 9.3 g/dL      Hematocrit 29.1 %      .3 fL      MCH 33.3 pg      MCHC 32.0 g/dL      RDW 13.3 %      RDW-SD 51.4 fl      MPV 11.0 fL      Platelets 221 10*3/mm3      Neutrophil % 64.7 %      Lymphocyte % 20.3 %      Monocyte % 11.5 %      Eosinophil % 2.4 %      Basophil % 0.7 %      Immature Grans % 0.4 %      Neutrophils, Absolute 8.76 10*3/mm3      Lymphocytes, Absolute 2.74 10*3/mm3      Monocytes, Absolute 1.56 10*3/mm3      Eosinophils, Absolute 0.33 10*3/mm3      Basophils, Absolute 0.09 10*3/mm3      Immature Grans, Absolute 0.05 10*3/mm3      nRBC 0.0 /100 WBC     POC Occult Blood Stool [[id number]]  (Abnormal) Resulted: 05/03/24 0251    Specimen: Stool Updated: 05/03/24 0252     Fecal Occult Blood Positive     Lot Number 257     Expiration Date 7/31/2024     DEVELOPER LOT NUMBER 257     DEVELOPER EXPIRATION DATE 7/31/2024     Positive Control Positive     Negative Control Negative    aPTT [[id number]]  (Normal) Collected: 05/02/24 2344    Specimen: Blood Updated: 05/03/24 0048     PTT 25.8 seconds     Protime-INR [[id number]]  (Normal) Collected: 05/02/24 2344    Specimen: Blood Updated: 05/03/24 0048     Protime 14.0 Seconds      INR 1.03    CBC &  Differential [[id number]]  (Abnormal) Collected: 05/02/24 2344    Specimen: Blood Updated: 05/03/24 0032    Narrative:      The following orders were created for panel order CBC & Differential.  Procedure                               Abnormality         Status                     ---------                               -----------         ------                     CBC Auto Differential[[id number]]        Abnormal            Final result                 Please view results for these tests on the individual orders.    CBC Auto Differential [[id number]]  (Abnormal) Collected: 05/02/24 2344    Specimen: Blood Updated: 05/03/24 0032     WBC 12.02 10*3/mm3      RBC 2.98 10*6/mm3      Hemoglobin 9.9 g/dL      Hematocrit 31.0 %      .0 fL      MCH 33.2 pg      MCHC 31.9 g/dL      RDW 13.2 %      RDW-SD 50.1 fl      MPV 11.2 fL      Platelets 248 10*3/mm3      Neutrophil % 69.9 %      Lymphocyte % 15.6 %      Monocyte % 12.2 %      Eosinophil % 1.6 %      Basophil % 0.4 %      Immature Grans % 0.3 %      Neutrophils, Absolute 8.40 10*3/mm3      Lymphocytes, Absolute 1.87 10*3/mm3      Monocytes, Absolute 1.47 10*3/mm3      Eosinophils, Absolute 0.19 10*3/mm3      Basophils, Absolute 0.05 10*3/mm3      Immature Grans, Absolute 0.04 10*3/mm3      nRBC 0.0 /100 WBC     Lactic Acid, Plasma [[id number]]  (Abnormal) Collected: 05/02/24 2344    Specimen: Blood Updated: 05/03/24 0024     Lactate 3.9 mmol/L     Comprehensive Metabolic Panel [[id number]]  (Abnormal) Collected: 05/02/24 2344    Specimen: Blood Updated: 05/03/24 0017     Glucose 114 mg/dL      BUN 27 mg/dL      Creatinine 1.21 mg/dL      Sodium 143 mmol/L      Potassium 4.5 mmol/L      Chloride 107 mmol/L      CO2 26.0 mmol/L      Calcium 8.5 mg/dL      Total Protein 5.9 g/dL      Albumin 3.7 g/dL      ALT (SGPT) 23 U/L      AST (SGOT) 21 U/L      Alkaline Phosphatase 110 U/L      Total Bilirubin 0.2 mg/dL      Globulin 2.2 gm/dL      A/G Ratio 1.7 g/dL       BUN/Creatinine Ratio 22.3     Anion Gap 10.0 mmol/L      eGFR 59.8 mL/min/1.73     Narrative:      GFR Normal >60  Chronic Kidney Disease <60  Kidney Failure <15    The GFR formula is only valid for adults with stable renal function between ages 18 and 70.    [facility] Draw [[id number]] Collected: 05/02/24 2344    Specimen: Blood Updated: 05/03/24 0000    Narrative:      The following orders were created for panel order [facility] Draw.  Procedure                               Abnormality         Status                     ---------                               -----------         ------                     Green Top (Gel)[[id number]]                                  Final result               Lavender Top[[id number]]                                     Final result               Red Top[[id number]]                                          Final result               Light Blue Top[[id number]]                                   Final result                 Please view results for these tests on the individual orders.    Green Top (Gel) [[id number]] Collected: 05/02/24 2344    Specimen: Blood Updated: 05/03/24 0000     Extra Tube Hold for add-ons.     Comment: Auto resulted.       Lavender Top [[id number]] Collected: 05/02/24 2344    Specimen: Blood Updated: 05/03/24 0000     Extra Tube hold for add-on     Comment: Auto resulted       Red Top [[id number]] Collected: 05/02/24 2344    Specimen: Blood Updated: 05/03/24 0000     Extra Tube Hold for add-ons.     Comment: Auto resulted.       Light Blue Top [[id number]] Collected: 05/02/24 2344    Specimen: Blood Updated: 05/03/24 0000     Extra Tube Hold for add-ons.     Comment: Auto resulted               Radiology Review:  Imaging Results (Last 72 Hours)       Procedure Component Value Units Date/Time    CT Angiogram Abdomen Pelvis [[id number]] Collected: 05/03/24 0840     Updated: 05/03/24 0858    Narrative:      EXAM/TECHNIQUE: CT angiography with 3D MIP images abdomen and  pelvis  without and with IV contrast, GI bleed protocol     INDICATION: GI Bleed; D64.9-Anemia, unspecified; K92.1-Melena     COMPARISON: Same-day CT     DLP: 2673.14 mGy.cm. Automated exposure control was also utilized to  decrease patient radiation dose.     FINDINGS:     Small focus of contrast blush in the mid rectum on axial image 221 of  series 12, favored to represent a site active bleeding within the mid  rectum. This is not present on precontrast images and progresses between  arterial and portal venous images. No other evidence of active  gastrointestinal bleed.      Sigmoid diverticulosis without evidence of diverticulitis. No colonic  wall thickening or pericolonic fat stranding. Distal esophagus and  stomach are decompressed. No abnormal small bowel distention or evidence  of active small bowel inflammation. Partial left colectomy noted.     Mild atelectasis in the lung bases. No suspicious liver lesion. No  gallstone or gallbladder wall thickening. No biliary dilatation.  Pancreas and adrenal glands are unremarkable. Atrophic spleen. Right  renal cyst. No solid renal mass. No urolithiasis or hydronephrosis.     No ascites or free pelvic fluid. No pelvic mass or pelvic collection.  Prostate and seminal vesicles unremarkable. Nonaneurysmal  atherosclerotic abdominal aorta. No evidence of aortic dissection or  intramural hematoma. No enlarged retroperitoneal, mesenteric, pelvic, or  inguinal lymph node. Right inguinal fat-containing hernia. No acute  osseous finding.       Impression:         Small focus of contrast blush in the mid rectum, in keeping with an area  of active bleeding/extravasation (series 12 image 221). No other site of  active GI bleed identified. Colonic diverticulosis without evidence of  diverticulitis.     This report was signed and finalized on 5/3/2024 8:55 AM by Dr. Suman Lindsay MD.       CT Abdomen Pelvis With Contrast [[id number]] Collected: 05/03/24 0656     Updated:  05/03/24 0703    Narrative:      EXAMINATION: CT ABDOMEN PELVIS W CONTRAST-      5/2/2024 11:55 PM     HISTORY: abdominal cramping with gross hematochezia; D64.9-Anemia,  unspecified; K92.1-Melena     In order to have a CT radiation dose as low as reasonably achievable  Automated Exposure Control was utilized for adjustment of the mA and/or  KV according to patient size.     CT Dose DLP = 403.97 mGy.cm.  (If there are multiple studies performed at the same time this  represents the total dose).     Abdomen/pelvis CT with IV contrast injection.  Axial, sagittal, and coronal sequences.     A preliminary [facility] report was faxed to the Emergency Department  immediately after this study was interpreted at 1:35 a.m.     Normal heart size.  Prominent coronary artery calcification.  Mild chronic change at the lung bases.  Moderate calcification throughout the abdominal aorta.  No aneurysm.     Normal liver, gallbladder, pancreas, and adrenal glands.  Small spleen or spleen remnant measuring 4.6 x 1.3 x 2.8 cm     The kidneys show mild diffuse cortical thinning.  There is a 23 mm exophytic cyst at the lower RIGHT kidney     No bowel dilation.  Moderate fecal material and fluid within the colon.  Colon resection site with a suture line noted at the LEFT lower  quadrant.  No peritoneal air or fluid.     Small fat-containing ventral hernias noted.       Impression:      1. No mass, fluid collection, or inflammatory process is seen.     This report was signed and finalized on 5/3/2024 7:00 AM by Dr. Arjun Gloria MD.               Impression/Plan:    Acute lower gastrointestinal bleeding      Appears to be acute lower GI bleeding with positive CTA in the rectum and diverticulosis.  Possible diverticular bleed.  Cannot rule out AVM versus colonic neoplasm.  The patient is scheduled for transfer to the [facility] today.  Apparently the hospitalist spoke to the gastroenterologist who accepted the patient in transfer  and is being sent to an institution that can provide interventional radiology services should this be needed.  The inpatient GI service remains available should we need to perform an unprepped colonoscopy.  The patient will eventually need a full colonoscopy with appropriate colonic preparation.  Agree with n.p.o. status and transfer.  The above was discussed in detail with the patient, the patient's medical power of  at bedside, the nursing staff in the emergency room in addition to the patient's admitting physician/hospitalist and they all agreed.  Will hold on any endoscopic intervention at this institution as the patient transfer to a tertiary care center is underway.  The patient should follow-up with a gastroenterologist within the week after hospital release as well.  Agree with avoiding aspirin and nonsteroidal anti-inflammatory drugs at this time.  Thank you    Venkat Wetzel MD  05/03/24   09:42 CDT    DISCLAIMER:    This physician works through a [employer] as an inpatient consultant gastroenterologist only and has no outpatient clinic for patient follow up.  Any results not available at time of inpatient discharge and/or GI clinic follow up should be managed by the hospitalist team, PCP, or outpatient gastroenterologist.

## 2024-05-03 NOTE — DISCHARGE SUMMARY
[facility] Medicine Services  DISCHARGE SUMMARY       Date of Admission: 5/2/2024  Date of Discharge:  5/3/2024  Primary Care Physician: Miguel Ángel Hinson DO    Presenting Problem/History of Present Illness:  83-year-old male with history of diverticulitis, hypertension, presented to the emergency department on May 2, 2024 evening complaining of hematochezia that started approximately 8 PM, 2 episodes at home.  Information obtained from patient and family member at bedside.  Reported mild abdominal pain, diffuse, with no associated fevers or vomiting.  No melena or hematemesis    Final Discharge Diagnoses:  Active Hospital Problems    Diagnosis     **Acute lower gastrointestinal bleeding        Consults: Gastroenterology    Procedures Performed: No    Pertinent Test Results:   Results for orders placed during the hospital encounter of 02/25/22    Adult Transthoracic Echo Complete w/ Color, Spectral and Contrast if necessary per protocol    Interpretation Summary  · Left ventricular systolic function is normal. Left ventricular ejection fraction appears to be 61 - 65%.  · Left ventricular wall thickness is consistent with mild concentric hypertrophy.  · Normal right ventricular cavity size and systolic function noted.  · Mild to moderate aortic valve stenosis is present.      Imaging Results (All)       Procedure Component Value Units Date/Time    CT Angiogram Abdomen Pelvis [[id number]] Resulted: 05/03/24 0759     Updated: 05/03/24 0759    CT Abdomen Pelvis With Contrast [[id number]] Collected: 05/03/24 0656     Updated: 05/03/24 0703    Narrative:      EXAMINATION: CT ABDOMEN PELVIS W CONTRAST-      5/2/2024 11:55 PM     HISTORY: abdominal cramping with gross hematochezia; D64.9-Anemia,  unspecified; K92.1-Melena     In order to have a CT radiation dose as low as reasonably achievable  Automated Exposure Control was utilized for adjustment of the mA and/or  KV according to  patient size.     CT Dose DLP = 403.97 mGy.cm.  (If there are multiple studies performed at the same time this  represents the total dose).     Abdomen/pelvis CT with IV contrast injection.  Axial, sagittal, and coronal sequences.     A preliminary [employer] report was faxed to the Emergency Department  immediately after this study was interpreted at 1:35 a.m.     Normal heart size.  Prominent coronary artery calcification.  Mild chronic change at the lung bases.  Moderate calcification throughout the abdominal aorta.  No aneurysm.     Normal liver, gallbladder, pancreas, and adrenal glands.  Small spleen or spleen remnant measuring 4.6 x 1.3 x 2.8 cm     The kidneys show mild diffuse cortical thinning.  There is a 23 mm exophytic cyst at the lower RIGHT kidney     No bowel dilation.  Moderate fecal material and fluid within the colon.  Colon resection site with a suture line noted at the LEFT lower  quadrant.  No peritoneal air or fluid.     Small fat-containing ventral hernias noted.       Impression:      1. No mass, fluid collection, or inflammatory process is seen.     This report was signed and finalized on 5/3/2024 7:00 AM by Dr. Arjun Gloria MD.             LAB RESULTS:      Lab 05/03/24  0539 05/03/24  0320 05/02/24  2344   WBC  --  13.53* 12.02*   HEMOGLOBIN 8.0* 9.3* 9.9*   HEMATOCRIT 25.7* 29.1* 31.0*   PLATELETS  --  221 248   NEUTROS ABS  --  8.76* 8.40*   IMMATURE GRANS (ABS)  --  0.05 0.04   LYMPHS ABS  --  2.74 1.87   MONOS ABS  --  1.56* 1.47*   EOS ABS  --  0.33 0.19   MCV  --  104.3* 104.0*   LACTATE 2.9* 4.8* 3.9*   PROTIME  --   --  14.0   APTT  --   --  25.8         Lab 05/03/24  0539 05/02/24  2344   SODIUM 140 143   POTASSIUM 4.8 4.5   CHLORIDE 107 107   CO2 28.0 26.0   ANION GAP 5.0 10.0   BUN 28* 27*   CREATININE 1.19 1.21   EGFR 61.0 59.8*   GLUCOSE 110* 114*   CALCIUM 8.2* 8.5*   MAGNESIUM 1.8  --    PHOSPHORUS 3.5  --          Lab 05/03/24  0539 05/02/24  2344   TOTAL PROTEIN 5.1* 5.9*  "  ALBUMIN 3.2* 3.7   GLOBULIN 1.9 2.2   ALT (SGPT) 19 23   AST (SGOT) 18 21   BILIRUBIN 0.2 0.2   BILIRUBIN DIRECT <0.2  --    ALK PHOS 89 110         Lab 05/02/24  2344   PROTIME 14.0   INR 1.03             Lab 05/02/24  2344   ABO TYPING O   RH TYPING Positive   ANTIBODY SCREEN Negative         Brief Urine Lab Results  (Last result in the past 365 days)        Color   Clarity   Blood   Leuk Est   Nitrite   Protein   CREAT   Urine HCG        12/14/23 1349 Yellow   Clear   Moderate   Trace   Positive   30 mg/dL                 Microbiology Results (last 10 days)       ** No results found for the last 240 hours. **            Hospital Course: Patient was presented to the hospitalist team, and admitted approximately at 2 AM. Initial hemoglobin at 23:44 was 9.9, down to 9.3 and this morning at 5:40 AM it was 8.0.  At the time of my evaluation 7:30 AM, nurse reported additional episodes of hematochezia while in the emergency department.  He is currently being transfused packed red blood cells. 1 unit was ordered and I will order a second unit of PRBcs. Patient did not complain of abdominal pain on my evaluation.  Vital signs stable, no tachycardia or hypotension.  Aspirin discontinued; antihypertensives on hold. Gastroenterologist was consulted and called from the emergency department, recommending CT angiogram of the abdomen and pelvis, which was ordered, pending report.  Transfer was recommended by specialist for interventional radiology evaluation and possible embolization.  The case was presented to Dr. Hernandez gastroenterologist at [facility], who accepted the case.   Case presented to Hospitalist at receiving facility.      Physical Exam on Discharge:  /80   Pulse 67   Temp 97.9 °F (36.6 °C)   Resp 16   Ht 172.7 cm (68\")   Wt 90.7 kg (200 lb)   SpO2 99%   BMI 30.41 kg/m²   Physical Exam  Constitutional:       Appearance: Normal appearance.   HENT:      Head: Normocephalic and " atraumatic.      Nose: Nose normal.      Mouth/Throat:      Mouth: Mucous membranes are moist.      Pharynx: Oropharynx is clear.   Eyes:      Extraocular Movements: Extraocular movements intact.      Conjunctiva/sclera: Conjunctivae normal.      Pupils: Pupils are equal, round, and reactive to light.   Cardiovascular:      Rate and Rhythm: Normal rate and regular rhythm.      Pulses: Normal pulses.   Pulmonary:      Effort: No respiratory distress.      Breath sounds: Normal breath sounds. No wheezing, rhonchi or rales.   Abdominal:      General: Abdomen is flat. Bowel sounds are normal.      Palpations: Abdomen is soft.      Tenderness: There is no guarding or rebound.   Musculoskeletal:         General: Normal range of motion.      Cervical back: Normal range of motion and neck supple.   Extremities:  No lower extremity edema.  Skin:     Capillary Refill: Capillary refill takes less than 2 seconds.      Coloration: Skin is not jaundiced.      Findings: No rash.   Neurological:      General: No focal deficit present.      Mental Status: Patient is alert, oriented to place time and person.     Sensory: No sensory deficit.      Motor: No weakness.      Coordination: Coordination normal.   Psychiatric:         Mood and Affect: Mood normal.         Behavior: Behavior normal.         Condition on Discharge: Stable    Discharge Disposition:  Transfer to Another Facility    Discharge Medications:     Discharge Medications        New Medications        Instructions Start Date   lactated ringers infusion   100 mL/hr (100 mL/hr), Intravenous, Continuous      ondansetron 2 mg/mL injection  Commonly known as: ZOFRAN   4 mg, Intravenous, Every 6 Hours PRN      pantoprazole 40 MG injection  Commonly known as: PROTONIX   40 mg, Intravenous, Every 12 Hours Scheduled             Changes to Medications        Instructions Start Date   metoprolol tartrate 25 MG tablet  Commonly known as: LOPRESSOR  What changed: when to take this    25 mg, Oral, 2 Times Daily             Continue These Medications        Instructions Start Date   atorvastatin 20 MG tablet  Commonly known as: LIPITOR   20 mg, Oral, Daily      carBAMazepine 200 MG tablet  Commonly known as: TEGretol   200 mg, Oral, 2 Times Daily      cloNIDine 0.1 MG tablet  Commonly known as: CATAPRES   0.1 mg, Oral, As Needed      cyanocobalamin 1000 MCG/ML injection   1,000 mcg, Intramuscular, Every 28 Days      finasteride 5 MG tablet  Commonly known as: PROSCAR   5 mg, Oral, Daily      FLUoxetine 40 MG capsule  Commonly known as: PROzac   40 mg, Oral, Daily      hyoscyamine 0.125 MG SL tablet  Commonly known as: LEVSIN   125 mcg, Oral, Daily PRN      irbesartan 300 MG tablet  Commonly known as: AVAPRO   300 mg, Oral, Daily      methylphenidate 20 MG tablet  Commonly known as: RITALIN   20 mg, Oral, 3 Times Daily      multivitamin with minerals tablet tablet   Oral      mupirocin 2 % ointment  Commonly known as: BACTROBAN   1 application , Topical, As Needed      omeprazole 40 MG capsule  Commonly known as: priLOSEC   40 mg, Oral, Daily      oxyCODONE-acetaminophen  MG per tablet  Commonly known as: PERCOCET   1 tablet, Oral, Every 6 Hours      tamsulosin 0.4 MG capsule 24 hr capsule  Commonly known as: FLOMAX   0.8 mg, Oral, Nightly      terazosin 2 MG capsule  Commonly known as: HYTRIN   2 mg, Oral, Nightly             Stop These Medications      aspirin 81 MG EC tablet            Discharge Diet: N.p.o.    Activity at Discharge: Bedrest     Follow-up Appointments:   Future Appointments   Date Time Provider Department Center   8/2/2024 11:00 AM [facility]   8/7/2024  1:00 PM Katie Diaz APRN [facility]   8/7/2024  2:30 PM Cyril Kurtz PA [facility]   12/12/2024  1:20 PM Abdoul Silva MD [facility]       Test Results Pending at Discharge: CT angiogram abdomen and pelvis    Electronically signed by Sergey Yuan MD, 05/03/24,  08:20 CDT.    Time: 35 minutes.

## 2024-05-03 NOTE — ED NOTES
MD Moore to transfer patient to [facility]; waiting on call back from transfer center.

## 2024-05-03 NOTE — ED NOTES
Patient was educated on the signs and symptoms of a transfusion reaction which may include:    Hives  Itching/Rash/Urticarias  Flushing  Chills  Fever (if greater than 1.8 degrees F or 1 degree C from the pre-transfusion baseline temperature And the increased result is a temperature greater than or equal to 100.4 °F)  Nausea/Vomiting  Chest/Abdominal/Back Pain  Pain at the Infusion Site  Headache  Muscle Aches/Myalgia  Dyspnea/Pulmonary Edema/Rales  Cyanosis  Coughing/Wheezing  Shock  Rigors  Hypoxemia  Hypertension  Hypotension  Abnormal Bleeding  Oliguria/Anuria  Hemoglobinuria  Tachycardia    Patient to notify staff if any signs/symptoms occur.  Patient verbalizes understanding.

## 2024-05-03 NOTE — ED NOTES
This nurse at bedside with MD Avila discussing patient decline and condition. Patient well appearing at this time however bleeding continues, associated with increasing fatigue and shortness of breath. MD aware. Spencer Chavarria, ELEUTERIO to take over care for patient. Consult placed at 0255 for GI consult. This nurse asked Dr. Avila if he would like me to call GI now. Dr. Avila states to call them in the morning instead of now, verified this to be the plan and MD verified.

## 2024-05-03 NOTE — PROGRESS NOTES
Patient with active gastrointestinal bleeding.  Hematochezia.  Evaluated last night by hospitalist.  Admitted to our service.  He is currently in the emergency department.  Last hemoglobin 8.  He is now being n transfused packed red blood cells.  He is not complaining of abdominal pain.  Hemodynamically stable.  The patient I have requested a nuclear medicine bleeding scan.  Gastroenterology consultation in place.    Discussed with intensivist, patient may require embolization.  Will discuss with gastroenterologist the best plan of care for this patient and if that is the case he will need to be transferred to another facility where they can perform this procedure.    NPO  Serial hemoglobin and hematocrit.  Bleeding scan STAT.

## 2024-05-03 NOTE — ED NOTES
On [facility] arrival, they requested EMTALA form which had not been done as it was not required per house super and ED director.   [facility] staff were adamant that this form was required. House supervisor to department to discuss.   MD Moore came down and was agreeable to sign EMTALA, patient family member/POA signed for patient.

## 2024-05-03 NOTE — ED NOTES
This nurse spoke with Dr. Alves regarding patient condition. Informed regarding bleeding being constant from rectum and that GI has not been called, and blood is running over 3.5h. This nurse expressed concern regarding increasing bleeding, patient weakness, although vital signs have not changed dramatically. Verbal order to call GI, increase blood rate, and ask GI for possible bleeding scan.

## 2024-05-03 NOTE — ED NOTES
Report called to [facility]. Guille MCCLELLAN gave report to Fer MCCLELLAN.

## 2024-05-04 LAB
BH BB BLOOD EXPIRATION DATE: NORMAL
BH BB BLOOD EXPIRATION DATE: NORMAL
BH BB BLOOD TYPE BARCODE: 5100
BH BB BLOOD TYPE BARCODE: 9500
BH BB DISPENSE STATUS: NORMAL
BH BB DISPENSE STATUS: NORMAL
BH BB PRODUCT CODE: NORMAL
BH BB PRODUCT CODE: NORMAL
BH BB UNIT NUMBER: NORMAL
BH BB UNIT NUMBER: NORMAL
CROSSMATCH INTERPRETATION: NORMAL
CROSSMATCH INTERPRETATION: NORMAL
UNIT  ABO: NORMAL
UNIT  ABO: NORMAL
UNIT  RH: NORMAL
UNIT  RH: NORMAL

## 2024-05-10 ENCOUNTER — TELEPHONE (OUTPATIENT)
Dept: NEUROSURGERY | Facility: CLINIC | Age: 84
End: 2024-05-10
Payer: MEDICARE

## 2024-05-16 ENCOUNTER — TRANSCRIBE ORDERS (OUTPATIENT)
Dept: ADMINISTRATIVE | Facility: HOSPITAL | Age: 84
End: 2024-05-16
Payer: MEDICARE

## 2024-05-17 ENCOUNTER — HOSPITAL ENCOUNTER (INPATIENT)
Facility: HOSPITAL | Age: 84
LOS: 4 days | Discharge: NURSING, REHAB OR LONG-TERM CARE | End: 2024-05-21
Attending: FAMILY MEDICINE | Admitting: FAMILY MEDICINE
Payer: MEDICARE

## 2024-05-17 DIAGNOSIS — Z74.09 IMPAIRED MOBILITY: ICD-10-CM

## 2024-05-17 DIAGNOSIS — M48.02 SPINAL STENOSIS IN CERVICAL REGION: ICD-10-CM

## 2024-05-17 DIAGNOSIS — R39.12 WEAK URINARY STREAM: ICD-10-CM

## 2024-05-17 DIAGNOSIS — R13.10 DYSPHAGIA, UNSPECIFIED TYPE: Primary | ICD-10-CM

## 2024-05-17 DIAGNOSIS — N40.0 ENLARGED PROSTATE: ICD-10-CM

## 2024-05-17 PROBLEM — M54.9 CHRONIC BACK PAIN: Status: ACTIVE | Noted: 2024-05-17

## 2024-05-17 PROBLEM — N17.9 AKI (ACUTE KIDNEY INJURY): Status: ACTIVE | Noted: 2024-05-17

## 2024-05-17 PROBLEM — Z87.19 H/O: GI BLEED: Status: ACTIVE | Noted: 2024-05-17

## 2024-05-17 PROBLEM — D72.829 LEUKOCYTOSIS: Status: ACTIVE | Noted: 2024-05-17

## 2024-05-17 PROBLEM — G89.29 CHRONIC BACK PAIN: Status: ACTIVE | Noted: 2024-05-17

## 2024-05-17 PROBLEM — R62.7 ADULT FAILURE TO THRIVE: Status: ACTIVE | Noted: 2024-05-17

## 2024-05-17 LAB
ANION GAP SERPL CALCULATED.3IONS-SCNC: 13 MMOL/L (ref 5–15)
BUN SERPL-MCNC: 86 MG/DL (ref 8–23)
BUN/CREAT SERPL: 39.8 (ref 7–25)
CALCIUM SPEC-SCNC: 8.3 MG/DL (ref 8.6–10.5)
CHLORIDE SERPL-SCNC: 100 MMOL/L (ref 98–107)
CO2 SERPL-SCNC: 23 MMOL/L (ref 22–29)
CREAT SERPL-MCNC: 2.16 MG/DL (ref 0.76–1.27)
DEPRECATED RDW RBC AUTO: 52.8 FL (ref 37–54)
EGFRCR SERPLBLD CKD-EPI 2021: 29.8 ML/MIN/1.73
ERYTHROCYTE [DISTWIDTH] IN BLOOD BY AUTOMATED COUNT: 14.8 % (ref 12.3–15.4)
GLUCOSE BLDC GLUCOMTR-MCNC: 194 MG/DL (ref 70–130)
GLUCOSE SERPL-MCNC: 196 MG/DL (ref 65–99)
HCT VFR BLD AUTO: 28.7 % (ref 37.5–51)
HGB BLD-MCNC: 9.1 G/DL (ref 13–17.7)
MCH RBC QN AUTO: 31 PG (ref 26.6–33)
MCHC RBC AUTO-ENTMCNC: 31.7 G/DL (ref 31.5–35.7)
MCV RBC AUTO: 97.6 FL (ref 79–97)
PLATELET # BLD AUTO: 523 10*3/MM3 (ref 140–450)
PMV BLD AUTO: 11.2 FL (ref 6–12)
POTASSIUM SERPL-SCNC: 5.2 MMOL/L (ref 3.5–5.2)
RBC # BLD AUTO: 2.94 10*6/MM3 (ref 4.14–5.8)
SODIUM SERPL-SCNC: 136 MMOL/L (ref 136–145)
WBC NRBC COR # BLD AUTO: 18.28 10*3/MM3 (ref 3.4–10.8)

## 2024-05-17 PROCEDURE — 94799 UNLISTED PULMONARY SVC/PX: CPT

## 2024-05-17 PROCEDURE — 82948 REAGENT STRIP/BLOOD GLUCOSE: CPT

## 2024-05-17 PROCEDURE — 94660 CPAP INITIATION&MGMT: CPT

## 2024-05-17 PROCEDURE — 94640 AIRWAY INHALATION TREATMENT: CPT

## 2024-05-17 PROCEDURE — 25810000003 SODIUM CHLORIDE 0.9 % SOLUTION: Performed by: NURSE PRACTITIONER

## 2024-05-17 PROCEDURE — 80048 BASIC METABOLIC PNL TOTAL CA: CPT | Performed by: NURSE PRACTITIONER

## 2024-05-17 PROCEDURE — 85027 COMPLETE CBC AUTOMATED: CPT | Performed by: NURSE PRACTITIONER

## 2024-05-17 RX ORDER — PANTOPRAZOLE SODIUM 40 MG/1
40 TABLET, DELAYED RELEASE ORAL DAILY
COMMUNITY

## 2024-05-17 RX ORDER — ACETAMINOPHEN 160 MG/5ML
650 SOLUTION ORAL EVERY 4 HOURS PRN
Status: DISCONTINUED | OUTPATIENT
Start: 2024-05-17 | End: 2024-05-21 | Stop reason: HOSPADM

## 2024-05-17 RX ORDER — ATORVASTATIN CALCIUM 10 MG/1
20 TABLET, FILM COATED ORAL NIGHTLY
Status: DISCONTINUED | OUTPATIENT
Start: 2024-05-18 | End: 2024-05-21 | Stop reason: HOSPADM

## 2024-05-17 RX ORDER — TERAZOSIN 2 MG/1
2 CAPSULE ORAL NIGHTLY
Status: DISCONTINUED | OUTPATIENT
Start: 2024-05-17 | End: 2024-05-21 | Stop reason: HOSPADM

## 2024-05-17 RX ORDER — CARBAMAZEPINE 200 MG/1
200 TABLET ORAL DAILY
Status: DISCONTINUED | OUTPATIENT
Start: 2024-05-18 | End: 2024-05-21 | Stop reason: HOSPADM

## 2024-05-17 RX ORDER — POLYETHYLENE GLYCOL 3350 17 G/17G
17 POWDER, FOR SOLUTION ORAL DAILY
Status: DISCONTINUED | OUTPATIENT
Start: 2024-05-17 | End: 2024-05-21 | Stop reason: HOSPADM

## 2024-05-17 RX ORDER — SODIUM CHLORIDE 0.9 % (FLUSH) 0.9 %
10 SYRINGE (ML) INJECTION EVERY 12 HOURS SCHEDULED
Status: DISCONTINUED | OUTPATIENT
Start: 2024-05-17 | End: 2024-05-21 | Stop reason: HOSPADM

## 2024-05-17 RX ORDER — ACETAMINOPHEN 325 MG/1
650 TABLET ORAL EVERY 4 HOURS PRN
Status: DISCONTINUED | OUTPATIENT
Start: 2024-05-17 | End: 2024-05-21 | Stop reason: HOSPADM

## 2024-05-17 RX ORDER — FLUOXETINE HYDROCHLORIDE 20 MG/1
40 CAPSULE ORAL DAILY
Status: DISCONTINUED | OUTPATIENT
Start: 2024-05-18 | End: 2024-05-21 | Stop reason: HOSPADM

## 2024-05-17 RX ORDER — BISACODYL 10 MG
10 SUPPOSITORY, RECTAL RECTAL DAILY PRN
Status: DISCONTINUED | OUTPATIENT
Start: 2024-05-17 | End: 2024-05-21 | Stop reason: HOSPADM

## 2024-05-17 RX ORDER — BUDESONIDE AND FORMOTEROL FUMARATE DIHYDRATE 160; 4.5 UG/1; UG/1
2 AEROSOL RESPIRATORY (INHALATION)
Status: DISCONTINUED | OUTPATIENT
Start: 2024-05-17 | End: 2024-05-21 | Stop reason: HOSPADM

## 2024-05-17 RX ORDER — ATORVASTATIN CALCIUM 10 MG/1
20 TABLET, FILM COATED ORAL NIGHTLY
Status: DISCONTINUED | OUTPATIENT
Start: 2024-05-17 | End: 2024-05-17

## 2024-05-17 RX ORDER — ONDANSETRON 4 MG/1
4 TABLET, ORALLY DISINTEGRATING ORAL EVERY 6 HOURS PRN
Status: DISCONTINUED | OUTPATIENT
Start: 2024-05-17 | End: 2024-05-21 | Stop reason: HOSPADM

## 2024-05-17 RX ORDER — PREDNISONE 20 MG/1
40 TABLET ORAL
Status: DISCONTINUED | OUTPATIENT
Start: 2024-05-18 | End: 2024-05-21 | Stop reason: HOSPADM

## 2024-05-17 RX ORDER — ACETAMINOPHEN 650 MG/1
650 SUPPOSITORY RECTAL EVERY 4 HOURS PRN
Status: DISCONTINUED | OUTPATIENT
Start: 2024-05-17 | End: 2024-05-21 | Stop reason: HOSPADM

## 2024-05-17 RX ORDER — OXYCODONE AND ACETAMINOPHEN 10; 325 MG/1; MG/1
1 TABLET ORAL EVERY 6 HOURS SCHEDULED
Status: DISCONTINUED | OUTPATIENT
Start: 2024-05-17 | End: 2024-05-18

## 2024-05-17 RX ORDER — FINASTERIDE 5 MG/1
5 TABLET, FILM COATED ORAL DAILY
Status: DISCONTINUED | OUTPATIENT
Start: 2024-05-18 | End: 2024-05-21 | Stop reason: HOSPADM

## 2024-05-17 RX ORDER — LOSARTAN POTASSIUM 50 MG/1
100 TABLET ORAL
Status: DISCONTINUED | OUTPATIENT
Start: 2024-05-18 | End: 2024-05-18

## 2024-05-17 RX ORDER — SODIUM CHLORIDE 9 MG/ML
75 INJECTION, SOLUTION INTRAVENOUS CONTINUOUS
Status: DISCONTINUED | OUTPATIENT
Start: 2024-05-17 | End: 2024-05-21 | Stop reason: HOSPADM

## 2024-05-17 RX ORDER — POLYETHYLENE GLYCOL 3350 17 G/17G
17 POWDER, FOR SOLUTION ORAL DAILY
COMMUNITY

## 2024-05-17 RX ORDER — IPRATROPIUM BROMIDE AND ALBUTEROL SULFATE 2.5; .5 MG/3ML; MG/3ML
3 SOLUTION RESPIRATORY (INHALATION)
COMMUNITY

## 2024-05-17 RX ORDER — MULTIPLE VITAMINS W/ MINERALS TAB 9MG-400MCG
1 TAB ORAL DAILY
Status: DISCONTINUED | OUTPATIENT
Start: 2024-05-18 | End: 2024-05-21 | Stop reason: HOSPADM

## 2024-05-17 RX ORDER — BUMETANIDE 0.5 MG/1
0.5 TABLET ORAL DAILY
COMMUNITY
End: 2024-05-21 | Stop reason: HOSPADM

## 2024-05-17 RX ORDER — AMIODARONE HYDROCHLORIDE 200 MG/1
200 TABLET ORAL 2 TIMES DAILY
COMMUNITY

## 2024-05-17 RX ORDER — NYSTATIN 100000 U/G
1 CREAM TOPICAL 2 TIMES DAILY
COMMUNITY

## 2024-05-17 RX ORDER — ASPIRIN 81 MG/1
81 TABLET ORAL NIGHTLY
Status: DISCONTINUED | OUTPATIENT
Start: 2024-05-18 | End: 2024-05-21 | Stop reason: HOSPADM

## 2024-05-17 RX ORDER — ASPIRIN 81 MG/1
81 TABLET ORAL NIGHTLY
Status: DISCONTINUED | OUTPATIENT
Start: 2024-05-17 | End: 2024-05-17

## 2024-05-17 RX ORDER — SODIUM CHLORIDE 9 MG/ML
40 INJECTION, SOLUTION INTRAVENOUS AS NEEDED
Status: DISCONTINUED | OUTPATIENT
Start: 2024-05-17 | End: 2024-05-21 | Stop reason: HOSPADM

## 2024-05-17 RX ORDER — POLYETHYLENE GLYCOL 3350 17 G/17G
17 POWDER, FOR SOLUTION ORAL DAILY PRN
Status: DISCONTINUED | OUTPATIENT
Start: 2024-05-17 | End: 2024-05-21 | Stop reason: HOSPADM

## 2024-05-17 RX ORDER — PANTOPRAZOLE SODIUM 40 MG/1
40 TABLET, DELAYED RELEASE ORAL
Status: DISCONTINUED | OUTPATIENT
Start: 2024-05-18 | End: 2024-05-21 | Stop reason: HOSPADM

## 2024-05-17 RX ORDER — NYSTATIN 100000 U/G
1 CREAM TOPICAL 2 TIMES DAILY
Status: DISCONTINUED | OUTPATIENT
Start: 2024-05-17 | End: 2024-05-21 | Stop reason: HOSPADM

## 2024-05-17 RX ORDER — FLUTICASONE PROPIONATE AND SALMETEROL 250; 50 UG/1; UG/1
1 POWDER RESPIRATORY (INHALATION)
COMMUNITY

## 2024-05-17 RX ORDER — IPRATROPIUM BROMIDE AND ALBUTEROL SULFATE 2.5; .5 MG/3ML; MG/3ML
3 SOLUTION RESPIRATORY (INHALATION)
Status: DISCONTINUED | OUTPATIENT
Start: 2024-05-17 | End: 2024-05-21 | Stop reason: HOSPADM

## 2024-05-17 RX ORDER — AMOXICILLIN 250 MG
2 CAPSULE ORAL 2 TIMES DAILY PRN
Status: DISCONTINUED | OUTPATIENT
Start: 2024-05-17 | End: 2024-05-21 | Stop reason: HOSPADM

## 2024-05-17 RX ORDER — NITROGLYCERIN 0.4 MG/1
0.4 TABLET SUBLINGUAL
Status: DISCONTINUED | OUTPATIENT
Start: 2024-05-17 | End: 2024-05-21 | Stop reason: HOSPADM

## 2024-05-17 RX ORDER — ONDANSETRON 2 MG/ML
4 INJECTION INTRAMUSCULAR; INTRAVENOUS EVERY 6 HOURS PRN
Status: DISCONTINUED | OUTPATIENT
Start: 2024-05-17 | End: 2024-05-21 | Stop reason: HOSPADM

## 2024-05-17 RX ORDER — BISACODYL 5 MG/1
5 TABLET, DELAYED RELEASE ORAL DAILY PRN
Status: DISCONTINUED | OUTPATIENT
Start: 2024-05-17 | End: 2024-05-21 | Stop reason: HOSPADM

## 2024-05-17 RX ORDER — SODIUM CHLORIDE 0.9 % (FLUSH) 0.9 %
10 SYRINGE (ML) INJECTION AS NEEDED
Status: DISCONTINUED | OUTPATIENT
Start: 2024-05-17 | End: 2024-05-21 | Stop reason: HOSPADM

## 2024-05-17 RX ORDER — AMIODARONE HYDROCHLORIDE 200 MG/1
200 TABLET ORAL 2 TIMES DAILY
Status: DISCONTINUED | OUTPATIENT
Start: 2024-05-17 | End: 2024-05-21 | Stop reason: HOSPADM

## 2024-05-17 RX ADMIN — APIXABAN 2.5 MG: 2.5 TABLET, FILM COATED ORAL at 20:44

## 2024-05-17 RX ADMIN — SODIUM CHLORIDE 75 ML/HR: 9 INJECTION, SOLUTION INTRAVENOUS at 20:46

## 2024-05-17 RX ADMIN — OXYCODONE HYDROCHLORIDE AND ACETAMINOPHEN 1 TABLET: 10; 325 TABLET ORAL at 20:44

## 2024-05-17 RX ADMIN — NYSTATIN 1 APPLICATION: 100000 CREAM TOPICAL at 20:44

## 2024-05-17 RX ADMIN — BUDESONIDE AND FORMOTEROL FUMARATE DIHYDRATE 2 PUFF: 160; 4.5 AEROSOL RESPIRATORY (INHALATION) at 23:18

## 2024-05-17 RX ADMIN — AMIODARONE HYDROCHLORIDE 200 MG: 200 TABLET ORAL at 20:45

## 2024-05-17 RX ADMIN — POLYETHYLENE GLYCOL 3350 17 G: 17 POWDER, FOR SOLUTION ORAL at 20:46

## 2024-05-17 RX ADMIN — METOPROLOL TARTRATE 12.5 MG: 25 TABLET, FILM COATED ORAL at 20:45

## 2024-05-17 RX ADMIN — TERAZOSIN HYDROCHLORIDE 2 MG: 2 CAPSULE ORAL at 20:45

## 2024-05-17 RX ADMIN — Medication 10 ML: at 20:53

## 2024-05-17 RX ADMIN — IPRATROPIUM BROMIDE AND ALBUTEROL SULFATE 3 ML: .5; 3 SOLUTION RESPIRATORY (INHALATION) at 21:06

## 2024-05-17 NOTE — PLAN OF CARE
Goal Outcome Evaluation:  Plan of Care Reviewed With: patient        Progress: no change  Outcome Evaluation: DA from [facility] today. Plan to find placement here in [place] and Palliative care plan

## 2024-05-17 NOTE — H&P
[facility] Medicine Services  HISTORY AND PHYSICAL    Date of Admission: 5/17/2024  Primary Care Physician: Miguel Ángel Hinson,     Subjective   Primary Historian: Medical record    Chief Complaint: Transferred in back to [facility]    History of Present Illness  Spencer Moore is an 83-year-old male with a past medical history of diveticulitis, hypertension GERD, enlarged prostate, chronic anemia, hearing loss, please see below for complete list.  Patient recently admitted 5/2 - 5/3, 2024 with hematochezia that started on 5/1. GI did see patient and recommended CT angiogram of the abdomen pelvis with recommendation for interventional radiology evaluation and possible embolization. Patient was held in the ED due to bed availability, due to ongoing bleeding he did receive 2 unit of packed red blood cells.   Patient was subsequently transferred into the care of Dr. Hernandez gastroenterology as [facility].    Transferred back today after admission 5/3 - 5/16, 2024 (no bed availability yesterday).  Patient underwent flex sigmoidostomy on 5/4, no obvious bleeding found.  During procedure he developed A-fib RVR with concerns for cardiac ischemia.  Cardiology was consulted he had a heart cath on 5/5 showing severe multivessel coronary artery disease he was deemed nonsurgical candidate and medical management recommended.  During his stay he developed acute hypoxic respiratory failure and pulmonary edema requiring diuresis and treatment for COPD exacerbation with IV steroids and bronchodilators.  At time of discharge she continues to need oxygen, pulmonary was following him at outlying facility.  He was treated with ceftriaxone and vancomycin due to concerns for sepsis.  Patient did complete 6 of 7 days antibiotic course last dose 5/14.  Palliative care consult completed DNR/DNI with wishes for transfer back to [facility] to be closer to home and pursue  rehab placement.  Discharge diagnoses from outlying facility acute respiratory failure with hypoxia, acute pulmonary edema, COPD exacerbation,'s coronary artery disease, resolved A-fib with RVR now controlled right, moderate aortic stenosis.  See below for recommendations moving forward.    Patient admitted direct admit room 402.  He has no significant complaints.  Vital signs are stable.  Patient is alert and oriented.  He has bilateral hearing aids and use.  He has oxygen in use and is asking for it to be taped to his face because it wants AN.  He is also asking for BiPAP at night.  He seems to be quite anxious about this I did assure him that I would order it as needed and at night.  I had a long discussion with family Juni and brandon Jane regarding his care.  He is a DNR/DNI and I will consult palliative care.  Goal is for patient to be transferred to rehab and or nursing home as he lives alone without anyone to assist with his care in home.  Patient has no complaints of pain.  He is somewhat dyspneic, saturation 99% on 3 L.  We will attempt to taper and discontinue.  Patient did not have home oxygen prior to initial admission 5/2/2024.  Is admitted for further evaluation treatment.    Review of Systems   Otherwise complete ROS reviewed and negative except as mentioned in the HPI.    Past Medical History:   Past Medical History:   Diagnosis Date    Anemia     Arthritis     Bilateral impacted cerumen 12/10/2018    Diverticulitis     Enlarged prostate     ETD (eustachian tube dysfunction)     GERD (gastroesophageal reflux disease)     Hypertension     Kidney infection     Lumbago     Narcolepsy     Sensorineural hearing loss (SNHL) of both ears 6/18/2018    SNHL (sensorineural hearing loss)     Tinnitus     Vitamin B12 deficiency     Weak urinary stream      Past Surgical History:  Past Surgical History:   Procedure Laterality Date    ABDOMINAL SURGERY      CATARACT EXTRACTION Left     HAND SURGERY Left     HERNIA  REPAIR      REPLACEMENT TOTAL KNEE Left     ROTATOR CUFF REPAIR      TONSILLECTOMY       Social History:  reports that he has never smoked. He has never used smokeless tobacco. He reports that he does not drink alcohol and does not use drugs.    Family History: He was adopted. Family history is unknown by patient.       Allergies:  Allergies   Allergen Reactions    Biaxin [Clarithromycin] GI Intolerance     unknown    Parafon Forte Dsc [Chlorzoxazone] Other (See Comments)     Swallowing issues       Medications:    Prior to Admission medications    Medication Sig Start Date End Date Taking? Authorizing Provider   aspirin 81 MG EC tablet Take 1 tablet by mouth Every Night.    Caroline Stephenson MD   atorvastatin (LIPITOR) 20 MG tablet Take 1 tablet by mouth Daily. 10/20/21   Caroline Stephenson MD   carBAMazepine (TEGretol) 200 MG tablet Take 1 tablet by mouth 2 (Two) Times a Day.    Caroline Stephenson MD   CloNIDine (CATAPRES) 0.1 MG tablet Take 1 tablet by mouth As Needed for High Blood Pressure (1 tablet if systolic is over 170).    Caroline Stephenson MD   cyanocobalamin 1000 MCG/ML injection Inject 1 mL into the appropriate muscle as directed by prescriber Every 28 (Twenty-Eight) Days. 4/2/20   Rosi Baker DO   finasteride (PROSCAR) 5 MG tablet Take 1 tablet by mouth Daily. 12/14/23   Abdoul Silva MD   FLUoxetine (PROzac) 40 MG capsule Take 1 capsule by mouth Daily. 11/3/16   Caroline Stephenson MD   hyoscyamine (LEVSIN) 0.125 MG SL tablet Take 1 tablet by mouth Daily As Needed. 7/3/20   Caroline Stephenson MD   irbesartan (AVAPRO) 300 MG tablet Take 1 tablet by mouth Daily. 1/20/20   Caroline Stephenson MD   lactated ringers infusion Infuse 100 mL/hr into a venous catheter Continuous. 5/3/24   Sergey Yuan MD   methylphenidate (RITALIN) 20 MG tablet Take 1 tablet by mouth 3 (Three) Times a Day. 4/3/24   Spencer Shaffer MD   metoprolol tartrate (LOPRESSOR) 25 MG tablet  Take 0.5 tablets by mouth 2 (Two) Times a Day.    Caroline Stephenson MD   Multiple Vitamins-Minerals (MULTIVITAMIN ADULT PO) Take  by mouth.    Caroline Stephenson MD   mupirocin (BACTROBAN) 2 % ointment Apply 1 Application topically to the appropriate area as directed As Needed. 9/1/20   Caroline Stephenson MD   omeprazole (priLOSEC) 40 MG capsule Take 1 capsule by mouth Daily. 12/4/16   Caroline Stephenson MD   ondansetron (ZOFRAN) 2 mg/mL injection Infuse 2 mL into a venous catheter Every 6 (Six) Hours As Needed for Nausea or Vomiting. 5/3/24   Sergey Yuan MD   oxyCODONE-acetaminophen (PERCOCET)  MG per tablet Take 1 tablet by mouth Every 6 (Six) Hours. 6/21/23   Caroline Stephenson MD   pantoprazole (PROTONIX) 40 MG injection Infuse 10 mL into a venous catheter Every 12 (Twelve) Hours. Indications: Gastrointestinal (GI) Bleed 5/3/24   Sergey Yuan MD   predniSONE (DELTASONE) 10 MG tablet Take  by mouth Daily. 4 tablets daily for 3 days,  3 tablets daily for 3 days,  2 tablets daily for 3 days,  1 tablet daily for 3 days,   1/2 tablet daily for 2 days. 4/22/24   Caroline Stephenson MD   tamsulosin (FLOMAX) 0.4 MG capsule 24 hr capsule Take 2 capsules by mouth Every Night. 12/14/23   Abdoul Silva MD   terazosin (HYTRIN) 2 MG capsule Take 1 capsule by mouth Every Night. 11/8/16   Caroline Stephenson MD     I have utilized all available immediate resources to obtain, update, or review the patient's current medications (including all prescriptions, over-the-counter products, herbals, cannabis/cannabidiol products, and vitamin/mineral/dietary (nutritional) supplements).    Objective     Vital Signs: /82 (BP Location: Left arm, Patient Position: Lying)   Pulse 89   Temp 97.6 °F (36.4 °C) (Oral)   Resp 20   Wt 83.6 kg (184 lb 4.8 oz)   SpO2 99%   BMI 28.02 kg/m²   Physical Exam  Vitals reviewed.   Constitutional:       Appearance: He is obese. He is ill-appearing.    HENT:      Head: Normocephalic and atraumatic.      Ears:      Comments: Bilateral hearing aids, communication difficult due to hearing loss     Mouth/Throat:      Mouth: Mucous membranes are dry.      Pharynx: Oropharynx is clear.   Eyes:      Extraocular Movements: Extraocular movements intact.      Conjunctiva/sclera: Conjunctivae normal.   Cardiovascular:      Rate and Rhythm: Normal rate.      Comments: Currently normal sinus rhythm in the 70s however has had episodes of A-fib since arrival  Pulmonary:      Breath sounds: Wheezing present.      Comments: Tachypneic, oxygen saturation on 3 L 99%.  I do feel component of anxiety is cause  Abdominal:      General: There is no distension.      Palpations: Abdomen is soft.   Musculoskeletal:         General: Deformity (Unable to bend right leg-chronic) present.      Cervical back: Normal range of motion and neck supple.      Right lower leg: No edema.      Left lower leg: No edema.      Comments: Generalized weakness and debility   Skin:     General: Skin is warm and dry.      Coloration: Skin is pale.   Neurological:      General: No focal deficit present.      Mental Status: He is alert and oriented to person, place, and time.   Psychiatric:         Mood and Affect: Mood normal.         Behavior: Behavior normal.        Results Reviewed:  Obtained from [facility] medical record medical record  5/16/2024  Sodium 135, potassium 4.6, BUN 82, creatinine 2.16, glucose 182  CBC white count 15.1, hemoglobin 8.8, hematocrit 27, platelet count 412,000    Lab Results (last 24 hours)       Procedure Component Value Units Date/Time    Basic Metabolic Panel [[id number]]  (Abnormal) Collected: 05/17/24 1745    Specimen: Blood Updated: 05/17/24 1811     Glucose 196 mg/dL      BUN 86 mg/dL      Creatinine 2.16 mg/dL      Sodium 136 mmol/L      Potassium 5.2 mmol/L      Comment: Slight hemolysis detected by analyzer. Result may be falsely elevated.        Chloride 100  mmol/L      CO2 23.0 mmol/L      Calcium 8.3 mg/dL      BUN/Creatinine Ratio 39.8     Anion Gap 13.0 mmol/L      eGFR 29.8 mL/min/1.73     CBC (No Diff) [[id number]]  (Abnormal) Collected: 05/17/24 1745    Specimen: Blood Updated: 05/17/24 1754     WBC 18.28 10*3/mm3      RBC 2.94 10*6/mm3      Hemoglobin 9.1 g/dL      Hematocrit 28.7 %      MCV 97.6 fL      MCH 31.0 pg      MCHC 31.7 g/dL      RDW 14.8 %      RDW-SD 52.8 fl      MPV 11.2 fL      Platelets 523 10*3/mm3           Imaging Results (Last 24 Hours)       ** No results found for the last 24 hours. **          Heart catheterization 5/5/2024 at [facility]  Moderate aortic stenosis, EF 66%, multivessel disease, recommendations for continuation of aspirin, statin amiodarone and transition heparin to Eliquis    Flex sigmoidoscopy 5/4/2024  Findings 1.  Erythematous tissue and what we could visualize but no obvious culprit of bleeding or recent bleeding  2.  Rectum unprepared: After extensive cleaning no lesions found.  Large internal hemorrhoids    Assessment / Plan   Assessment:   Active Hospital Problems    Diagnosis     Leukocytosis, unknown etiology-suspect steroid     Chronic back pain, multilevel     ASTRID (acute kidney injury)     H/O: GI bleed     Adult failure to thrive     Hypertension        Treatment Plan  1.  The patient will be admitted to Dr. Nye service here at [facility].   2.  Records reviewed from outlying facility  3.  Transfer medications reviewed and restarted as appropriate  4.  Taper steroid, transition from Solu-Medrol 40mg twice daily to prednisone 40 mg daily-start in a.m.  5.  Decrease Eliquis dose from 5 mg twice daily to 2.5 mg twice daily due to kidney failure  6.  Consult PT, OT and   7.  Supplemental oxygen, patient is requesting BiPAP at night, incentive spirometry, continuous pulse oximetry, DuoNebs  8.  Labs in a.m.  9.  Mechanical soft diet ground meats  10.  Oral care,  turn every 2 hours, up with assistance  11.  Routine telemetry orders  12.  Gentle hydration NS at 75ml/hour  13.  Consult palliative care nurse    Discharge recommendation from outlying facility: Continue to wean steroids, continue DuoNebs and supplemental oxygen, wean as tolerated  Home May need home oxygen evaluation at discharge  PT/OT for placement  May need nephrology consult for ASTRID    Medical Decision Making  Number and Complexity of problems: 5  Differential Diagnosis: None    Conditions and Status        Condition is unchanged.     [facility] Data  External documents reviewed: Records from outlying facility  Labs reviewed: Yes  Any tests that were considered but not ordered: No     Decision rules/scores evaluated (example [id number], Wells, etc): No     Discussed with: Patient, family at bedside and Dr. Nye     Care Planning  Shared decision making: Patient, family at bedside Dr. Nye  Code status and discussions: NR/DNI    Disposition  Social Determinants of Health that impact treatment or disposition: Lives alone  Estimated length of stay is 2+ days.     I confirmed that the patient's advanced care plan is present, code status is documented, and a surrogate decision maker is listed in the patient's medical record.     The patient's surrogate decision maker is family.     The patient was seen and examined by me on 5/17/2024 at 5:01 PM.    Electronically signed by DYANA Solitario, 05/17/24, 20:49 CDT.

## 2024-05-18 LAB
ACANTHOCYTES BLD QL SMEAR: ABNORMAL
ALBUMIN SERPL-MCNC: 3.1 G/DL (ref 3.5–5.2)
ALBUMIN/GLOB SERPL: 1.3 G/DL
ALP SERPL-CCNC: 89 U/L (ref 39–117)
ALT SERPL W P-5'-P-CCNC: 85 U/L (ref 1–41)
ANION GAP SERPL CALCULATED.3IONS-SCNC: 9 MMOL/L (ref 5–15)
ANISOCYTOSIS BLD QL: ABNORMAL
AST SERPL-CCNC: 41 U/L (ref 1–40)
BASOPHILS # BLD MANUAL: 0 10*3/MM3 (ref 0–0.2)
BASOPHILS NFR BLD MANUAL: 0 % (ref 0–1.5)
BILIRUB SERPL-MCNC: 0.4 MG/DL (ref 0–1.2)
BUN SERPL-MCNC: 89 MG/DL (ref 8–23)
BUN/CREAT SERPL: 43 (ref 7–25)
BURR CELLS BLD QL SMEAR: ABNORMAL
CALCIUM SPEC-SCNC: 8 MG/DL (ref 8.6–10.5)
CHLORIDE SERPL-SCNC: 102 MMOL/L (ref 98–107)
CO2 SERPL-SCNC: 24 MMOL/L (ref 22–29)
CREAT SERPL-MCNC: 2.07 MG/DL (ref 0.76–1.27)
DEPRECATED RDW RBC AUTO: 53.2 FL (ref 37–54)
EGFRCR SERPLBLD CKD-EPI 2021: 31.4 ML/MIN/1.73
EOSINOPHIL # BLD MANUAL: 0 10*3/MM3 (ref 0–0.4)
EOSINOPHIL NFR BLD MANUAL: 0 % (ref 0.3–6.2)
ERYTHROCYTE [DISTWIDTH] IN BLOOD BY AUTOMATED COUNT: 14.7 % (ref 12.3–15.4)
GLOBULIN UR ELPH-MCNC: 2.4 GM/DL
GLUCOSE SERPL-MCNC: 151 MG/DL (ref 65–99)
HCT VFR BLD AUTO: 26.8 % (ref 37.5–51)
HGB BLD-MCNC: 8.3 G/DL (ref 13–17.7)
LYMPHOCYTES # BLD MANUAL: 0.19 10*3/MM3 (ref 0.7–3.1)
LYMPHOCYTES NFR BLD MANUAL: 5.1 % (ref 5–12)
MCH RBC QN AUTO: 30.6 PG (ref 26.6–33)
MCHC RBC AUTO-ENTMCNC: 31 G/DL (ref 31.5–35.7)
MCV RBC AUTO: 98.9 FL (ref 79–97)
MONOCYTES # BLD: 0.97 10*3/MM3 (ref 0.1–0.9)
NEUTROPHILS # BLD AUTO: 17.85 10*3/MM3 (ref 1.7–7)
NEUTROPHILS NFR BLD MANUAL: 93.9 % (ref 42.7–76)
NRBC SPEC MANUAL: 18.2 /100 WBC (ref 0–0.2)
PLATELET # BLD AUTO: 502 10*3/MM3 (ref 140–450)
PMV BLD AUTO: 11.2 FL (ref 6–12)
POIKILOCYTOSIS BLD QL SMEAR: ABNORMAL
POLYCHROMASIA BLD QL SMEAR: ABNORMAL
POTASSIUM SERPL-SCNC: 5 MMOL/L (ref 3.5–5.2)
PROT SERPL-MCNC: 5.5 G/DL (ref 6–8.5)
RBC # BLD AUTO: 2.71 10*6/MM3 (ref 4.14–5.8)
SMALL PLATELETS BLD QL SMEAR: ABNORMAL
SODIUM SERPL-SCNC: 135 MMOL/L (ref 136–145)
TARGETS BLD QL SMEAR: ABNORMAL
VARIANT LYMPHS NFR BLD MANUAL: 1 % (ref 19.6–45.3)
WBC MORPH BLD: NORMAL
WBC NRBC COR # BLD AUTO: 19.01 10*3/MM3 (ref 3.4–10.8)

## 2024-05-18 PROCEDURE — 63710000001 PREDNISONE PER 1 MG: Performed by: NURSE PRACTITIONER

## 2024-05-18 PROCEDURE — 94799 UNLISTED PULMONARY SVC/PX: CPT

## 2024-05-18 PROCEDURE — 85025 COMPLETE CBC W/AUTO DIFF WBC: CPT | Performed by: NURSE PRACTITIONER

## 2024-05-18 PROCEDURE — 97166 OT EVAL MOD COMPLEX 45 MIN: CPT | Performed by: OCCUPATIONAL THERAPIST

## 2024-05-18 PROCEDURE — 80053 COMPREHEN METABOLIC PANEL: CPT | Performed by: NURSE PRACTITIONER

## 2024-05-18 PROCEDURE — 94761 N-INVAS EAR/PLS OXIMETRY MLT: CPT

## 2024-05-18 PROCEDURE — 25810000003 SODIUM CHLORIDE 0.9 % SOLUTION: Performed by: FAMILY MEDICINE

## 2024-05-18 PROCEDURE — 25810000003 SODIUM CHLORIDE 0.9 % SOLUTION: Performed by: NURSE PRACTITIONER

## 2024-05-18 PROCEDURE — 94660 CPAP INITIATION&MGMT: CPT

## 2024-05-18 PROCEDURE — 94664 DEMO&/EVAL PT USE INHALER: CPT

## 2024-05-18 PROCEDURE — 85007 BL SMEAR W/DIFF WBC COUNT: CPT | Performed by: NURSE PRACTITIONER

## 2024-05-18 RX ORDER — OXYCODONE AND ACETAMINOPHEN 10; 325 MG/1; MG/1
1 TABLET ORAL EVERY 6 HOURS PRN
Status: DISCONTINUED | OUTPATIENT
Start: 2024-05-18 | End: 2024-05-21 | Stop reason: HOSPADM

## 2024-05-18 RX ADMIN — PANTOPRAZOLE SODIUM 40 MG: 40 TABLET, DELAYED RELEASE ORAL at 04:58

## 2024-05-18 RX ADMIN — IPRATROPIUM BROMIDE AND ALBUTEROL SULFATE 3 ML: .5; 3 SOLUTION RESPIRATORY (INHALATION) at 19:45

## 2024-05-18 RX ADMIN — IPRATROPIUM BROMIDE AND ALBUTEROL SULFATE 3 ML: .5; 3 SOLUTION RESPIRATORY (INHALATION) at 11:03

## 2024-05-18 RX ADMIN — OXYCODONE HYDROCHLORIDE AND ACETAMINOPHEN 1 TABLET: 10; 325 TABLET ORAL at 09:23

## 2024-05-18 RX ADMIN — IPRATROPIUM BROMIDE AND ALBUTEROL SULFATE 3 ML: .5; 3 SOLUTION RESPIRATORY (INHALATION) at 06:51

## 2024-05-18 RX ADMIN — CARBAMAZEPINE 200 MG: 200 TABLET ORAL at 09:25

## 2024-05-18 RX ADMIN — LOSARTAN POTASSIUM 100 MG: 50 TABLET, FILM COATED ORAL at 09:24

## 2024-05-18 RX ADMIN — PREDNISONE 40 MG: 20 TABLET ORAL at 09:25

## 2024-05-18 RX ADMIN — ATORVASTATIN CALCIUM 20 MG: 10 TABLET, FILM COATED ORAL at 21:02

## 2024-05-18 RX ADMIN — BISACODYL 5 MG: 5 TABLET, COATED ORAL at 10:32

## 2024-05-18 RX ADMIN — Medication 10 ML: at 09:25

## 2024-05-18 RX ADMIN — SODIUM CHLORIDE 75 ML/HR: 9 INJECTION, SOLUTION INTRAVENOUS at 10:32

## 2024-05-18 RX ADMIN — SODIUM CHLORIDE 1000 ML: 9 INJECTION, SOLUTION INTRAVENOUS at 12:47

## 2024-05-18 RX ADMIN — BUDESONIDE AND FORMOTEROL FUMARATE DIHYDRATE 2 PUFF: 160; 4.5 AEROSOL RESPIRATORY (INHALATION) at 19:46

## 2024-05-18 RX ADMIN — METOPROLOL TARTRATE 12.5 MG: 25 TABLET, FILM COATED ORAL at 09:24

## 2024-05-18 RX ADMIN — AMIODARONE HYDROCHLORIDE 200 MG: 200 TABLET ORAL at 09:25

## 2024-05-18 RX ADMIN — ASPIRIN 81 MG: 81 TABLET, COATED ORAL at 21:02

## 2024-05-18 RX ADMIN — BUDESONIDE AND FORMOTEROL FUMARATE DIHYDRATE 2 PUFF: 160; 4.5 AEROSOL RESPIRATORY (INHALATION) at 06:58

## 2024-05-18 RX ADMIN — FLUOXETINE HYDROCHLORIDE 40 MG: 20 CAPSULE ORAL at 09:24

## 2024-05-18 RX ADMIN — SENNOSIDES AND DOCUSATE SODIUM 2 TABLET: 50; 8.6 TABLET ORAL at 10:32

## 2024-05-18 RX ADMIN — APIXABAN 2.5 MG: 2.5 TABLET, FILM COATED ORAL at 21:02

## 2024-05-18 RX ADMIN — FINASTERIDE 5 MG: 5 TABLET, FILM COATED ORAL at 09:25

## 2024-05-18 RX ADMIN — POLYETHYLENE GLYCOL 3350 17 G: 17 POWDER, FOR SOLUTION ORAL at 09:22

## 2024-05-18 RX ADMIN — NYSTATIN 1 APPLICATION: 100000 CREAM TOPICAL at 09:22

## 2024-05-18 RX ADMIN — Medication 10 ML: at 21:04

## 2024-05-18 RX ADMIN — AMIODARONE HYDROCHLORIDE 200 MG: 200 TABLET ORAL at 21:02

## 2024-05-18 RX ADMIN — NYSTATIN 1 APPLICATION: 100000 CREAM TOPICAL at 21:04

## 2024-05-18 RX ADMIN — OXYCODONE HYDROCHLORIDE AND ACETAMINOPHEN 1 TABLET: 10; 325 TABLET ORAL at 03:09

## 2024-05-18 RX ADMIN — APIXABAN 2.5 MG: 2.5 TABLET, FILM COATED ORAL at 09:25

## 2024-05-18 RX ADMIN — IPRATROPIUM BROMIDE AND ALBUTEROL SULFATE 3 ML: .5; 3 SOLUTION RESPIRATORY (INHALATION) at 15:09

## 2024-05-18 NOTE — PROGRESS NOTES
"    [facility] Medicine Services  INPATIENT PROGRESS NOTE    Patient Name: Spencer Moore  Date of Admission: 5/17/2024  Today's Date: 05/18/24  Length of Stay: 1  Primary Care Physician: Miguel Ángel Hinson DO    Subjective   Chief Complaint: Weakness  HPI   He is hypotensive this morning and feels weak. States \"do not worry I know where I am going.\"    Review of Systems   All pertinent negatives and positives are as above. All other systems have been reviewed and are negative unless otherwise stated.     Objective    Temp:  [97.6 °F (36.4 °C)-98.2 °F (36.8 °C)] 97.7 °F (36.5 °C)  Heart Rate:  [59-89] 59  Resp:  [16-27] 18  BP: (118-145)/(72-87) 127/86  Physical Exam  Vitals reviewed.   Constitutional:       Appearance: He is obese. He is ill-appearing.   HENT:      Head: Normocephalic and atraumatic.      Ears:      Comments: Bilateral hearing aids, communication difficult due to hearing loss     Mouth/Throat:      Mouth: Mucous membranes are dry.      Pharynx: Oropharynx is clear.   Eyes:      Extraocular Movements: Extraocular movements intact.      Conjunctiva/sclera: Conjunctivae normal.   Cardiovascular:      Rate and Rhythm: Normal rate.      Comments: Currently normal sinus rhythm in the 70s however has had episodes of A-fib since arrival  Pulmonary:      Breath sounds: Wheezing present.      Comments: Tachypneic, oxygen saturation on 3 L 99%.  I do feel component of anxiety is cause  Abdominal:      General: There is no distension.      Palpations: Abdomen is soft.   Musculoskeletal:         General: Deformity (Unable to bend right leg-chronic) present.      Cervical back: Normal range of motion and neck supple.      Right lower leg: No edema.      Left lower leg: No edema.      Comments: Generalized weakness and debility   Skin:     General: Skin is warm and dry.      Coloration: Skin is pale.   Neurological:      General: No focal deficit present.      Mental Status: " "He is alert and oriented to person, place, and time.   Psychiatric:         Mood and Affect: Mood normal.         Behavior: Behavior normal.       Results Review:  I have reviewed the labs, radiology results, and diagnostic studies.    Laboratory Data:   Results from last 7 days   Lab Units 05/18/24  0503 05/17/24  1745   WBC 10*3/mm3 19.01* 18.28*   HEMOGLOBIN g/dL 8.3* 9.1*   HEMATOCRIT % 26.8* 28.7*   PLATELETS 10*3/mm3 502* 523*        Results from last 7 days   Lab Units 05/18/24  0503 05/17/24  1745   SODIUM mmol/L 135* 136   POTASSIUM mmol/L 5.0 5.2   CHLORIDE mmol/L 102 100   CO2 mmol/L 24.0 23.0   BUN mg/dL 89* 86*   CREATININE mg/dL 2.07* 2.16*   CALCIUM mg/dL 8.0* 8.3*   BILIRUBIN mg/dL 0.4  --    ALK PHOS U/L 89  --    ALT (SGPT) U/L 85*  --    AST (SGOT) U/L 41*  --    GLUCOSE mg/dL 151* 196*       Culture Data:   No results found for: \"BLOODCX\", \"URINECX\", \"WOUNDCX\", \"MRSACX\", \"RESPCX\", \"STOOLCX\"    Radiology Data:   Imaging Results (Last 24 Hours)       ** No results found for the last 24 hours. **            I have reviewed the patient's current medications.     Assessment/Plan   Assessment  Active Hospital Problems    Diagnosis     Paroxysmal atrial fibrillation     Coronary artery disease involving native coronary artery of native heart without angina pectoris     Stage 3a chronic kidney disease     Leukocytosis, unknown etiology-suspect steroid     Chronic back pain, multilevel     ASTRID (acute kidney injury)     H/O: GI bleed     Adult failure to thrive     Hypertension        Treatment Plan  Continue telemetry  Vitals every 4 hours  Diet mechanical soft ground meat  IVF NS 75 cc/hour  Oxygen nasal canula, BiPAP prn  Up with assistance  PT/OT evaluation  Palliative care consult    CAD   ASA 81 mg daily  Declined CABG  Lipitor 20 mg daily  Metoprolol 12.5 mg PO BID    Afib  Amiodarone 200 mg PO BID  Metoprolol 12.5 mg PO BID  Eliquis 2.5 mg BID  Limit albuterol use    ASTRID/CKD 3  Continue slow " hydration    COPD   Duoneb 1 UD QID  Prednisone 40 mg daily  Oxygen nasal canula, BiPAP prn    GIB history  Resolved and tolerating AC    Hypotension > Hold diuresis  NS bolus 1 L, repeat prn    BPH > Proscar    Medical Decision Making  Number and Complexity of problems: 6 complex medical problems  Differential Diagnosis: none    Conditions and Status        Condition is unchanged.     MDM Data  External documents reviewed: [facility] EHR  Cardiac tracing (EKG, telemetry) interpretation: -  Radiology interpretation: see above  Labs reviewed: See above  Any tests that were considered but not ordered: none     Decision rules/scores evaluated (example [id number], Wells, etc): [id number] 4     Discussed with: Patient     Care Planning  Shared decision making: Patient  Code status and discussions: DNR    Disposition  Social Determinants of Health that impact treatment or disposition: none  I expect the patient to be discharged to home/SNF in 1-3 days.     Electronically signed by Barry Nye MD, 05/18/24, 12:34 CDT.

## 2024-05-18 NOTE — PLAN OF CARE
Problem: Adult Inpatient Plan of Care  Goal: Plan of Care Review  Outcome: Ongoing, Progressing  Flowsheets (Taken 5/18/2024 0345)  Outcome Evaluation: Pt. with c/o back pain this shift, scheduled pain meds given with relief. He slept most the night on bipap and said he felt better after wearing it. He does have complaints of dry mouth, oral care given as needed. Currently on 2L NC, sats WNL. Tele- SB/S 59-84. Safety maintained, care plan ongoing.

## 2024-05-18 NOTE — PLAN OF CARE
Goal Outcome Evaluation:  Plan of Care Reviewed With: patient, other (see comments) (RN)        Progress: no change  Outcome Evaluation: Initial nutrition assessment. Pt was recently at [facility] and transferred to [facility] 5/3-5/16. He was transferred back to [facility] to pursue rehab placement. He is ordered a healthy heart, [facility] ground diet. He has a SW and palliative care consult. He tells this RD that his appetite has not been good but is finally starting to pick back up. He reports he has had some dry mouth issues as well. He is trying to eat items that are soft and moist at this time such as mashed potatoes and gravy, pudding, ice cream, etc. He says he has the most difficulty with meats, sometimes even ground meat is difficult for him. He may benefit from a SLP consult to evaluate for dysphagia and to make diet consistency recommendations. The dining ambassador has taken his food choices today and he was happy to get to choose. His RN reports he did eat strawberry ice cream with lunch today. He declines nutrition supplements, reporting he has been forced to drink them and can't do it anymore. Encouraged pt to try a magic cup supplement, since he has been tolerating and enjoying ice cream. He agrees, will order a magic cup with lunch and dinner. Pt reports he knows he needs to eat and is trying his best. He has had some gradual weight loss over the last year, not considered significant. Referrals have been sent to SNF's. Cont to follow.

## 2024-05-18 NOTE — PLAN OF CARE
Goal Outcome Evaluation:              Outcome Evaluation: Soft BP today, Dr. Nye at bedside, 1L bolus given, now on RA, HR SB-S 57-77, intermittent second degree type 2(made md aware), NC PAC, c/o of generalzied pain, decreased appetite, nutrition consulted, bilat heels pink intact, ext cath in place, 200 ml output and incont x 1 urine today, safety maintained, rt knee stiff, decreased mobility, losartan dc'd, safety maintained

## 2024-05-18 NOTE — CASE MANAGEMENT/SOCIAL WORK
Discharge Planning Assessment   [place]     Patient Name: Spencer Moore  MRN: [id number]  Today's Date: 5/18/2024    Admit Date: 5/17/2024        Discharge Needs Assessment       Row Name 05/18/24 1406       Living Environment    People in Home alone    Current Living Arrangements home    Potentially Unsafe Housing Conditions none    Provides Primary Care For no one    Family Caregiver if Needed friend(s)    Quality of Family Relationships helpful    Able to Return to Prior Arrangements yes       Transition Planning    Patient/Family Anticipates Transition to long-term care facility    Patient/Family Anticipated Services at Transition skilled nursing       Discharge Needs Assessment    Readmission Within the Last 30 Days other (see comments)    Current Outpatient/Agency/Support Group skilled nursing facility    Concerns to be Addressed adjustment to diagnosis/illness;discharge planning    Patient's Choice of Community Agency(s) [facility]    Discharge Coordination/Progress Patient is a direct admit from [facility] with plans for placement. ANTONIETA reviewed chart before calling patient's Orestes YAO. Noted palliative care consult, anticipate this will happen on Monday. SW did call patient's Orestes YAO, and spoke to him at length re discharge planning. Patient did live alone but has experienced decline. Orestes agrees that patient will need SNF placement but is unsure at this point if patient will be able to continue with therapy. ANTONIETA did explain that if patient is able to participate, Medicare will cover SNF even if goal of care is palliative. ANTONIETA explained that if patient cannot participate and hospice is discussed, Medicare will not cover both SNF and hospice so options would be private pay at SNF with hospice involvement or home hospice. Orestes says home hospice is not an option. Orestes would like to speak to palliative care and see how patient does over weekend. Orestes would like to start with  referrals to [facility] and [facility] so SW has faxed referral to both facilities. Will follow up with admissions at each facility on Monday and will follow for outcome of palliative care consult.             IRMA Charles

## 2024-05-18 NOTE — THERAPY EVALUATION
Patient Name: Spencer Moore  : 1940    MRN: [id number]                              Today's Date: 2024       Admit Date: 2024    Visit Dx: No diagnosis found.  Patient Active Problem List   Diagnosis    Cervical spondylosis without myelopathy    Nonsmoker    BMI 29.0-29.9,adult    Benign non-nodular prostatic hyperplasia with lower urinary tract symptoms    Trigeminal neuralgia of right side of face    Primary narcolepsy without cataplexy    Hypertension    Mixed hyperlipidemia    Cerebrovascular small vessel disease    Sensorineural hearing loss (SNHL) of both ears    Macrocytic anemia    Left arm pain    Triceps tendon rupture, left, initial encounter    Left hand paresthesia    Spinal stenosis in cervical region    Cervical radiculopathy    Degeneration of cervical intervertebral disc    BMI 30.0-30.9,adult    History of anemia due to chronic kidney disease    Low vitamin B12 level    Acute lower gastrointestinal bleeding    Leukocytosis, unknown etiology-suspect steroid    Chronic back pain, multilevel    ASTRID (acute kidney injury)    H/O: GI bleed    Adult failure to thrive     Past Medical History:   Diagnosis Date    Anemia     Arthritis     Bilateral impacted cerumen 12/10/2018    Diverticulitis     Enlarged prostate     ETD (eustachian tube dysfunction)     GERD (gastroesophageal reflux disease)     Hypertension     Kidney infection     Lumbago     Narcolepsy     Sensorineural hearing loss (SNHL) of both ears 2018    SNHL (sensorineural hearing loss)     Tinnitus     Vitamin B12 deficiency     Weak urinary stream      Past Surgical History:   Procedure Laterality Date    ABDOMINAL SURGERY      CATARACT EXTRACTION Left     HAND SURGERY Left     HERNIA REPAIR      REPLACEMENT TOTAL KNEE Left     ROTATOR CUFF REPAIR      TONSILLECTOMY        General Information       Row Name 24 1058          OT Time and Intention    Document Type evaluation   Recently admited from  - 5/3 with  hematochezia. T/f to [facility] on 5/4 and underwent flex sigmoidostomy. Developed a-fib during procedure and required a heart cath on 5/5. Dx:acute respiratory failure with hypoxia..  -JJ     Mode of Treatment occupational therapy   ...acute pulmonary edema, EA COPD, CAD, A-fib with RVR, moderate aortic stenosis.  -       Row Name 05/18/24 1058          General Information    Patient Profile Reviewed yes  -     Prior Level of Function independent:;all household mobility;transfer;bed mobility;ADL's  prior to recent admit earlier this month  -     Existing Precautions/Restrictions fall;oxygen therapy device and L/min  -     Barriers to Rehab medically complex  -       Row Name 05/18/24 1058          Living Environment    People in Home alone  -       Row Name 05/18/24 1058          Cognition    Orientation Status (Cognition) oriented to;person;place;situation  -       Row Name 05/18/24 1058          Safety Issues, Functional Mobility    Impairments Affecting Function (Mobility) balance;endurance/activity tolerance;pain;range of motion (ROM);strength;postural/trunk control;shortness of breath  -               User Key  (r) = Recorded By, (t) = Taken By, (c) = Cosigned By      Initials Name Provider Type     Kena Light, KAREEM/L, CSRS Occupational Therapist                     Mobility/ADL's       Row Name 05/18/24 1058          Bed Mobility    Bed Mobility supine-sit;sit-supine;scooting/bridging  -     Scooting/Bridging [place] (Bed Mobility) maximum assist (25% patient effort);2 person assist  -     Supine-Sit [place] (Bed Mobility) moderate assist (50% patient effort);maximum assist (25% patient effort)  -     Sit-Supine [place] (Bed Mobility) moderate assist (50% patient effort);maximum assist (25% patient effort)  -     Bed Mobility, Safety Issues decreased use of arms for pushing/pulling;decreased use of legs for bridging/pushing  -     Assistive Device (Bed Mobility)  head of bed elevated;bed rails;draw sheet  -JJ       Row Name 05/18/24 1058          Transfers    Transfers sit-stand transfer;stand-sit transfer  -     Comment, (Transfers) attempted sit <> stand x2 reps. Unable to achieve full upright position with either attempt and Max A.  -JJ       Row Name 05/18/24 1058          Sit-Stand Transfer    Sit-Stand [place] (Transfers) maximum assist (25% patient effort)  -JJ       Row Name 05/18/24 1058          Stand-Sit Transfer    Stand-Sit [place] (Transfers) maximum assist (25% patient effort)  -JJ       Row Name 05/18/24 1058          Functional Mobility    Functional Mobility- Ind. Level unable to perform  -JJ       Row Name 05/18/24 1058          Activities of Daily Living    BADL Assessment/Intervention lower body dressing  -JJ       Row Name 05/18/24 1058          Lower Body Dressing Assessment/Training    [place] Level (Lower Body Dressing) don;socks;maximum assist (25% patient effort)  -     Position (Lower Body Dressing) edge of bed sitting  -               User Key  (r) = Recorded By, (t) = Taken By, (c) = Cosigned By      Initials Name Provider Type    Kena Taveras, OTR/L, CSRS Occupational Therapist                   Obj/Interventions       Row Name 05/18/24 1058          Sensory Assessment (Somatosensory)    Sensory Assessment (Somatosensory) UE sensation intact  -JJ       Row Name 05/18/24 1058          Vision Assessment/Intervention    Visual Impairment/Limitations WFL  -JJ       Row Name 05/18/24 1058          Range of Motion Comprehensive    General Range of Motion upper extremity range of motion deficits identified;lower extremity range of motion deficits identified  -     Comment, General Range of Motion L shoulder AROM impaired approx 75% - pt reports this is chronic. R shoulder AROM impaired approx 25-50% - also chronic. L knee locked into extension - pt reports this is chronic from a knee surgery.  -[facility] Name  05/18/24 1058          Strength Comprehensive (MMT)    Comment, General Manual Muscle Testing (MMT) Assessment B UE and LE strength is grossly 2+/5  -       Row Name 05/18/24 1058          Balance    Balance Assessment sitting static balance;sitting dynamic balance;standing static balance  -JJ     Static Sitting Balance contact guard  -JJ     Dynamic Sitting Balance moderate assist  -JJ     Position, Sitting Balance supported;sitting edge of bed  -JJ     Static Standing Balance maximum assist;2-person assist  -JJ     Position/Device Used, Standing Balance supported  -JJ               User Key  (r) = Recorded By, (t) = Taken By, (c) = Cosigned By      Initials Name Provider Type    Kena Branch, OTR/L, CSRS Occupational Therapist                   Goals/Plan       Row Name 05/18/24 1058          Transfer Goal 1 (OT)    Activity/Assistive Device (Transfer Goal 1, OT) commode, bedside without drop arms  -JJ     [place] Level/Cues Needed (Transfer Goal 1, OT) moderate assist (50-74% patient effort)  -JJ     Time Frame (Transfer Goal 1, OT) long term goal (LTG);by discharge  -JJ     Progress/Outcome (Transfer Goal 1, OT) new goal  -       Row Name 05/18/24 1058          Dressing Goal 1 (OT)    Activity/Device (Dressing Goal 1, OT) upper body dressing  -JJ     [place]/Cues Needed (Dressing Goal 1, OT) minimum assist (75% or more patient effort)  -JJ     Time Frame (Dressing Goal 1, OT) long term goal (LTG);by discharge  -JJ     Progress/Outcome (Dressing Goal 1, OT) new goal  -J       Row Name 05/18/24 1058          Grooming Goal 1 (OT)    Activity/Device (Grooming Goal 1, OT) grooming skills, all  -JJ     [place] (Grooming Goal 1, OT) minimum assist (75% or more patient effort)  -JJ     Time Frame (Grooming Goal 1, OT) long term goal (LTG);by discharge  -JJ     Progress/Outcome (Grooming Goal 1, OT) new goal  -       Row Name 05/18/24 1058          Therapy Assessment/Plan (OT)    Planned  Therapy Interventions (OT) activity tolerance training;adaptive equipment training;BADL retraining;functional balance retraining;transfer/mobility retraining;strengthening exercise;occupation/activity based interventions;cognitive/visual perception retraining;patient/caregiver education/training;ROM/therapeutic exercise  -               User Key  (r) = Recorded By, (t) = Taken By, (c) = Cosigned By      Initials Name Provider Type     Kena Light, OTVICKY/L, CSRS Occupational Therapist                   Clinical Impression       Row Name 05/18/24 1058          Pain Assessment    Additional Documentation Pain Scale: FACES Pre/Post-Treatment (Group)  -       Row Name 05/18/24 1059          Pain Scale: FACES Pre/Post-Treatment    Pain: FACES Scale, Pretreatment 0-->no hurt  -     Posttreatment Pain Rating 4-->hurts little more  -     Pain Location - neck  -     Pre/Posttreatment Pain Comment reports increased neck pain with movement.  -       Row Name 05/18/24 1058          Plan of Care Review    Plan of Care Reviewed With patient  -     Outcome Evaluation OT eval completed. Pt presents alert and oriented x3. He remained on 2L O2/NC throughout session with O2 sats maintaining WNL throughout activity. Pt has a complex medical hx with recent transfer to [facility]. He was t/f'd back to [facility] to be closer to family and possible rehab placement. Family is present in room throughout session and assist with PLOF. He reports that at baseline pt is independent with all adls and mobility, residing at home alone. But does endorse that pt has had a declined over the last several years. Today, pt demonstrated impaired ROM, strength, balance, activity tolerance and postural control. He requires Mod-Max A for all bed mobility. Attempts for stance were unsuccessful even with Max A from therapist. He was returned to bed at end of session with Mod-Max A. Pt would benefit from skilled OT services to address these deficits.  Recommend d/c to SNF  -       Row Name 05/18/24 1058          Therapy Assessment/Plan (OT)    Rehab Potential (OT) good, to achieve stated therapy goals  -     Criteria for Skilled Therapeutic Interventions Met (OT) yes;skilled treatment is necessary  -     Therapy Frequency (OT) 5 times/wk  -     Predicted Duration of Therapy Intervention (OT) 10 days  -       Row Name 05/18/24 1058          Therapy Plan Review/Discharge Plan (OT)    Anticipated Discharge Disposition (OT) [facility] nursing facility  -       Row Name 05/18/24 1058          Positioning and Restraints    Pre-Treatment Position in bed  -     Post Treatment Position bed  -JJ     In Bed notified nsg;fowlers;call light within reach;encouraged to call for assist;exit alarm on;side rails up x3;patient within staff view;with family/caregiver;RUE elevated;LUE elevated;L heel elevated;R heel elevated  -               User Key  (r) = Recorded By, (t) = Taken By, (c) = Cosigned By      Initials Name Provider Type    Kena Branch, OTR/L, CSRS Occupational Therapist                   Outcome Measures       Row Name 05/18/24 1058          How much help from another is currently needed...    Putting on and taking off regular lower body clothing? 1  -JJ     Bathing (including washing, rinsing, and drying) 1  -JJ     Toileting (which includes using toilet bed pan or urinal) 2  -JJ     Putting on and taking off regular upper body clothing 2  -JJ     Taking care of personal grooming (such as brushing teeth) 2  -JJ     Eating meals 3  -JJ     AM-PAC 6 Clicks Score (OT) 11  -       Row Name 05/18/24 0800          How much help from another person do you currently need...    Turning from your back to your side while in flat bed without using bedrails? 2  -AG     Moving from lying on back to sitting on the side of a flat bed without bedrails? 2  -AG     Moving to and from a bed to a chair (including a wheelchair)? 1  -AG     Standing up from a chair  using your arms (e.g., wheelchair, bedside chair)? 1  -AG     Climbing 3-5 steps with a railing? 1  -AG     To walk in hospital room? 1  -AG     AM-PAC 6 Clicks Score (PT) 8  -AG     Highest Level of Mobility Goal 3 --> Sit at edge of bed  -AG       Row Name 05/18/24 1058          Functional Assessment    Outcome Measure Options AM-PAC 6 Clicks Daily Activity (OT)  -MAGAN               User Key  (r) = Recorded By, (t) = Taken By, (c) = Cosigned By      Initials Name Provider Type    AG Larissa Valentine, RN Registered Nurse    Kena Branch, OTR/L, CSRS Occupational Therapist                    Occupational Therapy Education       Title: PT OT SLP Therapies (In Progress)       Topic: Occupational Therapy (In Progress)       Point: ADL training (Done)       Description:   Instruct learner(s) on proper safety adaptation and remediation techniques during self care or transfers.   Instruct in proper use of assistive devices.                  Learning Progress Summary             Patient Acceptance, E, VU by MAGAN at 5/18/2024 1146                         Point: Home exercise program (Not Started)       Description:   Instruct learner(s) on appropriate technique for monitoring, assisting and/or progressing therapeutic exercises/activities.                  Learner Progress:  Not documented in this visit.              Point: Precautions (Done)       Description:   Instruct learner(s) on prescribed precautions during self-care and functional transfers.                  Learning Progress Summary             Patient Acceptance, E, VU by MAGAN at 5/18/2024 1146                         Point: Body mechanics (Not Started)       Description:   Instruct learner(s) on proper positioning and spine alignment during self-care, functional mobility activities and/or exercises.                  Learner Progress:  Not documented in this visit.                              User Key       Initials Effective Dates Name Provider Type Discipline     MAGAN 07/11/23 -  Kena Light OTR/L, FARIBA Occupational Therapist OT                  OT Recommendation and Plan  Planned Therapy Interventions (OT): activity tolerance training, adaptive equipment training, BADL retraining, functional balance retraining, transfer/mobility retraining, strengthening exercise, occupation/activity based interventions, cognitive/visual perception retraining, patient/caregiver education/training, ROM/therapeutic exercise  Therapy Frequency (OT): 5 times/wk  Plan of Care Review  Plan of Care Reviewed With: patient  Outcome Evaluation: OT eval completed. Pt presents alert and oriented x3. He remained on 2L O2/NC throughout session with O2 sats maintaining WNL throughout activity. Pt has a complex medical hx with recent transfer to [facility]. He was t/f'd back to [facility] to be closer to family and possible rehab placement. Family is present in room throughout session and assist with PLOF. He reports that at baseline pt is independent with all adls and mobility, residing at home alone. But does endorse that pt has had a declined over the last several years. Today, pt demonstrated impaired ROM, strength, balance, activity tolerance and postural control. He requires Mod-Max A for all bed mobility. Attempts for stance were unsuccessful even with Max A from therapist. He was returned to bed at end of session with Mod-Max A. Pt would benefit from skilled OT services to address these deficits. Recommend d/c to SNF     Time Calculation:         Time Calculation- OT       Row Name 05/18/24 1058             Time Calculation- OT    OT Start Time 1058  -      OT Stop Time 1137  -      OT Time Calculation (min) 39 min  -      OT Received On 05/18/24  -      OT Goal Re-Cert Due Date 05/28/24  -                User Key  (r) = Recorded By, (t) = Taken By, (c) = Cosigned By      Initials Name Provider Type    Kena Branch OTR/L, FARIBA Occupational Therapist                  Therapy Charges for  Today       Code Description Service Date Service Provider Modifiers Qty    54255807627 HC OT EVAL MOD COMPLEXITY 3 5/18/2024 Kena Light OTR/L, CSRS GO 1                 KAREEM Garcia/L, CSRS  5/18/2024

## 2024-05-18 NOTE — DISCHARGE PLACEMENT REQUEST
"Call back: 4B Social Work [phone]    Spencer Felder (83 y.o. Male)       Date of Birth   1940    Social Security Number       Address   [place] [address]    Home Phone   [phone]    MRN   [id number]       Congregation   Rastafarian    Marital Status                               Admission Date   5/17/24    Admission Type   Urgent    Admitting Provider   Barry Nye MD    Attending Provider   Barry Nye MD    Department, Room/Bed   [facility] 4B, 402/1       Discharge Date       Discharge Disposition       Discharge Destination                                 Attending Provider: Barry Nye MD    Allergies: Biaxin [Clarithromycin], Parafon Forte Dsc [Chlorzoxazone]    Isolation: None   Infection: None   Code Status: No CPR    Ht: 172.7 cm (68\")   Wt: 83.6 kg (184 lb 4.8 oz)    Admission Cmt: None   Principal Problem: None                  Active Insurance as of 5/17/2024       Primary Coverage       Payor Plan Insurance Group Employer/Plan Group    MEDICARE MEDICARE A & B        Payor Plan Address Payor Plan Phone Number Payor Plan Fax Number Effective Dates    [address] [phone]  7/1/2006 - None Entered    [facility] [id number]         Subscriber Name Subscriber Birth Date Member ID       SPENCER FELDER 1940 [id number]               Secondary Coverage       Payor Plan Insurance Group Employer/Plan Group    ANTHEM BLUE CROSS ANTHEM BLUE CROSS BLUE SHIELD PPO [id number]       Payor Plan Address Payor Plan Phone Number Payor Plan Fax Number Effective Dates    [address] [phone]  1/1/2007 - None Entered    [facility] [id number]         Subscriber Name Subscriber Birth Date Member ID       SPENCER FELDER 1940 [id number]                     Emergency Contacts        (Rel.) Home Phone Work Phone Mobile Phone    Orestes Larsen  (JAMESA) (Relative) -- -- [phone]              Insurance " Information                  MEDICARE/MEDICARE A & B Phone: [phone]    Subscriber: Spencer Moore Subscriber#: [id number]    Group#: -- Precert#: --        ANTHEM BLUE CROSS/ANTHEM BLUE CROSS BLUE SHIELD PPO Phone: [phone]    Subscriber: Spencer Moore Subscriber#: [id number]    Group#: [id number] Precert#: --             History & Physical        Trinity Alegria APRN at 05/17/24 1701       Attestation signed by Barry Nye MD at 05/18/24 0309 (Updated)    I have reviewed this documentation and agree.    I performed a substantive part of the MDM during the patient’s E/M visit. I personally evaluated   and examined the patient. I personally made or approved the documented management plan and acknowledge its risk   of complications.   (Discussion) Management/test interpretation discussed with DYANA Gloria.       Electronically signed by Barry Nye MD, 5/17/2024, 21:32 CDT.                        [facility] Medicine Services  HISTORY AND PHYSICAL    Date of Admission: 5/17/2024  Primary Care Physician: Miguel Ángel Hinson, DO Sch[place]   Primary Historian: Medical record    Chief Complaint: Transferred in back to [facility]    History of Present Illness  Spencer Moore is an 83-year-old male with a past medical history of diveticulitis, hypertension GERD, enlarged prostate, chronic anemia, hearing loss, please see below for complete list.  Patient recently admitted 5/2 - 5/3, 2024 with hematochezia that started on 5/1. GI did see patient and recommended CT angiogram of the abdomen pelvis with recommendation for interventional radiology evaluation and possible embolization. Patient was held in the ED due to bed availability, due to ongoing bleeding he did receive 2 unit of packed red blood cells.   Patient was subsequently transferred into the care of Dr. Hernandez gastroenterology as [facility].    Transferred  back today after admission 5/3 - 5/16, 2024 (no bed availability yesterday).  Patient underwent flex sigmoidostomy on 5/4, no obvious bleeding found.  During procedure he developed A-fib RVR with concerns for cardiac ischemia.  Cardiology was consulted he had a heart cath on 5/5 showing severe multivessel coronary artery disease he was deemed nonsurgical candidate and medical management recommended.  During his stay he developed acute hypoxic respiratory failure and pulmonary edema requiring diuresis and treatment for COPD exacerbation with IV steroids and bronchodilators.  At time of discharge she continues to need oxygen, pulmonary was following him at outlying facility.  He was treated with ceftriaxone and vancomycin due to concerns for sepsis.  Patient did complete 6 of 7 days antibiotic course last dose 5/14.  Palliative care consult completed DNR/DNI with wishes for transfer back to [facility] to be closer to home and pursue rehab placement.  [facility] with hypoxia, acute pulmonary edema, COPD exacerbation,'s coronary artery disease, resolved A-fib with RVR now controlled right, moderate aortic stenosis.  See below for recommendations moving forward.    Patient admitted direct admit room 402.  He has no significant complaints.  Vital signs are stable.  Patient is alert and oriented.  He has bilateral hearing aids and use.  He has oxygen in use and is asking for it to be taped to his face because it wants AN.  He is also asking for BiPAP at night.  He seems to be quite anxious about this I did assure him that I would order it as needed and at night.  I had a long discussion with family Herb and ninataliia Jane regarding his care.  He is a DNR/DNI and I will consult palliative care.  Goal is for patient to be transferred to rehab and or nursing home as he lives alone without anyone to assist with his care in home.  Patient has no complaints of  pain.  He is somewhat dyspneic, saturation 99% on 3 L.  We will attempt to taper and discontinue.  Patient did not have home oxygen prior to initial admission 5/2/2024.  Is admitted for further evaluation treatment.    Review of Systems   Otherwise complete ROS reviewed and negative except as mentioned in the HPI.    Past Medical History:   Past Medical History:   Diagnosis Date    Anemia     Arthritis     Bilateral impacted cerumen 12/10/2018    Diverticulitis     Enlarged prostate     ETD (eustachian tube dysfunction)     GERD (gastroesophageal reflux disease)     Hypertension     Kidney infection     Lumbago     Narcolepsy     Sensorineural hearing loss (SNHL) of both ears 6/18/2018    SNHL (sensorineural hearing loss)     Tinnitus     Vitamin B12 deficiency     Weak urinary stream      Past Surgical History:  Past Surgical History:   Procedure Laterality Date    ABDOMINAL SURGERY      CATARACT EXTRACTION Left     HAND SURGERY Left     HERNIA REPAIR      REPLACEMENT TOTAL KNEE Left     ROTATOR CUFF REPAIR      TONSILLECTOMY       Social History:  reports that he has never smoked. He has never used smokeless tobacco. He reports that he does not drink alcohol and does not use drugs.    Family History: He was adopted. Family history is unknown by patient.       Allergies:  Allergies   Allergen Reactions    Biaxin [Clarithromycin] GI Intolerance     unknown    Parafon Forte Dsc [Chlorzoxazone] Other (See Comments)     Swallowing issues       Medications:    Prior to Admission medications    Medication Sig Start Date End Date Taking? Authorizing Provider   aspirin 81 MG EC tablet Take 1 tablet by mouth Every Night.    Caroline Stephenson MD   atorvastatin (LIPITOR) 20 MG tablet Take 1 tablet by mouth Daily. 10/20/21   Caroline Stephenson MD   carBAMazepine (TEGretol) 200 MG tablet Take 1 tablet by mouth 2 (Two) Times a Day.    Caroline Stephenson MD   CloNIDine (CATAPRES) 0.1 MG tablet Take 1 tablet by mouth As  Needed for High Blood Pressure (1 tablet if systolic is over 170).    Caroline Stephenson MD   cyanocobalamin 1000 MCG/ML injection Inject 1 mL into the appropriate muscle as directed by prescriber Every 28 (Twenty-Eight) Days. 4/2/20   Rosi Baker DO   finasteride (PROSCAR) 5 MG tablet Take 1 tablet by mouth Daily. 12/14/23   Abdoul Silva MD   FLUoxetine (PROzac) 40 MG capsule Take 1 capsule by mouth Daily. 11/3/16   Caroline Stephenson MD   hyoscyamine (LEVSIN) 0.125 MG SL tablet Take 1 tablet by mouth Daily As Needed. 7/3/20   Caroline Stephenson MD   irbesartan (AVAPRO) 300 MG tablet Take 1 tablet by mouth Daily. 1/20/20   Caroline Stephenson MD   lactated ringers infusion Infuse 100 mL/hr into a venous catheter Continuous. 5/3/24   Sergey Yuan MD   methylphenidate (RITALIN) 20 MG tablet Take 1 tablet by mouth 3 (Three) Times a Day. 4/3/24   Spencer Shaffer MD   metoprolol tartrate (LOPRESSOR) 25 MG tablet Take 0.5 tablets by mouth 2 (Two) Times a Day.    Caroline Stephenson MD   Multiple Vitamins-Minerals (MULTIVITAMIN ADULT PO) Take  by mouth.    Caroline Stephenson MD   mupirocin (BACTROBAN) 2 % ointment Apply 1 Application topically to the appropriate area as directed As Needed. 9/1/20   Caroline Stephenson MD   omeprazole (priLOSEC) 40 MG capsule Take 1 capsule by mouth Daily. 12/4/16   Caroline Stephenson MD   ondansetron (ZOFRAN) 2 mg/mL injection Infuse 2 mL into a venous catheter Every 6 (Six) Hours As Needed for Nausea or Vomiting. 5/3/24   Sergey Yuan MD   oxyCODONE-acetaminophen (PERCOCET)  MG per tablet Take 1 tablet by mouth Every 6 (Six) Hours. 6/21/23   Caroline Stephenson MD   pantoprazole (PROTONIX) 40 MG injection Infuse 10 mL into a venous catheter Every 12 (Twelve) Hours. Indications: Gastrointestinal (GI) Bleed 5/3/24   Sergey Yuan MD   predniSONE (DELTASONE) 10 MG tablet Take  by mouth Daily. 4 tablets daily for 3 days,  3  tablets daily for 3 days,  2 tablets daily for 3 days,  1 tablet daily for 3 days,   1/2 tablet daily for 2 days. 4/22/24   Caroline Stephenson MD   tamsulosin (FLOMAX) 0.4 MG capsule 24 hr capsule Take 2 capsules by mouth Every Night. 12/14/23   Abdoul Silva MD   terazosin (HYTRIN) 2 MG capsule Take 1 capsule by mouth Every Night. 11/8/16   Provider, MD Caroline     I have utilized all available immediate resources to obtain, update, or review the patient's current medications (including all prescriptions, over-the-counter products, herbals, cannabis/cannabidiol products, and vitamin/mineral/dietary (nutritional) supplements).    Objective     Vital Signs: /82 (BP Location: Left arm, Patient Position: Lying)   Pulse 89   Temp 97.6 °F (36.4 °C) (Oral)   Resp 20   Wt 83.6 kg (184 lb 4.8 oz)   SpO2 99%   BMI 28.02 kg/m²   Physical Exam  Vitals reviewed.   Constitutional:       Appearance: He is obese. He is ill-appearing.   HENT:      Head: Normocephalic and atraumatic.      Ears:      Comments: Bilateral hearing aids, communication difficult due to hearing loss     Mouth/Throat:      Mouth: Mucous membranes are dry.      Pharynx: Oropharynx is clear.   Eyes:      Extraocular Movements: Extraocular movements intact.      Conjunctiva/sclera: Conjunctivae normal.   Cardiovascular:      Rate and Rhythm: Normal rate.      Comments: Currently normal sinus rhythm in the 70s however has had episodes of A-fib since arrival  Pulmonary:      Breath sounds: Wheezing present.      Comments: Tachypneic, oxygen saturation on 3 L 99%.  I do feel component of anxiety is cause  Abdominal:      General: There is no distension.      Palpations: Abdomen is soft.   Musculoskeletal:         General: Deformity (Unable to bend right leg-chronic) present.      Cervical back: Normal range of motion and neck supple.      Right lower leg: No edema.      Left lower leg: No edema.      Comments: Generalized weakness and  debility   Skin:     General: Skin is warm and dry.      Coloration: Skin is pale.   Neurological:      General: No focal deficit present.      Mental Status: He is alert and oriented to person, place, and time.   Psychiatric:         Mood and Affect: Mood normal.         Behavior: Behavior normal.        Results Reviewed:  Obtained from outlying facility medical record medical record  5/16/2024  Sodium 135, potassium 4.6, BUN 82, creatinine 2.16, glucose 182  CBC white count 15.1, hemoglobin 8.8, hematocrit 27, platelet count 412,000    Lab Results (last 24 hours)       Procedure Component Value Units Date/Time    Basic Metabolic Panel [[id number]]  (Abnormal) Collected: 05/17/24 1745    Specimen: Blood Updated: 05/17/24 1811     Glucose 196 mg/dL      BUN 86 mg/dL      Creatinine 2.16 mg/dL      Sodium 136 mmol/L      Potassium 5.2 mmol/L      Comment: Slight hemolysis detected by analyzer. Result may be falsely elevated.        Chloride 100 mmol/L      CO2 23.0 mmol/L      Calcium 8.3 mg/dL      BUN/Creatinine Ratio 39.8     Anion Gap 13.0 mmol/L      eGFR 29.8 mL/min/1.73     CBC (No Diff) [[id number]]  (Abnormal) Collected: 05/17/24 1745    Specimen: Blood Updated: 05/17/24 1754     WBC 18.28 10*3/mm3      RBC 2.94 10*6/mm3      Hemoglobin 9.1 g/dL      Hematocrit 28.7 %      MCV 97.6 fL      MCH 31.0 pg      MCHC 31.7 g/dL      RDW 14.8 %      RDW-SD 52.8 fl      MPV 11.2 fL      Platelets 523 10*3/mm3           Imaging Results (Last 24 Hours)       ** No results found for the last 24 hours. **          Heart catheterization 5/5/2024 at [facility]  Moderate aortic stenosis, EF 66%, multivessel disease, recommendations for continuation of aspirin, statin amiodarone and transition heparin to Eliquis    Flex sigmoidoscopy 5/4/2024  Findings 1.  Erythematous tissue and what we could visualize but no obvious culprit of bleeding or recent bleeding  2.  Rectum unprepared: After extensive  cleaning no lesions found.  Large internal hemorrhoids    Assessment / Plan   Assessment:   Active Hospital Problems    Diagnosis     Leukocytosis, unknown etiology-suspect steroid     Chronic back pain, multilevel     ASTRID (acute kidney injury)     H/O: GI bleed     Adult failure to thrive     Hypertension        Treatment Plan  1.  The patient will be admitted to Dr. Nye service here at [facility].   2.  Records reviewed from [facility] facility  3.  Transfer medications reviewed and restarted as appropriate  4.  Taper steroid, transition from Solu-Medrol 40mg twice daily to prednisone 40 mg daily-start in a.m.  5.  Decrease Eliquis dose from 5 mg twice daily to 2.5 mg twice daily due to kidney failure  6.  Consult PT, OT and   7.  Supplemental oxygen, patient is requesting BiPAP at night, incentive spirometry, continuous pulse oximetry, DuoNebs  8.  Labs in a.m.  9.  Mechanical soft diet ground meats  10.  Oral care, turn every 2 hours, up with assistance  11.  Routine telemetry orders  12.  Gentle hydration NS at 75ml/hour  13.  Consult palliative care nurse    Discharge recommendation from [facility]: Continue to wean steroids, continue DuoNebs and supplemental oxygen, wean as tolerated  Home May need home oxygen evaluation at discharge  PT/OT for placement  May need nephrology consult for ASTRID    Medical Decision Making  Number and Complexity of problems: 5  Differential Diagnosis: None    Conditions and Status        Condition is unchanged.     [facility] Data  External documents reviewed: Records from [facility]  Labs reviewed: Yes  Any tests that were considered but not ordered: No     Decision rules/scores evaluated (example [id number], Wells, etc): No     Discussed with: Patient, family at bedside and Dr. Nye     Care Planning  Shared decision making: Patient, family at bedside Dr. Nye  Code status and discussions: NR/DNI    Disposition  Social Determinants  of Health that impact treatment or disposition: Lives alone  Estimated length of stay is 2+ days.     I confirmed that the patient's advanced care plan is present, code status is documented, and a surrogate decision maker is listed in the patient's medical record.     The patient's surrogate decision maker is family.     The patient was seen and examined by me on 2024 at 5:01 PM.    Electronically signed by DYANA Solitario, 24, 20:49 CDT.               Electronically signed by Barry Nye MD at 24 1234            Occupational Therapy Notes (most recent note)        Kena Light, OTVICKY/L, CSRS at 24 1147          Patient Name: Spencer Moore  : 1940    MRN: [id number]                              Today's Date: 2024       Admit Date: 2024    Visit Dx: No diagnosis found.  Patient Active Problem List   Diagnosis    Cervical spondylosis without myelopathy    Nonsmoker    BMI 29.0-29.9,adult    Benign non-nodular prostatic hyperplasia with lower urinary tract symptoms    Trigeminal neuralgia of right side of face    Primary narcolepsy without cataplexy    Hypertension    Mixed hyperlipidemia    Cerebrovascular small vessel disease    Sensorineural hearing loss (SNHL) of both ears    Macrocytic anemia    Left arm pain    Triceps tendon rupture, left, initial encounter    Left hand paresthesia    Spinal stenosis in cervical region    Cervical radiculopathy    Degeneration of cervical intervertebral disc    BMI 30.0-30.9,adult    History of anemia due to chronic kidney disease    Low vitamin B12 level    Acute lower gastrointestinal bleeding    Leukocytosis, unknown etiology-suspect steroid    Chronic back pain, multilevel    ASTRID (acute kidney injury)    H/O: GI bleed    Adult failure to thrive     Past Medical History:   Diagnosis Date    Anemia     Arthritis     Bilateral impacted cerumen 12/10/2018    Diverticulitis     Enlarged prostate     ETD  (eustachian tube dysfunction)     GERD (gastroesophageal reflux disease)     Hypertension     Kidney infection     Lumbago     Narcolepsy     Sensorineural hearing loss (SNHL) of both ears 6/18/2018    SNHL (sensorineural hearing loss)     Tinnitus     Vitamin B12 deficiency     Weak urinary stream      Past Surgical History:   Procedure Laterality Date    ABDOMINAL SURGERY      CATARACT EXTRACTION Left     HAND SURGERY Left     HERNIA REPAIR      REPLACEMENT TOTAL KNEE Left     ROTATOR CUFF REPAIR      TONSILLECTOMY        General Information       Row Name 05/18/24 1058          OT Time and Intention    Document Type evaluation   Recently admited from 5/2 - 5/3 with hematochezia. T/f to [facility] on 5/4 and underwent flex sigmoidostomy. Developed a-fib during procedure and required a heart cath on 5/5. Dx:acute respiratory failure with hypoxia..  -     Mode of Treatment occupational therapy   ...acute pulmonary edema, EA COPD, CAD, A-fib with RVR, moderate aortic stenosis.  -[employer]       Row Name 05/18/24 1058          General Information    Patient Profile Reviewed yes  -     Prior Level of Function independent:;all household mobility;transfer;bed mobility;ADL's  prior to recent admit earlier this month  -     Existing Precautions/Restrictions fall;oxygen therapy device and L/min  -     Barriers to Rehab medically complex  -       Row Name 05/18/24 1058          Living Environment    People in Home alone  -       Row Name 05/18/24 1058          Cognition    Orientation Status (Cognition) oriented to;person;place;situation  -       Row Name 05/18/24 1058          Safety Issues, Functional Mobility    Impairments Affecting Function (Mobility) balance;endurance/activity tolerance;pain;range of motion (ROM);strength;postural/trunk control;shortness of breath  -               User Key  (r) = Recorded By, (t) = Taken By, (c) = Cosigned By      Initials Name Provider Type    Kena Branch, OTR/L, CSRS  Occupational Therapist                     Mobility/ADL's       Row Name 05/18/24 1058          Bed Mobility    Bed Mobility supine-sit;sit-supine;scooting/bridging  -     Scooting/Bridging [place] (Bed Mobility) maximum assist (25% patient effort);2 person assist  -     Supine-Sit [place] (Bed Mobility) moderate assist (50% patient effort);maximum assist (25% patient effort)  -     Sit-Supine [place] (Bed Mobility) moderate assist (50% patient effort);maximum assist (25% patient effort)  -     Bed Mobility, Safety Issues decreased use of arms for pushing/pulling;decreased use of legs for bridging/pushing  -     Assistive Device (Bed Mobility) head of bed elevated;bed rails;draw sheet  -[facility] Name 05/18/24 1058          Transfers    Transfers sit-stand transfer;stand-sit transfer  -     Comment, (Transfers) attempted sit <> stand x2 reps. Unable to achieve full upright position with either attempt and Max A.  -JJ       Row Name 05/18/24 1058          Sit-Stand Transfer    Sit-Stand [place] (Transfers) maximum assist (25% patient effort)  -JJ       Row Name 05/18/24 1058          Stand-Sit Transfer    Stand-Sit [place] (Transfers) maximum assist (25% patient effort)  -JJ       Row Name 05/18/24 1058          Functional Mobility    Functional Mobility- Ind. Level unable to perform  -JJ       Row Name 05/18/24 1058          Activities of Daily Living    BADL Assessment/Intervention lower body dressing  -JJ       Row Name 05/18/24 1058          Lower Body Dressing Assessment/Training    [place] Level (Lower Body Dressing) don;socks;maximum assist (25% patient effort)  -     Position (Lower Body Dressing) edge of bed sitting  -               User Key  (r) = Recorded By, (t) = Taken By, (c) = Cosigned By      Initials Name Provider Type    Kena Taveras, KAREEM/L, CSRS Occupational Therapist                   Obj/Interventions       Row Name 05/18/24 1058           Sensory Assessment (Somatosensory)    Sensory Assessment (Somatosensory) UE sensation intact  -       Row Name 05/18/24 1058          Vision Assessment/Intervention    Visual Impairment/Limitations WFL  -       Row Name 05/18/24 1058          Range of Motion Comprehensive    General Range of Motion upper extremity range of motion deficits identified;lower extremity range of motion deficits identified  -     Comment, General Range of Motion L shoulder AROM impaired approx 75% - pt reports this is chronic. R shoulder AROM impaired approx 25-50% - also chronic. L knee locked into extension - pt reports this is chronic from a knee surgery.  -       Row Name 05/18/24 1058          Strength Comprehensive (MMT)    Comment, General Manual Muscle Testing (MMT) Assessment B UE and LE strength is grossly 2+/5  -       Row Name 05/18/24 1058          Balance    Balance Assessment sitting static balance;sitting dynamic balance;standing static balance  -     Static Sitting Balance contact guard  -     Dynamic Sitting Balance moderate assist  -     Position, Sitting Balance supported;sitting edge of bed  -     Static Standing Balance maximum assist;2-person assist  -     Position/Device Used, Standing Balance supported  -               User Key  (r) = Recorded By, (t) = Taken By, (c) = Cosigned By      Initials Name Provider Type     Kena Light, OTR/L, CSRS Occupational Therapist                   Goals/Plan       Row Name 05/18/24 1058          Transfer Goal 1 (OT)    Activity/Assistive Device (Transfer Goal 1, OT) commode, bedside without drop arms  -     [place] Level/Cues Needed (Transfer Goal 1, OT) moderate assist (50-74% patient effort)  -J     Time Frame (Transfer Goal 1, OT) long term goal (LTG);by discharge  -     Progress/Outcome (Transfer Goal 1, OT) new goal  -       Row Name 05/18/24 1058          Dressing Goal 1 (OT)    Activity/Device (Dressing Goal 1, OT) upper body  dressing  -JJ     [place]/Cues Needed (Dressing Goal 1, OT) minimum assist (75% or more patient effort)  -JJ     Time Frame (Dressing Goal 1, OT) long term goal (LTG);by discharge  -JJ     Progress/Outcome (Dressing Goal 1, OT) new goal  -JJ       Row Name 05/18/24 1058          Grooming Goal 1 (OT)    Activity/Device (Grooming Goal 1, OT) grooming skills, all  -JJ     [place] (Grooming Goal 1, OT) minimum assist (75% or more patient effort)  -JJ     Time Frame (Grooming Goal 1, OT) long term goal (LTG);by discharge  -JJ     Progress/Outcome (Grooming Goal 1, OT) new goal  -       Row Name 05/18/24 1058          Therapy Assessment/Plan (OT)    Planned Therapy Interventions (OT) activity tolerance training;adaptive equipment training;BADL retraining;functional balance retraining;transfer/mobility retraining;strengthening exercise;occupation/activity based interventions;cognitive/visual perception retraining;patient/caregiver education/training;ROM/therapeutic exercise  -               User Key  (r) = Recorded By, (t) = Taken By, (c) = Cosigned By      Initials Name Provider Type    Kena Branch, OTR/L, CSRS Occupational Therapist                   Clinical Impression       Row Name 05/18/24 1058          Pain Assessment    Additional Documentation Pain Scale: FACES Pre/Post-Treatment (Group)  -       Row Name 05/18/24 1058          Pain Scale: FACES Pre/Post-Treatment    Pain: FACES Scale, Pretreatment 0-->no hurt  -     Posttreatment Pain Rating 4-->hurts little more  -     Pain Location - neck  -     Pre/Posttreatment Pain Comment reports increased neck pain with movement.  -       Row Name 05/18/24 1054          Plan of Care Review    Plan of Care Reviewed With patient  -     Outcome Evaluation OT eval completed. Pt presents alert and oriented x3. He remained on 2L O2/NC throughout session with O2 sats maintaining WNL throughout activity. Pt has a complex medical hx with recent  transfer to [facility]. He was t/f'd back to [facility] to be closer to family and possible rehab placement. Family is present in room throughout session and assist with PLOF. He reports that at baseline pt is independent with all adls and mobility, residing at home alone. But does endorse that pt has had a declined over the last several years. Today, pt demonstrated impaired ROM, strength, balance, activity tolerance and postural control. He requires Mod-Max A for all bed mobility. Attempts for stance were unsuccessful even with Max A from therapist. He was returned to bed at end of session with Mod-Max A. Pt would benefit from skilled OT services to address these deficits. Recommend d/c to SNF  -       Row Name 05/18/24 1055          Therapy Assessment/Plan (OT)    Rehab Potential (OT) good, to achieve stated therapy goals  -     Criteria for Skilled Therapeutic Interventions Met (OT) yes;skilled treatment is necessary  -     Therapy Frequency (OT) 5 times/wk  -     Predicted Duration of Therapy Intervention (OT) 10 days  -       Row Name 05/18/24 1058          Therapy Plan Review/Discharge Plan (OT)    Anticipated Discharge Disposition (OT) [facility] nursing facility  -       Row Name 05/18/24 1058          Positioning and Restraints    Pre-Treatment Position in bed  -     Post Treatment Position bed  -     In Bed notified nsg;fowlers;call light within reach;encouraged to call for assist;exit alarm on;side rails up x3;patient within staff view;with family/caregiver;RUE elevated;LUE elevated;L heel elevated;R heel elevated  -               User Key  (r) = Recorded By, (t) = Taken By, (c) = Cosigned By      Initials Name Provider Type    Kena Branch, OTR/L, CSRS Occupational Therapist                   Outcome Measures       Row Name 05/18/24 105          How much help from another is currently needed...    Putting on and taking off regular lower body clothing? 1  -     Bathing (including washing,  rinsing, and drying) 1  -JJ     Toileting (which includes using toilet bed pan or urinal) 2  -JJ     Putting on and taking off regular upper body clothing 2  -JJ     Taking care of personal grooming (such as brushing teeth) 2  -JJ     Eating meals 3  -JJ     AM-PAC 6 Clicks Score (OT) 11  -JJ       Row Name 05/18/24 0800          How much help from another person do you currently need...    Turning from your back to your side while in flat bed without using bedrails? 2  -AG     Moving from lying on back to sitting on the side of a flat bed without bedrails? 2  -AG     Moving to and from a bed to a chair (including a wheelchair)? 1  -AG     Standing up from a chair using your arms (e.g., wheelchair, bedside chair)? 1  -AG     Climbing 3-5 steps with a railing? 1  -AG     To walk in hospital room? 1  -AG     AM-PAC 6 Clicks Score (PT) 8  -AG     Highest Level of Mobility Goal 3 --> Sit at edge of bed  -AG       Row Name 05/18/24 1058          Functional Assessment    Outcome Measure Options AM-PAC 6 Clicks Daily Activity (OT)  -               User Key  (r) = Recorded By, (t) = Taken By, (c) = Cosigned By      Initials Name Provider Type    AG Larissa Valentine, RN Registered Nurse    Kena Branch, OTR/L, CSRS Occupational Therapist                    Occupational Therapy Education       Title: PT OT SLP Therapies (In Progress)       Topic: Occupational Therapy (In Progress)       Point: ADL training (Done)       Description:   Instruct learner(s) on proper safety adaptation and remediation techniques during self care or transfers.   Instruct in proper use of assistive devices.                  Learning Progress Summary             Patient Acceptance, E, VU by MAGAN at 5/18/2024 1146                         Point: Home exercise program (Not Started)       Description:   Instruct learner(s) on appropriate technique for monitoring, assisting and/or progressing therapeutic exercises/activities.                  Learner  Progress:  Not documented in this visit.              Point: Precautions (Done)       Description:   Instruct learner(s) on prescribed precautions during self-care and functional transfers.                  Learning Progress Summary             Patient Rian, SONU VU by MAGAN at 5/18/2024 1146                         Point: Body mechanics (Not Started)       Description:   Instruct learner(s) on proper positioning and spine alignment during self-care, functional mobility activities and/or exercises.                  Learner Progress:  Not documented in this visit.                              User Key       Initials Effective Dates Name Provider Type Discipline    MAGAN 07/11/23 -  Kena Light, OTR/L, CSRS Occupational Therapist OT                  OT Recommendation and Plan  Planned Therapy Interventions (OT): activity tolerance training, adaptive equipment training, BADL retraining, functional balance retraining, transfer/mobility retraining, strengthening exercise, occupation/activity based interventions, cognitive/visual perception retraining, patient/caregiver education/training, ROM/therapeutic exercise  Therapy Frequency (OT): 5 times/wk  Plan of Care Review  Plan of Care Reviewed With: patient  Outcome Evaluation: OT eval completed. Pt presents alert and oriented x3. He remained on 2L O2/NC throughout session with O2 sats maintaining WNL throughout activity. Pt has a complex medical hx with recent transfer to [facility]. He was t/f'd back to [facility] to be closer to family and possible rehab placement. Family is present in room throughout session and assist with PLOF. He reports that at baseline pt is independent with all adls and mobility, residing at home alone. But does endorse that pt has had a declined over the last several years. Today, pt demonstrated impaired ROM, strength, balance, activity tolerance and postural control. He requires Mod-Max A for all bed mobility. Attempts for stance were unsuccessful  even with Max A from therapist. He was returned to bed at end of session with Mod-Max A. Pt would benefit from skilled OT services to address these deficits. Recommend d/c to SNF     Time Calculation:         Time Calculation- OT       Row Name 05/18/24 1058             Time Calculation- OT    OT Start Time 1058  -      OT Stop Time 1137  -      OT Time Calculation (min) 39 min  -      OT Received On 05/18/24  -      OT Goal Re-Cert Due Date 05/28/24  -                User Key  (r) = Recorded By, (t) = Taken By, (c) = Cosigned By      Initials Name Provider Type    Kena Branch OTR/L, CSRS Occupational Therapist                  Therapy Charges for Today       Code Description Service Date Service Provider Modifiers Qty    01753047716 HC OT EVAL MOD COMPLEXITY 3 5/18/2024 Kena Light OTR/L, CSRS GO 1                 Kena Light, OTR/L, CSRS  5/18/2024    Electronically signed by Kena Light OTR/L, CSRS at 05/18/24 1142

## 2024-05-18 NOTE — PLAN OF CARE
Goal Outcome Evaluation:  Plan of Care Reviewed With: patient           Outcome Evaluation: OT eval completed. Pt presents alert and oriented x3. He remained on 2L O2/NC throughout session with O2 sats maintaining WNL throughout activity. Pt has a complex medical hx with recent transfer to [facility]. He was t/f'd back to [facility] to be closer to family and possible rehab placement. Family is present in room throughout session and assist with PLOF. He reports that at baseline pt is independent with all adls and mobility, residing at home alone. But does endorse that pt has had a declined over the last several years. Today, pt demonstrated impaired ROM, strength, balance, activity tolerance and postural control. He requires Mod-Max A for all bed mobility. Attempts for stance were unsuccessful even with Max A from therapist. He was returned to bed at end of session with Mod-Max A. Pt would benefit from skilled OT services to address these deficits. Recommend d/c to SNF      Anticipated Discharge Disposition (OT): skilled nursing facility

## 2024-05-19 PROBLEM — I48.0 PAROXYSMAL ATRIAL FIBRILLATION: Status: ACTIVE | Noted: 2024-05-19

## 2024-05-19 PROBLEM — I25.10 CORONARY ARTERY DISEASE INVOLVING NATIVE CORONARY ARTERY OF NATIVE HEART WITHOUT ANGINA PECTORIS: Status: ACTIVE | Noted: 2024-05-19

## 2024-05-19 PROBLEM — I48.91 A-FIB: Status: ACTIVE | Noted: 2024-05-19

## 2024-05-19 PROBLEM — N18.31 STAGE 3A CHRONIC KIDNEY DISEASE: Status: ACTIVE | Noted: 2024-05-19

## 2024-05-19 PROCEDURE — 92610 EVALUATE SWALLOWING FUNCTION: CPT

## 2024-05-19 PROCEDURE — 94664 DEMO&/EVAL PT USE INHALER: CPT

## 2024-05-19 PROCEDURE — 94799 UNLISTED PULMONARY SVC/PX: CPT

## 2024-05-19 PROCEDURE — 94761 N-INVAS EAR/PLS OXIMETRY MLT: CPT

## 2024-05-19 PROCEDURE — 63710000001 PREDNISONE PER 1 MG: Performed by: NURSE PRACTITIONER

## 2024-05-19 PROCEDURE — 25810000003 SODIUM CHLORIDE 0.9 % SOLUTION: Performed by: NURSE PRACTITIONER

## 2024-05-19 PROCEDURE — 94660 CPAP INITIATION&MGMT: CPT

## 2024-05-19 PROCEDURE — 97162 PT EVAL MOD COMPLEX 30 MIN: CPT

## 2024-05-19 RX ADMIN — NYSTATIN 500000 UNITS: 100000 SUSPENSION ORAL at 11:38

## 2024-05-19 RX ADMIN — IPRATROPIUM BROMIDE AND ALBUTEROL SULFATE 3 ML: .5; 3 SOLUTION RESPIRATORY (INHALATION) at 10:22

## 2024-05-19 RX ADMIN — PREDNISONE 40 MG: 20 TABLET ORAL at 13:57

## 2024-05-19 RX ADMIN — SODIUM CHLORIDE 75 ML/HR: 9 INJECTION, SOLUTION INTRAVENOUS at 22:00

## 2024-05-19 RX ADMIN — IPRATROPIUM BROMIDE AND ALBUTEROL SULFATE 3 ML: .5; 3 SOLUTION RESPIRATORY (INHALATION) at 20:42

## 2024-05-19 RX ADMIN — BUDESONIDE AND FORMOTEROL FUMARATE DIHYDRATE 2 PUFF: 160; 4.5 AEROSOL RESPIRATORY (INHALATION) at 20:42

## 2024-05-19 RX ADMIN — PANTOPRAZOLE SODIUM 40 MG: 40 TABLET, DELAYED RELEASE ORAL at 07:12

## 2024-05-19 RX ADMIN — NYSTATIN 500000 UNITS: 100000 SUSPENSION ORAL at 22:00

## 2024-05-19 RX ADMIN — APIXABAN 2.5 MG: 2.5 TABLET, FILM COATED ORAL at 13:57

## 2024-05-19 RX ADMIN — NYSTATIN 1 APPLICATION: 100000 CREAM TOPICAL at 22:01

## 2024-05-19 RX ADMIN — IPRATROPIUM BROMIDE AND ALBUTEROL SULFATE 3 ML: .5; 3 SOLUTION RESPIRATORY (INHALATION) at 05:50

## 2024-05-19 RX ADMIN — TERAZOSIN HYDROCHLORIDE 2 MG: 2 CAPSULE ORAL at 22:00

## 2024-05-19 RX ADMIN — OXYCODONE HYDROCHLORIDE AND ACETAMINOPHEN 1 TABLET: 10; 325 TABLET ORAL at 22:06

## 2024-05-19 RX ADMIN — METOPROLOL TARTRATE 12.5 MG: 25 TABLET, FILM COATED ORAL at 21:59

## 2024-05-19 RX ADMIN — ASPIRIN 81 MG: 81 TABLET, COATED ORAL at 21:59

## 2024-05-19 RX ADMIN — APIXABAN 2.5 MG: 2.5 TABLET, FILM COATED ORAL at 22:00

## 2024-05-19 RX ADMIN — IPRATROPIUM BROMIDE AND ALBUTEROL SULFATE 3 ML: .5; 3 SOLUTION RESPIRATORY (INHALATION) at 14:18

## 2024-05-19 RX ADMIN — BUDESONIDE AND FORMOTEROL FUMARATE DIHYDRATE 2 PUFF: 160; 4.5 AEROSOL RESPIRATORY (INHALATION) at 05:59

## 2024-05-19 RX ADMIN — AMIODARONE HYDROCHLORIDE 200 MG: 200 TABLET ORAL at 21:59

## 2024-05-19 RX ADMIN — Medication 10 ML: at 22:01

## 2024-05-19 RX ADMIN — ATORVASTATIN CALCIUM 20 MG: 10 TABLET, FILM COATED ORAL at 22:00

## 2024-05-19 NOTE — PLAN OF CARE
Problem: Adult Inpatient Plan of Care  Goal: Plan of Care Review  Recent Flowsheet Documentation  Taken 5/19/2024 1245 by Yoseph Fairchild, PT  Plan of Care Reviewed With: patient  Outcome Evaluation: PT IE complete.  A&Ox4.  C/o intermittent 10/10 pain in throat.  Pt reports he was independent PLOF, but states he has had a decline in functional mobility.  Today he is max A for bed mobility.  Attempted standing, but unable to stand.  Pt able to sit EOB unsupported for several minutes.  Pt with decreased ROM, strength, endurance, balance.  PT to see for functional mobility training.  Recommend SNF [place].  Thank you for referral.   Goal Outcome Evaluation:  Plan of Care Reviewed With: patient           Outcome Evaluation: PT IE complete.  A&Ox4.  C/o intermittent 10/10 pain in throat.  Pt reports he was independent PLOF, but states he has had a decline in functional mobility.  Today he is max A for bed mobility.  Attempted standing, but unable to stand.  Pt able to sit EOB unsupported for several minutes.  Pt with decreased ROM, strength, endurance, balance.  PT to see for functional mobility training.  Recommend SNF at [place].  Thank you for referral.      Anticipated Discharge Disposition (PT): skilled nursing facility

## 2024-05-19 NOTE — THERAPY EVALUATION
Acute Care - Speech Language Pathology   Swallow Initial Evaluation [facility]     Patient Name: Spencer Moore  : 1940  MRN: [id number]  Today's Date: 2024               Admit Date: 2024  Clinical bedside swallow evaluation complete. The pt is noted to be alert sitting upright in bed at time of SLP arrival. He is noted to be [place], but does have hearing aids in bilaterally. Upon completion of oral mechanism exam, the pt is noted to have what appears to be significant diffuse oral thrush. He reports 10/10 oral pain. He expresses it is very painful to swallow and he does not feel like he can eat. He did complete trials of pureed, nectar thick, and thin liquids on this date. He is noted to have 1-2 multiple swallows with each trial as well as facial grimacing. No definite vocal change or overt s/s of aspiration noted. SLP recommends pt be placed on a pureed diet due to pt expressing he is unable to manipulate solids because of pain. He is ok to continue thin liquid consistencies. Meds to be administered crushed in pudding/applesauce. RN notified of oral thrush likely being the cause of oral pain and pt's complaints of swallowing difficulties. SLP will continue to follow and treat to ensure diet tolerance as well as potential for diet upgrade.    Aayush Hernandez CCC-SLP 2024 11:55 CDT    Visit Dx:     ICD-10-CM ICD-9-CM   1. Dysphagia, unspecified type  R13.10 787.20     Patient Active Problem List   Diagnosis    Cervical spondylosis without myelopathy    Nonsmoker    BMI 29.0-29.9,adult    Benign non-nodular prostatic hyperplasia with lower urinary tract symptoms    Trigeminal neuralgia of right side of face    Primary narcolepsy without cataplexy    Hypertension    Mixed hyperlipidemia    Cerebrovascular small vessel disease    Sensorineural hearing loss (SNHL) of both ears    Macrocytic anemia    Left arm pain    Triceps tendon rupture, left, initial encounter    Left hand paresthesia    Spinal  stenosis in cervical region    Cervical radiculopathy    Degeneration of cervical intervertebral disc    BMI 30.0-30.9,adult    History of anemia due to chronic kidney disease    Low vitamin B12 level    Acute lower gastrointestinal bleeding    Leukocytosis, unknown etiology-suspect steroid    Chronic back pain, multilevel    ASTRID (acute kidney injury)    H/O: GI bleed    Adult failure to thrive    Paroxysmal atrial fibrillation    Coronary artery disease involving native coronary artery of native heart without angina pectoris    Stage 3a chronic kidney disease    A-fib     Past Medical History:   Diagnosis Date    Anemia     Arthritis     Bilateral impacted cerumen 12/10/2018    Diverticulitis     Enlarged prostate     ETD (eustachian tube dysfunction)     GERD (gastroesophageal reflux disease)     Hypertension     Kidney infection     Lumbago     Narcolepsy     Sensorineural hearing loss (SNHL) of both ears 6/18/2018    SNHL (sensorineural hearing loss)     Tinnitus     Vitamin B12 deficiency     Weak urinary stream      Past Surgical History:   Procedure Laterality Date    ABDOMINAL SURGERY      CATARACT EXTRACTION Left     HAND SURGERY Left     HERNIA REPAIR      REPLACEMENT TOTAL KNEE Left     ROTATOR CUFF REPAIR      TONSILLECTOMY         SLP Recommendation and Plan  SLP Swallowing Diagnosis: mild-moderate, oral dysphagia, R/O pharyngeal dysphagia (05/19/24 0800)  SLP Diet Recommendation: puree, thin liquids (05/19/24 0800)  Recommended Precautions and Strategies: upright posture during/after eating, small bites of food and sips of liquid, alternate between small bites of food and sips of liquid (05/19/24 0800)  SLP Rec. for Method of Medication Administration: meds crushed, with puree, as tolerated (05/19/24 0800)     Monitor for Signs of Aspiration: yes, notify SLP if any concerns (05/19/24 0800)     Swallow Criteria for Skilled Therapeutic Interventions Met: demonstrates skilled criteria (05/19/24  0800)  Anticipated Discharge Disposition (SLP): skilled nursing facility (05/19/24 0800)  Rehab Potential/Prognosis, Swallowing: good, to achieve stated therapy goals (05/19/24 0800)  Therapy Frequency (Swallow): at least, 3 days per week (05/19/24 0800)  Predicted Duration Therapy Intervention (Days): until discharge (05/19/24 0800)  Oral Care Recommendations: Oral Care BID/PRN (05/19/24 0800)        Daily Summary of Progress (SLP): progress toward functional goals as expected (05/19/24 0800)               Treatment Assessment (SLP): continued (05/19/24 0800)     Plan for Continued Treatment (SLP): continue treatment per plan of care (05/19/24 0800)         Plan of Care Reviewed With: patient  Progress: no change  Outcome Evaluation: See note      SWALLOW EVALUATION (Last 72 Hours)       SLP Adult Swallow Evaluation       Row Name 05/19/24 0800                   Rehab Evaluation    Document Type evaluation  -CS        Subjective Information complains of;pain  -CS        Patient Observations alert;cooperative;agree to therapy  -CS        Patient/Family/Caregiver Comments/Observations No family present  -CS        Patient Effort adequate  -CS           General Information    Patient Profile Reviewed yes  -CS        Pertinent History Of Current Problem COPD exacerbation, thrush  -CS        Current Method of Nutrition mechanical ground textures;thin liquids  -CS        Precautions/Limitations, Vision WFL;for purposes of eval  -CS        Precautions/Limitations, Hearing hearing aid, bilaterally;hearing impairment, bilaterally  -CS        Prior Level of Function-Communication WFL  -CS        Prior Level of Function-Swallowing no diet consistency restrictions  -CS        Plans/Goals Discussed with patient  -CS        Barriers to Rehab medically complex  -CS        Patient's Goals for Discharge return to all previous roles/activities  -CS           Pain    Additional Documentation Pain Scale: Numbers Pre/Post-Treatment  (Group)  -CS           Pain Scale: Numbers Pre/Post-Treatment    Pretreatment Pain Rating 10/10  -CS        Posttreatment Pain Rating 10/10  -CS        Pain Location - mouth (nondental)  -CS           Oral Motor Structure and Function    Oral Lesions or Structural Abnormalities and/or variants Visible thrush throughout oral cavity.  -CS        Secretion Management WNL/WFL  -CS        Mucosal Quality ulcerated  -CS        Volitional Swallow delayed;weak  -CS        Volitional Cough weak;non-productive  -CS           Oral Musculature and Cranial Nerve Assessment    Oral Motor General Assessment generalized oral motor weakness  -CS           General Eating/Swallowing Observations    Respiratory Support Currently in Use nasal cannula  -CS        Eating/Swallowing Skills fed by SLP  -CS        Positioning During Eating upright in bed  -CS        Utensils Used spoon;straw  -CS        Consistencies Trialed pureed;thin liquids  -CS           Clinical Swallow Eval    Oral Prep Phase impaired  -CS        Oral Transit impaired  -CS        Oral Residue WFL  -CS        Pharyngeal Phase suspected pharyngeal impairment  -CS        Esophageal Phase unremarkable  -CS        Clinical Swallow Evaluation Summary See note  -CS           Oral Prep Concerns    Oral Prep Concerns increased prep time  -CS        Increased Prep Time pudding  -CS           Oral Transit Concerns    Oral Transit Concerns increased oral transit time  -CS        Increased Oral Transit Time pudding  -CS           Pharyngeal Phase Concerns    Pharyngeal Phase Concerns multiple swallows  -CS        Multiple Swallows thin;pudding  -CS           SLP Evaluation Clinical Impression    SLP Swallowing Diagnosis mild-moderate;oral dysphagia;R/O pharyngeal dysphagia  -CS        Functional Impact risk of aspiration/pneumonia;risk of malnutrition  -CS        Rehab Potential/Prognosis, Swallowing good, to achieve stated therapy goals  -CS        Swallow Criteria for Skilled  Therapeutic Interventions Met demonstrates skilled criteria  -CS           SLP Treatment Clinical Impressions    Treatment Assessment (SLP) continued  -CS        Daily Summary of Progress (SLP) progress toward functional goals as expected  -CS        Barriers to Overall Progress (SLP) Other (see comments)  qThrush  -CS        Plan for Continued Treatment (SLP) continue treatment per plan of care  -CS        Care Plan Review evaluation/treatment results reviewed  -CS           Recommendations    Therapy Frequency (Swallow) at least;3 days per week  -CS        Predicted Duration Therapy Intervention (Days) until discharge  -CS        SLP Diet Recommendation puree;thin liquids  -CS        Recommended Precautions and Strategies upright posture during/after eating;small bites of food and sips of liquid;alternate between small bites of food and sips of liquid  -CS        Oral Care Recommendations Oral Care BID/PRN  -CS        SLP Rec. for Method of Medication Administration meds crushed;with puree;as tolerated  -CS        Monitor for Signs of Aspiration yes;notify SLP if any concerns  -CS        Anticipated Discharge Disposition (SLP) skilled nursing facility  -CS           Swallow Goals (SLP)    Swallow LTGs Swallow Long Term Goal (free text)  -CS        Swallow STGs diet tolerance goal selection (SLP)  -CS        Diet Tolerance Goal Selection (SLP) Patient will tolerate trials of  -CS           (LTG) Swallow    (LTG) Swallow Pt will tolerate LRD w/o any overt s/s of aspiration.  -CS        [place] (Swallow Long Term Goal) with minimal cues (75-90% accuracy)  -CS        Time Frame (Swallow Long Term Goal) by discharge  -CS        Barriers (Swallow Long Term Goal) n/a  -CS        Progress/Outcomes (Swallow Long Term Goal) new goal  -CS           (STG) Patient will tolerate trials of    Consistencies Trialed (Tolerate trials) soft to chew (whole) textures;regular textures;pureed textures;thin liquids  -CS         Desired Outcome (Tolerate trials) without signs/symptoms of aspiration;without signs of distress  -CS        [place] (Tolerate trials) with minimal cues (75-90% accuracy)  -CS        Progress/Outcomes (Tolerate trials) new goal  -CS                  User Key  (r) = Recorded By, (t) = Taken By, (c) = Cosigned By      Initials Name Effective Dates    Aayush Gongora CCC-SLP 05/07/24 -                     EDUCATION  The patient has been educated in the following areas:   Dysphagia (Swallowing Impairment).        SLP GOALS       Row Name 05/19/24 0800             (LTG) Swallow    (LTG) Swallow Pt will tolerate LRD w/o any overt s/s of aspiration.  -CS      [place] (Swallow Long Term Goal) with minimal cues (75-90% accuracy)  -CS      Time Frame (Swallow Long Term Goal) by discharge  -CS      Barriers (Swallow Long Term Goal) n/a  -CS      Progress/Outcomes (Swallow Long Term Goal) new goal  -CS         (STG) Patient will tolerate trials of    Consistencies Trialed (Tolerate trials) soft to chew (whole) textures;regular textures;pureed textures;thin liquids  -CS      Desired Outcome (Tolerate trials) without signs/symptoms of aspiration;without signs of distress  -CS      [place] (Tolerate trials) with minimal cues (75-90% accuracy)  -CS      Progress/Outcomes (Tolerate trials) new goal  -CS                User Key  (r) = Recorded By, (t) = Taken By, (c) = Cosigned By      Initials Name Provider Type    Aayush Gongora CCC-SLP Speech and Language Pathologist                         Time Calculation:    Time Calculation- SLP       Row Name 05/19/24 1154             Time Calculation- SLP    SLP Start Time 0800  -      SLP Stop Time 0908  -      SLP Time Calculation (min) 68 min  -      SLP Received On 05/19/24  -      SLP Goal Re-Cert Due Date 05/29/24  -         Untimed Charges    SLP Eval/Re-eval  ST Eval Oral Pharyng Swallow - 83166  -      [id number] Eval Oral Pharyng Swallow Minutes 68   -CS         Total Minutes    Untimed Charges Total Minutes 68  -CS       Total Minutes 68  -CS                User Key  (r) = Recorded By, (t) = Taken By, (c) = Cosigned By      Initials Name Provider Type    CS Aayush Hernandez CCC-SLP Speech and Language Pathologist                    Therapy Charges for Today       Code Description Service Date Service Provider Modifiers Qty    [id number] HC ST EVAL ORAL PHARYNG SWALLOW 5 5/19/2024 Aayush Hernandez CCC-SLP GN 1                 RUTH Peck  5/19/2024

## 2024-05-19 NOTE — PLAN OF CARE
Goal Outcome Evaluation:  Plan of Care Reviewed With: patient        Progress: declining  Outcome Evaluation: patient reluctant to partake in slef care today or perform ADL's with nursing staff. A nephew and his wife came by to visit earlier and was able to convince patient to take some of his medications. Patient thinks he is not able to perform ability of swallowing but can do so very well with alot of encouragement to try. Patient slept remainder of afternoon and was placed on BiPap per request.

## 2024-05-19 NOTE — PROGRESS NOTES
RT EQUIPMENT DEVICE RELATED - SKIN ASSESSMENT    Aristeo Score:  Aristeo Score: 15     RT Medical Equipment/Device:     NIV Mask:  Under-the-nose   size:  B    Skin Assessment:      Nose:  Intact    Device Skin Pressure Protection:  Pressure points protected    Nurse Notification:  No    Oh Cruz, RRT

## 2024-05-19 NOTE — NURSING NOTE
"Patient is apprehensive to attempt and try medications, even if some are crushed well and mixed with applesauce. Patient needs a lot of encouragement as he is also apprehensive to perform certain ADL activities, or assist with repositioning in bed. Nursing staff highly encouraging patient to at least make an attempt at activities or other tasks.       Nystatin Swish and Swallow ordered for patient, due to appearance of redness, irritation and pain in mouth. RN educated patient that the medication is to be \"swished in the mouth and swallowed\" in order to coat the painful areas and promote healing.    Patient told RN, \"That is too difficult to do, and I can not do it you will have to do it for me.\"   I educated the patient he needed to attempt to try because RN could not safely perform that task for him. Patient performed swish and swallow very well, but seemed resentful.      "

## 2024-05-19 NOTE — PLAN OF CARE
Problem: Noninvasive Ventilation Acute  Goal: Effective Unassisted Ventilation and Oxygenation  Outcome: Ongoing, Progressing   Goal Outcome Evaluation:   Pt did not us bipap last night states it is drying his mouth and throat out. Current sat 96% on room air.  RT EQUIPMENT DEVICE RELATED - SKIN ASSESSMENT    Aristeo Score:  Aristeo Score: 15     RT Medical Equipment/Device:     NIV Mask:  Under-the-nose   size:  B    Skin Assessment:      Chin:  Intact  Nares:  Intact  Neck:  Intact    Device Skin Pressure Protection:  Positioning supports utilized, Pressure points protected, and Skin-to-device areas padded:  None Required    Nurse Notification:  Kirsten Devi, RRT

## 2024-05-19 NOTE — PLAN OF CARE
Goal Outcome Evaluation:              Outcome Evaluation: Pt appeared to sleep well majority of night. IV changed by house supe due to infiltration. Patient requested not to have another one if this goes bad. VSS. Safety maintained. SR [id number]

## 2024-05-19 NOTE — THERAPY EVALUATION
Patient Name: Spencer Moore  : 1940    MRN: [id number]                              Today's Date: 2024       Admit Date: 2024    Visit Dx:     ICD-10-CM ICD-9-CM   1. Dysphagia, unspecified type  R13.10 787.20   2. Impaired mobility [Z74.09]  Z74.09 799.89     Patient Active Problem List   Diagnosis    Cervical spondylosis without myelopathy    Nonsmoker    BMI 29.0-29.9,adult    Benign non-nodular prostatic hyperplasia with lower urinary tract symptoms    Trigeminal neuralgia of right side of face    Primary narcolepsy without cataplexy    Hypertension    Mixed hyperlipidemia    Cerebrovascular small vessel disease    Sensorineural hearing loss (SNHL) of both ears    Macrocytic anemia    Left arm pain    Triceps tendon rupture, left, initial encounter    Left hand paresthesia    Spinal stenosis in cervical region    Cervical radiculopathy    Degeneration of cervical intervertebral disc    BMI 30.0-30.9,adult    History of anemia due to chronic kidney disease    Low vitamin B12 level    Acute lower gastrointestinal bleeding    Leukocytosis, unknown etiology-suspect steroid    Chronic back pain, multilevel    ASTRID (acute kidney injury)    H/O: GI bleed    Adult failure to thrive    Paroxysmal atrial fibrillation    Coronary artery disease involving native coronary artery of native heart without angina pectoris    Stage 3a chronic kidney disease    A-fib     Past Medical History:   Diagnosis Date    Anemia     Arthritis     Bilateral impacted cerumen 12/10/2018    Diverticulitis     Enlarged prostate     ETD (eustachian tube dysfunction)     GERD (gastroesophageal reflux disease)     Hypertension     Kidney infection     Lumbago     Narcolepsy     Sensorineural hearing loss (SNHL) of both ears 2018    SNHL (sensorineural hearing loss)     Tinnitus     Vitamin B12 deficiency     Weak urinary stream      Past Surgical History:   Procedure Laterality Date    ABDOMINAL SURGERY      CATARACT  EXTRACTION Left     HAND SURGERY Left     HERNIA REPAIR      REPLACEMENT TOTAL KNEE Left     ROTATOR CUFF REPAIR      TONSILLECTOMY        General Information       [facility] Name 05/19/24 1245          Physical Therapy Time and Intention    Document Type evaluation  Recent admit 5/2-5/3 with hematochezia. T/f to [facility] on 5/4 and underwent flex sigmoidostomy. Developed a-fib during procedure and required a heart cath on 5/5. Dx:acute resp failure w/ hypoxia, acute pulmonary edema, AE COPD, CAD, A-fib w/ rvr..  -     Mode of Treatment physical therapy  ..moderate aortic stenosis  -Critical access hospital Name 05/19/24 1245          General Information    Patient Profile Reviewed yes  -     Prior Level of Function independent:;all household mobility;ADL's  DME:  shower chair, rw, cane.  walk in shower  -     Existing Precautions/Restrictions fall;oxygen therapy device and L/min  -     Barriers to Rehab medically complex  -Critical access hospital Name 05/19/24 1245          Living Environment    People in Home alone  -Critical access hospital Name 05/19/24 1245          Home Main Entrance    Number of Stairs, Main Entrance three  and ramp  -     Stair Railings, Main Entrance railings on both sides of stairs  -Critical access hospital Name 05/19/24 1245          Stairs Within Home, Primary    Number of Stairs, Within Home, Primary none  -Critical access hospital Name 05/19/24 1245          Cognition    Orientation Status (Cognition) oriented x 4  -Critical access hospital Name 05/19/24 1245          Safety Issues, Functional Mobility    Safety Issues Affecting Function (Mobility) ability to follow commands  -     Impairments Affecting Function (Mobility) balance;endurance/activity tolerance;pain;postural/trunk control;range of motion (ROM);strength;shortness of breath  -               User Key  (r) = Recorded By, (t) = Taken By, (c) = Cosigned By      Initials Name Provider Type     Yoseph Fairchild, PT Physical Therapist                   Mobility       Row Name 05/19/24 1244           Bed Mobility    Bed Mobility supine-sit;sit-supine  -     Scooting/Bridging [place] (Bed Mobility) maximum assist (25% patient effort)  -     Supine-Sit [place] (Bed Mobility) maximum assist (25% patient effort)  -     Sit-Supine [place] (Bed Mobility) moderate assist (50% patient effort)  -     Assistive Device (Bed Mobility) draw sheet;head of bed elevated;bed rails  -LH       Row Name 05/19/24 1245          Transfers    Comment, (Transfers) unable to stand today  -LH       Row Name 05/19/24 1245          Sit-Stand Transfer    Sit-Stand [place] (Transfers) maximum assist (25% patient effort)  -               User Key  (r) = Recorded By, (t) = Taken By, (c) = Cosigned By      Initials Name Provider Type     Yoseph Fairchild, PT Physical Therapist                   Obj/Interventions       Row Name 05/19/24 1245          Range of Motion Comprehensive    General Range of Motion upper extremity range of motion deficits identified;lower extremity range of motion deficits identified;neck/trunk range of motion deficits identified  -     Comment, General Range of Motion R knee limited mobility - chronic  -LH       Row Name 05/19/24 1245          Strength Comprehensive (MMT)    General Manual Muscle Testing (MMT) Assessment upper extremity strength deficits identified;lower extremity strength deficits identified  -LH       Row Name 05/19/24 1245          Balance    Static Sitting Balance standby assist  -LH       Row Name 05/19/24 1245          Sensory Assessment (Somatosensory)    Sensory Assessment (Somatosensory) sensation intact  -               User Key  (r) = Recorded By, (t) = Taken By, (c) = Cosigned By      Initials Name Provider Type     Yoseph Fairchild, PT Physical Therapist                   Goals/Plan       Row Name 05/19/24 1245          Bed Mobility Goal 1 (PT)    Activity/Assistive Device (Bed Mobility Goal 1, PT) bed mobility activities, all  -     [place]  Level/Cues Needed (Bed Mobility Goal 1, PT) minimum assist (75% or more patient effort)  -     Time Frame (Bed Mobility Goal 1, PT) 10 days  -     Progress/Outcomes (Bed Mobility Goal 1, PT) new [facility]  -       Row Name 05/19/24 1245          Transfer Goal 1 (PT)    Activity/Assistive Device (Transfer Goal 1, PT) sit-to-stand/stand-to-sit  -     [place] Level/Cues Needed (Transfer Goal 1, PT) minimum assist (75% or more patient effort)  -     Time Frame (Transfer Goal 1, PT) 10 days  -     Progress/Outcome (Transfer Goal 1, PT) new goal  -[facility] Name 05/19/24 1245          Gait Training Goal 1 (PT)    Activity/Assistive Device (Gait Training Goal 1, PT) gait (walking locomotion);assistive device use  -     [place] Level (Gait Training Goal 1, PT) minimum assist (75% or more patient effort)  -     Distance (Gait Training Goal 1, PT) 20ft  -     Time Frame (Gait Training Goal 1, PT) 10 days  -     Progress/Outcome (Gait Training Goal 1, PT) new [facility]  -[facility] Name 05/19/24 1242          Therapy Assessment/Plan (PT)    Planned Therapy Interventions (PT) balance training;bed mobility training;gait training;home exercise program;strengthening;patient/family education;transfer training  -               User Key  (r) = Recorded By, (t) = Taken By, (c) = Cosigned By      Initials Name Provider Type     Yoseph Fairchild, PT Physical Therapist                   Clinical Impression       Row Name 05/19/24 1244          Pain    Pretreatment Pain Rating 0/10 - no pain  -     Posttreatment Pain Rating 10/10  -     Pain Location - throat  -     Pain Intervention(s) Medication (See MAR)  -       Row Name 05/19/24 1244          Plan of Care Review    Plan of Care Reviewed With patient  -     Outcome Evaluation PT IE complete.  A&Ox4.  C/o intermittent 10/10 pain in throat.  Pt reports he was independent PLOF, but states he has had a decline in functional mobility.  Today he is max A  for bed mobility.  Attempted standing, but unable to stand.  Pt able to sit EOB unsupported for several minutes.  Pt with decreased ROM, strength, endurance, balance.  PT to see for functional mobility training.  Recommend SNF at [place].  Thank you for referral.  -       Row Name 05/19/24 1247          Therapy Assessment/Plan (PT)    Patient/Family Therapy Goals Statement (PT) regain strength  -     Rehab Potential (PT) good, to achieve stated therapy goals  -     Criteria for Skilled Interventions Met (PT) yes;skilled treatment is necessary  -     Therapy Frequency (PT) 2 times/day  -     Predicted Duration of Therapy Intervention (PT) until dc  -       Row Name 05/19/24 1243          Vital Signs    Posttreatment Heart Rate (beats/min) 103  -     Post SpO2 (%) 94  -     O2 Delivery Post Treatment supplemental O2  -     Post Patient Position Supine  -[facility] Name 05/19/24 3579          Positioning and Restraints    Pre-Treatment Position in bed  -     Post Treatment Position bed  -     In Bed fowlers;call light within reach;encouraged to call for assist;side lying left;side rails up x2;heels elevated  side lying right upon entering room  -               User Key  (r) = Recorded By, (t) = Taken By, (c) = Cosigned By      Initials Name Provider Type     Yoseph Fairchild, PT Physical Therapist                   Outcome Measures       Row Name 05/19/24 9132          How much help from another person do you currently need...    Turning from your back to your side while in flat bed without using bedrails? 2  -LH     Moving from lying on back to sitting on the side of a flat bed without bedrails? 2  -LH     Moving to and from a bed to a chair (including a wheelchair)? 1  -LH     Standing up from a chair using your arms (e.g., wheelchair, bedside chair)? 1  -LH     Climbing 3-5 steps with a railing? 1  -LH     To walk in hospital room? 1  -     AM-PAC 6 Clicks Score (PT) 8  -     Highest Level of  Mobility Goal 3 --> Sit at edge of bed  -       Row Name 05/19/24 1245          Functional Assessment    Outcome Measure Options AM-PAC 6 Clicks Basic Mobility (PT)  -               User Key  (r) = Recorded By, (t) = Taken By, (c) = Cosigned By      Initials Name Provider Type     Yoseph Fairchild, PT Physical Therapist                                 Physical Therapy Education       Title: PT OT SLP Therapies (In Progress)       Topic: Physical Therapy (Done)       Point: Mobility training (Done)       Learning Progress Summary             Patient Acceptance, E,D, DU,VU by  at 5/19/2024 1329    Comment: benefits of PT and POC, call for A OOB                         Point: Precautions (Done)       Learning Progress Summary             Patient Acceptance, E,D, DU,VU by  at 5/19/2024 1329    Comment: benefits of PT and POC, call for A OOB                                         User Key       Initials Effective Dates Name Provider Type [facility] 02/03/23 -  Yoseph Fairchild, PT Physical Therapist PT                  PT Recommendation and Plan  Planned Therapy Interventions (PT): balance training, bed mobility training, gait training, home exercise program, strengthening, patient/family education, transfer training  Plan of Care Reviewed With: patient  Outcome Evaluation: PT IE complete.  A&Ox4.  C/o intermittent 10/10 pain in throat.  Pt reports he was independent PLOF, but states he has had a decline in functional mobility.  Today he is max A for bed mobility.  Attempted standing, but unable to stand.  Pt able to sit EOB unsupported for several minutes.  Pt with decreased ROM, strength, endurance, balance.  PT to see for functional mobility training.  Recommend SNF at [place].  Thank you for referral.     Time Calculation:         PT Charges       Row Name 05/19/24 1323             Time Calculation    Start Time 1245  -      Stop Time 1325  15 minutes earlier on CR  -      Time Calculation (min) 40 min  -       PT Received On 05/19/24  -      PT Goal Re-Cert Due Date 05/29/24  -         Untimed Charges    PT Eval/Re-eval Minutes 55  -LH         Total Minutes    Untimed Charges Total Minutes 55  -LH       Total Minutes 55  -LH                User Key  (r) = Recorded By, (t) = Taken By, (c) = Cosigned By      Initials Name Provider Type     Yoseph Fairchild, PT Physical Therapist                  Therapy Charges for Today       Code Description Service Date Service Provider Modifiers Qty    69869672972 HC PT EVAL MOD COMPLEXITY 4 5/19/2024 Yoseph Fairchild, PT GP 1            PT G-Codes  Outcome Measure Options: AM-PAC 6 Clicks Basic Mobility (PT)  AM-PAC 6 Clicks Score (PT): 8  AM-PAC 6 Clicks Score (OT): 11  PT Discharge Summary  Anticipated Discharge Disposition (PT): skilled nursing facility    Yoseph Fairchild, RASHARD  5/19/2024

## 2024-05-19 NOTE — PLAN OF CARE
Goal Outcome Evaluation:  Plan of Care Reviewed With: patient        Progress: no change  Outcome Evaluation: See note      Anticipated Discharge Disposition (SLP): skilled nursing facility          SLP Swallowing Diagnosis: mild-moderate, oral dysphagia, R/O pharyngeal dysphagia (05/19/24 0800)  Treatment Assessment (SLP): continued (05/19/24 0800)     Plan for Continued Treatment (SLP): continue treatment per plan of care (05/19/24 0800)    Clinical bedside swallow evaluation complete. The pt is noted to be alert sitting upright in bed at time of SLP arrival. He is noted to be [place], but does have hearing aids in bilaterally. Upon completion of oral mechanism exam, the pt is noted to have what appears to be significant diffuse oral thrush. He reports 10/10 oral pain. He expresses it is very painful to swallow and he does not feel like he can eat. He did complete trials of pureed, nectar thick, and thin liquids on this date. He is noted to have 1-2 multiple swallows with each trial as well as facial grimacing. No definite vocal change or overt s/s of aspiration noted. SLP recommends pt be placed on a pureed diet due to pt expressing he is unable to manipulate solids because of pain. He is ok to continue thin liquid consistencies. Meds to be administered crushed in pudding/applesauce. RN notified of oral thrush likely being the cause of oral pain and pt's complaints of swallowing difficulties. SLP will continue to follow and treat to ensure diet tolerance as well as potential for diet upgrade.

## 2024-05-19 NOTE — PROGRESS NOTES
[facility] Medicine Services  INPATIENT PROGRESS NOTE    Patient Name: Spencer Moore  Date of Admission: 5/17/2024  Today's Date: 05/19/24  Length of Stay: 2  Primary Care Physician: Miguel Ángel Hinson DO    Subjective   Chief Complaint: Weakness  HPI   Hypotension improved yesterday after IV fluid bolus.  No new complaints today.    Review of Systems   All pertinent negatives and positives are as above. All other systems have been reviewed and are negative unless otherwise stated.     Objective    Temp:  [97.8 °F (36.6 °C)-98.4 °F (36.9 °C)] 98.4 °F (36.9 °C)  Heart Rate:  [65-87] 87  Resp:  [16-18] 16  BP: ()/(48-63) 99/63  Physical Exam  Vitals reviewed.   Constitutional:       Appearance: He is obese. He is ill-appearing.   HENT:      Head: Normocephalic and atraumatic.      Ears:      Comments: Bilateral hearing aids, communication difficult due to hearing loss     Mouth/Throat:      Mouth: Mucous membranes are dry.      Pharynx: Oropharynx is clear.   Eyes:      Extraocular Movements: Extraocular movements intact.      Conjunctiva/sclera: Conjunctivae normal.   Cardiovascular:      Rate and Rhythm: Normal rate.      Comments: Currently normal sinus rhythm in the 70s however has had episodes of A-fib since arrival  Pulmonary:      Breath sounds: Wheezing present.      Comments: Oxygen saturation on 2 L 99%.   Abdominal:      General: There is no distension.      Palpations: Abdomen is soft.   Musculoskeletal:         General: Deformity (Unable to bend right leg-chronic) present.      Cervical back: Normal range of motion and neck supple.      Right lower leg: No edema.      Left lower leg: No edema.      Comments: Generalized weakness and debility   Skin:     General: Skin is warm and dry.      Coloration: Skin is pale.   Neurological:      General: No focal deficit present.      Mental Status: He is alert and oriented to person, place, and time.   Psychiatric:   "       Mood and Affect: Mood normal.         Behavior: Behavior normal.       Results Review:  I have reviewed the labs, radiology results, and diagnostic studies.    Laboratory Data:   Results from last 7 days   Lab Units 05/18/24  0503 05/17/24  1745   WBC 10*3/mm3 19.01* 18.28*   HEMOGLOBIN g/dL 8.3* 9.1*   HEMATOCRIT % 26.8* 28.7*   PLATELETS 10*3/mm3 502* 523*        Results from last 7 days   Lab Units 05/18/24  0503 05/17/24  1745   SODIUM mmol/L 135* 136   POTASSIUM mmol/L 5.0 5.2   CHLORIDE mmol/L 102 100   CO2 mmol/L 24.0 23.0   BUN mg/dL 89* 86*   CREATININE mg/dL 2.07* 2.16*   CALCIUM mg/dL 8.0* 8.3*   BILIRUBIN mg/dL 0.4  --    ALK PHOS U/L 89  --    ALT (SGPT) U/L 85*  --    AST (SGOT) U/L 41*  --    GLUCOSE mg/dL 151* 196*       Culture Data:   No results found for: \"BLOODCX\", \"URINECX\", \"WOUNDCX\", \"MRSACX\", \"RESPCX\", \"STOOLCX\"    Radiology Data:   Imaging Results (Last 24 Hours)       ** No results found for the last 24 hours. **            I have reviewed the patient's current medications.     Assessment/Plan   Assessment  Active Hospital Problems    Diagnosis     **A-fib     Paroxysmal atrial fibrillation     Coronary artery disease involving native coronary artery of native heart without angina pectoris     Stage 3a chronic kidney disease     Leukocytosis, unknown etiology-suspect steroid     Chronic back pain, multilevel     ASTRID (acute kidney injury)     H/O: GI bleed     Adult failure to thrive     Hypertension        Treatment Plan  Continue telemetry  Vitals every 4 hours  Diet mechanical soft ground meat  IVF NS 75 cc/hour  Oxygen nasal canula, BiPAP prn  Up with assistance  PT/OT evaluation  Palliative care consult    CAD   ASA 81 mg daily  Declined CABG  Lipitor 20 mg daily  Metoprolol 12.5 mg PO BID    Afib  Amiodarone 200 mg PO BID  Metoprolol 12.5 mg PO BID  Eliquis 2.5 mg BID  Limit albuterol use    ASTRID/CKD 3  Continue slow hydration    COPD   Duoneb 1 UD QID  Prednisone 40 mg " daily  Oxygen nasal canula, BiPAP prn    GIB history  Resolved and tolerating AC    Hypotension > Hold diuresis  NS bolus 1 L, repeat prn    BPH > Proscar    Medical Decision Making  Number and Complexity of problems: 6 complex medical problems  Differential Diagnosis: none    Conditions and Status        Condition is unchanged.     [facility] Data  External documents reviewed: [facility] E[facility]  Cardiac tracing (EKG, telemetry) interpretation: -  Radiology interpretation: see above  Labs reviewed: See above  Any tests that were considered but not ordered: none     Decision rules/scores evaluated (example [id number], [place], etc): [id number]     Discussed with: Patient     Care Planning  Shared decision making: Patient  Code status and discussions: DNR    Disposition  Social Determinants of Health that impact treatment or disposition: none  I expect the patient to be discharged to home/SNF in 1-3 days.     Electronically signed by Barry Nye MD, 05/19/24, 17:11 CDT.

## 2024-05-20 LAB
ACANTHOCYTES BLD QL SMEAR: ABNORMAL
ANION GAP SERPL CALCULATED.3IONS-SCNC: 9 MMOL/L (ref 5–15)
ANISOCYTOSIS BLD QL: ABNORMAL
BASOPHILS # BLD MANUAL: 0 10*3/MM3 (ref 0–0.2)
BASOPHILS NFR BLD MANUAL: 0 % (ref 0–1.5)
BUN SERPL-MCNC: 66 MG/DL (ref 8–23)
BUN/CREAT SERPL: 42.3 (ref 7–25)
CALCIUM SPEC-SCNC: 7.4 MG/DL (ref 8.6–10.5)
CHLORIDE SERPL-SCNC: 109 MMOL/L (ref 98–107)
CO2 SERPL-SCNC: 20 MMOL/L (ref 22–29)
CREAT SERPL-MCNC: 1.56 MG/DL (ref 0.76–1.27)
DEPRECATED RDW RBC AUTO: 53.7 FL (ref 37–54)
EGFRCR SERPLBLD CKD-EPI 2021: 44.1 ML/MIN/1.73
ELLIPTOCYTES BLD QL SMEAR: ABNORMAL
EOSINOPHIL # BLD MANUAL: 0 10*3/MM3 (ref 0–0.4)
EOSINOPHIL NFR BLD MANUAL: 0 % (ref 0.3–6.2)
ERYTHROCYTE [DISTWIDTH] IN BLOOD BY AUTOMATED COUNT: 15.1 % (ref 12.3–15.4)
GIANT PLATELETS: ABNORMAL
GLUCOSE SERPL-MCNC: 128 MG/DL (ref 65–99)
HCT VFR BLD AUTO: 25 % (ref 37.5–51)
HGB BLD-MCNC: 8 G/DL (ref 13–17.7)
LYMPHOCYTES # BLD MANUAL: 0.53 10*3/MM3 (ref 0.7–3.1)
LYMPHOCYTES NFR BLD MANUAL: 6 % (ref 5–12)
MACROCYTES BLD QL SMEAR: ABNORMAL
MCH RBC QN AUTO: 31 PG (ref 26.6–33)
MCHC RBC AUTO-ENTMCNC: 32 G/DL (ref 31.5–35.7)
MCV RBC AUTO: 96.9 FL (ref 79–97)
MONOCYTES # BLD: 1.06 10*3/MM3 (ref 0.1–0.9)
NEUTROPHILS # BLD AUTO: 16.1 10*3/MM3 (ref 1.7–7)
NEUTROPHILS NFR BLD MANUAL: 90 % (ref 42.7–76)
NEUTS BAND NFR BLD MANUAL: 1 % (ref 0–5)
NRBC SPEC MANUAL: 8 /100 WBC (ref 0–0.2)
PLATELET # BLD AUTO: 411 10*3/MM3 (ref 140–450)
PMV BLD AUTO: 11.5 FL (ref 6–12)
POIKILOCYTOSIS BLD QL SMEAR: ABNORMAL
POLYCHROMASIA BLD QL SMEAR: ABNORMAL
POTASSIUM SERPL-SCNC: 5.2 MMOL/L (ref 3.5–5.2)
RBC # BLD AUTO: 2.58 10*6/MM3 (ref 4.14–5.8)
SODIUM SERPL-SCNC: 138 MMOL/L (ref 136–145)
VARIANT LYMPHS NFR BLD MANUAL: 3 % (ref 19.6–45.3)
WBC MORPH BLD: NORMAL
WBC NRBC COR # BLD AUTO: 17.69 10*3/MM3 (ref 3.4–10.8)

## 2024-05-20 PROCEDURE — 97530 THERAPEUTIC ACTIVITIES: CPT

## 2024-05-20 PROCEDURE — 94799 UNLISTED PULMONARY SVC/PX: CPT

## 2024-05-20 PROCEDURE — 94761 N-INVAS EAR/PLS OXIMETRY MLT: CPT

## 2024-05-20 PROCEDURE — 94664 DEMO&/EVAL PT USE INHALER: CPT

## 2024-05-20 PROCEDURE — 92526 ORAL FUNCTION THERAPY: CPT

## 2024-05-20 PROCEDURE — 85025 COMPLETE CBC W/AUTO DIFF WBC: CPT | Performed by: FAMILY MEDICINE

## 2024-05-20 PROCEDURE — 85007 BL SMEAR W/DIFF WBC COUNT: CPT | Performed by: FAMILY MEDICINE

## 2024-05-20 PROCEDURE — 63710000001 PREDNISONE PER 1 MG: Performed by: NURSE PRACTITIONER

## 2024-05-20 PROCEDURE — 94660 CPAP INITIATION&MGMT: CPT

## 2024-05-20 PROCEDURE — 25810000003 SODIUM CHLORIDE 0.9 % SOLUTION: Performed by: NURSE PRACTITIONER

## 2024-05-20 PROCEDURE — 99223 1ST HOSP IP/OBS HIGH 75: CPT

## 2024-05-20 PROCEDURE — 80048 BASIC METABOLIC PNL TOTAL CA: CPT | Performed by: FAMILY MEDICINE

## 2024-05-20 RX ORDER — PREDNISONE 5 MG/1
1 TABLET ORAL TAKE AS DIRECTED
Qty: 1 EACH | Refills: 0 | Status: SHIPPED | OUTPATIENT
Start: 2024-05-20

## 2024-05-20 RX ORDER — TAMSULOSIN HYDROCHLORIDE 0.4 MG/1
1 CAPSULE ORAL NIGHTLY
Start: 2024-05-20

## 2024-05-20 RX ORDER — OXYCODONE AND ACETAMINOPHEN 10; 325 MG/1; MG/1
1 TABLET ORAL EVERY 6 HOURS PRN
Qty: 12 TABLET | Refills: 0 | Status: SHIPPED | OUTPATIENT
Start: 2024-05-20

## 2024-05-20 RX ADMIN — NYSTATIN 500000 UNITS: 100000 SUSPENSION ORAL at 08:50

## 2024-05-20 RX ADMIN — NYSTATIN 1 APPLICATION: 100000 CREAM TOPICAL at 09:05

## 2024-05-20 RX ADMIN — IPRATROPIUM BROMIDE AND ALBUTEROL SULFATE 3 ML: .5; 3 SOLUTION RESPIRATORY (INHALATION) at 15:06

## 2024-05-20 RX ADMIN — Medication 10 ML: at 09:03

## 2024-05-20 RX ADMIN — FLUOXETINE HYDROCHLORIDE 40 MG: 20 CAPSULE ORAL at 09:02

## 2024-05-20 RX ADMIN — APIXABAN 2.5 MG: 2.5 TABLET, FILM COATED ORAL at 21:13

## 2024-05-20 RX ADMIN — NYSTATIN 500000 UNITS: 100000 SUSPENSION ORAL at 11:52

## 2024-05-20 RX ADMIN — METOPROLOL TARTRATE 12.5 MG: 25 TABLET, FILM COATED ORAL at 09:03

## 2024-05-20 RX ADMIN — APIXABAN 2.5 MG: 2.5 TABLET, FILM COATED ORAL at 09:02

## 2024-05-20 RX ADMIN — NYSTATIN 1 APPLICATION: 100000 CREAM TOPICAL at 21:13

## 2024-05-20 RX ADMIN — POLYETHYLENE GLYCOL 3350 17 G: 17 POWDER, FOR SOLUTION ORAL at 09:01

## 2024-05-20 RX ADMIN — TERAZOSIN HYDROCHLORIDE 2 MG: 2 CAPSULE ORAL at 21:13

## 2024-05-20 RX ADMIN — OXYCODONE HYDROCHLORIDE AND ACETAMINOPHEN 1 TABLET: 10; 325 TABLET ORAL at 23:12

## 2024-05-20 RX ADMIN — NYSTATIN 500000 UNITS: 100000 SUSPENSION ORAL at 21:13

## 2024-05-20 RX ADMIN — BUDESONIDE AND FORMOTEROL FUMARATE DIHYDRATE 2 PUFF: 160; 4.5 AEROSOL RESPIRATORY (INHALATION) at 09:26

## 2024-05-20 RX ADMIN — SODIUM CHLORIDE 75 ML/HR: 9 INJECTION, SOLUTION INTRAVENOUS at 11:52

## 2024-05-20 RX ADMIN — FINASTERIDE 5 MG: 5 TABLET, FILM COATED ORAL at 09:02

## 2024-05-20 RX ADMIN — NYSTATIN 500000 UNITS: 100000 SUSPENSION ORAL at 17:54

## 2024-05-20 RX ADMIN — Medication 1 TABLET: at 17:54

## 2024-05-20 RX ADMIN — Medication 10 ML: at 21:33

## 2024-05-20 RX ADMIN — BUDESONIDE AND FORMOTEROL FUMARATE DIHYDRATE 2 PUFF: 160; 4.5 AEROSOL RESPIRATORY (INHALATION) at 18:17

## 2024-05-20 RX ADMIN — IPRATROPIUM BROMIDE AND ALBUTEROL SULFATE 3 ML: .5; 3 SOLUTION RESPIRATORY (INHALATION) at 18:17

## 2024-05-20 RX ADMIN — AMIODARONE HYDROCHLORIDE 200 MG: 200 TABLET ORAL at 21:13

## 2024-05-20 RX ADMIN — ASPIRIN 81 MG: 81 TABLET, COATED ORAL at 21:13

## 2024-05-20 RX ADMIN — CARBAMAZEPINE 200 MG: 200 TABLET ORAL at 09:04

## 2024-05-20 RX ADMIN — PREDNISONE 40 MG: 20 TABLET ORAL at 09:02

## 2024-05-20 RX ADMIN — AMIODARONE HYDROCHLORIDE 200 MG: 200 TABLET ORAL at 09:02

## 2024-05-20 RX ADMIN — ATORVASTATIN CALCIUM 20 MG: 10 TABLET, FILM COATED ORAL at 21:13

## 2024-05-20 RX ADMIN — PANTOPRAZOLE SODIUM 40 MG: 40 TABLET, DELAYED RELEASE ORAL at 09:02

## 2024-05-20 RX ADMIN — IPRATROPIUM BROMIDE AND ALBUTEROL SULFATE 3 ML: .5; 3 SOLUTION RESPIRATORY (INHALATION) at 06:24

## 2024-05-20 RX ADMIN — METOPROLOL TARTRATE 12.5 MG: 25 TABLET, FILM COATED ORAL at 21:14

## 2024-05-20 RX ADMIN — IPRATROPIUM BROMIDE AND ALBUTEROL SULFATE 3 ML: .5; 3 SOLUTION RESPIRATORY (INHALATION) at 10:04

## 2024-05-20 NOTE — DISCHARGE SUMMARY
[facility] Medicine Services  DISCHARGE SUMMARY       Date of Admission: 5/17/2024  Date of Discharge:  5/21/2024  Primary Care Physician: Miguel Ángel Hinson,     Discharge Diagnoses:  Active Hospital Problems    Diagnosis     **ASTRID (acute kidney injury)     Paroxysmal atrial fibrillation     Coronary artery disease involving native coronary artery of native heart without angina pectoris     Stage 3a chronic kidney disease     A-fib     Leukocytosis, unknown etiology-suspect steroid     Chronic back pain, multilevel     H/O: GI bleed     Adult failure to thrive     Hypertension          Presenting Problem/History of Present Illness:  A-fib [I48.91]     Chief Complaint on Day of Discharge:   No specific complaint    History of Present Illness on Day of Discharge:   The patient this morning on the left physical therapy to roll him to the side.  He is currently on BiPAP settings of 16/8.  He has participated with therapy today.  Resuscitation status has been de-escalated to DNR/DNI with no interventions.  He has a bed available at [facility] and is appropriate for discharge to that facility today.    Hospital Course  Spencer Moore is an 83-year-old male with a past medical history of diveticulitis, hypertension GERD, enlarged prostate, chronic anemia, hearing loss, please see below for complete list.  Patient recently admitted 5/2 - 5/3, 2024 with hematochezia that started on 5/1. GI did see patient and recommended CT angiogram of the abdomen pelvis with recommendation for interventional radiology evaluation and possible embolization. Patient was held in the ED due to bed availability, due to ongoing bleeding he did receive 2 unit of packed red blood cells.   Patient was subsequently transferred into the care of Dr. Hernandez gastroenterology as [facility].     Transferred back today after admission 5/3 - 5/16, 2024 (no bed availability yesterday).  Patient underwent  flex sigmoidostomy on 5/4, no obvious bleeding found.  During procedure he developed A-fib RVR with concerns for cardiac ischemia.  Cardiology was consulted he had a heart cath on 5/5 showing severe multivessel coronary artery disease he was deemed nonsurgical candidate and medical management recommended.  During his stay he developed acute hypoxic respiratory failure and pulmonary edema requiring diuresis and treatment for COPD exacerbation with IV steroids and bronchodilators.  At time of discharge she continues to need oxygen, pulmonary was following him at outlying facility.  He was treated with ceftriaxone and vancomycin due to concerns for sepsis.  Patient did complete 6 of 7 days antibiotic course last dose 5/14.  Palliative care consult completed DNR/DNI with wishes for transfer back to [facility] to be closer to home and pursue rehab placement.  [facility] failure with hypoxia, acute pulmonary edema, COPD exacerbation,'s coronary artery disease, resolved A-fib with RVR now controlled right, moderate aortic stenosis.  See below for recommendations moving forward.     Patient admitted direct admit room 402.  He has no significant complaints.  Vital signs are stable.  Patient is alert and oriented.  He has bilateral hearing aids and use.  He has oxygen in use and is asking for it to be taped to his face because it wants AN.  He is also asking for BiPAP at night.  He seems to be quite anxious about this I did assure him that I would order it as needed and at night.  I had a long discussion with family Herb and brandon Jane regarding his care.  He is a DNR/DNI and I will consult palliative care.  Goal is for patient to be transferred to rehab and or nursing home as he lives alone without anyone to assist with his care in home.  Patient has no complaints of pain.  He is somewhat dyspneic, saturation 99% on 3 L.  We will attempt to taper and discontinue.   "Patient did not have home oxygen prior to initial admission 5/2/2024.  Is admitted for further evaluation treatment.  Treatment Plan  1.  The patient will be admitted to Dr. Nye service here at [facility].   2.  Records reviewed from outlying facility  3.  Transfer medications reviewed and restarted as appropriate  4.  Taper steroid, transition from Solu-Medrol 40mg twice daily to prednisone 40 mg daily-start in a.m.  5.  Decrease Eliquis dose from 5 mg twice daily to 2.5 mg twice daily due to kidney failure  6.  Consult PT, OT and   7.  Supplemental oxygen, patient is requesting BiPAP at night, incentive spirometry, continuous pulse oximetry, DuoNebs  8.  Labs in a.m.  9.  Mechanical soft diet ground meats  10.  Oral care, turn every 2 hours, up with assistance  11.  Routine telemetry orders  12.  Gentle hydration NS at 75ml/hour  13.  Consult palliative care nurse    On the morning of admission, the patient remained hypotensive and felt weak.  He told the nurses and his physician at that time to \"do not worry, I know where I am going.\"  Diuresis was held secondary to hypotension, the patient's ASTRID was treated with slow IV fluid hydration.  The patient demonstrated difficulty with swallowing and the eating.  Diet was de-escalated to a puréed diet with occasional ground meats.  Hypotension improved with fluid bolus.  The patient continued on DuoNebs and prednisone for COPD.  He was treated with amiodarone and metoprolol as well as Eliquis for atrial fibrillation.  A bed is now available at [facility] for the patient to attempt rehabilitation.  He is stable for discharge to that facility today.  BiPAP to be used at at bedtime with settings of 16/8.    Consults:   Palliative care:  Active Hospital Problems     Diagnosis      **ASTRID (acute kidney injury)      Paroxysmal atrial fibrillation      Coronary artery disease involving native coronary artery of native heart without angina pectoris  "     Stage 3a chronic kidney disease      A-fib      Leukocytosis, unknown etiology-suspect steroid      Chronic back pain, multilevel      H/O: GI bleed      Adult failure to thrive      Hypertension        Impression/Problem List:  Acute respiratory failure with hypoxia  Multivessel coronary artery disease - Not surgical candidate   Impaired mobility and ADLs  Aortic stenosis, moderate  Anemia  Chronic kidney disease stage IIIa  Atrial fibrillation  Adult failure to thrive  Dysphagia  Advanced age      Plan / Recommendations      Palliative Care Encounter   Goals of care include CODE STATUS NO CPR with limited support interventions.     Prognosis is poor to guarded long-term secondary to acute respiratory failure with hypoxia, severe multivessel coronary artery disease and not surgical candidate, impaired mobility and ADLs, moderate aortic stenosis, CKD, atrial fibrillation, dysphagia, advanced age and other comorbidities listed above.     Attempted to have care conference with Mr. Mooer however difficult to have extensive discussion due to BiPAP mask and reports increased mouth pain.    He asked that his family/POA, Orestes, be updated.       Call placed to Orestes to discuss goals of care per Mr. Moore's request however unable to reach.    Voicemail left with request for return phone call.       According to chart review plans to discharge to SNF and continue efforts at rehabilitation and transitioning measures towards hospice care in future.        Thank you for allowing us to participate in patient's plan of care. Palliative Care Team will continue to follow patient.      Time spent:70 minutes spent reviewing medical and medication records, assessing and examining patient, discussing with family, answering questions, providing some guidance about a plan and documentation of care, and coordinating care with other healthcare members, with > 50% time spent face to face.         Electronically signed byLaurel  "ALLAN Cho, DYANA      Result Review    Result Review:  I have personally reviewed the results from the time of this admission to 5/20/2024 13:56 CDT and agree with these findings:  []  Laboratory  []  Microbiology  []  Radiology  []  EKG/Telemetry   []  Cardiology/Vascular   []  Pathology  []  Old records  []  Other:    Condition on Discharge:    Stable     Physical Exam on Discharge:  /71 (BP Location: Left arm, Patient Position: Lying)   Pulse 73   Temp 97.6 °F (36.4 °C) (Axillary)   Resp 22   Ht 172.7 cm (68\")   Wt 83.6 kg (184 lb 4.8 oz)   SpO2 98%   BMI 28.02 kg/m²   Physical Exam       Constitutional:       Appearance: He is ill-appearing.   HENT:      Head: Normocephalic and atraumatic.      Ears:      Comments: Bilateral hearing aids, communication difficult due to hearing loss     Mouth: Mucous membranes are dry.      Pharynx: Oropharynx is clear.   Eyes:      Extraocular Movements: Extraocular movements intact.      Conjunctiva/sclera: Conjunctivae normal.   Cardiovascular:      Rate and Rhythm: Normal rate.      Comments: Currently normal sinus rhythm  Pulmonary:      Breath sounds: Wheezing present.      Comments: Oxygen saturation on 2 L 99%.   Abdominal:      General: There is no distension.      Palpations: Abdomen is soft.   Musculoskeletal:         General: Deformity (Unable to bend right leg-chronic) present.      Cervical back: Normal range of motion and neck supple.      Right lower leg: No edema.      Left lower leg: No edema.      Comments: Generalized weakness and debility   Skin:     General: Skin is warm and dry.      Coloration: Skin is pale.   Neurological:      General: No focal deficit present.      Mental Status: He is alert and oriented to person, place, and time.   Psychiatric:         Mood and Affect: Mood normal.         Behavior: Behavior normal.     Discharge Disposition:  Skilled Nursing Facility (DC - External)    Discharge Medications:     Discharge Medications    "     New Medications        Instructions Start Date   predniSONE 5 MG (48) tablet therapy pack dose pack   5 mg, Oral, Take As Directed, Take as directed on package instructions.             Changes to Medications        Instructions Start Date   oxyCODONE-acetaminophen  MG per tablet  Commonly known as: PERCOCET  What changed:   when to take this  reasons to take this   1 tablet, Oral, Every 6 Hours PRN             Continue These Medications        Instructions Start Date   amiodarone 200 MG tablet  Commonly known as: PACERONE   200 mg, Oral, 2 Times Daily      apixaban 5 MG tablet tablet  Commonly known as: ELIQUIS   5 mg, Oral, 2 Times Daily      carBAMazepine 200 MG tablet  Commonly known as: TEGretol   200 mg, Oral, Daily      finasteride 5 MG tablet  Commonly known as: PROSCAR   5 mg, Oral, Daily      FLUoxetine 40 MG capsule  Commonly known as: PROzac   40 mg, Oral, Daily      Fluticasone-Salmeterol 250-50 MCG/ACT DISKUS  Commonly known as: ADVAIR/WIXELA   1 puff, Inhalation, 2 Times Daily - RT      ipratropium-albuterol 0.5-2.5 mg/3 ml nebulizer  Commonly known as: DUO-NEB   3 mL, Nebulization, 4 Times Daily - RT      metoprolol tartrate 25 MG tablet  Commonly known as: LOPRESSOR   12.5 mg, Oral, 2 Times Daily      nystatin 487430 UNIT/GM cream  Commonly known as: MYCOSTATIN   1 Application, Topical, 2 Times Daily      ondansetron 2 mg/mL injection  Commonly known as: ZOFRAN   4 mg, Intravenous, Every 6 Hours PRN      pantoprazole 40 MG EC tablet  Commonly known as: PROTONIX   40 mg, Oral, Daily      polyethylene glycol 17 g packet  Commonly known as: MIRALAX   17 g, Oral, Daily      tamsulosin 0.4 MG capsule 24 hr capsule  Commonly known as: FLOMAX   0.4 mg, Oral, Nightly             Stop These Medications      aspirin 81 MG EC tablet     atorvastatin 40 MG tablet  Commonly known as: LIPITOR     bumetanide 0.5 MG tablet  Commonly known as: BUMEX     cyanocobalamin 1000 MCG/ML injection     irbesartan  300 MG tablet  Commonly known as: AVAPRO     methylphenidate 20 MG tablet  Commonly known as: RITALIN     multivitamin with minerals tablet tablet     terazosin 2 MG capsule  Commonly known as: HYTRIN              Discharge Diet:   Diet Instructions       Diet: Regular/House Diet; Mechanical Ground (NDD 2); Thin (IDDSI 0)      Discharge Diet: Regular/House Diet    Texture: Mechanical Ground (NDD 2)    Fluid Consistency: Thin (IDDSI 0)            Discharge Care Plan / Instructions:   Discharge to skilled nursing facility today    Activity at Discharge:   Activity Instructions       Activity as Tolerated              Follow-up Appointments:  N/A    Electronically signed by Ovidio Brand DO, 05/21/24, 10:03 CDT.    Time: Discharge over 30 min    Part of this note may be an electronic transcription/translation of spoken language to printed text using the Dragon Dictation system.

## 2024-05-20 NOTE — CONSULTS
[facility]  Initial Consult    Attending Physician: Ovidio Brand DO  Referring Provider:  Trinity Alegria APRN    Patient Name: Spencer Moore  Date of Admission: 5/17/2024  Today's Date: 05/20/24     Reason for Referral: Goals of Care/Advance Care Planning    Code Status and Medical Interventions:   Ordered at: 05/17/24 1722     Medical Intervention Limits:    NO intubation (DNI)    NO cardioversion     Level Of Support Discussed With:    Patient     Code Status (Patient has no pulse and is not breathing):    No CPR (Do Not Attempt to Resuscitate)     Medical Interventions (Patient has pulse or is breathing):    Limited Support      Subjective     HPI: 83 y.o. male with past medical history including anemia, arthritis, diverticulitis, enlarged prostate, GERD, hypertension, lumbago, narcolepsy, sensorineural hearing loss of bilateral ears, tinnitus and vitamin B-12 deficiency.  Additional past medical history listed below.  According to chart review he presented to [facility] ED on 5/2/2024 with gross hematochezia and abdominal cramping.  Noted to have active rectal bleeding and he was transferred to [facility] in [place], [place] for higher level of care.  According to care everywhere he underwent EGD/sigmoidoscopy on 5/4/2024.  It is also noted during the procedure he was found to be in atrial fibrillation and noted concerns for ECG changes.  Echocardiogram was completed demonstrating moderate aortic stenosis.  Cardiology was consulted and he underwent cardiac catheterization which revealed severe multivessel coronary artery disease.  CT surgery was consulted and he was deemed nonsurgical candidate and recommended medical management.  Chart review also reveals he developed acute respiratory failure with hypoxia and pulmonary edema and received diuresis and treatment for COPD exacerbation however continued to require supplemental oxygen.  He was seen  by palliative care services to discuss goals of care during hospitalization as well per chart review.  He was transferred back to [facility] on 5/17/2024 to be closer to home and pursue rehab placement.  He was admitted to the medical floor.  Labs collected on 5/17/2024 revealed multiple abnormalities including creatinine 2.16, BUN 96, GFR 39.8, calcium 8.3, WBC 18.28, hemoglobin 9.1, hematocrit 28.7 and platelets 523,000.  He was noted to have some hypotension on 5/18/2024 and was given 1 L bolus with some improvement.  PT and OT have evaluated and recommended SNF at discharge.  Referrals have been made per .  Labs collected this morning reveal renal function improving with creatinine 1.56, BUN 66 and GFR 44.1.  Remains anemic with hemoglobin 8.0 and hematocrit 25.0.  WBC remains elevated however has been receiving steroids and it is trending downward to 17.69.  Palliative care has been consulted to discuss goals of care/advance care planning. He is lying in bed, alert and in no apparent distress.  On BiPAP support.  Reports shortness of breath is better with use of BiPAP.  Reports continued mouth pain due to thrush.  No visitors currently present.  Noted plans to discharge to SNF with rehab and transition to hospice in future if needed.  Briefly discussed with attending.     Advance Care Planning   Advanced Directives: Patient has an advance directive on file. Patient reports document is valid.    Advance Care Planning Discussion: Attempted to have care conference with Mr. Moore however difficult to have extensive discussion due to BiPAP mask and reports increased mouth pain.  He asked that his family/POA, Orestes, be updated.  Reports he was present earlier however must have stepped out.  Call placed to Orestes to discuss goals of care per Mr. Moore's request however unable to reach.  Voicemail left with request for return phone call.      The patient receives support from his extended family.  Patient's family, Orestes, is  his POA.    Review of Systems   Respiratory:  Positive for shortness of breath.    Neurological:  Positive for weakness.     Pain Assessment  Nonverbal Indicators of Pain: nonverbal indicators absent  Pain Location: back  Pain Description: constant, aching  Past Medical History:   Diagnosis Date    Anemia     Arthritis     Bilateral impacted cerumen 12/10/2018    Diverticulitis     Enlarged prostate     ETD (eustachian tube dysfunction)     GERD (gastroesophageal reflux disease)     Hypertension     Kidney infection     Lumbago     Narcolepsy     Sensorineural hearing loss (SNHL) of both ears 6/18/2018    SNHL (sensorineural hearing loss)     Tinnitus     Vitamin B12 deficiency     Weak urinary stream       Past Surgical History:   Procedure Laterality Date    ABDOMINAL SURGERY      CATARACT EXTRACTION Left     HAND SURGERY Left     HERNIA REPAIR      REPLACEMENT TOTAL KNEE Left     ROTATOR CUFF REPAIR      TONSILLECTOMY        Social History     Socioeconomic History    Marital status:    Tobacco Use    Smoking status: Never    Smokeless tobacco: Never   Vaping Use    Vaping status: Never Used   Substance and Sexual Activity    Alcohol use: No    Drug use: No    Sexual activity: Defer     Family History   Adopted: Yes   Family history unknown: Yes      Allergies   Allergen Reactions    Biaxin [Clarithromycin] GI Intolerance     unknown    Parafon Forte Dsc [Chlorzoxazone] Other (See Comments)     Swallowing issues       Objective   Diagnostics: Reviewed    Intake/Output Summary (Last 24 hours) at 5/20/2024 0758  Last data filed at 5/20/2024 0600  Gross per 24 hour   Intake --   Output 1000 ml   Net -1000 ml       Current medications patient is presently taking including all prescriptions, over-the-counter, herbals and vitamin/mineral/dietary (nutritional) supplements with reviewed including route, type, dose and frequency and are current per MAR at time of dictation.  Current  Facility-Administered Medications   Medication Dose Route Frequency Provider Last Rate Last Admin    acetaminophen (TYLENOL) tablet 650 mg  650 mg Oral Q4H PRN Trinity Alegria, APRANGEL        Or    acetaminophen (TYLENOL) 160 MG/5ML oral solution 650 mg  650 mg Oral Q4H PRN Trinity Alegria, APRN        Or    acetaminophen (TYLENOL) suppository 650 mg  650 mg Rectal Q4H PRN Trinity Alegria, APRN        amiodarone (PACERONE) tablet 200 mg  200 mg Oral BID Trinity Alegria, APRN   200 mg at 05/19/24 2159    apixaban (ELIQUIS) tablet 2.5 mg  2.5 mg Oral BID Trinity Alegria, APRN   2.5 mg at 05/19/24 2200    aspirin EC tablet 81 mg  81 mg Oral Nightly Trinity Alegria, APRN   81 mg at 05/19/24 2159    atorvastatin (LIPITOR) tablet 20 mg  20 mg Oral Nightly Trinity Alegria, APRN   20 mg at 05/19/24 2200    sennosides-docusate (PERICOLACE) 8.6-50 MG per tablet 2 tablet  2 tablet Oral BID PRN Trinity Alegria APRN   2 tablet at 05/18/24 1032    And    polyethylene glycol (MIRALAX) packet 17 g  17 g Oral Daily PRN Trinity Alegria APRANGEL        And    bisacodyl (DULCOLAX) EC tablet 5 mg  5 mg Oral Daily PRN Trinity Alegria APRN   5 mg at 05/18/24 1032    And    bisacodyl (DULCOLAX) suppository 10 mg  10 mg Rectal Daily PRN Trinity Alegria, APRN        budesonide-formoterol (SYMBICORT) 160-4.5 MCG/ACT inhaler 2 puff  2 puff Inhalation BID - RT Trinity Alegria APRN   2 puff at 05/19/24 2042    carBAMazepine (TEGretol) tablet 200 mg  200 mg Oral Daily Trinity Alegria, APRN   200 mg at 05/18/24 0925    finasteride (PROSCAR) tablet 5 mg  5 mg Oral Daily Trinity Alegria, APRN   5 mg at 05/18/24 0925    FLUoxetine (PROzac) capsule 40 mg  40 mg Oral Daily Trinity Alegria, APRN   40 mg at 05/18/24 0924    hyoscyamine (LEVSIN) SL tablet 125 mcg  125 mcg Oral Q4H PRN Trinity Alegria APRN        ipratropium-albuterol (DUO-NEB) nebulizer solution 3 mL  3 mL Nebulization 4x Daily - RT Trinity Alegria APRN    3 mL at 05/20/24 0624    metoprolol tartrate (LOPRESSOR) tablet 12.5 mg  12.5 mg Oral BID Trinity Alegria APRN   12.5 mg at 05/19/24 2159    multivitamin with minerals 1 tablet  1 tablet Oral Daily Trinity Alegria APRN        nitroglycerin (NITROSTAT) SL tablet 0.4 mg  0.4 mg Sublingual Q5 Min PRN Trinity Alegria APRN        nystatin (MYCOSTATIN) 100,000 unit/mL suspension 500,000 Units  5 mL Oral 4x Daily Barry Nye MD   500,000 Units at 05/19/24 2200    nystatin (MYCOSTATIN) 420234 UNIT/GM cream 1 Application  1 Application Topical BID Trinity Alegria APRN   1 Application at 05/19/24 2201    ondansetron ODT (ZOFRAN-ODT) disintegrating tablet 4 mg  4 mg Oral Q6H PRN Trinity Alegria APRN        Or    ondansetron (ZOFRAN) injection 4 mg  4 mg Intravenous Q6H PRN Trinity Alegria APRN        oxyCODONE-acetaminophen (PERCOCET)  MG per tablet 1 tablet  1 tablet Oral Q6H PRN Barry Nye MD   1 tablet at 05/19/24 2206    pantoprazole (PROTONIX) EC tablet 40 mg  40 mg Oral Q AM Trinity Alegria APRN   40 mg at 05/19/24 0712    polyethylene glycol (MIRALAX) packet 17 g  17 g Oral Daily Trinity Alegria APRN   17 g at 05/18/24 0922    predniSONE (DELTASONE) tablet 40 mg  40 mg Oral Daily With Breakfast Trinity Alegria APRN   40 mg at 05/19/24 1357    sodium chloride 0.9 % bolus 1,000 mL  1,000 mL Intravenous Once Barry Nye MD        sodium chloride 0.9 % flush 10 mL  10 mL Intravenous Q12H Trinity Alegria APRN   10 mL at 05/19/24 2201    sodium chloride 0.9 % flush 10 mL  10 mL Intravenous PRN Trinity Alegria APRN        sodium chloride 0.9 % infusion 40 mL  40 mL Intravenous PRN Trinity Alegria APRN        sodium chloride 0.9 % infusion  75 mL/hr Intravenous Continuous Trinity Alegria APRN 75 mL/hr at 05/19/24 2200 75 mL/hr at 05/19/24 2200    terazosin (HYTRIN) capsule 2 mg  2 mg Oral Nightly Trinity Alegria APRN   2 mg at 05/19/24 2200  "   sodium chloride, 75 mL/hr, Last Rate: 75 mL/hr (05/19/24 2200)       acetaminophen **OR** acetaminophen **OR** acetaminophen    senna-docusate sodium **AND** polyethylene glycol **AND** bisacodyl **AND** bisacodyl    hyoscyamine    nitroglycerin    ondansetron ODT **OR** ondansetron    oxyCODONE-acetaminophen    sodium chloride    sodium chloride  Assessment   /67 (BP Location: Left arm, Patient Position: Lying)   Pulse 62   Temp 98 °F (36.7 °C) (Axillary)   Resp 22   Ht 172.7 cm (68\")   Wt 83.6 kg (184 lb 4.8 oz)   SpO2 97%   BMI 28.02 kg/m²     Physical Exam  Vitals and nursing note reviewed.   Constitutional:       General: He is awake. He is not in acute distress.     Appearance: He is ill-appearing.   HENT:      Head: Normocephalic and atraumatic.   Eyes:      General: Lids are normal.      Extraocular Movements: Extraocular movements intact.   Neck:      Vascular: No JVD.      Trachea: Trachea normal.   Cardiovascular:      Rate and Rhythm: Normal rate.   Pulmonary:      Comments: BiPAP present.  Musculoskeletal:      Cervical back: Neck supple.   Skin:     General: Skin is warm and dry.   Neurological:      Mental Status: He is alert.   Psychiatric:         Mood and Affect: Mood is anxious.         Behavior: Behavior is cooperative.     Functional status: Palliative Performance Scale Score: Performance 40% based on the following measures: Ambulation: Mainly in bed, Activity and Evidence of Disease: Unable to do any work, extensive evidence of disease, Self-Care: Mainly assistance required,  Intake: Normal or reduced, LOC: Full, drowsy or confusion.  Nutritional status: Albumin 3.1. Body mass index is 28.02 kg/m²..  Patient status: Disease state: Controlled with current treatments.    Active Hospital Problems    Diagnosis     **ASTRID (acute kidney injury)     Paroxysmal atrial fibrillation     Coronary artery disease involving native coronary artery of native heart without angina pectoris     " Stage 3a chronic kidney disease     A-fib     Leukocytosis, unknown etiology-suspect steroid     Chronic back pain, multilevel     H/O: GI bleed     Adult failure to thrive     Hypertension      Impression/Problem List:  Acute respiratory failure with hypoxia  Multivessel coronary artery disease - Not surgical candidate   Impaired mobility and ADLs  Aortic stenosis, moderate  Anemia  Chronic kidney disease stage IIIa  Atrial fibrillation  Adult failure to thrive  Dysphagia  Advanced age     Plan / Recommendations     Palliative Care Encounter   Goals of care include CODE STATUS NO CPR with limited support interventions.    Prognosis is poor to guarded long-term secondary to acute respiratory failure with hypoxia, severe multivessel coronary artery disease and not surgical candidate, impaired mobility and ADLs, moderate aortic stenosis, CKD, atrial fibrillation, dysphagia, advanced age and other comorbidities listed above.     Attempted to have care conference with Mr. Moore however difficult to have extensive discussion due to BiPAP mask and reports increased mouth pain.    He asked that his family/POA, Orestes, be updated.       Call placed to Orestes to discuss goals of care per Mr. Moore's request however unable to reach.    Voicemail left with request for return phone call.       According to chart review plans to discharge to SNF and continue efforts at rehabilitation and transitioning measures towards hospice care in future.    ADDENDUM:  Met with patient's POA/brother-in-law, Orestes, outside of patient's room.  He provided additional information regarding Mr. Moore's baseline and events during hospitalization.  Shared he was not in the greatest health prior to current events however had quality of life.  Since finding of new health problems during recent work-up have impacted his quality of life negatively.  Discussed treatment options and he reflected on previous discussions.  Shared plans to  transition to more limited focus measures and not continue rehospitalizations.  Discussed MOST form and have initiated to reflect wishes of NO CPR and comfort measures.  Signed by attending and faxed copy to medical records to place in EMR.  Also provided copies to Orestes.  He was appreciative of discussion.  Plans to discharge to SNF and likely involve hospice services in the near future.  He was appreciative of discussion.  Orestes appears to have good prognostic awareness.        Thank you for allowing us to participate in patient's plan of care. Palliative Care Team will continue to follow patient.     Time spent:80 minutes spent reviewing medical and medication records, assessing and examining patient, discussing with family, answering questions, providing some guidance about a plan and documentation of care, and coordinating care with other healthcare members, with > 50% time spent face to face.       Electronically signed by, DYANA Ragland, 05/20/24.

## 2024-05-20 NOTE — CASE MANAGEMENT/SOCIAL WORK
Continued Stay Note  CHAVA Garay     Patient Name: Spencer Moore  MRN: [id number]  Today's Date: 5/20/2024    Admit Date: 5/17/2024    Plan: [facility]   Discharge Plan       Row Name 05/20/24 1255       Plan    Plan [facility]    Patient/Family in Agreement with Plan yes    Plan Comments Spoke with pt's NAJMA [facility] in the room. [facility] [place] does not have any [facility] beds. [facility] accepts [facility]. Right now he plans on pt going for therapy and transitioning to hospice there IF needed.                   Discharge Codes    No documentation.                       ISAAC Kaplan

## 2024-05-20 NOTE — PROGRESS NOTES
RT EQUIPMENT DEVICE RELATED - SKIN ASSESSMENT    Aristeo Score:  Aristeo Score: 14     RT Medical Equipment/Device:     NIV Mask:  Under-the-nose   size:  B    Skin Assessment:      Nose:  Intact    Device Skin Pressure Protection:  Pressure points protected    Nurse Notification:  No    Oh Cruz, RRT

## 2024-05-20 NOTE — THERAPY TREATMENT NOTE
Acute Care - Speech Language Pathology   Swallow Treatment Note [facility]     Patient Name: Spencer Moore  : 1940  MRN: [id number]  Today's Date: 2024               Admit Date: 2024    Patient seen to address dysphagia. Patient reported mouth pain from thrush continues. RN provided medication provided. Patient brother in law present for a portion of the session. Patient willing to try oral intake. Patient is currently on a puree diet with thin liquids. Patient displayed an overt cough with thin liquids from a straw. Patient given sips from side of cup and from a spoon with no further overt s/s aspiration noted. Patient was able to consume 2 containers of puree apples. Patient attempted a [place] chocolate bar. Patient stated it was not soft enough and caused pain with intake. Sip size needs limiting. ST communicated to patient, family, and staff with expressed understanding stated. Patient requires assist during meals. Patient is safe to continue with puree solids and thin liquids from side of cup or spoon. Patient continues with significant oral pain from thrush and weakness which impacts airway protection. ST will continue to treat for dysphagia.    Ivett Flores, SLP 2024 12:33 CDT    Visit Dx:     ICD-10-CM ICD-9-CM   1. Dysphagia, unspecified type  R13.10 787.20   2. Impaired mobility [Z74.09]  Z74.09 799.89     Patient Active Problem List   Diagnosis    Cervical spondylosis without myelopathy    Nonsmoker    BMI 29.0-29.9,adult    Benign non-nodular prostatic hyperplasia with lower urinary tract symptoms    Trigeminal neuralgia of right side of face    Primary narcolepsy without cataplexy    Hypertension    Mixed hyperlipidemia    Cerebrovascular small vessel disease    Sensorineural hearing loss (SNHL) of both ears    Macrocytic anemia    Left arm pain    Triceps tendon rupture, left, initial encounter    Left hand paresthesia    Spinal stenosis in cervical region    Cervical  radiculopathy    Degeneration of cervical intervertebral disc    BMI 30.0-30.9,adult    History of anemia due to chronic kidney disease    Low vitamin B12 level    Acute lower gastrointestinal bleeding    Leukocytosis, unknown etiology-suspect steroid    Chronic back pain, multilevel    ASTRID (acute kidney injury)    H/O: GI bleed    Adult failure to thrive    Paroxysmal atrial fibrillation    Coronary artery disease involving native coronary artery of native heart without angina pectoris    Stage 3a chronic kidney disease    A-fib     Past Medical History:   Diagnosis Date    Anemia     Arthritis     Bilateral impacted cerumen 12/10/2018    Diverticulitis     Enlarged prostate     ETD (eustachian tube dysfunction)     GERD (gastroesophageal reflux disease)     Hypertension     Kidney infection     Lumbago     Narcolepsy     Sensorineural hearing loss (SNHL) of both ears 6/18/2018    SNHL (sensorineural hearing loss)     Tinnitus     Vitamin B12 deficiency     Weak urinary stream      Past Surgical History:   Procedure Laterality Date    ABDOMINAL SURGERY      CATARACT EXTRACTION Left     HAND SURGERY Left     HERNIA REPAIR      REPLACEMENT TOTAL KNEE Left     ROTATOR CUFF REPAIR      TONSILLECTOMY         SLP Recommendation and Plan     SLP Diet Recommendation: puree, thin liquids (05/20/24 0833)  Recommended Precautions and Strategies: upright posture during/after eating, small bites of food and sips of liquid, alternate between small bites of food and sips of liquid (05/20/24 0833)  SLP Rec. for Method of Medication Administration: meds crushed, with puree, as tolerated (05/20/24 0833)     Monitor for Signs of Aspiration: yes, notify SLP if any concerns (05/20/24 0833)  Recommended Diagnostics: reassess via clinical swallow evaluation (05/20/24 0833)     Anticipated Discharge Disposition (SLP): skilled nursing facility (05/20/24 0833)     Therapy Frequency (Swallow): at least, 3 days per week (05/20/24  0833)  Predicted Duration Therapy Intervention (Days): until discharge (05/20/24 0833)  Oral Care Recommendations: Oral Care BID/PRN (05/20/24 0833)        Daily Summary of Progress (SLP): progress towards functional goals is fair (05/20/24 0833)               Treatment Assessment (SLP): continued (05/20/24 0833)  Treatment Assessment Comments (SLP): see note (05/20/24 0833)  Plan for Continued Treatment (SLP): continue treatment per plan of care (05/20/24 0833)         Plan of Care Reviewed With: patient, caregiver, family  Progress: improving      SWALLOW EVALUATION (Last 72 Hours)       SLP Adult Swallow Evaluation       Row Name 05/20/24 0833 05/19/24 0800                Rehab Evaluation    Document Type therapy note (daily note)  -MD evaluation  -CS       Subjective Information complains of  thrush  -MD complains of;pain  -CS       Patient Observations alert;cooperative;agree to therapy  -MD alert;cooperative;agree to therapy  -CS       Patient/Family/Caregiver Comments/Observations Brother in law present for a portion of the session.  -MD No family present  -CS       Patient Effort adequate  -MD adequate  -CS       Symptoms Noted During/After Treatment none  -MD --          General Information    Patient Profile Reviewed -- yes  -CS       Pertinent History Of Current Problem -- COPD exacerbation, thrush  -CS       Current Method of Nutrition -- mechanical ground textures;thin liquids  -CS       Precautions/Limitations, Vision -- WFL;for purposes of eval  -CS       Precautions/Limitations, Hearing -- hearing aid, bilaterally;hearing impairment, bilaterally  -CS       Prior Level of Function-Communication -- WFL  -CS       Prior Level of Function-Swallowing -- no diet consistency restrictions  -CS       Plans/Goals Discussed with -- patient  -CS       Barriers to Rehab -- medically complex  -CS       Patient's Goals for Discharge -- return to all previous roles/activities  -CS          Pain    Additional  Documentation -- Pain Scale: Numbers Pre/Post-Treatment (Group)  -CS          Pain Scale: Numbers Pre/Post-Treatment    Pretreatment Pain Rating 6/10  -MD 10/10  -CS       Posttreatment Pain Rating 6/10  -MD 10/10  -CS       Pain Location - mouth (nondental);throat  -MD mouth (nondental)  -CS       Pain Intervention(s) Medication (See MAR)  -MD --          Oral Motor Structure and Function    Oral Lesions or Structural Abnormalities and/or variants -- Visible thrush throughout oral cavity.  -CS       Secretion Management -- WNL/WFL  -CS       Mucosal Quality -- ulcerated  -CS       Volitional Swallow -- delayed;weak  -CS       Volitional Cough -- weak;non-productive  -CS          Oral Musculature and Cranial Nerve Assessment    Oral Motor General Assessment -- generalized oral motor weakness  -CS          General Eating/Swallowing Observations    Respiratory Support Currently in Use -- nasal cannula  -CS       Eating/Swallowing Skills -- fed by SLP  -CS       Positioning During Eating -- upright in bed  -CS       Utensils Used -- spoon;straw  -CS       Consistencies Trialed -- pureed;thin liquids  -CS          Clinical Swallow Eval    Oral Prep Phase -- impaired  -CS       Oral Transit -- impaired  -CS       Oral Residue -- WFL  -CS       Pharyngeal Phase -- suspected pharyngeal impairment  -CS       Esophageal Phase -- unremarkable  -CS       Clinical Swallow Evaluation Summary -- See note  -CS          Oral Prep Concerns    Oral Prep Concerns -- increased prep time  -CS       Increased Prep Time -- pudding  -CS          Oral Transit Concerns    Oral Transit Concerns -- increased oral transit time  -CS       Increased Oral Transit Time -- pudding  -CS          Pharyngeal Phase Concerns    Pharyngeal Phase Concerns -- multiple swallows  -CS       Multiple Swallows -- thin;pudding  -CS          SLP Evaluation Clinical Impression    SLP Swallowing Diagnosis -- mild-moderate;oral dysphagia;R/O pharyngeal dysphagia  -CS        Functional Impact -- risk of aspiration/pneumonia;risk of malnutrition  -CS       Rehab Potential/Prognosis, Swallowing -- good, to achieve stated therapy goals  -CS       Swallow Criteria for Skilled Therapeutic Interventions Met -- demonstrates skilled criteria  -CS          SLP Treatment Clinical Impressions    Treatment Assessment (SLP) continued  -MD continued  -CS       Treatment Assessment Comments (SLP) see note  -MD --       Daily Summary of Progress (SLP) progress towards functional goals is fair  -MD progress toward functional goals as expected  -CS       Barriers to Overall Progress (SLP) Other (see comments)  Thrush  -MD Other (see comments)  qThrush  -CS       Plan for Continued Treatment (SLP) continue treatment per plan of care  -MD continue treatment per plan of care  -CS       Care Plan Review risks/benefits reviewed;current/potential barriers reviewed;patient/other agree to care plan  -MD evaluation/treatment results reviewed  -CS          Recommendations    Therapy Frequency (Swallow) at least;3 days per week  -MD at least;3 days per week  -CS       Predicted Duration Therapy Intervention (Days) until discharge  -MD until discharge  -CS       SLP Diet Recommendation puree;thin liquids  -MD puree;thin liquids  -CS       Recommended Diagnostics reassess via clinical swallow evaluation  -MD --       Recommended Precautions and Strategies upright posture during/after eating;small bites of food and sips of liquid;alternate between small bites of food and sips of liquid  -MD upright posture during/after eating;small bites of food and sips of liquid;alternate between small bites of food and sips of liquid  -CS       Oral Care Recommendations Oral Care BID/PRN  -MD Oral Care BID/PRN  -CS       SLP Rec. for Method of Medication Administration meds crushed;with puree;as tolerated  -MD meds crushed;with puree;as tolerated  -CS       Monitor for Signs of Aspiration yes;notify SLP if any concerns  -MD  yes;notify SLP if any concerns  -CS       Anticipated Discharge Disposition (SLP) skilled nursing facility  -MD skilled nursing facility  -CS          Swallow Goals (SLP)    Swallow LTGs Swallow Long Term Goal (free text)  -MD Swallow Long Term Goal (free text)  -CS       Swallow STGs diet tolerance goal selection (SLP)  -MD diet tolerance goal selection (SLP)  -CS       Diet Tolerance Goal Selection (SLP) Patient will tolerate trials of  -MD Patient will tolerate trials of  -CS          (LTG) Swallow    (LTG) Swallow Pt will tolerate LRD w/o any overt s/s of aspiration.  -MD Pt will tolerate LRD w/o any overt s/s of aspiration.  -CS       [place] (Swallow Long Term Goal) with minimal cues (75-90% accuracy)  -MD with minimal cues (75-90% accuracy)  -CS       Time Frame (Swallow Long Term Goal) by discharge  -MD by discharge  -CS       Barriers (Swallow Long Term Goal) n/a  -MD n/a  -CS       Progress/Outcomes (Swallow Long Term Goal) progress slower than expected  -MD new goal  -CS          (STG) Patient will tolerate trials of    Consistencies Trialed (Tolerate trials) soft to chew (whole) textures;regular textures;pureed textures;thin liquids  -MD soft to chew (whole) textures;regular textures;pureed textures;thin liquids  -CS       Desired Outcome (Tolerate trials) without signs/symptoms of aspiration;without signs of distress  -MD without signs/symptoms of aspiration;without signs of distress  -CS       [place] (Tolerate trials) with minimal cues (75-90% accuracy)  -MD with minimal cues (75-90% accuracy)  -CS       Time Frame (Tolerate trials) by discharge  -MD --       Progress/Outcomes (Tolerate trials) progress slower than expected  -MD new goal  -CS                 User Key  (r) = Recorded By, (t) = Taken By, (c) = Cosigned By      Initials Name Effective Dates    Aayush Gongora CCC-SLP 05/07/24 -     Ivett Mccullough, SLP 06/21/22 -                     EDUCATION  The patient has been educated  in the following areas:   Dysphagia (Swallowing Impairment).        SLP GOALS       Row Name 05/20/24 0833 05/19/24 0800          (LTG) Swallow    (LTG) Swallow Pt will tolerate LRD w/o any overt s/s of aspiration.  -MD Pt will tolerate LRD w/o any overt s/s of aspiration.  -CS     [place] (Swallow Long Term Goal) with minimal cues (75-90% accuracy)  -MD with minimal cues (75-90% accuracy)  -CS     Time Frame (Swallow Long Term Goal) by discharge  -MD by discharge  -CS     Barriers (Swallow Long Term Goal) n/a  -MD n/a  -CS     Progress/Outcomes (Swallow Long Term Goal) progress slower than expected  -MD new goal  -CS        (STG) Patient will tolerate trials of    Consistencies Trialed (Tolerate trials) soft to chew (whole) textures;regular textures;pureed textures;thin liquids  -MD soft to chew (whole) textures;regular textures;pureed textures;thin liquids  -CS     Desired Outcome (Tolerate trials) without signs/symptoms of aspiration;without signs of distress  -MD without signs/symptoms of aspiration;without signs of distress  -CS     [place] (Tolerate trials) with minimal cues (75-90% accuracy)  -MD with minimal cues (75-90% accuracy)  -CS     Time Frame (Tolerate trials) by discharge  -MD --     Progress/Outcomes (Tolerate trials) progress slower than expected  -MD new goal  -CS               User Key  (r) = Recorded By, (t) = Taken By, (c) = Cosigned By      Initials Name Provider Type    CS Aayush Hernandez, [facility]SLP Speech and Language Pathologist    Ivett Mccullough, SLP Speech and Language Pathologist                         Time Calculation:    Time Calculation- SLP       Row Name 05/20/24 1230             Time Calculation- SLP    SLP Start Time 0833  -MD      SLP Stop Time 0941  -MD      SLP Time Calculation (min) 68 min  -MD      SLP Received On 05/20/24  -MD         Untimed Charges    17231-SR Treatment Swallow Minutes 68  -MD         Total Minutes    Untimed Charges Total Minutes 68  -MD        Total Minutes 68  -MD                User Key  (r) = Recorded By, (t) = Taken By, (c) = Cosigned By      Initials Name Provider Type    Ivett Mccullough, SLP Speech and Language Pathologist                    Therapy Charges for Today       Code Description Service Date Service Provider Modifiers Qty    [id number]  ST TREATMENT SWALLOW 5 5/20/2024 Ivett Flores, SLP GN 1                 FRANKLIN Puga  5/20/2024

## 2024-05-20 NOTE — PROGRESS NOTES
RT EQUIPMENT DEVICE RELATED - SKIN ASSESSMENT    Aristeo Score:  Aristeo Score: 13     RT Medical Equipment/Device:     NIV Mask:  Under-the-nose   size:  B    Skin Assessment:      Nares:  Intact    Device Skin Pressure Protection:  Skin-to-device areas padded:  None Required    Nurse Notification:  No    Bethany Reeves, RRT

## 2024-05-20 NOTE — PROGRESS NOTES
[facility] Medicine Services  INPATIENT PROGRESS NOTE    Patient Name: Spencer Moore  Date of Admission: 5/17/2024  Today's Date: 05/20/24  Length of Stay: 3  Primary Care Physician: Miguel Ángel Hinson DO    Subjective   Chief Complaint: Generalized weakness, anxiety  HPI     The patient became very anxious today requiring placement of BiPAP mask.  He attempted to work with physical therapy but was essentially unable to participate fully.  Oxygen saturations remained 97% while on 2 L the patient became very short of breath and anxious.  He will require BiPAP at discharge with settings of 16/8.  Initially, the patient was felt appropriate for discharge today however the nursing facility cannot obtain a simple BiPAP machine today and the patient's discharge will be delayed until tomorrow.  White blood cell count today 17,700 with hemoglobin 8.0.  Renal function continues to improve with creatinine 1.56.  BUN decreased to 66.    Review of Systems   All pertinent negatives and positives are as above. All other systems have been reviewed and are negative unless otherwise stated.     Objective    Temp:  [97.5 °F (36.4 °C)-98.1 °F (36.7 °C)] 97.6 °F (36.4 °C)  Heart Rate:  [62-85] 70  Resp:  [16-28] 20  BP: ()/(48-71) 130/71  Physical Exam    Constitutional:       Appearance: He is ill-appearing.   HENT:      Head: Normocephalic and atraumatic.      Ears:      Comments: Bilateral hearing aids, communication difficult due to hearing loss     Mouth: Mucous membranes are dry.      Pharynx: Oropharynx is clear.   Eyes:      Extraocular Movements: Extraocular movements intact.      Conjunctiva/sclera: Conjunctivae normal.   Cardiovascular:      Rate and Rhythm: Normal rate.      Comments: Currently normal sinus rhythm  Pulmonary:      Breath sounds: Occasional scattered wheeze.     Comments: Oxygen saturation on 2 L 99%.   Abdominal:      General: There is no distension.       "Palpations: Abdomen is soft.   Musculoskeletal:         General: Deformity (Unable to bend right leg-chronic) present.      Cervical back: Normal range of motion and neck supple.      Right lower leg: No edema.      Left lower leg: No edema.      Comments: Generalized weakness and debility   Skin:     General: Skin is warm and dry.      Coloration: Skin is pale.   Neurological:      General: No focal deficit present.      Mental Status: He is alert and oriented to person, place, and time.   Psychiatric:         Mood and Affect: Mood normal.         Behavior: Behavior normal.     Results Review:  I have reviewed the labs, radiology results, and diagnostic studies.    Laboratory Data:   Results from last 7 days   Lab Units 05/20/24  0310 05/18/24  0503 05/17/24  1745   WBC 10*3/mm3 17.69* 19.01* 18.28*   HEMOGLOBIN g/dL 8.0* 8.3* 9.1*   HEMATOCRIT % 25.0* 26.8* 28.7*   PLATELETS 10*3/mm3 411 502* 523*        Results from last 7 days   Lab Units 05/20/24  0310 05/18/24  0503 05/17/24  1745   SODIUM mmol/L 138 135* 136   POTASSIUM mmol/L 5.2 5.0 5.2   CHLORIDE mmol/L 109* 102 100   CO2 mmol/L 20.0* 24.0 23.0   BUN mg/dL 66* 89* 86*   CREATININE mg/dL 1.56* 2.07* 2.16*   CALCIUM mg/dL 7.4* 8.0* 8.3*   BILIRUBIN mg/dL  --  0.4  --    ALK PHOS U/L  --  89  --    ALT (SGPT) U/L  --  85*  --    AST (SGOT) U/L  --  41*  --    GLUCOSE mg/dL 128* 151* 196*       Culture Data:   No results found for: \"BLOODCX\", \"URINECX\", \"WOUNDCX\", \"MRSACX\", \"RESPCX\", \"STOOLCX\"    Radiology Data:   Imaging Results (Last 24 Hours)       ** No results found for the last 24 hours. **            I have reviewed the patient's current medications.     Assessment/Plan   Assessment  Active Hospital Problems    Diagnosis     **ASTRID (acute kidney injury)     Paroxysmal atrial fibrillation     Coronary artery disease involving native coronary artery of native heart without angina pectoris     Stage 3a chronic kidney disease     A-fib     Leukocytosis, " unknown etiology-suspect steroid     Chronic back pain, multilevel     H/O: GI bleed     Adult failure to thrive     Hypertension        Treatment Plan  Continue nasal O2  Continue adjust BiPAP  Discharge to SNF tomorrow    Medical Decision Making  Number and Complexity of problems: 6 complex medical problems  Differential Diagnosis: none     Conditions and Status        Condition is unchanged.     [facility] Data  External documents reviewed: [facility] EHR  Cardiac tracing (EKG, telemetry) interpretation: -  Radiology interpretation: see above  Labs reviewed: See above  Any tests that were considered but not ordered: none     Decision rules/scores evaluated (example [id number], Wells, etc): [id number]     Discussed with: Patient     Care Planning  Shared decision making: Patient  Code status and discussions: DNR     Disposition  Social Determinants of Health that impact treatment or disposition: none  I expect the patient to be discharged to home/SNF in 1 days.     Electronically signed by Ovidio Brand DO, 05/20/24, 15:57 CDT.

## 2024-05-20 NOTE — PLAN OF CARE
Goal Outcome Evaluation:      The patient is using bipap at night and prn.

## 2024-05-20 NOTE — PLAN OF CARE
Goal Outcome Evaluation:  Plan of Care Reviewed With: patient        Progress: declining  Outcome Evaluation: Pt. agreeable to therapy. He was agreeable to attempt to sit up, but prior to starting, he reported that he was wet. Assisted pt with rolling in bed to change wet brief and gown. After this, pt became very SOA and anxious. He requested to have bipap placed on. His O2 sat was 97% while on 2L. Nsg and RT were notified who assisted pt with this. Will continue to attempt to work with pt on mobility as able.

## 2024-05-20 NOTE — PLAN OF CARE
Goal Outcome Evaluation:  Plan of Care Reviewed With: patient, caregiver, family     Progress: improving     Anticipated Discharge Disposition (SLP): skilled nursing facility    Treatment Assessment (SLP): continued (05/20/24 0833)  Treatment Assessment Comments (SLP): see note (05/20/24 0833)  Plan for Continued Treatment (SLP): continue treatment per plan of care (05/20/24 0833)    Patient seen to address dysphagia. Patient reported mouth pain from thrush continues. RN provided medication provided. Patient brother in law present for a portion of the session. Patient willing to try oral intake. Patient is currently on a puree diet with thin liquids. Patient displayed an overt cough with thin liquids from a straw. Patient given sips from side of cup and from a spoon with no further overt s/s aspiration noted. Patient was able to consume 2 containers of puree apples. Patient attempted a [place] chocolate bar. Patient stated it was not soft enough and caused pain with intake. Sip size needs limiting. ST communicated to patient, family, and staff with expressed understanding stated. Patient requires assist during meals. Patient is safe to continue with puree solids and thin liquids from side of cup or spoon. Patient continues with significant oral pain from thrush and weakness which impacts airway protection. ST will continue to treat for dysphagia.    Ivett Flores, SLP 5/20/2024 12:30 CDT

## 2024-05-20 NOTE — PLAN OF CARE
Goal Outcome Evaluation: Patient is being discharged to nursing home tomorrow. The facility is trying to get a bipap for the patient. No complaints of pain this shift. Patient requested bipap be put back on 2 different times this shift, he states that with his throat, tongue and mouth having thrush that is why he needs the bipap back on her feels like that he is having a hard time breathing. Patients O2 sats having been staying above 96% on the nasal cannula. Patient safety to be maintained this shift, continue to monitor and report abnormal to provider.

## 2024-05-20 NOTE — THERAPY TREATMENT NOTE
Acute Care - Physical Therapy Treatment Note  [facility]     Patient Name: Spencer Moore  : 1940  MRN: [id number]  Today's Date: 2024      Visit Dx:     ICD-10-CM ICD-9-CM   1. Dysphagia, unspecified type  R13.10 787.20   2. Impaired mobility [Z74.09]  Z74.09 799.89     Patient Active Problem List   Diagnosis    Cervical spondylosis without myelopathy    Nonsmoker    BMI 29.0-29.9,adult    Benign non-nodular prostatic hyperplasia with lower urinary tract symptoms    Trigeminal neuralgia of right side of face    Primary narcolepsy without cataplexy    Hypertension    Mixed hyperlipidemia    Cerebrovascular small vessel disease    Sensorineural hearing loss (SNHL) of both ears    Macrocytic anemia    Left arm pain    Triceps tendon rupture, left, initial encounter    Left hand paresthesia    Spinal stenosis in cervical region    Cervical radiculopathy    Degeneration of cervical intervertebral disc    BMI 30.0-30.9,adult    History of anemia due to chronic kidney disease    Low vitamin B12 level    Acute lower gastrointestinal bleeding    Leukocytosis, unknown etiology-suspect steroid    Chronic back pain, multilevel    ASTRID (acute kidney injury)    H/O: GI bleed    Adult failure to thrive    Paroxysmal atrial fibrillation    Coronary artery disease involving native coronary artery of native heart without angina pectoris    Stage 3a chronic kidney disease    A-fib     Past Medical History:   Diagnosis Date    Anemia     Arthritis     Bilateral impacted cerumen 12/10/2018    Diverticulitis     Enlarged prostate     ETD (eustachian tube dysfunction)     GERD (gastroesophageal reflux disease)     Hypertension     Kidney infection     Lumbago     Narcolepsy     Sensorineural hearing loss (SNHL) of both ears 2018    SNHL (sensorineural hearing loss)     Tinnitus     Vitamin B12 deficiency     Weak urinary stream      Past Surgical History:   Procedure Laterality Date    ABDOMINAL SURGERY      CATARACT  EXTRACTION Left     HAND SURGERY Left     HERNIA REPAIR      REPLACEMENT TOTAL KNEE Left     ROTATOR CUFF REPAIR      TONSILLECTOMY       PT Assessment (Last 12 Hours)       PT Evaluation and Treatment       Row Name 05/20/24 0949          Physical Therapy Time and Intention    Subjective Information complains of;dyspnea  -     Document Type therapy note (daily note)  -     Mode of Treatment physical therapy  -     Comment very anxious  -       Row Name 05/20/24 0949          General Information    Existing Precautions/Restrictions fall;oxygen therapy device and L/min  -     Limitations/Impairments hearing  -       Row Name 05/20/24 0949          Pain    Pain Location - throat  -     Pain Intervention(s) Medication (See MAR)  -       Row Name 05/20/24 0949          Pain Scale: FACES Pre/Post-Treatment    Pain: FACES Scale, Pretreatment 8-->hurts whole lot  -     Posttreatment Pain Rating 8-->hurts whole lot  -     Pain Location - throat  -       Row Name 05/20/24 0949          Bed Mobility    Rolling Left Emery (Bed Mobility) verbal cues;moderate assist (50% patient effort)  -     Rolling Right Emery (Bed Mobility) verbal cues;moderate assist (50% patient effort)  -     Comment, (Bed Mobility) pt agreeable to attempt to sit up, but stated he was wet before starting. Assisted pt with rolling to change wet brief and pad. After rolling, pt became very SOA and anxious. He demanded to have bipap put back on. Notified nsg and RT who assisted pt. Finished helping to place new brief and gown.  -       Row Name 05/20/24 0949          Plan of Care Review    Plan of Care Reviewed With patient  -     Progress declining  -     Outcome Evaluation Pt. agreeable to therapy. He was agreeable to attempt to sit up, but prior to starting, he reported that he was wet. Assisted pt with rolling in bed to change wet brief and gown. After this, pt became very SOA and anxious. He requested to  have bipap placed on. His O2 sat was 97% while on 2L. Nsg and RT were notified who assisted pt with this. Will continue to attempt to work with pt on mobility as able.  -       Row Name 05/20/24 0949          Vital Signs    O2 Delivery Pre Treatment supplemental O2  -MF     Intra SpO2 (%) 97  -MF     O2 Delivery Intra Treatment supplemental O2  2l  -MF     O2 Delivery Post Treatment supplemental O2  -MF     Pre Patient Position Supine  -MF     Intra Patient Position Supine  -MF     Post Patient Position Supine  -       Row Name 05/20/24 0949          Positioning and Restraints    Pre-Treatment Position in bed  -MF     Post Treatment Position bed  -MF     In Bed fowlers;call light within reach;with other staff;with family/caregiver;side rails up x2  -               User Key  (r) = Recorded By, (t) = Taken By, (c) = Cosigned By      Initials Name Provider Type    Charity Larson, PTA Physical Therapist Assistant                    Physical Therapy Education       Title: PT OT SLP Therapies (In Progress)       Topic: Physical Therapy (Done)       Point: Mobility training (Done)       Learning Progress Summary             Patient Acceptance, E,D, DU,VU by  at 5/19/2024 1329    Comment: benefits of PT and POC, call for A OOB                         Point: Precautions (Done)       Learning Progress Summary             Patient Acceptance, E,D, DU,VU by  at 5/19/2024 1329    Comment: benefits of PT and POC, call for A OOB                                         User Key       Initials Effective Dates Name Provider Type Discipline     02/03/23 -  Yoseph Fairchild, PT Physical Therapist PT                  PT Recommendation and Plan     Plan of Care Reviewed With: patient  Progress: declining  Outcome Evaluation: Pt. agreeable to therapy. He was agreeable to attempt to sit up, but prior to starting, he reported that he was wet. Assisted pt with rolling in bed to change wet brief and gown. After this, pt became  very SOA and anxious. He requested to have bipap placed on. His O2 sat was 97% while on 2L. Nsg and RT were notified who assisted pt with this. Will continue to attempt to work with pt on mobility as able.   Outcome Measures       Row Name 05/20/24 0949             How much help from another person do you currently need...    Turning from your back to your side while in flat bed without using bedrails? 2  -MF      Moving from lying on back to sitting on the side of a flat bed without bedrails? 2  -MF      Moving to and from a bed to a chair (including a wheelchair)? 1  -MF      Standing up from a chair using your arms (e.g., wheelchair, bedside chair)? 1  -MF      Climbing 3-5 steps with a railing? 1  -MF      To walk in hospital room? 1  -MF      AM-PAC 6 Clicks Score (PT) 8  -MF      Highest Level of Mobility Goal 3 --> Sit at edge of bed  -MF         Functional Assessment    Outcome Measure Options AM-PAC 6 Clicks Basic Mobility (PT)  -MF                User Key  (r) = Recorded By, (t) = Taken By, (c) = Cosigned By      Initials Name Provider Type    Charity Larson PTA Physical Therapist Assistant                     Time Calculation:    PT Charges       Row Name 05/20/24 1019             Time Calculation    Start Time 0949  -MF      Stop Time 1014  -      Time Calculation (min) 25 min  -MF      PT Received On 05/20/24  -         Time Calculation- PT    Total Timed Code Minutes- PT 25 minute(s)  -MF         Timed Charges    39029 - PT Therapeutic Activity Minutes 25  -MF         Total Minutes    Timed Charges Total Minutes 25  -MF       Total Minutes 25  -MF                User Key  (r) = Recorded By, (t) = Taken By, (c) = Cosigned By      Initials Name Provider Type    Charity Larson PTA Physical Therapist Assistant                  Therapy Charges for Today       Code Description Service Date Service Provider Modifiers Qty    [id number]  PT THERAPEUTIC ACT EA 15 MIN 5/20/2024 Louise  Charity JIM, PTA GP 2            PT G-Codes  Outcome Measure Options: AM-PAC 6 Clicks Basic Mobility (PT)  AM-PAC 6 Clicks Score (PT): 8  AM-PAC 6 Clicks Score (OT): 11    Charity Gil, PTA  5/20/2024

## 2024-05-20 NOTE — CASE MANAGEMENT/SOCIAL WORK
Continued Stay Note   [place]     Patient Name: Spencer Moore  MRN: [id number]  Today's Date: 5/20/2024    Admit Date: 5/17/2024    Plan: [facility]   Discharge Plan       Row Name 05/20/24 1548       Plan    Plan [facility]    Plan Comments [facility] unable to get the bipap for pt until tomorrow morning. Bipap settings will need in the d/c summary as well. Informing Dr. Brand.      Row Name 05/20/24 0615       Plan    Plan [facility]    Patient/Family in Agreement with Plan yes    Plan Comments Spoke with pt's NAJMA Alfredo in the room. [facility] has offered a bed. [place] does not have any open beds. Juni accepts [facility]. Right now he plans on pt going for therapy and transitioning to hospice there IF needed.                   Discharge Codes    No documentation.                 Expected Discharge Date and Time       Expected Discharge Date Expected Discharge Time    May 20, 2024               ISAAC Kaplan

## 2024-05-21 VITALS
HEART RATE: 68 BPM | RESPIRATION RATE: 24 BRPM | WEIGHT: 184.3 LBS | TEMPERATURE: 98.3 F | BODY MASS INDEX: 27.93 KG/M2 | SYSTOLIC BLOOD PRESSURE: 116 MMHG | HEIGHT: 68 IN | DIASTOLIC BLOOD PRESSURE: 66 MMHG | OXYGEN SATURATION: 99 %

## 2024-05-21 PROCEDURE — 94660 CPAP INITIATION&MGMT: CPT

## 2024-05-21 PROCEDURE — 94799 UNLISTED PULMONARY SVC/PX: CPT

## 2024-05-21 PROCEDURE — 25810000003 SODIUM CHLORIDE 0.9 % SOLUTION: Performed by: NURSE PRACTITIONER

## 2024-05-21 PROCEDURE — 63710000001 PREDNISONE PER 1 MG: Performed by: NURSE PRACTITIONER

## 2024-05-21 RX ADMIN — IPRATROPIUM BROMIDE AND ALBUTEROL SULFATE 3 ML: .5; 3 SOLUTION RESPIRATORY (INHALATION) at 06:20

## 2024-05-21 RX ADMIN — SODIUM CHLORIDE 75 ML/HR: 9 INJECTION, SOLUTION INTRAVENOUS at 00:44

## 2024-05-21 RX ADMIN — IPRATROPIUM BROMIDE AND ALBUTEROL SULFATE 3 ML: .5; 3 SOLUTION RESPIRATORY (INHALATION) at 10:02

## 2024-05-21 RX ADMIN — APIXABAN 2.5 MG: 2.5 TABLET, FILM COATED ORAL at 10:28

## 2024-05-21 RX ADMIN — CARBAMAZEPINE 200 MG: 200 TABLET ORAL at 10:28

## 2024-05-21 RX ADMIN — FLUOXETINE HYDROCHLORIDE 40 MG: 20 CAPSULE ORAL at 10:28

## 2024-05-21 RX ADMIN — Medication 10 ML: at 10:29

## 2024-05-21 RX ADMIN — AMIODARONE HYDROCHLORIDE 200 MG: 200 TABLET ORAL at 10:28

## 2024-05-21 RX ADMIN — NYSTATIN 1 APPLICATION: 100000 CREAM TOPICAL at 10:29

## 2024-05-21 RX ADMIN — PREDNISONE 40 MG: 20 TABLET ORAL at 10:28

## 2024-05-21 RX ADMIN — OXYCODONE HYDROCHLORIDE AND ACETAMINOPHEN 1 TABLET: 10; 325 TABLET ORAL at 10:47

## 2024-05-21 RX ADMIN — PANTOPRAZOLE SODIUM 40 MG: 40 TABLET, DELAYED RELEASE ORAL at 05:14

## 2024-05-21 RX ADMIN — FINASTERIDE 5 MG: 5 TABLET, FILM COATED ORAL at 10:28

## 2024-05-21 RX ADMIN — METOPROLOL TARTRATE 12.5 MG: 25 TABLET, FILM COATED ORAL at 10:28

## 2024-05-21 RX ADMIN — BUDESONIDE AND FORMOTEROL FUMARATE DIHYDRATE 2 PUFF: 160; 4.5 AEROSOL RESPIRATORY (INHALATION) at 06:20

## 2024-05-21 NOTE — CASE MANAGEMENT/SOCIAL WORK
Continued Stay Note  [facility]     Patient Name: Spencer Moore  MRN: [id number]  Today's Date: 5/21/2024    Admit Date: 5/17/2024    Plan: [facility]   Discharge Plan       Row Name 05/21/24 1135       Plan    Plan [facility]    Final Discharge Disposition Code 03 - skilled nursing facility (SNF)    Final Note Bipap available at [facility]. Patient is discharged to [facility] today.             Expected Discharge Date and Time       Expected Discharge Date Expected Discharge Time    May 21, 2024         IRMA Charles

## 2024-05-21 NOTE — THERAPY DISCHARGE NOTE
Acute Care - Physical Therapy Discharge Summary  [facility]       Patient Name: Spencer Moore  : 1940  MRN: [id number]    Today's Date: 2024                 Admit Date: 2024      PT Recommendation and Plan    Visit Dx:    ICD-10-CM ICD-9-CM   1. Dysphagia, unspecified type  R13.10 787.20   2. Impaired mobility [Z74.09]  Z74.09 799.89   3. Enlarged prostate  N40.0 600.00   4. Weak urinary stream  R39.12 788.62   5. Spinal stenosis in cervical region  M48.02 723.0        Outcome Measures       Row Name 24 0949             How much help from another person do you currently need...    Turning from your back to your side while in flat bed without using bedrails? 2  -MF      Moving from lying on back to sitting on the side of a flat bed without bedrails? 2  -MF      Moving to and from a bed to a chair (including a wheelchair)? 1  -MF      Standing up from a chair using your arms (e.g., wheelchair, bedside chair)? 1  -MF      Climbing 3-5 steps with a railing? 1  -MF      To walk in hospital room? 1  -MF      AM-PAC 6 Clicks Score (PT) 8  -      Highest Level of Mobility Goal 3 --> Sit at edge of bed  -         Functional Assessment    Outcome Measure Options AM-PAC 6 Clicks Basic Mobility (PT)  -                User Key  (r) = Recorded By, (t) = Taken By, (c) = Cosigned By      Initials Name Provider Type    Charity Larson PTA Physical Therapist Assistant                         PT Rehab Goals       Row Name 24 1605             Bed Mobility Goal 1 (PT)    Activity/Assistive Device (Bed Mobility Goal 1, PT) bed mobility activities, all  -LY      [place] Level/Cues Needed (Bed Mobility Goal 1, PT) minimum assist (75% or more patient effort)  -LY      Time Frame (Bed Mobility Goal 1, PT) 10 days  -LY      Progress/Outcomes (Bed Mobility Goal 1, PT) goal not met  -LY         Transfer Goal 1 (PT)    Activity/Assistive Device (Transfer Goal 1, PT) sit-to-stand/stand-to-sit   -LY      [place] Level/Cues Needed (Transfer Goal 1, PT) minimum assist (75% or more patient effort)  -LY      Time Frame (Transfer Goal 1, PT) 10 days  -LY      Progress/Outcome (Transfer Goal 1, PT) goal not met  -LY         Gait Training Goal 1 (PT)    Activity/Assistive Device (Gait Training Goal 1, PT) gait (walking locomotion);assistive device use  -LY      [place] Level (Gait Training Goal 1, PT) minimum assist (75% or more patient effort)  -LY      Distance (Gait Training Goal 1, PT) 20ft  -LY      Time Frame (Gait Training Goal 1, PT) 10 days  -LY      Progress/Outcome (Gait Training Goal 1, PT) goal not met  -LY                User Key  (r) = Recorded By, (t) = Taken By, (c) = Cosigned By      Initials Name Provider Type Discipline    Kisha Grayson, PTA Physical Therapist Assistant PT                        PT Discharge Summary  Anticipated Discharge Disposition (PT): skilled nursing facility  Reason for Discharge: Discharge from facility  Outcomes Achieved: Refer to plan of care for updates on goals achieved  Discharge Destination: SNF      Kisha Gonzalez PTA   5/21/2024

## 2024-05-21 NOTE — PLAN OF CARE
Goal Outcome Evaluation:               DC to [facility] today. Awaiting EMS transport.

## 2024-05-21 NOTE — THERAPY DISCHARGE NOTE
Acute Care - Occupational Therapy Discharge Summary  [facility]     Patient Name: Spencer Moore  : 1940  MRN: [id number]    Today's Date: 2024                 Admit Date: 2024        OT Recommendation and Plan    Visit Dx:    ICD-10-CM ICD-9-CM   1. Dysphagia, unspecified type  R13.10 787.20   2. Impaired mobility [Z74.09]  Z74.09 799.89   3. Enlarged prostate  N40.0 600.00   4. Weak urinary stream  R39.12 788.62   5. Spinal stenosis in cervical region  M48.02 723.0                OT Rehab Goals       Row Name 24 1300             Transfer Goal 1 (OT)    Activity/Assistive Device (Transfer Goal 1, OT) commode, bedside without drop arms  -CS      [place] Level/Cues Needed (Transfer Goal 1, OT) moderate assist (50-74% patient effort)  -CS      Time Frame (Transfer Goal 1, OT) long term goal (LTG);by discharge  -CS      Progress/Outcome (Transfer Goal 1, OT) goal not met  -CS         Dressing Goal 1 (OT)    Activity/Device (Dressing Goal 1, OT) upper body dressing  -CS      [place]/Cues Needed (Dressing Goal 1, OT) minimum assist (75% or more patient effort)  -CS      Time Frame (Dressing Goal 1, OT) long term goal (LTG);by discharge  -CS      Progress/Outcome (Dressing Goal 1, OT) goal not met  -CS         Grooming Goal 1 (OT)    Activity/Device (Grooming Goal 1, OT) grooming skills, all  -CS      [place] (Grooming Goal 1, OT) minimum assist (75% or more patient effort)  -CS      Time Frame (Grooming Goal 1, OT) long term goal (LTG);by discharge  -CS      Progress/Outcome (Grooming Goal 1, OT) goal not met  -CS                User Key  (r) = Recorded By, (t) = Taken By, (c) = Cosigned By      Initials Name Provider Type Discipline    CS Anuja Yusuf, OTR/L, CNT Occupational Therapist OT                     Outcome Measures       Row Name 24 0949             How much help from another person do you currently need...    Turning from your back to your side while in flat  bed without using bedrails? 2  -MF      Moving from lying on back to sitting on the side of a flat bed without bedrails? 2  -MF      Moving to and from a bed to a chair (including a wheelchair)? 1  -MF      Standing up from a chair using your arms (e.g., wheelchair, bedside chair)? 1  -MF      Climbing 3-5 steps with a railing? 1  -MF      To walk in hospital room? 1  -MF      AM-PAC 6 Clicks Score (PT) 8  -MF      Highest Level of Mobility Goal 3 --> Sit at edge of bed  -MF         Functional Assessment    Outcome Measure Options AM-PAC 6 Clicks Basic Mobility (PT)  -                User Key  (r) = Recorded By, (t) = Taken By, (c) = Cosigned By      Initials Name Provider Type    Charity Larson, PTA Physical Therapist Assistant                            OT Discharge Summary  Anticipated Discharge Disposition (OT): skilled nursing facility  Reason for Discharge: Discharge from facility  Outcomes Achieved: Refer to plan of care for updates on goals achieved  Discharge Destination: SNF      Anuja Yusuf, CLEVELANDR/L, BUZZ  5/21/2024

## 2024-05-21 NOTE — PLAN OF CARE
Problem: Noninvasive Ventilation Acute  Goal: Effective Unassisted Ventilation and Oxygenation  Outcome: Ongoing, Progressing     Problem: Adult Inpatient Plan of Care  Goal: Plan of Care Review  Outcome: Ongoing, Progressing  Flowsheets (Taken 5/21/2024 0359)  Outcome Evaluation: A&OX4. Bedrest. VS stable. O2>92% on Bipap. BM noted this shift. External catheter in place. C/O chronic back pain. PRN pain medication given. Discharge  to [facility] on 05/21/2024. Will continue to monitor. SB/NSR 55-77  Goal: Patient-Specific Goal (Individualized)  Outcome: Ongoing, Progressing  Goal: Absence of Hospital-Acquired Illness or Injury  Outcome: Ongoing, Progressing  Intervention: Identify and Manage Fall Risk  Recent Flowsheet Documentation  Taken 5/21/2024 0200 by Robyn Haile RN  Safety Promotion/Fall Prevention: safety round/check completed  Taken 5/21/2024 0000 by Robyn Haile RN  Safety Promotion/Fall Prevention: safety round/check completed  Taken 5/20/2024 2130 by Robyn Haile RN  Safety Promotion/Fall Prevention: safety round/check completed  Taken 5/20/2024 2000 by Robyn Haile RN  Safety Promotion/Fall Prevention: safety round/check completed  Taken 5/20/2024 1900 by Robyn Haile RN  Safety Promotion/Fall Prevention: safety round/check completed  Intervention: Prevent Skin Injury  Recent Flowsheet Documentation  Taken 5/20/2024 2130 by Robyn Haile RN  Body Position:   supine, legs elevated   weight shifting   sitting up in bed  Skin Protection: adhesive use limited  Taken 5/20/2024 2000 by Robyn Haile RN  Body Position:   turned   supine, legs elevated   sitting up in bed   weight shifting  Intervention: Prevent and Manage VTE (Venous Thromboembolism) Risk  Recent Flowsheet Documentation  Taken 5/20/2024 2130 by Robyn Haile RN  Activity Management: activity encouraged  VTE Prevention/Management: (see mar) other (see comments)  Range of Motion: active ROM (range of motion)  encouraged  Intervention: Prevent Infection  Recent Flowsheet Documentation  Taken 5/20/2024 2130 by Robyn Haile RN  Infection Prevention: single patient room provided  Goal: Optimal Comfort and Wellbeing  Outcome: Ongoing, Progressing  Intervention: Monitor Pain and Promote Comfort  Recent Flowsheet Documentation  Taken 5/20/2024 2312 by Robyn Haile RN  Pain Management Interventions: see MAR  Intervention: Provide Person-Centered Care  Recent Flowsheet Documentation  Taken 5/20/2024 2130 by Robyn Haile RN  Trust Relationship/Rapport:   care explained   questions answered   questions encouraged  Goal: Readiness for Transition of Care  Outcome: Ongoing, Progressing     Problem: Obstructive Sleep Apnea Risk or Actual Comorbidity Management  Goal: Unobstructed Breathing During Sleep  Outcome: Ongoing, Progressing     Problem: Palliative Care  Goal: Enhanced Quality of Life  Outcome: Ongoing, Progressing  Intervention: Maximize Comfort  Recent Flowsheet Documentation  Taken 5/20/2024 2312 by Robyn Haile RN  Pain Management Interventions: see MAR  Taken 5/20/2024 2000 by Robyn Haile RN  Oral Care: mouth wash rinse  Intervention: Optimize Psychosocial Wellbeing  Recent Flowsheet Documentation  Taken 5/20/2024 2130 by Robyn Haile RN  Supportive Measures: self-care encouraged  Family/Support System Care: self-care encouraged   Goal Outcome Evaluation:              Outcome Evaluation: A&OX4. Bedrest. VS stable. O2>92% on Bipap. BM noted this shift. External catheter in place. C/O chronic back pain. PRN pain medication given. Discharge  to [facility] on 05/21/2024. Will continue to monitor. SB/NSR 55-77

## 2024-05-21 NOTE — CASE MANAGEMENT/SOCIAL WORK
Continued Stay Note  [facility]     Patient Name: Spencer Moore  MRN: [id number]  Today's Date: 5/21/2024    Admit Date: 5/17/2024    Plan: [facility]   Discharge Plan       Row Name 05/21/24 0813       Plan    Plan [facility]    Plan Comments Spoke to Sis in admissions at [facility]. Sis says bipap should be delivered this AM and she will notify SW when delivered. Bipap settings needed in dc summary.             IRMA Charles

## 2024-05-22 NOTE — THERAPY DISCHARGE NOTE
Acute Care - Speech Language Pathology Discharge Summary  [facility]       Patient Name: Spencer Moore  : 1940  MRN: [id number]    Today's Date: 2024                   Admit Date: 2024      SLP Recommendation and Plan  Pureed foods and thin liquids    Visit Dx:    ICD-10-CM ICD-9-CM   1. Dysphagia, unspecified type  R13.10 787.20   2. Impaired mobility [Z74.09]  Z74.09 799.89   3. Enlarged prostate  N40.0 600.00   4. Weak urinary stream  R39.12 788.62   5. Spinal stenosis in cervical region  M48.02 723.0                SLP GOALS       Row Name 24 1500 24 0833          (LTG) Swallow    (LTG) Swallow Pt will tolerate LRD w/o any overt s/s of aspiration.  -MB Pt will tolerate LRD w/o any overt s/s of aspiration.  -MD     Carson (Swallow Long Term Goal) with minimal cues (75-90% accuracy)  -MB with minimal cues (75-90% accuracy)  -MD     Time Frame (Swallow Long Term Goal) by discharge  -MB by discharge  -MD     Barriers (Swallow Long Term Goal) n/a  -MB n/a  -MD     Progress/Outcomes (Swallow Long Term Goal) goal partially met  -MB progress slower than expected  -MD        (STG) Patient will tolerate trials of    Consistencies Trialed (Tolerate trials) soft to chew (whole) textures;regular textures;pureed textures;thin liquids  -MB soft to chew (whole) textures;regular textures;pureed textures;thin liquids  -MD     Desired Outcome (Tolerate trials) without signs/symptoms of aspiration;without signs of distress  -MB without signs/symptoms of aspiration;without signs of distress  -MD     Carson (Tolerate trials) with minimal cues (75-90% accuracy)  -MB with minimal cues (75-90% accuracy)  -MD     Time Frame (Tolerate trials) by discharge  -MB by discharge  -MD     Progress/Outcomes (Tolerate trials) goal partially met  -MB progress slower than expected  -MD               User Key  (r) = Recorded By, (t) = Taken By, (c) = Cosigned By      Initials Name Provider Type    MB  Katrin Smith CCC-SLP Speech and Language Pathologist    Ivett Mccullough, SLP Speech and Language Pathologist                    [email or web address]@      SLP Discharge Summary  Anticipated Discharge Disposition (SLP): skilled nursing facility  Reason for Discharge: discharge from this facility  Progress Toward Achieving Short/long Term Goals: goals partially met within established timelines  Discharge Destination: SNF      Katrin Smith CCC-SLP  5/22/2024

## 2024-06-27 ENCOUNTER — TELEPHONE (OUTPATIENT)
Dept: ONCOLOGY | Facility: CLINIC | Age: 84
End: 2024-06-27

## 2024-06-27 NOTE — TELEPHONE ENCOUNTER
Caller: Orestes Larsen  (POA)    Relationship to patient: Emergency Contact    Best call back number: [phone]    Chief complaint: CALLED TO CANCEL AND NOT RESCHEDULE, PATIENT IS IN THE NURSING HOME AND WILL NOT BE COMING OUT      Type of visit: FOLLOW UP       If rescheduling, when is the original appointment: 8-7-24

## 2024-08-05 ENCOUNTER — OFFICE VISIT (OUTPATIENT)
Dept: PALLATIVE CARE | Age: 84
End: 2024-08-05

## 2024-08-05 DIAGNOSIS — R06.09 DYSPNEA ON EXERTION: Primary | ICD-10-CM

## 2024-08-05 DIAGNOSIS — Z74.1 REQUIRES ASSISTANCE WITH ACTIVITIES OF DAILY LIVING (ADL): ICD-10-CM

## 2024-08-05 DIAGNOSIS — Z51.5 ENCOUNTER FOR PALLIATIVE CARE: ICD-10-CM

## 2024-08-07 NOTE — PROGRESS NOTES
Supportive Care/Community Based Palliative Care  Initial Consultation Note      Patient Name:  Benitez Miranda  Medical Record Number:  [id number]  YOB: 1940    Date of Visit: 8/5/2024  Location of Visit:  [facility] (admission date 5/21/2024)    Referring Provider: [facility]  Patient Care Team:  Kobe Rob DO as PCP - General (Internal Medicine)  Marlee Dolan APRN - CNP as Advanced Practice Nurse (Nurse Practitioner)    Reason for Consult:   Goals of care   Symptom Management    ACP  Family Support  Patient Support    History obtained from:  patient, non-family caregiver - POA, electronic medical record    PALLIATIVE DIAGNOSES AND ORDERS/RECOMMENDATIONS/PLAN:   1. Dyspnea on exertion  Supplemental oxygen as needed  CPAP at night  Continue HF regimen    2. Requires assistance with activities of daily living (ADL)  Continue facility level of care    3. Encounter for palliative care  Current goals of care include: Continue treatment with palliative intent, Preserve independence/control/autonomy, Maintain or improve quality of life, Focus on comfort and quality of life, Does not want aggressive care or procedures, Wants comfort focused treatment without hospitalization, Achievement of a peaceful death    Code status confirmed: DNC  Follow up home visit in 1-2 weeks and as needed    CHIEF COMPLAINT:     Chief Complaint   Patient presents with    Referral - General     Goals of care       CLINICAL SUMMARY:          Benitez Miranda is a 84 y.o. 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    Hospital admission in May 2024 for GI bleed, during flex sig patient went into a. Fib RVR, underwent left heart cath and was found to have multivessel CAD, not surgical candidate, met criteria

## 2024-08-08 PROBLEM — R06.09 DYSPNEA ON EXERTION: Status: ACTIVE | Noted: 2024-08-08

## 2024-08-13 ENCOUNTER — OFFICE VISIT (OUTPATIENT)
Dept: PALLATIVE CARE | Age: 84
End: 2024-08-13

## 2024-08-13 DIAGNOSIS — Z74.1 REQUIRES ASSISTANCE WITH ACTIVITIES OF DAILY LIVING (ADL): Primary | ICD-10-CM

## 2024-08-13 DIAGNOSIS — Z51.5 ENCOUNTER FOR PALLIATIVE CARE: ICD-10-CM

## 2024-08-13 NOTE — PROGRESS NOTES
Supportive Care/Community Based Palliative Care  Follow Up Note        Patient Name:  Benitez Miranda  Medical Record Number:  [id number]  YOB: 1940    Date of Visit: 8/13/2024  Location of Visit:  [facility] (admission date 5/21/2024)    Patient Care Team:  Kobe Rob DO as PCP - General (Internal Medicine)  Marlee Dolan APRN - CNP as Advanced Practice Nurse (Nurse Practitioner)    History obtained from:  patient, electronic medical record    PALLIATIVE DIAGNOSES AND ORDERS/RECOMMENDATIONS/PLAN:   1. Requires assistance with activities of daily living (ADL)  Continue 24 hour care    2. Encounter for palliative care  Current goals of care include: Continue treatment with palliative intent, Preserve independence/control/autonomy, Maintain or improve functional status, Maintain or improve quality of life, Focus on comfort and quality of life, Does not want aggressive care or procedures, Achievement of a peaceful death    Code status confirmed: DNRCC  Follow up home visit in 1-2 weeks and as needed    CHIEF COMPLAINT:     Chief Complaint   Patient presents with    Follow-up     Symptom monitoring        CLINICAL SUMMARY:          Benitez Miranda is a 84 y.o. 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     Hospital admission in May 2024 for GI bleed, during flex sig patient went into a. Fib RVR, underwent left heart cath and was found to have multivessel CAD, not surgical candidate, met criteria for discharge and was discharged to [facility].    HPI AND VISIT SUMMARY:   I saw Benitez Miranda in His room at [facility]. Upon my arrival patient was noted to be awake, alert, oriented, in no distress.    The patient reports he is feeling well this morning. He denies chest pain and

## 2024-08-26 ENCOUNTER — OFFICE VISIT (OUTPATIENT)
Dept: PALLATIVE CARE | Age: 84
End: 2024-08-26
Payer: MEDICARE

## 2024-08-26 DIAGNOSIS — I25.119 CHEST PAIN DUE TO CAD (HCC): Primary | ICD-10-CM

## 2024-08-26 DIAGNOSIS — R06.09 DYSPNEA ON EXERTION: ICD-10-CM

## 2024-08-26 DIAGNOSIS — Z51.5 ENCOUNTER FOR PALLIATIVE CARE: ICD-10-CM

## 2024-08-26 DIAGNOSIS — I25.110 CORONARY ARTERY DISEASE INVOLVING NATIVE CORONARY ARTERY OF NATIVE HEART WITH UNSTABLE ANGINA PECTORIS (HCC): ICD-10-CM

## 2024-08-26 PROCEDURE — 1123F ACP DISCUSS/DSCN MKR DOCD: CPT | Performed by: NURSE PRACTITIONER

## 2024-08-26 PROCEDURE — 99305 1ST NF CARE MODERATE MDM 35: CPT | Performed by: NURSE PRACTITIONER
